# Patient Record
Sex: MALE | Race: WHITE | Employment: UNEMPLOYED | URBAN - METROPOLITAN AREA
[De-identification: names, ages, dates, MRNs, and addresses within clinical notes are randomized per-mention and may not be internally consistent; named-entity substitution may affect disease eponyms.]

---

## 2017-03-03 DIAGNOSIS — F41.9 ANXIETY AND DEPRESSION: ICD-10-CM

## 2017-03-03 DIAGNOSIS — F32.A ANXIETY AND DEPRESSION: ICD-10-CM

## 2017-03-06 RX ORDER — PAROXETINE HYDROCHLORIDE 20 MG/1
TABLET, FILM COATED ORAL
Qty: 60 TAB | Refills: 3 | Status: SHIPPED | OUTPATIENT
Start: 2017-03-06 | End: 2017-08-11 | Stop reason: SDUPTHER

## 2017-03-06 RX ORDER — CLONAZEPAM 0.5 MG/1
0.5 TABLET ORAL DAILY
Qty: 30 TAB | Refills: 0 | Status: SHIPPED | OUTPATIENT
Start: 2017-03-06 | End: 2017-12-21 | Stop reason: SDUPTHER

## 2017-04-03 ENCOUNTER — OFFICE VISIT (OUTPATIENT)
Dept: FAMILY MEDICINE CLINIC | Age: 32
End: 2017-04-03

## 2017-04-03 VITALS
BODY MASS INDEX: 30.85 KG/M2 | TEMPERATURE: 98.7 F | WEIGHT: 240.4 LBS | HEART RATE: 82 BPM | RESPIRATION RATE: 16 BRPM | HEIGHT: 74 IN | SYSTOLIC BLOOD PRESSURE: 180 MMHG | DIASTOLIC BLOOD PRESSURE: 106 MMHG

## 2017-04-03 DIAGNOSIS — I10 ESSENTIAL HYPERTENSION: Primary | ICD-10-CM

## 2017-04-03 DIAGNOSIS — L29.8 PRURITIC ERYTHEMATOUS RASH: ICD-10-CM

## 2017-04-03 RX ORDER — CLOTRIMAZOLE AND BETAMETHASONE DIPROPIONATE 10; .64 MG/G; MG/G
CREAM TOPICAL
Qty: 60 G | Refills: 0 | Status: SHIPPED | OUTPATIENT
Start: 2017-04-03 | End: 2017-12-21 | Stop reason: ALTCHOICE

## 2017-04-03 RX ORDER — LISINOPRIL AND HYDROCHLOROTHIAZIDE 12.5; 2 MG/1; MG/1
1 TABLET ORAL DAILY
Qty: 30 TAB | Refills: 0 | Status: SHIPPED | OUTPATIENT
Start: 2017-04-03 | End: 2017-04-30 | Stop reason: SDUPTHER

## 2017-04-03 RX ORDER — FAMOTIDINE 20 MG/1
20 TABLET, FILM COATED ORAL 2 TIMES DAILY
COMMUNITY
End: 2017-12-21 | Stop reason: ALTCHOICE

## 2017-04-03 NOTE — PROGRESS NOTES
Chief Complaint   Patient presents with    Medication Refill    Hypertension     Increase BPs     Patient sts has concerns with Blood Pressure. 1. Have you been to the ER, urgent care clinic since your last visit? Hospitalized since your last visit? No    2. Have you seen or consulted any other health care providers outside of the [address] [place] [address] since your last visit? Include any pap smears or colon screening.  No

## 2017-04-03 NOTE — PATIENT INSTRUCTIONS
Home Blood Pressure Test: About This Test  What is it? A home blood pressure test allows you to keep track of your blood pressure at home. Blood pressure is a measure of the force of blood against the walls of your arteries. Blood pressure readings include two numbers, such as 130/80 (say \"130 over 80\"). The first number is the systolic pressure. The second number is the diastolic pressure. Why is this test done? You may do this test at home to:  · Find out if you have high blood pressure. · Track your blood pressure if you have high blood pressure. · Track how well medicine is working to reduce high blood pressure. · Check how lifestyle changes, such as weight loss and exercise, are affecting blood pressure. How can you prepare for the test?  · Do not use caffeine, tobacco, or medicines known to raise blood pressure (such as nasal decongestant sprays) for at least 30 minutes before taking your blood pressure. · Do not exercise for at least 30 minutes before taking your blood pressure. What happens before the test?  Take your blood pressure while you feel comfortable and relaxed. Sit quietly with both feet on the floor for at least 5 minutes before the test.  What happens during the test?  · Sit with your arm slightly bent and resting on a table so that your upper arm is at the same level as your heart. · Roll up your sleeve or take off your shirt to expose your upper arm. · Wrap the blood pressure cuff around your upper arm so that the lower edge of the cuff is about 1 inch above the bend of your elbow. Proceed with the following steps depending on if you are using an automatic or manual pressure monitor. Automatic blood pressure monitors  · Press the on/off button on the automatic monitor and wait until the ready-to-measure \"heart\" symbol appears next to zero in the display window. · Press the start button. The cuff will inflate and deflate by itself.   · Your blood pressure numbers will appear on the screen. · Write your numbers in your log book, along with the date and time. Manual blood pressure monitors  · Place the earpieces of a stethoscope in your ears, and place the bell of the stethoscope over the artery, just below the cuff. · Close the valve on the rubber inflating bulb. · Squeeze the bulb rapidly with your opposite hand to inflate the cuff until the dial or column of mercury reads about 30 mm Hg higher than your usual systolic pressure. If you do not know your usual pressure, inflate the cuff to 210 mm Hg or until the pulse at your wrist disappears. · Open the pressure valve just slightly by twisting or pressing the valve on the bulb. · As you watch the pressure slowly fall, note the level on the dial at which you first start to hear a pulsing or tapping sound through the stethoscope. This is your systolic blood pressure. · Continue letting the air out slowly. The sounds will become muffled and will finally disappear. Note the pressure when the sounds completely disappear. This is your diastolic blood pressure. Let out all the remaining air. · Write your numbers in your log book, along with the date and time. What else should you know about the test?  Results for adults ages 25 and older (mm Hg):  · Normal (ideal): Systolic 372 or below. Diastolic 79 or below. · Prehypertension: Systolic 272 to 249. Diastolic 80 to 89. · Hypertension: Systolic 103 or above. Diastolic 90 or above. Follow-up care is a key part of your treatment and safety. Be sure to make and go to all appointments, and call your doctor if you are having problems. It's also a good idea to keep a list of the medicines you take. Where can you learn more? Go to http://alistair-kalpesh.info/. Enter C427 in the search box to learn more about \"Home Blood Pressure Test: About This Test.\"  Current as of: January 27, 2016  Content Version: 11.2  © 8863-5223 [employer].  Care instructions adapted under license by [employer] (which disclaims liability or warranty for this information). If you have questions about a medical condition or this instruction, always ask your healthcare professional. [address] any warranty or liability for your use of this information. DASH Diet: Care Instructions  Your Care Instructions  The DASH diet is an eating plan that can help lower your blood pressure. DASH stands for Dietary Approaches to Stop Hypertension. Hypertension is high blood pressure. The DASH diet focuses on eating foods that are high in calcium, potassium, and magnesium. These nutrients can lower blood pressure. The foods that are highest in these nutrients are fruits, vegetables, low-fat dairy products, nuts, seeds, and legumes. But taking calcium, potassium, and magnesium supplements instead of eating foods that are high in those nutrients does not have the same effect. The DASH diet also includes whole grains, fish, and poultry. The DASH diet is one of several lifestyle changes your doctor may recommend to lower your high blood pressure. Your doctor may also want you to decrease the amount of sodium in your diet. Lowering sodium while following the DASH diet can lower blood pressure even further than just the DASH diet alone. Follow-up care is a key part of your treatment and safety. Be sure to make and go to all appointments, and call your doctor if you are having problems. It's also a good idea to know your test results and keep a list of the medicines you take. How can you care for yourself at home? Following the DASH diet  · Eat 4 to 5 servings of fruit each day. A serving is 1 medium-sized piece of fruit, ½ cup chopped or canned fruit, 1/4 cup dried fruit, or 4 ounces (½ cup) of fruit juice. Choose fruit more often than fruit juice. · Eat 4 to 5 servings of vegetables each day.  A serving is 1 cup of lettuce or raw leafy vegetables, ½ cup of chopped or cooked vegetables, or 4 ounces (½ cup) of vegetable juice. Choose vegetables more often than vegetable juice. · Get 2 to 3 servings of low-fat and fat-free dairy each day. A serving is 8 ounces of milk, 1 cup of yogurt, or 1 ½ ounces of cheese. · Eat 6 to 8 servings of grains each day. A serving is 1 slice of bread, 1 ounce of dry cereal, or ½ cup of cooked rice, pasta, or cooked cereal. Try to choose whole-grain products as much as possible. · Limit lean meat, poultry, and fish to 2 servings each day. A serving is 3 ounces, about the size of a deck of cards. · Eat 4 to 5 servings of nuts, seeds, and legumes (cooked dried beans, lentils, and split peas) each week. A serving is 1/3 cup of nuts, 2 tablespoons of seeds, or ½ cup of cooked beans or peas. · Limit fats and oils to 2 to 3 servings each day. A serving is 1 teaspoon of vegetable oil or 2 tablespoons of salad dressing. · Limit sweets and added sugars to 5 servings or less a week. A serving is 1 tablespoon jelly or jam, ½ cup sorbet, or 1 cup of lemonade. · Eat less than 2,300 milligrams (mg) of sodium a day. If you limit your sodium to 1,500 mg a day, you can lower your blood pressure even more. Tips for success  · Start small. Do not try to make dramatic changes to your diet all at once. You might feel that you are missing out on your favorite foods and then be more likely to not follow the plan. Make small changes, and stick with them. Once those changes become habit, add a few more changes. · Try some of the following:  ¨ Make it a goal to eat a fruit or vegetable at every meal and at snacks. This will make it easy to get the recommended amount of fruits and vegetables each day. ¨ Try yogurt topped with fruit and nuts for a snack or healthy dessert. ¨ Add lettuce, tomato, cucumber, and onion to sandwiches. ¨ Combine a ready-made pizza crust with low-fat mozzarella cheese and lots of vegetable toppings.  Try using tomatoes, squash, spinach, broccoli, carrots, cauliflower, and onions. ¨ Have a variety of cut-up vegetables with a low-fat dip as an appetizer instead of chips and dip. ¨ Sprinkle sunflower seeds or chopped almonds over salads. Or try adding chopped walnuts or almonds to cooked vegetables. ¨ Try some vegetarian meals using beans and peas. Add garbanzo or kidney beans to salads. Make burritos and tacos with mashed pelaez beans or black beans. Where can you learn more? Go to http://alistairSWEEPiOkalpesh.info/. Enter C490 in the search box to learn more about \"DASH Diet: Care Instructions. \"  Current as of: March 23, 2016  Content Version: 11.2  © 9743-2945 [employer]. Care instructions adapted under license by [employer] (which disclaims liability or warranty for this information). If you have questions about a medical condition or this instruction, always ask your healthcare professional. [address] any warranty or liability for your use of this information.

## 2017-04-03 NOTE — PROGRESS NOTES
Chief Complaint   Patient presents with    Medication Refill    Hypertension     Increase BPs       HPI:  Patient is a 33year old  male presents today for follow up of Hypertension. He was previously on Lisinopril 5 mg daily for hypertension which he has not taken in about 2 years and   has been using herbal preparations to try to control BP as well as diet and exercise. Blood pressure is elevated at the office today initially at 160/100 taken by nurse with repeat by me at 180/106. He endorsed recent erythematous scaly rash on the posterior right calf that he noticed yesterday, otherwise he feels well and denies fever, chills, headache, dizziness, blurry vision, CP, or SOB. Past Medical History:   Diagnosis Date    Anxiety     Cholelithiasis 08/18/2013    Note on U/S    Chronic gastritis 10/17/2014    Dr. Rosmery Shipley (EGD Result) H. Pylori Neg    Depression     Dog bite of left hand including fingers with infection 11/16/2014    Adm 11/18/14 for IV antibiotics.  ETOH abuse     GERD (gastroesophageal reflux disease)     Hepatic steatosis 02/06/2014    Noted on U/S.     History of plasmapheresis 02/2014    Hypertriglyceridemia, Severe Pancreatitis    Hypertension     Hypertriglyceridemia     Hypomagnesemia 07/24/2016    1.6 mg/dL    Non compliance with medical treatment     Osteomyelitis of left hand ([address] 11/22/2014    MRI Finding @ LUE 2nd MCP joint.  Pancreatitis 08/17/2013    Highest on Record (2/6/14) 2723. Dr. Rosmery Shipley    PICC (peripherally inserted central catheter) in place 41/86/1758    R Basilic Vein (IV Antibiotics for LUE Hand Osteomyelitis).  Rectal bleeding     Substance abuse     Several UDS + Opiates.      Vitamin D deficiency 02/17/2014    11.6ng/mL ( ng/mL)     Allergies   Allergen Reactions    Amoxicillin Swelling    Contrast Dye [Iodine] Hives     Pt had iv contrast prior to this study without problems, developed hives on 07/04/15 5 minutes post injection    Penicillins Hives and Swelling    Wellbutrin [Bupropion] Other (comments)     Caused aggression     Current Outpatient Prescriptions   Medication Sig Dispense Refill    GRAPE SEED EXTRACT (GRAPE SEED PO) Take  by mouth.  famotidine (PEPCID) 20 mg tablet Take 20 mg by mouth two (2) times a day.  UBIDECARENONE/VITAMIN E MIXED (COQ10  PO) Take  by mouth.  VITAMIN B COMPLEX VIT C NO.4 (SUPER B COMPLEX + C PO) Take  by mouth.  lisinopril-hydroCHLOROthiazide (PRINZIDE, ZESTORETIC) 20-12.5 mg per tablet Take 1 Tab by mouth daily. 30 Tab 0    clotrimazole-betamethasone (LOTRISONE) topical cream Sig: Apply BID to affected area 60 g 0    clonazePAM (KLONOPIN) 0.5 mg tablet Take 1 Tab by mouth daily. Max Daily Amount: 0.5 mg. 30 Tab 0    PARoxetine (PAXIL) 20 mg tablet TAKE TWO TABLETS BY MOUTH ONCE DAILY 60 Tab 3    loratadine (CLARITIN) 10 mg tablet Take 10 mg by mouth daily. Indications: ALLERGIC RHINITIS      fluticasone (FLONASE) 50 mcg/actuation nasal spray 2 Sprays by Both Nostrils route daily. Indications: ALLERGIC RHINITIS      lisinopril (PRINIVIL, ZESTRIL) 5 mg tablet Take 1 Tab by mouth daily. Indications: HYPERTENSION 30 Tab 2    gemfibrozil (LOPID) 600 mg tablet TAKE ONE TABLET BY MOUTH TWICE DAILY 180 Tab 0         ROS:  Pertinent as in HPI        Physical Exam:  Visit Vitals    BP (!) 180/106    Pulse 82    Temp 98.7 °F (37.1 °C) (Oral)    Resp 16    Ht 6' 2\" (1.88 m)    Wt 240 lb 6.4 oz (109 kg)    BMI 30.87 kg/m2     General: a & o x 3, afebrile, well-nourished, interacting appropriately, in no acute distress  Respiratory: symmetrical chest expansion, lung sounds clear bilaterally  Cardiovascular: normal S1S2, regular rate and rhythm  Skin: Erythematous scaly rash noted on the posterior right calf      Assessment/Plan:    ICD-10-CM ICD-9-CM    1.  Essential hypertension I10 401.9 Uncontrolled  Lisinopril-HCTZ 20-12.5 mg daily started today and he was counseled on low salt diet. He was advised on home BP monitoring and keep diary to present at f/u in 1 week. lisinopril-hydroCHLOROthiazide (PRINZIDE, ZESTORETIC) 20-12.5 mg per tablet   2. Pruritic erythematous rash L29.8 698.8 Lotrisone cream to be applied BID to affected area started today. clotrimazole-betamethasone (LOTRISONE) topical cream           Orders Placed This Encounter    GRAPE SEED EXTRACT (GRAPE SEED PO)     Sig: Take  by mouth.  famotidine (PEPCID) 20 mg tablet     Sig: Take 20 mg by mouth two (2) times a day.  UBIDECARENONE/VITAMIN E MIXED (COQ10  PO)     Sig: Take  by mouth.  VITAMIN B COMPLEX VIT C NO.4 (SUPER B COMPLEX + C PO)     Sig: Take  by mouth.  lisinopril-hydroCHLOROthiazide (PRINZIDE, ZESTORETIC) 20-12.5 mg per tablet     Sig: Take 1 Tab by mouth daily. Dispense:  30 Tab     Refill:  0    clotrimazole-betamethasone (LOTRISONE) topical cream     Sig: Sig: Apply BID to affected area     Dispense:  60 g     Refill:  0         Additional Notes: Discussed today's diagnosis, treatment plans. Discussed medication indications and side effects. After Visit Summary: Discussed provided printed patient instructions. Answered questions accordingly.   Follow-up Disposition: In 1 week for Hypertension          Lamont Thrasher DO, MPH  Internal Medicine

## 2017-04-03 NOTE — MR AVS SNAPSHOT
Visit Information Date & Time Provider Department Dept. Phone Encounter #  
 4/3/2017 12:30 PM Martine Ta, [address]  [id number] Follow-up Instructions Return in about 1 week (around 4/10/2017). Upcoming Health Maintenance Date Due INFLUENZA AGE 9 TO ADULT 8/1/2016 DTaP/Tdap/Td series (2 - Td) 11/17/2024 Allergies as of 4/3/2017  Review Complete On: 4/3/2017 By: Deng Guzman LPN Severity Noted Reaction Type Reactions Amoxicillin  12/28/2014    Swelling Contrast Dye [Iodine]  07/04/2015   Topical Hives Pt had iv contrast prior to this study without problems, developed hives on 07/04/15 5 minutes post injection Penicillins  05/27/2015    Hives, Swelling Wellbutrin [Bupropion]  10/03/2012    Other (comments) Caused aggression Current Immunizations  Never Reviewed Name Date Tdap 11/17/2014 12:17 PM  
  
 Not reviewed this visit You Were Diagnosed With   
  
 Codes Comments Essential hypertension    -  Primary ICD-10-CM: I10 
ICD-9-CM: 401.9 Vitals BP Pulse Temp Resp Height(growth percentile) Weight(growth percentile) (!) 160/100 (BP 1 Location: Left arm, BP Patient Position: Sitting) 82 98.7 °F (37.1 °C) (Oral) 16 6' 2\" (1.88 m) 240 lb 6.4 oz (109 kg) BMI Smoking Status 30.87 kg/m2 Former Smoker Vitals History BMI and BSA Data Body Mass Index Body Surface Area  
 30.87 kg/m 2 2.39 m 2 Preferred Pharmacy Pharmacy Name Phone [facility] [address] [phone] Your Updated Medication List  
  
   
This list is accurate as of: 4/3/17  1:51 PM.  Always use your most recent med list.  
  
  
  
  
 clonazePAM 0.5 mg tablet Commonly known as:  [place] Take 1 Tab by mouth daily. Max Daily Amount: 0.5 mg.  
  
 COQ10  PO Take  by mouth. fluticasone 50 mcg/actuation nasal spray Commonly known as:  Carmarilyn Lenugusick 2 Sprays by Both Nostrils route daily. Indications: ALLERGIC RHINITIS  
  
 gemfibrozil 600 mg tablet Commonly known as:  LOPID TAKE ONE TABLET BY MOUTH TWICE DAILY  
  
 GRAPE SEED PO Take  by mouth.  
  
 lisinopril 5 mg tablet Commonly known as:  Duldanie Vallejoshalini Take 1 Tab by mouth daily. Indications: HYPERTENSION  
  
 lisinopril-hydroCHLOROthiazide 20-12.5 mg per tablet Commonly known as:  Vallerie Irwin Take 1 Tab by mouth daily. loratadine 10 mg tablet Commonly known as:  Efrain Meo Take 10 mg by mouth daily. Indications: ALLERGIC RHINITIS PARoxetine 20 mg tablet Commonly known as:  PAXIL TAKE TWO TABLETS BY MOUTH ONCE DAILY PEPCID 20 mg tablet Generic drug:  famotidine Take 20 mg by mouth two (2) times a day. SUPER B COMPLEX + C PO Take  by mouth. Prescriptions Sent to Pharmacy Refills  
 lisinopril-hydroCHLOROthiazide (PRINZIDE, ZESTORETIC) 20-12.5 mg per tablet 0 Sig: Take 1 Tab by mouth daily. Class: Normal  
 Pharmacy: [address] [facility]  #: [phone] Route: Oral  
  
Follow-up Instructions Return in about 1 week (around 4/10/2017). Patient Instructions Home Blood Pressure Test: About This Test 
What is it? A home blood pressure test allows you to keep track of your blood pressure at home. Blood pressure is a measure of the force of blood against the walls of your arteries. Blood pressure readings include two numbers, such as 130/80 (say \"130 over 80\"). The first number is the systolic pressure. The second number is the diastolic pressure. Why is this test done? You may do this test at home to: · Find out if you have high blood pressure. · Track your blood pressure if you have high blood pressure. · Track how well medicine is working to reduce high blood pressure. · Check how lifestyle changes, such as weight loss and exercise, are affecting blood pressure. How can you prepare for the test? 
· Do not use caffeine, tobacco, or medicines known to raise blood pressure (such as nasal decongestant sprays) for at least 30 minutes before taking your blood pressure. · Do not exercise for at least 30 minutes before taking your blood pressure. What happens before the test? 
Take your blood pressure while you feel comfortable and relaxed. Sit quietly with both feet on the floor for at least 5 minutes before the test. 
What happens during the test? 
· Sit with your arm slightly bent and resting on a table so that your upper arm is at the same level as your heart. · Roll up your sleeve or take off your shirt to expose your upper arm. · Wrap the blood pressure cuff around your upper arm so that the lower edge of the cuff is about 1 inch above the bend of your elbow. Proceed with the following steps depending on if you are using an automatic or manual pressure monitor. Automatic blood pressure monitors · Press the on/off button on the automatic monitor and wait until the ready-to-measure \"heart\" symbol appears next to zero in the display window. · Press the start button. The cuff will inflate and deflate by itself. · Your blood pressure numbers will appear on the screen. · Write your numbers in your log book, along with the date and time. Manual blood pressure monitors · Place the earpieces of a stethoscope in your ears, and place the bell of the stethoscope over the artery, just below the cuff. · Close the valve on the rubber inflating bulb. · Squeeze the bulb rapidly with your opposite hand to inflate the cuff until the dial or column of mercury reads about 30 mm Hg higher than your usual systolic pressure. If you do not know your usual pressure, inflate the cuff to 210 mm Hg or until the pulse at your wrist disappears. · Open the pressure valve just slightly by twisting or pressing the valve on the bulb. · As you watch the pressure slowly fall, note the level on the dial at which you first start to hear a pulsing or tapping sound through the stethoscope. This is your systolic blood pressure. · Continue letting the air out slowly. The sounds will become muffled and will finally disappear. Note the pressure when the sounds completely disappear. This is your diastolic blood pressure. Let out all the remaining air. · Write your numbers in your log book, along with the date and time. What else should you know about the test? 
Results for adults ages 25 and older (mm Hg): · Normal (ideal): Systolic 860 or below. Diastolic 79 or below. · Prehypertension: Systolic 157 to 511. Diastolic 80 to 89. · Hypertension: Systolic 906 or above. Diastolic 90 or above. Follow-up care is a key part of your treatment and safety. Be sure to make and go to all appointments, and call your doctor if you are having problems. It's also a good idea to keep a list of the medicines you take. Where can you learn more? Go to http://alistair-kalpesh.info/. Enter C427 in the search box to learn more about \"Home Blood Pressure Test: About This Test.\" Current as of: January 27, 2016 Content Version: 11.2 © 4765-4906 RoomReveal. Care instructions adapted under license by [employer]alo (which disclaims liability or warranty for this information). If you have questions about a medical condition or this instruction, always ask your healthcare professional. [place] [address] any warranty or liability for your use of this information. DASH Diet: Care Instructions Your Care Instructions The DASH diet is an eating plan that can help lower your blood pressure. DASH stands for Dietary Approaches to Stop Hypertension. Hypertension is high blood pressure. The DASH diet focuses on eating foods that are high in calcium, potassium, and magnesium. These nutrients can lower blood pressure. The foods that are highest in these nutrients are fruits, vegetables, low-fat dairy products, nuts, seeds, and legumes. But taking calcium, potassium, and magnesium supplements instead of eating foods that are high in those nutrients does not have the same effect. The DASH diet also includes whole grains, fish, and poultry. The DASH diet is one of several lifestyle changes your doctor may recommend to lower your high blood pressure. Your doctor may also want you to decrease the amount of sodium in your diet. Lowering sodium while following the DASH diet can lower blood pressure even further than just the DASH diet alone. Follow-up care is a key part of your treatment and safety. Be sure to make and go to all appointments, and call your doctor if you are having problems. It's also a good idea to know your test results and keep a list of the medicines you take. How can you care for yourself at home? Following the DASH diet · Eat 4 to 5 servings of fruit each day. A serving is 1 medium-sized piece of fruit, ½ cup chopped or canned fruit, 1/4 cup dried fruit, or 4 ounces (½ cup) of fruit juice. Choose fruit more often than fruit juice. · Eat 4 to 5 servings of vegetables each day. A serving is 1 cup of lettuce or raw leafy vegetables, ½ cup of chopped or cooked vegetables, or 4 ounces (½ cup) of vegetable juice. Choose vegetables more often than vegetable juice. · Get 2 to 3 servings of low-fat and fat-free dairy each day. A serving is 8 ounces of milk, 1 cup of yogurt, or 1 ½ ounces of cheese. · Eat 6 to 8 servings of grains each day. A serving is 1 slice of bread, 1 ounce of dry cereal, or ½ cup of cooked rice, pasta, or cooked cereal. Try to choose whole-grain products as much as possible. · Limit lean meat, poultry, and fish to 2 servings each day.  A serving is 3 ounces, about the size of a deck of cards. · Eat 4 to 5 servings of nuts, seeds, and legumes (cooked dried beans, lentils, and split peas) each week. A serving is 1/3 cup of nuts, 2 tablespoons of seeds, or ½ cup of cooked beans or peas. · Limit fats and oils to 2 to 3 servings each day. A serving is 1 teaspoon of vegetable oil or 2 tablespoons of salad dressing. · Limit sweets and added sugars to 5 servings or less a week. A serving is 1 tablespoon jelly or jam, ½ cup sorbet, or 1 cup of lemonade. · Eat less than 2,300 milligrams (mg) of sodium a day. If you limit your sodium to 1,500 mg a day, you can lower your blood pressure even more. Tips for success · Start small. Do not try to make dramatic changes to your diet all at once. You might feel that you are missing out on your favorite foods and then be more likely to not follow the plan. Make small changes, and stick with them. Once those changes become habit, add a few more changes. · Try some of the following: ¨ Make it a goal to eat a fruit or vegetable at every meal and at snacks. This will make it easy to get the recommended amount of fruits and vegetables each day. ¨ Try yogurt topped with fruit and nuts for a snack or healthy dessert. ¨ Add lettuce, tomato, cucumber, and onion to sandwiches. ¨ Combine a ready-made pizza crust with low-fat mozzarella cheese and lots of vegetable toppings. Try using tomatoes, squash, spinach, broccoli, carrots, cauliflower, and onions. ¨ Have a variety of cut-up vegetables with a low-fat dip as an appetizer instead of chips and dip. ¨ Sprinkle sunflower seeds or chopped almonds over salads. Or try adding chopped walnuts or almonds to cooked vegetables. ¨ Try some vegetarian meals using beans and peas. Add garbanzo or kidney beans to salads. Make burritos and tacos with mashed pelaez beans or black beans. Where can you learn more? Go to http://sainz-kalpesh.info/. Enter H974 in the search box to learn more about \"DASH Diet: Care Instructions. \" Current as of: March 23, 2016 Content Version: 11.2 © 8960-2654 ViS. Care instructions adapted under license by "[employer] (which disclaims liability or warranty for this information). If you have questions about a medical condition or this instruction, always ask your healthcare professional. [address] any warranty or liability for your use of this information. Introducing [facility] & [facility]! Dear Beatrice Mart: Thank you for requesting a [email or web address] account. Our records indicate that you already have an active [email or web address] account. You can access your account anytime at [email or web address] Did you know that you can access your hospital and ER discharge instructions at any time in [email or web address]? You can also review all of your test results from your hospital stay or ER visit. Additional Information If you have questions, please visit the Frequently Asked Questions section of the [email or web address] website at [email or web address] Remember, [email or web address] is NOT to be used for urgent needs. For medical emergencies, dial 911. Now available from your iPhone and Android! Please provide this summary of care documentation to your next provider. Your primary care clinician is listed as Liz Gonzalez. If you have any questions after today's visit, please call [phone].

## 2017-04-10 ENCOUNTER — OFFICE VISIT (OUTPATIENT)
Dept: FAMILY MEDICINE CLINIC | Age: 32
End: 2017-04-10

## 2017-04-10 DIAGNOSIS — I10 ESSENTIAL HYPERTENSION: Primary | ICD-10-CM

## 2017-04-30 DIAGNOSIS — I10 ESSENTIAL HYPERTENSION: ICD-10-CM

## 2017-05-01 RX ORDER — LISINOPRIL AND HYDROCHLOROTHIAZIDE 12.5; 2 MG/1; MG/1
TABLET ORAL
Qty: 30 TAB | Refills: 0 | Status: SHIPPED | OUTPATIENT
Start: 2017-05-01 | End: 2017-06-04 | Stop reason: SDUPTHER

## 2017-06-05 ENCOUNTER — TELEPHONE (OUTPATIENT)
Dept: FAMILY MEDICINE CLINIC | Age: 32
End: 2017-06-05

## 2017-06-16 NOTE — TELEPHONE ENCOUNTER
Juan Alberto on home phone for pt to return call to office to schedule a fu apt with [facility].  Noted in Jr

## 2017-07-03 DIAGNOSIS — I10 ESSENTIAL HYPERTENSION: ICD-10-CM

## 2017-07-05 RX ORDER — LISINOPRIL AND HYDROCHLOROTHIAZIDE 12.5; 2 MG/1; MG/1
TABLET ORAL
Qty: 30 TAB | Refills: 0 | Status: SHIPPED | OUTPATIENT
Start: 2017-07-05 | End: 2017-08-07 | Stop reason: SDUPTHER

## 2017-08-07 DIAGNOSIS — I10 ESSENTIAL HYPERTENSION: ICD-10-CM

## 2017-08-08 NOTE — TELEPHONE ENCOUNTER
Patient called to check on the status of his refill. Patient states he has been out of the mediation for a couple of days. He is requesting it to be filled as soon as possible and a call when it has been done.  [phone]

## 2017-08-09 ENCOUNTER — TELEPHONE (OUTPATIENT)
Dept: FAMILY MEDICINE CLINIC | Age: 32
End: 2017-08-09

## 2017-08-09 RX ORDER — LISINOPRIL AND HYDROCHLOROTHIAZIDE 12.5; 2 MG/1; MG/1
TABLET ORAL
Qty: 30 TAB | Refills: 0 | Status: SHIPPED | OUTPATIENT
Start: 2017-08-09 | End: 2017-09-06 | Stop reason: SDUPTHER

## 2017-08-09 NOTE — TELEPHONE ENCOUNTER
Ronel Ashford 40 minutes ago (2:55 PM)                 Pt called wanting to know can someone else write his rx instead of Dr. Chang Burgos

## 2017-08-11 DIAGNOSIS — F32.A ANXIETY AND DEPRESSION: ICD-10-CM

## 2017-08-11 DIAGNOSIS — F41.9 ANXIETY AND DEPRESSION: ICD-10-CM

## 2017-08-11 RX ORDER — PAROXETINE HYDROCHLORIDE 20 MG/1
TABLET, FILM COATED ORAL
Qty: 30 TAB | Refills: 0 | Status: SHIPPED | OUTPATIENT
Start: 2017-08-11 | End: 2017-09-06 | Stop reason: SDUPTHER

## 2017-08-11 NOTE — TELEPHONE ENCOUNTER
Has not been seen in office April 2017 uncontrolled hypertension seen by [facility] and told to come back in one week.   Needs appointment ----RX refilled

## 2017-09-06 DIAGNOSIS — I10 ESSENTIAL HYPERTENSION: ICD-10-CM

## 2017-09-06 DIAGNOSIS — F32.A ANXIETY AND DEPRESSION: ICD-10-CM

## 2017-09-06 DIAGNOSIS — F41.9 ANXIETY AND DEPRESSION: ICD-10-CM

## 2017-09-07 RX ORDER — LISINOPRIL AND HYDROCHLOROTHIAZIDE 12.5; 2 MG/1; MG/1
TABLET ORAL
Qty: 30 TAB | Refills: 0 | Status: SHIPPED | OUTPATIENT
Start: 2017-09-07 | End: 2017-10-10 | Stop reason: SDUPTHER

## 2017-09-07 RX ORDER — PAROXETINE HYDROCHLORIDE 20 MG/1
TABLET, FILM COATED ORAL
Qty: 30 TAB | Refills: 0 | Status: SHIPPED | OUTPATIENT
Start: 2017-09-07 | End: 2017-10-10 | Stop reason: SDUPTHER

## 2017-10-10 DIAGNOSIS — I10 ESSENTIAL HYPERTENSION: ICD-10-CM

## 2017-10-10 DIAGNOSIS — F41.9 ANXIETY AND DEPRESSION: ICD-10-CM

## 2017-10-10 DIAGNOSIS — F32.A ANXIETY AND DEPRESSION: ICD-10-CM

## 2017-10-10 RX ORDER — PAROXETINE HYDROCHLORIDE 20 MG/1
TABLET, FILM COATED ORAL
Qty: 30 TAB | Refills: 0 | Status: SHIPPED | OUTPATIENT
Start: 2017-10-10 | End: 2017-11-20 | Stop reason: SDUPTHER

## 2017-10-10 RX ORDER — LISINOPRIL AND HYDROCHLOROTHIAZIDE 12.5; 2 MG/1; MG/1
TABLET ORAL
Qty: 30 TAB | Refills: 0 | Status: SHIPPED | OUTPATIENT
Start: 2017-10-10 | End: 2017-11-20 | Stop reason: SDUPTHER

## 2017-10-10 NOTE — TELEPHONE ENCOUNTER
Requested Prescriptions     Pending Prescriptions Disp Refills    lisinopril-hydroCHLOROthiazide (PRINZIDE, ZESTORETIC) 20-12.5 mg per tablet 30 Tab 0     Sig: TAKE ONE TABLET BY MOUTH ONCE DAILY    PARoxetine (PAXIL) 20 mg tablet 30 Tab 0     Sig: TAKE TWO TABLETS BY MOUTH ONCE DAILY

## 2017-11-20 DIAGNOSIS — F41.9 ANXIETY AND DEPRESSION: ICD-10-CM

## 2017-11-20 DIAGNOSIS — F32.A ANXIETY AND DEPRESSION: ICD-10-CM

## 2017-11-20 DIAGNOSIS — I10 ESSENTIAL HYPERTENSION: ICD-10-CM

## 2017-11-20 RX ORDER — LISINOPRIL AND HYDROCHLOROTHIAZIDE 12.5; 2 MG/1; MG/1
TABLET ORAL
Qty: 30 TAB | Refills: 0 | Status: SHIPPED | OUTPATIENT
Start: 2017-11-20 | End: 2017-12-21 | Stop reason: ALTCHOICE

## 2017-11-20 RX ORDER — PAROXETINE HYDROCHLORIDE 20 MG/1
TABLET, FILM COATED ORAL
Qty: 30 TAB | Refills: 0 | Status: SHIPPED | OUTPATIENT
Start: 2017-11-20 | End: 2017-12-21 | Stop reason: DRUGHIGH

## 2017-12-11 DIAGNOSIS — F41.9 ANXIETY AND DEPRESSION: ICD-10-CM

## 2017-12-11 DIAGNOSIS — F32.A ANXIETY AND DEPRESSION: ICD-10-CM

## 2017-12-11 RX ORDER — CLONAZEPAM 0.5 MG/1
0.5 TABLET ORAL DAILY
Qty: 30 TAB | Refills: 0 | OUTPATIENT
Start: 2017-12-11

## 2017-12-11 NOTE — TELEPHONE ENCOUNTER
Pt has scheduled fu apt with [facility] December 21st but said that his anxiety has been really bad lately and its messing with his sleep. He knows that he might have to be seen before any refill is granted.

## 2017-12-21 ENCOUNTER — OFFICE VISIT (OUTPATIENT)
Dept: FAMILY MEDICINE CLINIC | Age: 32
End: 2017-12-21

## 2017-12-21 VITALS
WEIGHT: 227.6 LBS | TEMPERATURE: 99.1 F | DIASTOLIC BLOOD PRESSURE: 90 MMHG | OXYGEN SATURATION: 99 % | SYSTOLIC BLOOD PRESSURE: 148 MMHG | HEIGHT: 74 IN | BODY MASS INDEX: 29.21 KG/M2 | RESPIRATION RATE: 17 BRPM | HEART RATE: 88 BPM

## 2017-12-21 DIAGNOSIS — F32.A ANXIETY AND DEPRESSION: ICD-10-CM

## 2017-12-21 DIAGNOSIS — F41.1 GAD (GENERALIZED ANXIETY DISORDER): ICD-10-CM

## 2017-12-21 DIAGNOSIS — F41.0 PANIC ATTACKS: ICD-10-CM

## 2017-12-21 DIAGNOSIS — I10 ESSENTIAL HYPERTENSION: Primary | ICD-10-CM

## 2017-12-21 DIAGNOSIS — Z91.199 PATIENT NONADHERENCE: ICD-10-CM

## 2017-12-21 DIAGNOSIS — F41.9 ANXIETY AND DEPRESSION: ICD-10-CM

## 2017-12-21 DIAGNOSIS — Z59.9 FINANCIAL DIFFICULTIES: ICD-10-CM

## 2017-12-21 RX ORDER — LISINOPRIL 20 MG/1
20 TABLET ORAL DAILY
Qty: 30 TAB | Refills: 3 | Status: SHIPPED | OUTPATIENT
Start: 2017-12-21 | End: 2018-06-08 | Stop reason: SDUPTHER

## 2017-12-21 RX ORDER — PAROXETINE HYDROCHLORIDE 20 MG/1
40 TABLET, FILM COATED ORAL DAILY
Qty: 60 TAB | Refills: 3 | Status: SHIPPED | OUTPATIENT
Start: 2017-12-21 | End: 2018-06-08 | Stop reason: SDUPTHER

## 2017-12-21 RX ORDER — HYDROCHLOROTHIAZIDE 25 MG/1
TABLET ORAL
Qty: 30 TAB | Refills: 4 | Status: SHIPPED | OUTPATIENT
Start: 2017-12-21 | End: 2018-05-21 | Stop reason: SDUPTHER

## 2017-12-21 RX ORDER — CLONAZEPAM 0.5 MG/1
0.5 TABLET ORAL
Qty: 60 TAB | Refills: 0 | Status: SHIPPED | OUTPATIENT
Start: 2017-12-21 | End: 2018-06-08 | Stop reason: SDUPTHER

## 2017-12-21 SDOH — ECONOMIC STABILITY - INCOME SECURITY: PROBLEM RELATED TO HOUSING AND ECONOMIC CIRCUMSTANCES, UNSPECIFIED: Z59.9

## 2017-12-21 NOTE — MR AVS SNAPSHOT
Visit Information Date & Time Provider Department Dept. Phone Encounter #  
 12/21/2017 12:20 PM Jaimee Newsome NP [address] [phone] [id number] Upcoming Health Maintenance Date Due DTaP/Tdap/Td series (2 - Td) 11/17/2024 Allergies as of 12/21/2017  Review Complete On: 12/21/2017 By: Jaimee Newsome NP Severity Noted Reaction Type Reactions Amoxicillin  12/28/2014    Swelling Contrast Dye [Iodine]  07/04/2015   Topical Hives Pt had iv contrast prior to this study without problems, developed hives on 07/04/15 5 minutes post injection Iodinated Contrast- Oral And Iv Dye  12/02/2015    Hives Pt got hives when he received IV contrast in the past  
 Penicillins  05/27/2015    Hives, Swelling Wellbutrin [Bupropion]  10/03/2012    Other (comments) Caused aggression Current Immunizations  Never Reviewed Name Date Tdap 11/17/2014 12:17 PM  
  
 Not reviewed this visit You Were Diagnosed With   
  
 Codes Comments Essential hypertension    -  Primary ICD-10-CM: I10 
ICD-9-CM: 401.9 MICHELLE (generalized anxiety disorder)     ICD-10-CM: F41.1 ICD-9-CM: 300.02 Panic attacks     ICD-10-CM: F41.0 ICD-9-CM: 300.01 Patient nonadherence     ICD-10-CM: Z91.19 ICD-9-CM: V15.81 Vitals BP Pulse Temp Resp Height(growth percentile) Weight(growth percentile) 148/90 (BP 1 Location: Left arm, BP Patient Position: Sitting) 88 99.1 °F (37.3 °C) 17 6' 2\" (1.88 m) 227 lb 9.6 oz (103.2 kg) SpO2 BMI Smoking Status 99% 29.22 kg/m2 Former Smoker Vitals History BMI and BSA Data Body Mass Index Body Surface Area  
 29.22 kg/m 2 2.32 m 2 Preferred Pharmacy Pharmacy Name Phone [facility] - [address] [phone] Your Updated Medication List  
  
   
This list is accurate as of: 12/21/17  1:04 PM.  Always use your most recent med list.  
 clonazePAM 0.5 mg tablet Commonly known as:  Radha Wiley Take 1 Tab by mouth daily. Max Daily Amount: 0.5 mg.  
  
 COQ10  PO Take  by mouth. fluticasone 50 mcg/actuation nasal spray Commonly known as:  Shelvia Birks 2 Sprays by Both Nostrils route daily. Indications: ALLERGIC RHINITIS  
  
 gemfibrozil 600 mg tablet Commonly known as:  LOPID TAKE ONE TABLET BY MOUTH TWICE DAILY  
  
 lisinopril-hydroCHLOROthiazide 20-12.5 mg per tablet Commonly known as:  PRINZIDE, ZESTORETIC  
TAKE ONE TABLET BY MOUTH ONCE DAILY  
  
 loratadine 10 mg tablet Commonly known as:  Lynette Nay Take 10 mg by mouth daily. Indications: ALLERGIC RHINITIS Introducing [facility] & HEALTH SERVICES! Dear Saint Dolphin: Thank you for requesting a adhoclabs account. Our records indicate that you already have an active adhoclabs account. You can access your account anytime at [email or web address] Did you know that you can access your hospital and ER discharge instructions at any time in adhoclabs? You can also review all of your test results from your hospital stay or ER visit. Additional Information If you have questions, please visit the Frequently Asked Questions section of the adhoclabs website at [email or web address] Remember, adhoclabs is NOT to be used for urgent needs. For medical emergencies, dial 911. Now available from your iPhone and Android! Please provide this summary of care documentation to your next provider. Your primary care clinician is listed as Jaky Romero. If you have any questions after today's visit, please call [phone].

## 2017-12-21 NOTE — PROGRESS NOTES
Patient here today for follow up for hypertension and anxiety. 1. Have you been to the ER, urgent care clinic since your last visit? Hospitalized since your last visit? No    2. Have you seen or consulted any other health care providers outside of the [address] [place] [address] since your last visit? Include any pap smears or colon screening.  No

## 2017-12-21 NOTE — PROGRESS NOTES
Subjective:   Isaiah Diaz is a 27 y.o. male with hypertension and MICHELLE, depression  Lost job   Financial constraints  Not taking medications  PHQ-9 score 9  MICHELLE-7 score 20  ROS: denies suicidal ideations, denies illicit drugs or alcohol, (does admit was drinking 8 drinks a day one month ago but denies today), denies pain. Needs refills today  . Current Outpatient Prescriptions   Medication Sig Dispense Refill    lisinopril-hydroCHLOROthiazide (PRINZIDE, ZESTORETIC) 20-12.5 mg per tablet TAKE ONE TABLET BY MOUTH ONCE DAILY 30 Tab 0    loratadine (CLARITIN) 10 mg tablet Take 10 mg by mouth daily. Indications: ALLERGIC RHINITIS      UBIDECARENONE/VITAMIN E MIXED (COQ10  PO) Take  by mouth.  clonazePAM (KLONOPIN) 0.5 mg tablet Take 1 Tab by mouth daily. Max Daily Amount: 0.5 mg. 30 Tab 0    fluticasone (FLONASE) 50 mcg/actuation nasal spray 2 Sprays by Both Nostrils route daily. Indications: ALLERGIC RHINITIS      gemfibrozil (LOPID) 600 mg tablet TAKE ONE TABLET BY MOUTH TWICE DAILY 180 Tab 0      . Objective:   Awake and alert in no acute distress  Neck supple without lymphadenopathy, no carotid artery bruits auscultated bilaterally. Lungs clear throughout  S1 S2 RRR without ectopy or murmur auscultated. Extremities: no clubbing, cyanosis, peripheral edema  Integumentary: no rashes  Normal skin turgor  Visit Vitals    /90 (BP 1 Location: Left arm, BP Patient Position: Sitting)  Comment: has not taken medication lost job    Pulse 88    Temp 99.1 °F (37.3 °C)    Resp 17    Ht 6' 2\" (1.88 m)    Wt 227 lb 9.6 oz (103.2 kg)    SpO2 99%    BMI 29.22 kg/m2      Diagnoses and all orders for this visit:    1. Essential hypertension  -     hydroCHLOROthiazide (HYDRODIURIL) 25 mg tablet; 1/2 pill daily  -     lisinopril (PRINIVIL, ZESTRIL) 20 mg tablet; Take 1 Tab by mouth daily. 2. MICHELLE (generalized anxiety disorder)  -     PARoxetine (PAXIL) 20 mg tablet;  Take 2 Tabs by mouth daily.    3. Panic attacks  -     clonazePAM (KLONOPIN) 0.5 mg tablet; Take 1 Tab by mouth three (3) times daily as needed. Max Daily Amount: 1.5 mg.    4. Patient nonadherence    5. Anxiety and depression  -     PARoxetine (PAXIL) 20 mg tablet; Take 2 Tabs by mouth daily. 6. Financial difficulties      General comfort measures  Anticipatory guidance regarding emergency care if depression symptoms worsen and or patient experiences suicidal ideation and patient verbalizes understanding. Patient verbalizes understanding. I have discussed the diagnosis with the patient and the intended plan as seen in the above orders. The patient has received an after-visit summary and questions were answered concerning future plans. I have discussed medication side effects and warnings with the patient as well. Follow-up Disposition:  Return in about 8 weeks (around 2/15/2018) for MICHELLE and htn.

## 2018-01-05 ENCOUNTER — HOSPITAL ENCOUNTER (EMERGENCY)
Age: 33
Discharge: HOME | End: 2018-01-05
Attending: EMERGENCY MEDICINE
Payer: SELF-PAY

## 2018-01-05 ENCOUNTER — APPOINTMENT (OUTPATIENT)
Dept: GENERAL RADIOLOGY | Age: 33
End: 2018-01-05
Attending: PHYSICIAN ASSISTANT
Payer: SELF-PAY

## 2018-01-05 VITALS
SYSTOLIC BLOOD PRESSURE: 194 MMHG | HEART RATE: 106 BPM | RESPIRATION RATE: 18 BRPM | WEIGHT: 225 LBS | BODY MASS INDEX: 28.88 KG/M2 | TEMPERATURE: 99.8 F | HEIGHT: 74 IN | DIASTOLIC BLOOD PRESSURE: 115 MMHG | OXYGEN SATURATION: 100 %

## 2018-01-05 DIAGNOSIS — M79.671 RIGHT FOOT PAIN: Primary | ICD-10-CM

## 2018-01-05 LAB
ALBUMIN SERPL-MCNC: 4 G/DL (ref 3.4–5)
ALBUMIN/GLOB SERPL: 1.1 {RATIO} (ref 0.8–1.7)
ALP SERPL-CCNC: 47 U/L (ref 45–117)
ALT SERPL-CCNC: 19 U/L (ref 16–61)
ANION GAP SERPL CALC-SCNC: 8 MMOL/L (ref 3–18)
AST SERPL-CCNC: 24 U/L (ref 15–37)
BASOPHILS # BLD: 0 K/UL (ref 0–0.06)
BASOPHILS NFR BLD: 0 % (ref 0–2)
BILIRUB SERPL-MCNC: 0.7 MG/DL (ref 0.2–1)
BUN SERPL-MCNC: 14 MG/DL (ref 7–18)
BUN/CREAT SERPL: 15 (ref 12–20)
CALCIUM SERPL-MCNC: 8.8 MG/DL (ref 8.5–10.1)
CHLORIDE SERPL-SCNC: 103 MMOL/L (ref 100–108)
CO2 SERPL-SCNC: 26 MMOL/L (ref 21–32)
CREAT SERPL-MCNC: 0.96 MG/DL (ref 0.6–1.3)
D DIMER PPP FEU-MCNC: 0.28 UG/ML(FEU)
DIFFERENTIAL METHOD BLD: ABNORMAL
EOSINOPHIL # BLD: 0.3 K/UL (ref 0–0.4)
EOSINOPHIL NFR BLD: 3 % (ref 0–5)
ERYTHROCYTE [DISTWIDTH] IN BLOOD BY AUTOMATED COUNT: 12.8 % (ref 11.6–14.5)
GLOBULIN SER CALC-MCNC: 3.6 G/DL (ref 2–4)
GLUCOSE SERPL-MCNC: 88 MG/DL (ref 74–99)
HCT VFR BLD AUTO: 40 % (ref 36–48)
HGB BLD-MCNC: 13.9 G/DL (ref 13–16)
LIPASE SERPL-CCNC: 287 U/L (ref 73–393)
LYMPHOCYTES # BLD: 2 K/UL (ref 0.9–3.6)
LYMPHOCYTES NFR BLD: 21 % (ref 21–52)
MCH RBC QN AUTO: 31.7 PG (ref 24–34)
MCHC RBC AUTO-ENTMCNC: 34.8 G/DL (ref 31–37)
MCV RBC AUTO: 91.1 FL (ref 74–97)
MONOCYTES # BLD: 0.9 K/UL (ref 0.05–1.2)
MONOCYTES NFR BLD: 10 % (ref 3–10)
NEUTS SEG # BLD: 6.1 K/UL (ref 1.8–8)
NEUTS SEG NFR BLD: 66 % (ref 40–73)
PLATELET # BLD AUTO: 236 K/UL (ref 135–420)
PMV BLD AUTO: 9.8 FL (ref 9.2–11.8)
POTASSIUM SERPL-SCNC: 3.7 MMOL/L (ref 3.5–5.5)
PROT SERPL-MCNC: 7.6 G/DL (ref 6.4–8.2)
RBC # BLD AUTO: 4.39 M/UL (ref 4.7–5.5)
SODIUM SERPL-SCNC: 137 MMOL/L (ref 136–145)
WBC # BLD AUTO: 9.3 K/UL (ref 4.6–13.2)

## 2018-01-05 PROCEDURE — 99282 EMERGENCY DEPT VISIT SF MDM: CPT

## 2018-01-05 PROCEDURE — 74011250637 HC RX REV CODE- 250/637: Performed by: EMERGENCY MEDICINE

## 2018-01-05 PROCEDURE — 85025 COMPLETE CBC W/AUTO DIFF WBC: CPT

## 2018-01-05 PROCEDURE — 80053 COMPREHEN METABOLIC PANEL: CPT

## 2018-01-05 PROCEDURE — 83690 ASSAY OF LIPASE: CPT

## 2018-01-05 PROCEDURE — 73630 X-RAY EXAM OF FOOT: CPT

## 2018-01-05 PROCEDURE — 85379 FIBRIN DEGRADATION QUANT: CPT

## 2018-01-05 PROCEDURE — 96374 THER/PROPH/DIAG INJ IV PUSH: CPT

## 2018-01-05 PROCEDURE — 74011250636 HC RX REV CODE- 250/636: Performed by: EMERGENCY MEDICINE

## 2018-01-05 RX ORDER — CLINDAMYCIN HYDROCHLORIDE 300 MG/1
300 CAPSULE ORAL 4 TIMES DAILY
Qty: 20 CAP | Refills: 0 | Status: SHIPPED | OUTPATIENT
Start: 2018-01-05 | End: 2018-06-08

## 2018-01-05 RX ORDER — CLINDAMYCIN HYDROCHLORIDE 150 MG/1
300 CAPSULE ORAL
Status: COMPLETED | OUTPATIENT
Start: 2018-01-05 | End: 2018-01-05

## 2018-01-05 RX ORDER — ONDANSETRON 2 MG/ML
4 INJECTION INTRAMUSCULAR; INTRAVENOUS
Status: COMPLETED | OUTPATIENT
Start: 2018-01-05 | End: 2018-01-05

## 2018-01-05 RX ORDER — HYDROCODONE BITARTRATE AND ACETAMINOPHEN 5; 325 MG/1; MG/1
1 TABLET ORAL ONCE
Status: COMPLETED | OUTPATIENT
Start: 2018-01-05 | End: 2018-01-05

## 2018-01-05 RX ORDER — HYDROCODONE BITARTRATE AND ACETAMINOPHEN 5; 325 MG/1; MG/1
1 TABLET ORAL
Qty: 12 TAB | Refills: 0 | Status: SHIPPED | OUTPATIENT
Start: 2018-01-05 | End: 2018-06-08

## 2018-01-05 RX ORDER — TRAMADOL HYDROCHLORIDE 50 MG/1
50 TABLET ORAL
Status: COMPLETED | OUTPATIENT
Start: 2018-01-05 | End: 2018-01-05

## 2018-01-05 RX ADMIN — ONDANSETRON 4 MG: 2 SOLUTION INTRAMUSCULAR; INTRAVENOUS at 16:40

## 2018-01-05 RX ADMIN — HYDROCODONE BITARTRATE AND ACETAMINOPHEN 1 TABLET: 5; 325 TABLET ORAL at 17:00

## 2018-01-05 RX ADMIN — TRAMADOL HYDROCHLORIDE 50 MG: 50 TABLET ORAL at 15:06

## 2018-01-05 RX ADMIN — CLINDAMYCIN HYDROCHLORIDE 300 MG: 150 CAPSULE ORAL at 17:00

## 2018-01-05 NOTE — DISCHARGE INSTRUCTIONS
Foot Pain: Care Instructions  Your Care Instructions  Foot injuries that cause pain and swelling are fairly common. Almost all sports or home repair projects can cause a misstep that ends up as foot pain. Normal wear and tear, especially as you get older, also can cause foot pain. Most minor foot injuries will heal on their own, and home treatment is usually all you need to do. If you have a severe injury, you may need tests and treatment. Follow-up care is a key part of your treatment and safety. Be sure to make and go to all appointments, and call your doctor if you are having problems. It's also a good idea to know your test results and keep a list of the medicines you take. How can you care for yourself at home? · Take pain medicines exactly as directed. ¨ If the doctor gave you a prescription medicine for pain, take it as prescribed. ¨ If you are not taking a prescription pain medicine, ask your doctor if you can take an over-the-counter medicine. · Rest and protect your foot. Take a break from any activity that may cause pain. · Put ice or a cold pack on your foot for 10 to 20 minutes at a time. Put a thin cloth between the ice and your skin. · Prop up the sore foot on a pillow when you ice it or anytime you sit or lie down during the next 3 days. Try to keep it above the level of your heart. This will help reduce swelling. · Your doctor may recommend that you wrap your foot with an elastic bandage. Keep your foot wrapped for as long as your doctor advises. · If your doctor recommends crutches, use them as directed. · Wear roomy footwear. · As soon as pain and swelling end, begin gentle exercises of your foot. Your doctor can tell you which exercises will help. When should you call for help? Call 911 anytime you think you may need emergency care. For example, call if:  ? · Your foot turns pale, white, blue, or cold.    ?Call your doctor now or seek immediate medical care if:  ? · You cannot move or stand on your foot. ? · Your foot looks twisted or out of its normal position. ? · Your foot is not stable when you step down. ? · You have signs of infection, such as:  ¨ Increased pain, swelling, warmth, or redness. ¨ Red streaks leading from the sore area. ¨ Pus draining from a place on your foot. ¨ A fever. ? · Your foot is numb or tingly. ? Watch closely for changes in your health, and be sure to contact your doctor if:  ? · You do not get better as expected. ? · You have bruises from an injury that last longer than 2 weeks. Where can you learn more? Go to http://alistair-kalpesh.info/. Enter W075 in the search box to learn more about \"Foot Pain: Care Instructions. \"  Current as of: March 21, 2017  Content Version: 11.4  © 2849-7544 My eStore App. Care instructions adapted under license by [employer] (which disclaims liability or warranty for this information). If you have questions about a medical condition or this instruction, always ask your healthcare professional. [address] any warranty or liability for your use of this information.

## 2018-01-05 NOTE — ED NOTES
I have reviewed discharge instructions with the patient. The patient verbalized understanding. Pt left ED in NAD, ambulatory with crutches.

## 2018-01-05 NOTE — ED NOTES
I performed a brief evaluation, including history and physical, of the patient here in triage and I have determined that pt will need further treatment and evaluation from the main side ER physician. I have placed initial orders to help in expediting patients care. January 05, 2018 at 1:08 PM - Heydi Randolph PA-C        Visit Vitals    BP (!) 194/115    Pulse (!) 106    Temp 99.8 °F (37.7 °C)    Resp 18    Ht 6' 2\" (1.88 m)    Wt 102.1 kg (225 lb)    SpO2 100%    BMI 28.89 kg/m2        Concerned for infection in foot. Will plan on labs, poc lactic to r/o sepsis, and imaging.   Heydi Randolph PA-C

## 2018-01-05 NOTE — ED PROVIDER NOTES
EMERGENCY DEPARTMENT HISTORY AND PHYSICAL EXAM    1:59 PM      Date: 1/5/2018  Patient Name: Daniele Perez    History of Presenting Illness     No chief complaint on file. History Provided By: Patient    Chief Complaint: Foot Pain  Duration: 4 Days  Timing:  Constant, Progressive and Worsening  Location: Right foot starting at 5th digit progressing to calf  Quality:   Severity: Severe  Modifying Factors: Tylenol, Denies injury  Associated Symptoms: spasms, HA, nausea, diarrhea, fever, LUQ pain. Denies joint pain, cough, dysuria      Additional History (Context): Daniele Perez is a 27 y.o. male with hx of HTN, GERD, and Pancreatitis who presents with c/o constant severe and worsening right foot pain starting at 5th digit and progressing up to calf onset 4 days. Pt states associated sx of spasms, HA, nausea, diarrhea, fever, and LUQ pain. Denies join pain, cough, or dysuria. Pt states taking Tylenol to reduce fever but it did not relieve pain in LE. Pt is a non-smoker. Denies recent injury, travel, animal bites, or surgery. PCP: Sherie Willis NP    Current Facility-Administered Medications   Medication Dose Route Frequency Provider Last Rate Last Dose    traMADol (ULTRAM) tablet 50 mg  50 mg Oral NOW Gilbert Mcnamara MD        ondansetron [facility]) injection 4 mg  4 mg IntraVENous NOW Gilbert Mcnamara MD         Current Outpatient Prescriptions   Medication Sig Dispense Refill    hydroCHLOROthiazide (HYDRODIURIL) 25 mg tablet 1/2 pill daily 30 Tab 4    lisinopril (PRINIVIL, ZESTRIL) 20 mg tablet Take 1 Tab by mouth daily. 30 Tab 3    PARoxetine (PAXIL) 20 mg tablet Take 2 Tabs by mouth daily. 60 Tab 3    clonazePAM (KLONOPIN) 0.5 mg tablet Take 1 Tab by mouth three (3) times daily as needed. Max Daily Amount: 1.5 mg. 60 Tab 0    UBIDECARENONE/VITAMIN E MIXED (COQ10  PO) Take  by mouth.  loratadine (CLARITIN) 10 mg tablet Take 10 mg by mouth daily.  Indications: ALLERGIC RHINITIS      fluticasone (FLONASE) 50 mcg/actuation nasal spray 2 Sprays by Both Nostrils route daily. Indications: ALLERGIC RHINITIS      gemfibrozil (LOPID) 600 mg tablet TAKE ONE TABLET BY MOUTH TWICE DAILY 180 Tab 0       Past History     Past Medical History:  Past Medical History:   Diagnosis Date    Anxiety     Cholelithiasis 08/18/2013    Note on U/S    Chronic gastritis 10/17/2014    Dr. Ravindra Black (EGD Result) H. Pylori Neg    Depression     Dog bite of left hand including fingers with infection 11/16/2014    Adm 11/18/14 for IV antibiotics.  ETOH abuse     GERD (gastroesophageal reflux disease)     Hepatic steatosis 02/06/2014    Noted on U/S.     History of plasmapheresis 02/2014    Hypertriglyceridemia, Severe Pancreatitis    Hypertension     Hypertriglyceridemia     Hypomagnesemia 07/24/2016    1.6 mg/dL    Non compliance with medical treatment     Osteomyelitis of left hand ([facility] [address] 11/22/2014    MRI Finding @ LUE 2nd MCP joint.  Pancreatitis 08/17/2013    Highest on Record (2/6/14) 7263. Dr. Ravindra Black    PICC (peripherally inserted central catheter) in place 02/78/9205    R Basilic Vein (IV Antibiotics for LUE Hand Osteomyelitis).  Rectal bleeding     Substance abuse     Several UDS + Opiates.  Vitamin D deficiency 02/17/2014    11.6ng/mL ( ng/mL)       Past Surgical History:  Past Surgical History:   Procedure Laterality Date    HX CYST INCISION AND DRAINAGE Left 12/01/2014    LUE 2nd digit abscess I&D.      HX ENDOSCOPY  10/17/2014    Dr. Ravindra Black: EGD/Colonoscopy    HX HEENT      wisdom tooth extraction    HX KNEE ARTHROSCOPY Right        Family History:  Family History   Problem Relation Age of Onset    Elevated Lipids Mother     Hypertension Father     Thyroid Disease Mother     Alcohol abuse Maternal Grandmother     MS Paternal Grandmother        Social History:  Social History   Substance Use Topics    Smoking status: Former Smoker     Quit date: 9/17/2012    Smokeless tobacco: Never Used    Alcohol use 8.0 oz/week     8 Cans of beer, 8 Standard drinks or equivalent per week      Comment: quit 02/02/2016       Allergies: Allergies   Allergen Reactions    Amoxicillin Swelling    Contrast Dye [Iodine] Hives     Pt had iv contrast prior to this study without problems, developed hives on 07/04/15 5 minutes post injection    Iodinated Contrast- Oral And Iv Dye Hives     Pt got hives when he received IV contrast in the past    Penicillins Hives and Swelling    Wellbutrin [Bupropion] Other (comments)     Caused aggression         Review of Systems       Review of Systems   Constitutional: Positive for fever. Respiratory: Negative for cough. Gastrointestinal: Positive for abdominal pain (LUQ), diarrhea and nausea. Genitourinary: Negative for dysuria. Musculoskeletal:        Right LE pain from 5th digit up to calf with spasms   Neurological: Positive for headaches. All other systems reviewed and are negative. Physical Exam     Patient Vitals for the past 12 hrs:   Temp Pulse Resp BP SpO2   01/05/18 1303 99.8 °F (37.7 °C) (!) 106 18 (!) 194/115 100 %           Physical Exam   Constitutional: He is oriented to person, place, and time. He appears well-developed. HENT:   Head: Normocephalic and atraumatic. Eyes: Conjunctivae and EOM are normal.   Neck: Normal range of motion. Cardiovascular: Normal heart sounds. Exam reveals no gallop and no friction rub. No murmur heard. Pulmonary/Chest: Effort normal and breath sounds normal. No stridor. Abdominal: Soft. There is tenderness (LUQ). Musculoskeletal:        Right lower leg: He exhibits tenderness (mild right calf). He exhibits no edema. Right foot: There is tenderness (lateral aspect of lower ankle and at base of 5th digit w/o warmth or edema). There is normal range of motion, no swelling and no crepitus.    Good pulse in right foot  No streaking noted   Neurological: He is alert and oriented to person, place, and time. Skin: Skin is warm and dry. He is not diaphoretic. Psychiatric: He has a normal mood and affect. His behavior is normal.   Nursing note and vitals reviewed. Diagnostic Study Results     Labs -  Recent Results (from the past 12 hour(s))   CBC WITH AUTOMATED DIFF    Collection Time: 01/05/18  3:25 PM   Result Value Ref Range    WBC 9.3 4.6 - 13.2 K/uL    RBC 4.39 (L) 4.70 - 5.50 M/uL    HGB 13.9 13.0 - 16.0 g/dL    HCT 40.0 36.0 - 48.0 %    MCV 91.1 74.0 - 97.0 FL    MCH 31.7 24.0 - 34.0 PG    MCHC 34.8 31.0 - 37.0 g/dL    RDW 12.8 11.6 - 14.5 %    PLATELET 884 403 - 949 K/uL    MPV 9.8 9.2 - 11.8 FL    NEUTROPHILS 66 40 - 73 %    LYMPHOCYTES 21 21 - 52 %    MONOCYTES 10 3 - 10 %    EOSINOPHILS 3 0 - 5 %    BASOPHILS 0 0 - 2 %    ABS. NEUTROPHILS 6.1 1.8 - 8.0 K/UL    ABS. LYMPHOCYTES 2.0 0.9 - 3.6 K/UL    ABS. MONOCYTES 0.9 0.05 - 1.2 K/UL    ABS. EOSINOPHILS 0.3 0.0 - 0.4 K/UL    ABS. BASOPHILS 0.0 0.0 - 0.06 K/UL    DF AUTOMATED     METABOLIC PANEL, COMPREHENSIVE    Collection Time: 01/05/18  3:25 PM   Result Value Ref Range    Sodium 137 136 - 145 mmol/L    Potassium 3.7 3.5 - 5.5 mmol/L    Chloride 103 100 - 108 mmol/L    CO2 26 21 - 32 mmol/L    Anion gap 8 3.0 - 18 mmol/L    Glucose 88 74 - 99 mg/dL    BUN 14 7.0 - 18 MG/DL    Creatinine 0.96 0.6 - 1.3 MG/DL    BUN/Creatinine ratio 15 12 - 20      GFR est AA >60 >60 ml/min/1.73m2    GFR est non-AA >60 >60 ml/min/1.73m2    Calcium 8.8 8.5 - 10.1 MG/DL    Bilirubin, total 0.7 0.2 - 1.0 MG/DL    ALT (SGPT) 19 16 - 61 U/L    AST (SGOT) 24 15 - 37 U/L    Alk.  phosphatase 47 45 - 117 U/L    Protein, total 7.6 6.4 - 8.2 g/dL    Albumin 4.0 3.4 - 5.0 g/dL    Globulin 3.6 2.0 - 4.0 g/dL    A-G Ratio 1.1 0.8 - 1.7     D DIMER    Collection Time: 01/05/18  3:25 PM   Result Value Ref Range    D DIMER 0.28 <0.46 ug/ml(FEU)   LIPASE    Collection Time: 01/05/18  3:25 PM   Result Value Ref Range    Lipase 287 73 - 393 U/L       Radiologic Studies -   XR FOOT RT MIN 3 V   Final Result   IMPRESSION:     Only mild or questionable soft tissue prominence at the lateral aspect of right  mid foot. No other diagnostic finding on radiographs of right foot. Medical Decision Making     Provider Notes (Medical Decision Making): Will check dimer for VTE but not a lot of swelling or erythema. Pt states this feels like prior cellulitis and there is no crepitus suggesting necrotizing fasciitis or other severe infection. For pain control and likely empiric abx here then for outpt management. I am the first provider for this patient. I reviewed the vital signs, available nursing notes, past medical history, past surgical history, family history and social history. Vital Signs-Reviewed the patient's vital signs. Pulse Oximetry Analysis -  100% on room air (Interpretation) Normal    Cardiac Monitor:  Rate: 106 bpm  Rhythm:  Sinus Tachycardia on monitor      Records Reviewed: Nursing Notes and Old Medical Records (Time of Review: 2:10 PM)    ED Course: Progress Notes, Reevaluation, and Consults:  -Re-evaluted the patient and feeling better. No longer tachycardic, HR in 90's on final reassessment.   -Results including Labs and XR were discussed and reviewed with pt who understood the implications. Otherwise reassuring results. XR/Labs good, no pancreatitis, D-dimer was (-) no blood clot. -We discussed the diagnosis, treatment, and plan. Next steps in close outpt care include: discharge with Abx and pain medication.    -They verbally convey understanding and agreement of the signs, symptoms, diagnosis, treatment and prognosis and additionally agree to follow up as discussed. All questions were answered, and we reviewed pertinent return precautions as seen in the discharge paperwork. Pt understood follow up instructions, and would return to the ED if any worsening or concerns.      Linda Rubi MD  4:36 PM      Diagnosis     Clinical Impression:   1. Right foot pain        Disposition: Discharge    Follow-up Information     None           Patient's Medications   Start Taking    No medications on file   Continue Taking    CLONAZEPAM (KLONOPIN) 0.5 MG TABLET    Take 1 Tab by mouth three (3) times daily as needed. Max Daily Amount: 1.5 mg. FLUTICASONE (FLONASE) 50 MCG/ACTUATION NASAL SPRAY    2 Sprays by Both Nostrils route daily. Indications: ALLERGIC RHINITIS    GEMFIBROZIL (LOPID) 600 MG TABLET    TAKE ONE TABLET BY MOUTH TWICE DAILY    HYDROCHLOROTHIAZIDE (HYDRODIURIL) 25 MG TABLET    1/2 pill daily    LISINOPRIL (PRINIVIL, ZESTRIL) 20 MG TABLET    Take 1 Tab by mouth daily. LORATADINE (CLARITIN) 10 MG TABLET    Take 10 mg by mouth daily. Indications: ALLERGIC RHINITIS    PAROXETINE (PAXIL) 20 MG TABLET    Take 2 Tabs by mouth daily. UBIDECARENONE/VITAMIN E MIXED (COQ10  PO)    Take  by mouth. These Medications have changed    No medications on file   Stop Taking    No medications on file     _______________________________    Attestations:  [id number]  acting as a scribe for and in the presence of Kris Green MD      January 05, 2018 at 2:10 PM       Provider Attestation:      I personally performed the services described in the documentation, reviewed the documentation, as recorded by the scribe in my presence, and it accurately and completely records my words and actions.  January 05, 2018 at 2:10 PM - Kris Green MD    _______________________________

## 2018-05-21 DIAGNOSIS — I10 ESSENTIAL HYPERTENSION: ICD-10-CM

## 2018-05-21 DIAGNOSIS — F41.0 PANIC ATTACKS: ICD-10-CM

## 2018-05-21 NOTE — TELEPHONE ENCOUNTER
Requested Prescriptions     Pending Prescriptions Disp Refills    hydroCHLOROthiazide (HYDRODIURIL) 25 mg tablet 30 Tab 4     Si/2 pill daily    clonazePAM (KLONOPIN) 0.5 mg tablet 60 Tab 0     Sig: Take 1 Tab by mouth three (3) times daily as needed.  Max Daily Amount: 1.5 mg.

## 2018-05-23 RX ORDER — HYDROCHLOROTHIAZIDE 25 MG/1
TABLET ORAL
Qty: 30 TAB | Refills: 4 | Status: SHIPPED | OUTPATIENT
Start: 2018-05-23 | End: 2018-06-08 | Stop reason: SDUPTHER

## 2018-05-23 RX ORDER — CLONAZEPAM 0.5 MG/1
0.5 TABLET ORAL
Qty: 60 TAB | Refills: 0 | OUTPATIENT
Start: 2018-05-23

## 2018-05-28 ENCOUNTER — HOSPITAL ENCOUNTER (EMERGENCY)
Age: 33
Discharge: HOME | End: 2018-05-28
Attending: EMERGENCY MEDICINE
Payer: SELF-PAY

## 2018-05-28 VITALS
BODY MASS INDEX: 28.23 KG/M2 | OXYGEN SATURATION: 99 % | DIASTOLIC BLOOD PRESSURE: 108 MMHG | HEIGHT: 74 IN | TEMPERATURE: 100.4 F | WEIGHT: 220 LBS | HEART RATE: 90 BPM | SYSTOLIC BLOOD PRESSURE: 177 MMHG | RESPIRATION RATE: 16 BRPM

## 2018-05-28 DIAGNOSIS — A09 DIARRHEA OF INFECTIOUS ORIGIN: Primary | ICD-10-CM

## 2018-05-28 LAB
ALBUMIN SERPL-MCNC: 4.9 G/DL (ref 3.4–5)
ALBUMIN/GLOB SERPL: 1.4 {RATIO} (ref 0.8–1.7)
ALP SERPL-CCNC: 58 U/L (ref 45–117)
ALT SERPL-CCNC: 53 U/L (ref 16–61)
ANION GAP SERPL CALC-SCNC: 17 MMOL/L (ref 3–18)
APPEARANCE UR: CLEAR
AST SERPL-CCNC: 54 U/L (ref 15–37)
BACTERIA SPEC CULT: NORMAL
BACTERIA URNS QL MICRO: ABNORMAL /HPF
BASOPHILS # BLD: 0 K/UL (ref 0–0.1)
BASOPHILS NFR BLD: 0 % (ref 0–2)
BILIRUB SERPL-MCNC: 1 MG/DL (ref 0.2–1)
BILIRUB UR QL: ABNORMAL
BUN SERPL-MCNC: 8 MG/DL (ref 7–18)
BUN/CREAT SERPL: 8 (ref 12–20)
CALCIUM SERPL-MCNC: 10 MG/DL (ref 8.5–10.1)
CHLORIDE SERPL-SCNC: 102 MMOL/L (ref 100–108)
CO2 SERPL-SCNC: 20 MMOL/L (ref 21–32)
COLOR UR: ABNORMAL
CREAT SERPL-MCNC: 1.05 MG/DL (ref 0.6–1.3)
DIFFERENTIAL METHOD BLD: ABNORMAL
EOSINOPHIL # BLD: 0.3 K/UL (ref 0–0.4)
EOSINOPHIL NFR BLD: 2 % (ref 0–5)
EPITH CASTS URNS QL MICRO: NEGATIVE /LPF (ref 0–5)
ERYTHROCYTE [DISTWIDTH] IN BLOOD BY AUTOMATED COUNT: 12.3 % (ref 11.6–14.5)
GLOBULIN SER CALC-MCNC: 3.5 G/DL (ref 2–4)
GLUCOSE SERPL-MCNC: 85 MG/DL (ref 74–99)
GLUCOSE UR STRIP.AUTO-MCNC: NEGATIVE MG/DL
HCT VFR BLD AUTO: 44.3 % (ref 36–48)
HGB BLD-MCNC: 16.3 G/DL (ref 13–16)
HGB UR QL STRIP: NEGATIVE
KETONES UR QL STRIP.AUTO: ABNORMAL MG/DL
LEUKOCYTE ESTERASE UR QL STRIP.AUTO: ABNORMAL
LIPASE SERPL-CCNC: 312 U/L (ref 73–393)
LIPASE SERPL-CCNC: 314 U/L (ref 73–393)
LYMPHOCYTES # BLD: 2.2 K/UL (ref 0.9–3.6)
LYMPHOCYTES NFR BLD: 19 % (ref 21–52)
MCH RBC QN AUTO: 32.6 PG (ref 24–34)
MCHC RBC AUTO-ENTMCNC: 36.8 G/DL (ref 31–37)
MCV RBC AUTO: 88.6 FL (ref 74–97)
MONOCYTES # BLD: 1 K/UL (ref 0.05–1.2)
MONOCYTES NFR BLD: 8 % (ref 3–10)
MUCOUS THREADS URNS QL MICRO: ABNORMAL /LPF
NEUTS SEG # BLD: 8 K/UL (ref 1.8–8)
NEUTS SEG NFR BLD: 71 % (ref 40–73)
NITRITE UR QL STRIP.AUTO: NEGATIVE
PH UR STRIP: 5.5 [PH] (ref 5–8)
PLATELET # BLD AUTO: 277 K/UL (ref 135–420)
PMV BLD AUTO: 9.8 FL (ref 9.2–11.8)
POTASSIUM SERPL-SCNC: 3.9 MMOL/L (ref 3.5–5.5)
PROT SERPL-MCNC: 8.4 G/DL (ref 6.4–8.2)
PROT UR STRIP-MCNC: ABNORMAL MG/DL
RBC # BLD AUTO: 5 M/UL (ref 4.7–5.5)
RBC #/AREA URNS HPF: NEGATIVE /HPF (ref 0–5)
SERVICE CMNT-IMP: NORMAL
SODIUM SERPL-SCNC: 139 MMOL/L (ref 136–145)
SP GR UR REFRACTOMETRY: 1.02 (ref 1–1.03)
UROBILINOGEN UR QL STRIP.AUTO: 1 EU/DL (ref 0.2–1)
WBC # BLD AUTO: 11.4 K/UL (ref 4.6–13.2)
WBC URNS QL MICRO: ABNORMAL /HPF (ref 0–4)

## 2018-05-28 PROCEDURE — 87493 C DIFF AMPLIFIED PROBE: CPT | Performed by: EMERGENCY MEDICINE

## 2018-05-28 PROCEDURE — 96361 HYDRATE IV INFUSION ADD-ON: CPT

## 2018-05-28 PROCEDURE — 85025 COMPLETE CBC W/AUTO DIFF WBC: CPT

## 2018-05-28 PROCEDURE — 74011250636 HC RX REV CODE- 250/636: Performed by: EMERGENCY MEDICINE

## 2018-05-28 PROCEDURE — 74011250636 HC RX REV CODE- 250/636

## 2018-05-28 PROCEDURE — 83690 ASSAY OF LIPASE: CPT

## 2018-05-28 PROCEDURE — 81001 URINALYSIS AUTO W/SCOPE: CPT

## 2018-05-28 PROCEDURE — 96374 THER/PROPH/DIAG INJ IV PUSH: CPT

## 2018-05-28 PROCEDURE — 80053 COMPREHEN METABOLIC PANEL: CPT

## 2018-05-28 PROCEDURE — 83690 ASSAY OF LIPASE: CPT | Performed by: EMERGENCY MEDICINE

## 2018-05-28 PROCEDURE — 99283 EMERGENCY DEPT VISIT LOW MDM: CPT

## 2018-05-28 RX ORDER — FENTANYL CITRATE 50 UG/ML
50 INJECTION, SOLUTION INTRAMUSCULAR; INTRAVENOUS
Status: COMPLETED | OUTPATIENT
Start: 2018-05-28 | End: 2018-05-28

## 2018-05-28 RX ORDER — ONDANSETRON 2 MG/ML
INJECTION INTRAMUSCULAR; INTRAVENOUS
Status: COMPLETED
Start: 2018-05-28 | End: 2018-05-28

## 2018-05-28 RX ORDER — DICYCLOMINE HYDROCHLORIDE 20 MG/1
20 TABLET ORAL EVERY 6 HOURS
Qty: 20 TAB | Refills: 0 | Status: SHIPPED | OUTPATIENT
Start: 2018-05-28 | End: 2018-06-02

## 2018-05-28 RX ORDER — ONDANSETRON 4 MG/1
4 TABLET, ORALLY DISINTEGRATING ORAL
Status: DISCONTINUED | OUTPATIENT
Start: 2018-05-28 | End: 2018-05-28 | Stop reason: HOSPADM

## 2018-05-28 RX ORDER — ONDANSETRON 4 MG/1
4 TABLET, ORALLY DISINTEGRATING ORAL
Qty: 15 TAB | Refills: 0 | Status: SHIPPED | OUTPATIENT
Start: 2018-05-28 | End: 2018-06-08

## 2018-05-28 RX ADMIN — FENTANYL CITRATE 50 MCG: 50 INJECTION INTRAMUSCULAR; INTRAVENOUS at 13:15

## 2018-05-28 RX ADMIN — SODIUM CHLORIDE 1000 ML: 900 INJECTION, SOLUTION INTRAVENOUS at 13:04

## 2018-05-28 RX ADMIN — ONDANSETRON 4 MG: 2 INJECTION INTRAMUSCULAR; INTRAVENOUS at 13:15

## 2018-05-28 RX ADMIN — SODIUM CHLORIDE 1000 ML: 900 INJECTION, SOLUTION INTRAVENOUS at 13:05

## 2018-05-28 NOTE — ED PROVIDER NOTES
EMERGENCY DEPARTMENT HISTORY AND PHYSICAL EXAM    12:29 PM      Date: 5/28/2018  Patient Name: Jordon White    History of Presenting Illness     Chief Complaint   Patient presents with    Abdominal Pain         History Provided By: Patient    Additional History (Context): Jordon White is a 27 y.o. male with hx of HTN, GERD, pancreatitis, chronic gastritis, hepatic steatosis, cholelithiasis, plasmapheresis, substance abuse and ETOH abuse presenting to the ED with c/o constant, worsening left sided abdominal pain that began earlier today. Denies any CP, SOB, fever, chills or urinary sx. Associated sx include nausea, vomiting and diarrhea. Severity is 8/10. Admits to \"no more than 4 drinks per day\". No other sx or complaints given at this time. PCP: Melvin Triplett NP    Chief Complaint: Abdominal Pain   Duration: Earlier today   Timing:  Constant and Worsening  Location: Left sided   Quality: N/A  Severity: 8 out of 10  Modifying Factors: None   Associated Symptoms: nausea, vomiting, diarrhea       Current Outpatient Prescriptions   Medication Sig Dispense Refill    hydroCHLOROthiazide (HYDRODIURIL) 25 mg tablet 1/2 pill daily 30 Tab 4    HYDROcodone-acetaminophen (NORCO) 5-325 mg per tablet Take 1 Tab by mouth every four (4) hours as needed for Pain. Max Daily Amount: 6 Tabs. 12 Tab 0    clindamycin (CLEOCIN) 300 mg capsule Take 1 Cap by mouth four (4) times daily. 20 Cap 0    lisinopril (PRINIVIL, ZESTRIL) 20 mg tablet Take 1 Tab by mouth daily. 30 Tab 3    PARoxetine (PAXIL) 20 mg tablet Take 2 Tabs by mouth daily. 60 Tab 3    clonazePAM (KLONOPIN) 0.5 mg tablet Take 1 Tab by mouth three (3) times daily as needed. Max Daily Amount: 1.5 mg. 60 Tab 0    UBIDECARENONE/VITAMIN E MIXED (COQ10  PO) Take  by mouth.  loratadine (CLARITIN) 10 mg tablet Take 10 mg by mouth daily.  Indications: ALLERGIC RHINITIS      fluticasone (FLONASE) 50 mcg/actuation nasal spray 2 Sprays by Both Nostrils route daily. Indications: ALLERGIC RHINITIS      gemfibrozil (LOPID) 600 mg tablet TAKE ONE TABLET BY MOUTH TWICE DAILY 180 Tab 0       Past History     Past Medical History:  Past Medical History:   Diagnosis Date    Anxiety     Cholelithiasis 08/18/2013    Note on U/S    Chronic gastritis 10/17/2014    Dr. Chad Gayle (EGD Result) H. Pylori Neg    Depression     Dog bite of left hand including fingers with infection 11/16/2014    Adm 11/18/14 for IV antibiotics.  ETOH abuse     GERD (gastroesophageal reflux disease)     Hepatic steatosis 02/06/2014    Noted on U/S.     History of plasmapheresis 02/2014    Hypertriglyceridemia, Severe Pancreatitis    Hypertension     Hypertriglyceridemia     Hypomagnesemia 07/24/2016    1.6 mg/dL    Non compliance with medical treatment     Osteomyelitis of left hand ([facility] [address] 11/22/2014    MRI Finding @ LUE 2nd MCP joint.  Pancreatitis 08/17/2013    Highest on Record (2/6/14) 2723. Dr. Chad Gayle    PICC (peripherally inserted central catheter) in place 09/73/0874    R Basilic Vein (IV Antibiotics for LUE Hand Osteomyelitis).  Rectal bleeding     Substance abuse     Several UDS + Opiates.  Vitamin D deficiency 02/17/2014    11.6ng/mL ( ng/mL)       Past Surgical History:  Past Surgical History:   Procedure Laterality Date    HX CYST INCISION AND DRAINAGE Left 12/01/2014    LUE 2nd digit abscess I&D.      HX ENDOSCOPY  10/17/2014    Dr. Chad Gayle: EGD/Colonoscopy    HX HEENT      wisdom tooth extraction    HX KNEE ARTHROSCOPY Right        Family History:  Family History   Problem Relation Age of Onset    Elevated Lipids Mother     Thyroid Disease Mother     Hypertension Father     Alcohol abuse Maternal Grandmother     MS Paternal Grandmother        Social History:  Social History   Substance Use Topics    Smoking status: Former Smoker     Quit date: 9/17/2012    Smokeless tobacco: Never Used    Alcohol use 8.0 oz/week     8 Cans of beer, 8 Standard drinks or equivalent per week      Comment: quit 02/02/2016       Allergies: Allergies   Allergen Reactions    Amoxicillin Swelling    Contrast Dye [Iodine] Hives     Pt had iv contrast prior to this study without problems, developed hives on 07/04/15 5 minutes post injection    Iodinated Contrast- Oral And Iv Dye Hives     Pt got hives when he received IV contrast in the past    Penicillins Hives and Swelling    Wellbutrin [Bupropion] Other (comments)     Caused aggression         Review of Systems     Review of Systems   Constitutional: Negative for fever. Gastrointestinal: Positive for abdominal pain, diarrhea, nausea and vomiting. All other systems reviewed and are negative. Physical Exam     Visit Vitals    BP (!) 177/108 (BP 1 Location: Left arm, BP Patient Position: At rest)    Pulse 90    Temp 100.4 °F (38 °C)    Resp 16    Ht 6' 2\" (1.88 m)    Wt 99.8 kg (220 lb)    SpO2 99%    BMI 28.25 kg/m2       Physical Exam   Constitutional: He is oriented to person, place, and time. He appears well-developed. HENT:   Head: Normocephalic and atraumatic. Eyes: EOM are normal. Pupils are equal, round, and reactive to light. Neck: Normal range of motion. Neck supple. Cardiovascular: Normal rate, regular rhythm and normal heart sounds. Exam reveals no friction rub. No murmur heard. Pulmonary/Chest: Effort normal and breath sounds normal. No respiratory distress. He has no wheezes. Abdominal: Soft. He exhibits no distension. There is no tenderness. There is no rebound and no guarding. Musculoskeletal: Normal range of motion. Neurological: He is alert and oriented to person, place, and time. Skin: Skin is warm and dry. Psychiatric: He has a normal mood and affect. His behavior is normal. Thought content normal.         Diagnostic Study Results         Medical Decision Making     1. abd pain; with diarrrhea; feels like prior panceratitis. Lipase wnl'   c diff neg. Bili wnl    willd/c    Diagnosis     No diagnosis found. _______________________________    Attestations:  Scribe [address] [place] [address] acting as a scribe for and in the presence of Lydia Gallegos MD      May 28, 2018 at 12:29 PM       Provider Attestation:      I personally performed the services described in the documentation, reviewed the documentation, as recorded by the scribe in my presence, and it accurately and completely records my words and actions.  May 28, 2018 at 12:29 PM - Lydia Gallegos MD    _______________________________

## 2018-05-28 NOTE — ED NOTES
I have reviewed discharge instructions with the patient. The patient verbalized understanding. Discharge medications reviewed with patient and appropriate educational materials and side effects teaching were provided. Patient armband removed and shredded. Patient states that he feels better 2 RX provided. Patient ambulating at this time.

## 2018-05-28 NOTE — ED TRIAGE NOTES
The patient presents for evaluation of left side abdominal pain, nausea, vomiting, and diarrhea that began today.

## 2018-06-08 ENCOUNTER — OFFICE VISIT (OUTPATIENT)
Dept: FAMILY MEDICINE CLINIC | Age: 33
End: 2018-06-08

## 2018-06-08 VITALS
DIASTOLIC BLOOD PRESSURE: 111 MMHG | HEIGHT: 74 IN | HEART RATE: 73 BPM | TEMPERATURE: 98.7 F | RESPIRATION RATE: 18 BRPM | BODY MASS INDEX: 30.16 KG/M2 | WEIGHT: 235 LBS | SYSTOLIC BLOOD PRESSURE: 159 MMHG | OXYGEN SATURATION: 98 %

## 2018-06-08 DIAGNOSIS — F32.A ANXIETY AND DEPRESSION: ICD-10-CM

## 2018-06-08 DIAGNOSIS — F41.0 PANIC ATTACKS: ICD-10-CM

## 2018-06-08 DIAGNOSIS — K80.20 CALCULUS OF GALLBLADDER WITHOUT CHOLECYSTITIS WITHOUT OBSTRUCTION: ICD-10-CM

## 2018-06-08 DIAGNOSIS — F41.1 GAD (GENERALIZED ANXIETY DISORDER): Primary | ICD-10-CM

## 2018-06-08 DIAGNOSIS — I10 ESSENTIAL HYPERTENSION: ICD-10-CM

## 2018-06-08 DIAGNOSIS — F41.9 ANXIETY AND DEPRESSION: ICD-10-CM

## 2018-06-08 DIAGNOSIS — F10.10 ALCOHOL ABUSE: ICD-10-CM

## 2018-06-08 DIAGNOSIS — Z91.14 NONADHERENCE TO MEDICATION: ICD-10-CM

## 2018-06-08 RX ORDER — LISINOPRIL 20 MG/1
20 TABLET ORAL DAILY
Qty: 30 TAB | Refills: 4 | Status: SHIPPED | OUTPATIENT
Start: 2018-06-08 | End: 2019-07-09 | Stop reason: SDUPTHER

## 2018-06-08 RX ORDER — PAROXETINE HYDROCHLORIDE 20 MG/1
40 TABLET, FILM COATED ORAL DAILY
Qty: 60 TAB | Refills: 4 | Status: SHIPPED | OUTPATIENT
Start: 2018-06-08 | End: 2018-10-18 | Stop reason: SDUPTHER

## 2018-06-08 RX ORDER — CLONAZEPAM 0.5 MG/1
0.5 TABLET ORAL
Qty: 60 TAB | Refills: 0 | Status: SHIPPED | OUTPATIENT
Start: 2018-06-08 | End: 2018-08-15 | Stop reason: SDUPTHER

## 2018-06-08 RX ORDER — HYDROCHLOROTHIAZIDE 25 MG/1
TABLET ORAL
Qty: 30 TAB | Refills: 4 | Status: SHIPPED | OUTPATIENT
Start: 2018-06-08 | End: 2019-07-09 | Stop reason: SDUPTHER

## 2018-06-08 NOTE — PROGRESS NOTES
Subjective:   Efrain Iglesias is a 27 y.o. male here today to follow up with anxiety, depression and hypertension. Hypertension ROS: not taking medications regularly as instructed, no medication side effects noted, no TIA's, no chest pain on exertion, no dyspnea on exertion, no swelling of ankles, financial constraints and has not taken blood pressure medications for over 7 days. New concerns: went to the emergency room 5-28-18 [facility] ED for a upper abdominal pain. He thought it could have been pancreatitis but his labs were normal. Reviewed diagnostics and lab work. CT of abdomen and abdominal ultrasound revealed cholelithiasis. Patient does admit that he was titrating his paroxetine to make it last and was under dosing. He has been drinking alcohol to self medicate and he discusses this is why he thought he may have had pancreatitis. He admits to drinking one six pack daily up until a few days ago to help cope with anxiety. PHQ-9 score 7  MICHELLE-7 score 18  ROS: denies depression, +panic attacks at least three times per week, denies illicit drug use, denies suicidal ideations, denies abdominal pain today but does report that prior to ED visit he was experiencing upper right abdominal pain every other night for several weeks. Denies nausea or vomiting    Wt Readings from Last 3 Encounters:   06/08/18 235 lb (106.6 kg)   05/28/18 220 lb (99.8 kg)   01/05/18 225 lb (102.1 kg)     BP Readings from Last 3 Encounters:   06/08/18 (!) 159/111   05/28/18 (!) 177/108   01/05/18 (!) 194/115     PMH: reviewed medications and allergy lists and medical and family history. OBJECTIVE:  Awake and alert in no acute distress  Neck supple without lymphadenopathy, no carotid artery bruits auscultated bilaterally. Lungs clear throughout  S1 S2 RRR without ectopy or murmur auscultated. Abdomen: normoactive bowel sounds all quadrants, no tenderness to abdomen upper and lower quadrants.  No hepatosplenomegaly  Integumentary: no rashes  Normal skin turgor  Visit Vitals    BP (!) 159/111 (BP 1 Location: Left arm, BP Patient Position: Sitting)    Pulse 73    Temp 98.7 °F (37.1 °C) (Oral)    Resp 18    Ht 6' 2\" (1.88 m)    Wt 235 lb (106.6 kg)    SpO2 98%    BMI 30.17 kg/m2     Diagnoses and all orders for this visit:    1. MICHELLE (generalized anxiety disorder)  -     PARoxetine (PAXIL) 20 mg tablet; Take 2 Tabs by mouth daily. 2. Panic attacks  -     clonazePAM (KLONOPIN) 0.5 mg tablet; Take 1 Tab by mouth three (3) times daily as needed. Max Daily Amount: 1.5 mg.    3. Essential hypertension  -     hydroCHLOROthiazide (HYDRODIURIL) 25 mg tablet; 1/2 pill daily  -     lisinopril (PRINIVIL, ZESTRIL) 20 mg tablet; Take 1 Tab by mouth daily. 4. Anxiety and depression  -     PARoxetine (PAXIL) 20 mg tablet; Take 2 Tabs by mouth daily. 5. Calculus of gallbladder without cholecystitis without obstruction  -     [facility]    6. Alcohol abuse    7. Nonadherence to medication    Reviewed risks and benefits and common side effects of new medication  General comfort measures  Stop drinking alcohol  Take medications as directed  Health maintenance reviewed  Patient verbalizes understanding. I have discussed the diagnosis with the patient and the intended plan as seen in the above orders. The patient has received an after-visit summary and questions were answered concerning future plans. I have discussed medication side effects and warnings with the patient as well. Follow-up Disposition:  Return in about 4 months (around 10/8/2018), or if symptoms worsen or fail to improve, for htn.

## 2018-06-08 NOTE — PATIENT INSTRUCTIONS
Well Visit, Ages 25 to 48: Care Instructions  Your Care Instructions    Physical exams can help you stay healthy. Your doctor has checked your overall health and may have suggested ways to take good care of yourself. He or she also may have recommended tests. At home, you can help prevent illness with healthy eating, regular exercise, and other steps. Follow-up care is a key part of your treatment and safety. Be sure to make and go to all appointments, and call your doctor if you are having problems. It's also a good idea to know your test results and keep a list of the medicines you take. How can you care for yourself at home? · Reach and stay at a healthy weight. This will lower your risk for many problems, such as obesity, diabetes, heart disease, and high blood pressure. · Get at least 30 minutes of physical activity on most days of the week. Walking is a good choice. You also may want to do other activities, such as running, swimming, cycling, or playing tennis or team sports. Discuss any changes in your exercise program with your doctor. · Do not smoke or allow others to smoke around you. If you need help quitting, talk to your doctor about stop-smoking programs and medicines. These can increase your chances of quitting for good. · Talk to your doctor about whether you have any risk factors for sexually transmitted infections (STIs). Having one sex partner (who does not have STIs and does not have sex with anyone else) is a good way to avoid these infections. · Use birth control if you do not want to have children at this time. Talk with your doctor about the choices available and what might be best for you. · Protect your skin from too much sun. When you're outdoors from 10 a.m. to 4 p.m., stay in the shade or cover up with clothing and a hat with a wide brim. Wear sunglasses that block UV rays. Even when it's cloudy, put broad-spectrum sunscreen (SPF 30 or higher) on any exposed skin.   · See a dentist one or two times a year for checkups and to have your teeth cleaned. · Wear a seat belt in the car. · Drink alcohol in moderation, if at all. That means no more than 2 drinks a day for men and 1 drink a day for women. Follow your doctor's advice about when to have certain tests. These tests can spot problems early. For everyone  · Cholesterol. Have the fat (cholesterol) in your blood tested after age 21. Your doctor will tell you how often to have this done based on your age, family history, or other things that can increase your risk for heart disease. · Blood pressure. Have your blood pressure checked during a routine doctor visit. Your doctor will tell you how often to check your blood pressure based on your age, your blood pressure results, and other factors. · Vision. Talk with your doctor about how often to have a glaucoma test.  · Diabetes. Ask your doctor whether you should have tests for diabetes. · Colon cancer. Have a test for colon cancer at age 48. You may have one of several tests. If you are younger than 48, you may need a test earlier if you have any risk factors. Risk factors include whether you already had a precancerous polyp removed from your colon or whether your parent, brother, sister, or child has had colon cancer. For women  · Breast exam and mammogram. Talk to your doctor about when you should have a clinical breast exam and a mammogram. Medical experts differ on whether and how often women under 50 should have these tests. Your doctor can help you decide what is right for you. · Pap test and pelvic exam. Begin Pap tests at age 24. A Pap test is the best way to find cervical cancer. The test often is part of a pelvic exam. Ask how often to have this test.  · Tests for sexually transmitted infections (STIs). Ask whether you should have tests for STIs. You may be at risk if you have sex with more than one person, especially if your partners do not wear condoms.   For men  · Tests for sexually transmitted infections (STIs). Ask whether you should have tests for STIs. You may be at risk if you have sex with more than one person, especially if you do not wear a condom. · Testicular cancer exam. Ask your doctor whether you should check your testicles regularly. · Prostate exam. Talk to your doctor about whether you should have a blood test (called a PSA test) for prostate cancer. Experts differ on whether and when men should have this test. Some experts suggest it if you are older than 39 and are -American or have a father or brother who got prostate cancer when he was younger than 72. When should you call for help? Watch closely for changes in your health, and be sure to contact your doctor if you have any problems or symptoms that concern you. Where can you learn more? Go to http://alistair-kalpesh.info/. Enter P072 in the search box to learn more about \"Well Visit, Ages 25 to 48: Care Instructions. \"  Current as of: May 12, 2017  Content Version: 11.4  © 5809-9257 [employer]. Care instructions adapted under license by [employer] (which disclaims liability or warranty for this information). If you have questions about a medical condition or this instruction, always ask your healthcare professional. [place] [address] any warranty or liability for your use of this information. Low-Fat Diet for Gallbladder Disease: Care Instructions  Your Care Instructions    When you eat, the gallbladder releases bile, which helps you digest the fat in food. If you have an inflamed gallbladder, this may cause pain. A low-fat diet may give your gallbladder a rest so you can start to heal. Your doctor and dietitian can help you make an eating plan that does not irritate your digestive system. Always talk with your doctor or dietitian before you make changes in your diet.   Follow-up care is a key part of your treatment and safety. Be sure to make and go to all appointments, and call your doctor if you are having problems. It's also a good idea to know your test results and keep a list of the medicines you take. How can you care for yourself at home? · Eat many small meals and snacks each day instead of three large meals. · Choose lean meats. ¨ Eat no more than 5 to 6½ ounces of meat a day. ¨ Cut off all fat you can see. ¨ Eat chicken and turkey without the skin. ¨ Many types of fish, such as salmon, lake trout, tuna, and herring, provide healthy omega-3 fat. But, avoid fish canned in oil, such as sardines in olive oil. ¨ Bake, broil, or grill meats, poultry, or fish instead of frying them in butter or fat. · Drink or eat nonfat or low-fat milk, yogurt, cheese, or other milk products each day. ¨ Read the labels on cheeses, and choose those with less than 5 grams of fat an ounce. ¨ Try fat-free sour cream, cream cheese, or yogurt. ¨ Avoid cream soups and cream sauces on pasta. ¨ Eat low-fat ice cream, frozen yogurt, or sorbet. Avoid regular ice cream.  · Eat whole-grain cereals, breads, crackers, rice, or pasta. Avoid high-fat foods such as croissants, scones, biscuits, waffles, doughnuts, muffins, granola, and high-fat breads. · Flavor your foods with herbs and spices (such as basil, tarragon, or mint), fat-free sauces, or lemon juice instead of butter. You can also use butter substitutes, fat-free mayonnaise, or fat-free dressing. · Try applesauce, prune puree, or mashed bananas to replace some or all of the fat when you bake. · Limit fats and oils, such as butter, margarine, mayonnaise, and salad dressing, to no more than 1 tablespoon a meal.  · Avoid high-fat foods, such as:  ¨ Chocolate, whole milk, ice cream, and processed cheese. ¨ Fried or buttered foods. ¨ Sausage, salami, and wilks. ¨ Cinnamon rolls, cakes, pies, cookies, and other pastries.   ¨ Prepared snack foods, such as potato chips, nut and granola bars, and mixed nuts. ¨ Coconut and avocado. · Learn how to read food labels for serving sizes and ingredients. Fast-food and convenience-food meals often have lots of fat. Where can you learn more? Go to http://alistair-kalpesh.info/. Enter U026 in the search box to learn more about \"Low-Fat Diet for Gallbladder Disease: Care Instructions. \"  Current as of: May 12, 2017  Content Version: 11.4  © 4443-3364 [employer]. Care instructions adapted under license by [employer] (which disclaims liability or warranty for this information). If you have questions about a medical condition or this instruction, always ask your healthcare professional. [address] any warranty or liability for your use of this information.

## 2018-06-08 NOTE — PROGRESS NOTES
1. Have you been to the ER, urgent care clinic since your last visit? Hospitalized since your last visit? Yes seen in the ER about 2 weeks ago. Diarrhea/abdominal pain    2. Have you seen or consulted any other health care providers outside of the [address] [place] [address] since your last visit? Include any pap smears or colon screening. No

## 2018-06-08 NOTE — MR AVS SNAPSHOT
[address] [facility] 
 
 
 [address] [address] [address] [facility] [address] 
[phone] Patient: Abelino Louis MRN: [id number] LNO:[id number] Visit Information Date & Time Provider Department Dept. Phone Encounter #  
 6/8/2018  1:20 PM Maranda Westbrook NP [employer] [address] [id number] Upcoming Health Maintenance Date Due Influenza Age 5 to Adult 8/1/2018 DTaP/Tdap/Td series (2 - Td) 11/17/2024 Allergies as of 6/8/2018  Review Complete On: 6/8/2018 By: Maranda Westbrook NP Severity Noted Reaction Type Reactions Amoxicillin  12/28/2014    Swelling Contrast Dye [Iodine]  07/04/2015   Topical Hives Pt had iv contrast prior to this study without problems, developed hives on 07/04/15 5 minutes post injection Iodinated Contrast- Oral And Iv Dye  12/02/2015    Hives Pt got hives when he received IV contrast in the past  
 Penicillins  05/27/2015    Hives, Swelling Wellbutrin [Bupropion]  10/03/2012    Other (comments) Caused aggression Current Immunizations  Never Reviewed Name Date Tdap 11/17/2014 12:17 PM  
  
 Not reviewed this visit You Were Diagnosed With   
  
 Codes Comments IMCHELLE (generalized anxiety disorder)    -  Primary ICD-10-CM: F41.1 ICD-9-CM: 300.02 Panic attacks     ICD-10-CM: F41.0 ICD-9-CM: 300.01 Essential hypertension     ICD-10-CM: I10 
ICD-9-CM: 401.9 Anxiety and depression     ICD-10-CM: F41.9, F32.9 ICD-9-CM: 300.00, 311 Calculus of gallbladder without cholecystitis without obstruction     ICD-10-CM: K80.20 ICD-9-CM: 574.20 Alcohol abuse     ICD-10-CM: F10.10 ICD-9-CM: 305.00 Nonadherence to medication     ICD-10-CM: Z91.14 
ICD-9-CM: V15.81 Vitals BP Pulse Temp Resp Height(growth percentile) Weight(growth percentile)  (!) 159/111 (BP 1 Location: Left arm, BP Patient Position: Sitting) 73 98.7 °F (37.1 °C) (Oral) 18 6' 2\" (1.88 m) 235 lb (106.6 kg) SpO2 BMI Smoking Status 98% 30.17 kg/m2 Former Smoker BMI and BSA Data Body Mass Index Body Surface Area  
 30.17 kg/m 2 2.36 m 2 Preferred Pharmacy Pharmacy Name Phone [address] [place] [address]. [phone] Your Updated Medication List  
  
   
This list is accurate as of 18  2:06 PM.  Always use your most recent med list.  
  
  
  
  
 clonazePAM 0.5 mg tablet Commonly known as:  Amy Cord Take 1 Tab by mouth three (3) times daily as needed. Max Daily Amount: 1.5 mg.  
  
 gemfibrozil 600 mg tablet Commonly known as:  LOPID TAKE ONE TABLET BY MOUTH TWICE DAILY  
  
 hydroCHLOROthiazide 25 mg tablet Commonly known as:  HYDRODIURIL  
1/2 pill daily  
  
 lisinopril 20 mg tablet Commonly known as:  Debbie Edward Take 1 Tab by mouth daily. loratadine 10 mg tablet Commonly known as:  Aidee Daub Take 10 mg by mouth daily. Indications: ALLERGIC RHINITIS PARoxetine 20 mg tablet Commonly known as:  PAXIL Take 2 Tabs by mouth daily. PROMETHAZINE PO Take  by mouth. Prescriptions Printed Refills  
 clonazePAM (KLONOPIN) 0.5 mg tablet 0 Sig: Take 1 Tab by mouth three (3) times daily as needed. Max Daily Amount: 1.5 mg.  
 Class: Print Route: Oral  
  
Prescriptions Sent to Pharmacy Refills  
 hydroCHLOROthiazide (HYDRODIURIL) 25 mg tablet 4 Si/2 pill daily Class: Normal  
 Pharmacy: [facility] [address] [address]. Ph #: [phone]  
 lisinopril (PRINIVIL, ZESTRIL) 20 mg tablet 4 Sig: Take 1 Tab by mouth daily. Class: Normal  
 Pharmacy: [facility] [address] [address]. Ph #: [phone] Route: Oral  
 PARoxetine (PAXIL) 20 mg tablet 4 Sig: Take 2 Tabs by mouth daily.   
 Class: Normal  
 Pharmacy: [address]  #: [phone] Route: Oral  
  
We Performed the Following REFERRAL TO GENERAL SURGERY [REF27 Custom] Comments:  
 Cholelithiasis Referral Information Referral ID Referred By Referred To  
  
 [id number], [address]   
   [address] [facility] [place] [facility], [address] Phone: [phone] Fax: [phone] Visits Status Start Date End Date 1 New Request 6/8/18 6/8/19 If your referral has a status of pending review or denied, additional information will be sent to support the outcome of this decision. Patient Instructions Well Visit, Ages 25 to 48: Care Instructions Your Care Instructions Physical exams can help you stay healthy. Your doctor has checked your overall health and may have suggested ways to take good care of yourself. He or she also may have recommended tests. At home, you can help prevent illness with healthy eating, regular exercise, and other steps. Follow-up care is a key part of your treatment and safety. Be sure to make and go to all appointments, and call your doctor if you are having problems. It's also a good idea to know your test results and keep a list of the medicines you take. How can you care for yourself at home? · Reach and stay at a healthy weight. This will lower your risk for many problems, such as obesity, diabetes, heart disease, and high blood pressure. · Get at least 30 minutes of physical activity on most days of the week. Walking is a good choice. You also may want to do other activities, such as running, swimming, cycling, or playing tennis or team sports. Discuss any changes in your exercise program with your doctor. · Do not smoke or allow others to smoke around you. If you need help quitting, talk to your doctor about stop-smoking programs and medicines. These can increase your chances of quitting for good. · Talk to your doctor about whether you have any risk factors for sexually transmitted infections (STIs). Having one sex partner (who does not have STIs and does not have sex with anyone else) is a good way to avoid these infections. · Use birth control if you do not want to have children at this time. Talk with your doctor about the choices available and what might be best for you. · Protect your skin from too much sun. When you're outdoors from 10 a.m. to 4 p.m., stay in the shade or cover up with clothing and a hat with a wide brim. Wear sunglasses that block UV rays. Even when it's cloudy, put broad-spectrum sunscreen (SPF 30 or higher) on any exposed skin. · See a dentist one or two times a year for checkups and to have your teeth cleaned. · Wear a seat belt in the car. · Drink alcohol in moderation, if at all. That means no more than 2 drinks a day for men and 1 drink a day for women. Follow your doctor's advice about when to have certain tests. These tests can spot problems early. For everyone · Cholesterol. Have the fat (cholesterol) in your blood tested after age 21. Your doctor will tell you how often to have this done based on your age, family history, or other things that can increase your risk for heart disease. · Blood pressure. Have your blood pressure checked during a routine doctor visit. Your doctor will tell you how often to check your blood pressure based on your age, your blood pressure results, and other factors. · Vision. Talk with your doctor about how often to have a glaucoma test. 
· Diabetes. Ask your doctor whether you should have tests for diabetes. · Colon cancer. Have a test for colon cancer at age 48. You may have one of several tests. If you are younger than 48, you may need a test earlier if you have any risk factors.  Risk factors include whether you already had a precancerous polyp removed from your colon or whether your parent, brother, sister, or child has had colon cancer. For women · Breast exam and mammogram. Talk to your doctor about when you should have a clinical breast exam and a mammogram. Medical experts differ on whether and how often women under 50 should have these tests. Your doctor can help you decide what is right for you. · Pap test and pelvic exam. Begin Pap tests at age 24. A Pap test is the best way to find cervical cancer. The test often is part of a pelvic exam. Ask how often to have this test. 
· Tests for sexually transmitted infections (STIs). Ask whether you should have tests for STIs. You may be at risk if you have sex with more than one person, especially if your partners do not wear condoms. For men · Tests for sexually transmitted infections (STIs). Ask whether you should have tests for STIs. You may be at risk if you have sex with more than one person, especially if you do not wear a condom. · Testicular cancer exam. Ask your doctor whether you should check your testicles regularly. · Prostate exam. Talk to your doctor about whether you should have a blood test (called a PSA test) for prostate cancer. Experts differ on whether and when men should have this test. Some experts suggest it if you are older than 39 and are -American or have a father or brother who got prostate cancer when he was younger than 72. When should you call for help? Watch closely for changes in your health, and be sure to contact your doctor if you have any problems or symptoms that concern you. Where can you learn more? Go to http://alistair-kalpesh.info/. Enter P072 in the search box to learn more about \"Well Visit, Ages 25 to 48: Care Instructions. \" Current as of: May 12, 2017 Content Version: 11.4 © 7389-8775 [employer]wise, [employer].  Care instructions adapted under license by [address] (which disclaims liability or warranty for this information). If you have questions about a medical condition or this instruction, always ask your healthcare professional. [address] any warranty or liability for your use of this information. Low-Fat Diet for Gallbladder Disease: Care Instructions Your Care Instructions When you eat, the gallbladder releases bile, which helps you digest the fat in food. If you have an inflamed gallbladder, this may cause pain. A low-fat diet may give your gallbladder a rest so you can start to heal. Your doctor and dietitian can help you make an eating plan that does not irritate your digestive system. Always talk with your doctor or dietitian before you make changes in your diet. Follow-up care is a key part of your treatment and safety. Be sure to make and go to all appointments, and call your doctor if you are having problems. It's also a good idea to know your test results and keep a list of the medicines you take. How can you care for yourself at home? · Eat many small meals and snacks each day instead of three large meals. · Choose lean meats. ¨ Eat no more than 5 to 6½ ounces of meat a day. ¨ Cut off all fat you can see. ¨ Eat chicken and turkey without the skin. ¨ Many types of fish, such as salmon, lake trout, tuna, and herring, provide healthy omega-3 fat. But, avoid fish canned in oil, such as sardines in olive oil. ¨ Bake, broil, or grill meats, poultry, or fish instead of frying them in butter or fat. · Drink or eat nonfat or low-fat milk, yogurt, cheese, or other milk products each day. ¨ Read the labels on cheeses, and choose those with less than 5 grams of fat an ounce. ¨ Try fat-free sour cream, cream cheese, or yogurt. ¨ Avoid cream soups and cream sauces on pasta. ¨ Eat low-fat ice cream, frozen yogurt, or sorbet.  Avoid regular ice cream. 
 · Eat whole-grain cereals, breads, crackers, rice, or pasta. Avoid high-fat foods such as croissants, scones, biscuits, waffles, doughnuts, muffins, granola, and high-fat breads. · Flavor your foods with herbs and spices (such as basil, tarragon, or mint), fat-free sauces, or lemon juice instead of butter. You can also use butter substitutes, fat-free mayonnaise, or fat-free dressing. · Try applesauce, prune puree, or mashed bananas to replace some or all of the fat when you bake. · Limit fats and oils, such as butter, margarine, mayonnaise, and salad dressing, to no more than 1 tablespoon a meal. 
· Avoid high-fat foods, such as: 
¨ Chocolate, whole milk, ice cream, and processed cheese. ¨ Fried or buttered foods. ¨ Sausage, salami, and wilks. ¨ Cinnamon rolls, cakes, pies, cookies, and other pastries. ¨ Prepared snack foods, such as potato chips, nut and granola bars, and mixed nuts. ¨ Coconut and avocado. · Learn how to read food labels for serving sizes and ingredients. Fast-food and convenience-food meals often have lots of fat. Where can you learn more? Go to http://alistair-kalpesh.info/. Enter H362 in the search box to learn more about \"Low-Fat Diet for Gallbladder Disease: Care Instructions. \" Current as of: May 12, 2017 Content Version: 11.4 © 8935-9529 [employer]. Care instructions adapted under license by [employer] (which disclaims liability or warranty for this information). If you have questions about a medical condition or this instruction, always ask your healthcare professional. [place] [address] any warranty or liability for your use of this information. Introducing [facility]! Dear Zackary Zhou: Thank you for requesting a RidePost account. Our records indicate that you already have an active RidePost account. You can access your account anytime at [email or web address] Did you know that you can access your hospital and ER discharge instructions at any time in PasswordBank? You can also review all of your test results from your hospital stay or ER visit. Additional Information If you have questions, please visit the Frequently Asked Questions section of the PasswordBank website at [email or web address] Remember, PasswordBank is NOT to be used for urgent needs. For medical emergencies, dial 911. Now available from your iPhone and Android! Please provide this summary of care documentation to your next provider. Your primary care clinician is listed as Timur Romero. If you have any questions after today's visit, please call [phone].

## 2018-10-12 PROCEDURE — 99284 EMERGENCY DEPT VISIT MOD MDM: CPT

## 2018-10-12 PROCEDURE — 96375 TX/PRO/DX INJ NEW DRUG ADDON: CPT

## 2018-10-13 ENCOUNTER — APPOINTMENT (OUTPATIENT)
Dept: ULTRASOUND IMAGING | Age: 33
End: 2018-10-13
Attending: PHYSICIAN ASSISTANT
Payer: SELF-PAY

## 2018-10-13 ENCOUNTER — HOSPITAL ENCOUNTER (EMERGENCY)
Age: 33
Discharge: HOME | End: 2018-10-13
Attending: EMERGENCY MEDICINE
Payer: SELF-PAY

## 2018-10-13 ENCOUNTER — APPOINTMENT (OUTPATIENT)
Dept: CT IMAGING | Age: 33
End: 2018-10-13
Attending: EMERGENCY MEDICINE
Payer: SELF-PAY

## 2018-10-13 VITALS
DIASTOLIC BLOOD PRESSURE: 96 MMHG | OXYGEN SATURATION: 100 % | WEIGHT: 207 LBS | HEIGHT: 74 IN | HEART RATE: 87 BPM | TEMPERATURE: 98.4 F | SYSTOLIC BLOOD PRESSURE: 145 MMHG | RESPIRATION RATE: 16 BRPM | BODY MASS INDEX: 26.56 KG/M2

## 2018-10-13 VITALS
TEMPERATURE: 98.1 F | DIASTOLIC BLOOD PRESSURE: 78 MMHG | WEIGHT: 207.8 LBS | BODY MASS INDEX: 26.68 KG/M2 | HEART RATE: 77 BPM | RESPIRATION RATE: 12 BRPM | OXYGEN SATURATION: 99 % | SYSTOLIC BLOOD PRESSURE: 133 MMHG

## 2018-10-13 DIAGNOSIS — R19.7 DIARRHEA, UNSPECIFIED TYPE: ICD-10-CM

## 2018-10-13 DIAGNOSIS — R11.2 NON-INTRACTABLE VOMITING WITH NAUSEA, UNSPECIFIED VOMITING TYPE: ICD-10-CM

## 2018-10-13 DIAGNOSIS — R10.13 ABDOMINAL PAIN, EPIGASTRIC: Primary | ICD-10-CM

## 2018-10-13 DIAGNOSIS — K62.5 BRIGHT RED BLOOD PER RECTUM: ICD-10-CM

## 2018-10-13 DIAGNOSIS — R10.32 ABDOMINAL PAIN, LLQ (LEFT LOWER QUADRANT): Primary | ICD-10-CM

## 2018-10-13 LAB
ABO + RH BLD: NORMAL
ALBUMIN SERPL-MCNC: 3.9 G/DL (ref 3.4–5)
ALBUMIN SERPL-MCNC: 4.6 G/DL (ref 3.4–5)
ALBUMIN/GLOB SERPL: 1.3 {RATIO} (ref 0.8–1.7)
ALBUMIN/GLOB SERPL: 1.3 {RATIO} (ref 0.8–1.7)
ALP SERPL-CCNC: 47 U/L (ref 45–117)
ALP SERPL-CCNC: 55 U/L (ref 45–117)
ALT SERPL-CCNC: 24 U/L (ref 16–61)
ALT SERPL-CCNC: 28 U/L (ref 16–61)
ANION GAP SERPL CALC-SCNC: 15 MMOL/L (ref 3–18)
ANION GAP SERPL CALC-SCNC: 7 MMOL/L (ref 3–18)
APPEARANCE UR: CLEAR
APPEARANCE UR: CLEAR
AST SERPL-CCNC: 25 U/L (ref 15–37)
AST SERPL-CCNC: 29 U/L (ref 15–37)
BACTERIA URNS QL MICRO: NEGATIVE /HPF
BASOPHILS # BLD: 0 K/UL (ref 0–0.1)
BASOPHILS # BLD: 0.1 K/UL (ref 0–0.1)
BASOPHILS NFR BLD: 1 % (ref 0–2)
BASOPHILS NFR BLD: 1 % (ref 0–2)
BILIRUB SERPL-MCNC: 0.4 MG/DL (ref 0.2–1)
BILIRUB SERPL-MCNC: 0.6 MG/DL (ref 0.2–1)
BILIRUB UR QL: NEGATIVE
BILIRUB UR QL: NEGATIVE
BLOOD GROUP ANTIBODIES SERPL: NORMAL
BUN SERPL-MCNC: 4 MG/DL (ref 7–18)
BUN SERPL-MCNC: 4 MG/DL (ref 7–18)
BUN/CREAT SERPL: 4 (ref 12–20)
BUN/CREAT SERPL: 4 (ref 12–20)
CALCIUM SERPL-MCNC: 7.9 MG/DL (ref 8.5–10.1)
CALCIUM SERPL-MCNC: 8.4 MG/DL (ref 8.5–10.1)
CHLORIDE SERPL-SCNC: 107 MMOL/L (ref 100–108)
CHLORIDE SERPL-SCNC: 108 MMOL/L (ref 100–108)
CO2 SERPL-SCNC: 17 MMOL/L (ref 21–32)
CO2 SERPL-SCNC: 23 MMOL/L (ref 21–32)
COLOR UR: YELLOW
COLOR UR: YELLOW
CREAT SERPL-MCNC: 0.9 MG/DL (ref 0.6–1.3)
CREAT SERPL-MCNC: 0.94 MG/DL (ref 0.6–1.3)
DIFFERENTIAL METHOD BLD: ABNORMAL
DIFFERENTIAL METHOD BLD: ABNORMAL
EOSINOPHIL # BLD: 0.2 K/UL (ref 0–0.4)
EOSINOPHIL # BLD: 0.2 K/UL (ref 0–0.4)
EOSINOPHIL NFR BLD: 3 % (ref 0–5)
EOSINOPHIL NFR BLD: 3 % (ref 0–5)
EPITH CASTS URNS QL MICRO: NORMAL /LPF (ref 0–5)
ERYTHROCYTE [DISTWIDTH] IN BLOOD BY AUTOMATED COUNT: 13.1 % (ref 11.6–14.5)
ERYTHROCYTE [DISTWIDTH] IN BLOOD BY AUTOMATED COUNT: 13.2 % (ref 11.6–14.5)
ETHANOL SERPL-MCNC: <3 MG/DL (ref 0–3)
GLOBULIN SER CALC-MCNC: 3 G/DL (ref 2–4)
GLOBULIN SER CALC-MCNC: 3.6 G/DL (ref 2–4)
GLUCOSE SERPL-MCNC: 119 MG/DL (ref 74–99)
GLUCOSE SERPL-MCNC: 93 MG/DL (ref 74–99)
GLUCOSE UR STRIP.AUTO-MCNC: NEGATIVE MG/DL
GLUCOSE UR STRIP.AUTO-MCNC: NEGATIVE MG/DL
HCT VFR BLD AUTO: 37.8 % (ref 36–48)
HCT VFR BLD AUTO: 43.6 % (ref 36–48)
HGB BLD-MCNC: 13.8 G/DL (ref 13–16)
HGB BLD-MCNC: 16.3 G/DL (ref 13–16)
HGB UR QL STRIP: NEGATIVE
HGB UR QL STRIP: NEGATIVE
KETONES UR QL STRIP.AUTO: NEGATIVE MG/DL
KETONES UR QL STRIP.AUTO: NEGATIVE MG/DL
LACTATE BLD-SCNC: 1.2 MMOL/L (ref 0.4–2)
LEUKOCYTE ESTERASE UR QL STRIP.AUTO: ABNORMAL
LEUKOCYTE ESTERASE UR QL STRIP.AUTO: NEGATIVE
LIPASE SERPL-CCNC: 287 U/L (ref 73–393)
LIPASE SERPL-CCNC: 356 U/L (ref 73–393)
LYMPHOCYTES # BLD: 1.9 K/UL (ref 0.9–3.6)
LYMPHOCYTES # BLD: 3.3 K/UL (ref 0.9–3.6)
LYMPHOCYTES NFR BLD: 35 % (ref 21–52)
LYMPHOCYTES NFR BLD: 42 % (ref 21–52)
MCH RBC QN AUTO: 30.6 PG (ref 24–34)
MCH RBC QN AUTO: 31.4 PG (ref 24–34)
MCHC RBC AUTO-ENTMCNC: 36.5 G/DL (ref 31–37)
MCHC RBC AUTO-ENTMCNC: 37.4 G/DL (ref 31–37)
MCV RBC AUTO: 83.8 FL (ref 74–97)
MCV RBC AUTO: 84 FL (ref 74–97)
MONOCYTES # BLD: 0.5 K/UL (ref 0.05–1.2)
MONOCYTES # BLD: 0.5 K/UL (ref 0.05–1.2)
MONOCYTES NFR BLD: 10 % (ref 3–10)
MONOCYTES NFR BLD: 6 % (ref 3–10)
NEUTS SEG # BLD: 2.7 K/UL (ref 1.8–8)
NEUTS SEG # BLD: 3.7 K/UL (ref 1.8–8)
NEUTS SEG NFR BLD: 48 % (ref 40–73)
NEUTS SEG NFR BLD: 51 % (ref 40–73)
NITRITE UR QL STRIP.AUTO: NEGATIVE
NITRITE UR QL STRIP.AUTO: NEGATIVE
PH UR STRIP: 6 [PH] (ref 5–8)
PH UR STRIP: 6 [PH] (ref 5–8)
PLATELET # BLD AUTO: 288 K/UL (ref 135–420)
PLATELET # BLD AUTO: 341 K/UL (ref 135–420)
PLATELET COMMENTS,PCOM: ABNORMAL
PMV BLD AUTO: 9.5 FL (ref 9.2–11.8)
PMV BLD AUTO: 9.6 FL (ref 9.2–11.8)
POTASSIUM SERPL-SCNC: 3.4 MMOL/L (ref 3.5–5.5)
POTASSIUM SERPL-SCNC: 3.5 MMOL/L (ref 3.5–5.5)
PROT SERPL-MCNC: 6.9 G/DL (ref 6.4–8.2)
PROT SERPL-MCNC: 8.2 G/DL (ref 6.4–8.2)
PROT UR STRIP-MCNC: NEGATIVE MG/DL
PROT UR STRIP-MCNC: NEGATIVE MG/DL
RBC # BLD AUTO: 4.51 M/UL (ref 4.7–5.5)
RBC # BLD AUTO: 5.19 M/UL (ref 4.7–5.5)
RBC #/AREA URNS HPF: NEGATIVE /HPF (ref 0–5)
RBC MORPH BLD: ABNORMAL
SODIUM SERPL-SCNC: 138 MMOL/L (ref 136–145)
SODIUM SERPL-SCNC: 139 MMOL/L (ref 136–145)
SP GR UR REFRACTOMETRY: <1.005 (ref 1–1.03)
SP GR UR REFRACTOMETRY: <1.005 (ref 1–1.03)
SPECIMEN EXP DATE BLD: NORMAL
UROBILINOGEN UR QL STRIP.AUTO: 0.2 EU/DL (ref 0.2–1)
UROBILINOGEN UR QL STRIP.AUTO: 0.2 EU/DL (ref 0.2–1)
WBC # BLD AUTO: 5.2 K/UL (ref 4.6–13.2)
WBC # BLD AUTO: 7.8 K/UL (ref 4.6–13.2)
WBC URNS QL MICRO: NORMAL /HPF (ref 0–4)

## 2018-10-13 PROCEDURE — 83605 ASSAY OF LACTIC ACID: CPT

## 2018-10-13 PROCEDURE — 96361 HYDRATE IV INFUSION ADD-ON: CPT

## 2018-10-13 PROCEDURE — 99284 EMERGENCY DEPT VISIT MOD MDM: CPT

## 2018-10-13 PROCEDURE — 96376 TX/PRO/DX INJ SAME DRUG ADON: CPT

## 2018-10-13 PROCEDURE — 85025 COMPLETE CBC W/AUTO DIFF WBC: CPT | Performed by: EMERGENCY MEDICINE

## 2018-10-13 PROCEDURE — 81001 URINALYSIS AUTO W/SCOPE: CPT | Performed by: PHYSICIAN ASSISTANT

## 2018-10-13 PROCEDURE — 74011250637 HC RX REV CODE- 250/637: Performed by: PHYSICIAN ASSISTANT

## 2018-10-13 PROCEDURE — 74011250636 HC RX REV CODE- 250/636: Performed by: PHYSICIAN ASSISTANT

## 2018-10-13 PROCEDURE — 80307 DRUG TEST PRSMV CHEM ANLYZR: CPT | Performed by: PHYSICIAN ASSISTANT

## 2018-10-13 PROCEDURE — 86900 BLOOD TYPING SEROLOGIC ABO: CPT | Performed by: EMERGENCY MEDICINE

## 2018-10-13 PROCEDURE — 74011250637 HC RX REV CODE- 250/637: Performed by: EMERGENCY MEDICINE

## 2018-10-13 PROCEDURE — 74011000250 HC RX REV CODE- 250: Performed by: PHYSICIAN ASSISTANT

## 2018-10-13 PROCEDURE — 83690 ASSAY OF LIPASE: CPT | Performed by: EMERGENCY MEDICINE

## 2018-10-13 PROCEDURE — 83690 ASSAY OF LIPASE: CPT | Performed by: PHYSICIAN ASSISTANT

## 2018-10-13 PROCEDURE — 96375 TX/PRO/DX INJ NEW DRUG ADDON: CPT

## 2018-10-13 PROCEDURE — C9113 INJ PANTOPRAZOLE SODIUM, VIA: HCPCS | Performed by: EMERGENCY MEDICINE

## 2018-10-13 PROCEDURE — 81003 URINALYSIS AUTO W/O SCOPE: CPT | Performed by: EMERGENCY MEDICINE

## 2018-10-13 PROCEDURE — 76705 ECHO EXAM OF ABDOMEN: CPT

## 2018-10-13 PROCEDURE — 74011250636 HC RX REV CODE- 250/636: Performed by: EMERGENCY MEDICINE

## 2018-10-13 PROCEDURE — 96374 THER/PROPH/DIAG INJ IV PUSH: CPT

## 2018-10-13 PROCEDURE — 80053 COMPREHEN METABOLIC PANEL: CPT | Performed by: PHYSICIAN ASSISTANT

## 2018-10-13 PROCEDURE — 74176 CT ABD & PELVIS W/O CONTRAST: CPT

## 2018-10-13 PROCEDURE — 80053 COMPREHEN METABOLIC PANEL: CPT | Performed by: EMERGENCY MEDICINE

## 2018-10-13 PROCEDURE — 93005 ELECTROCARDIOGRAM TRACING: CPT

## 2018-10-13 PROCEDURE — 74011250636 HC RX REV CODE- 250/636

## 2018-10-13 RX ORDER — ONDANSETRON 4 MG/1
4 TABLET, ORALLY DISINTEGRATING ORAL
Qty: 10 TAB | Refills: 0 | Status: SHIPPED | OUTPATIENT
Start: 2018-10-13 | End: 2018-10-14

## 2018-10-13 RX ORDER — ONDANSETRON 4 MG/1
4 TABLET, ORALLY DISINTEGRATING ORAL
Status: COMPLETED | OUTPATIENT
Start: 2018-10-13 | End: 2018-10-13

## 2018-10-13 RX ORDER — MORPHINE SULFATE 2 MG/ML
4 INJECTION, SOLUTION INTRAMUSCULAR; INTRAVENOUS ONCE
Status: DISCONTINUED | OUTPATIENT
Start: 2018-10-13 | End: 2018-10-13 | Stop reason: HOSPADM

## 2018-10-13 RX ORDER — MORPHINE SULFATE 4 MG/ML
4 INJECTION INTRAVENOUS
Status: COMPLETED | OUTPATIENT
Start: 2018-10-13 | End: 2018-10-13

## 2018-10-13 RX ORDER — PANTOPRAZOLE SODIUM 40 MG/10ML
40 INJECTION, POWDER, LYOPHILIZED, FOR SOLUTION INTRAVENOUS
Status: COMPLETED | OUTPATIENT
Start: 2018-10-13 | End: 2018-10-13

## 2018-10-13 RX ORDER — ONDANSETRON HYDROCHLORIDE 4 MG/2ML
4 INJECTION, SOLUTION INTRAMUSCULAR; INTRAVENOUS
Status: COMPLETED | OUTPATIENT
Start: 2018-10-13 | End: 2018-10-13

## 2018-10-13 RX ORDER — FAMOTIDINE 10 MG/ML
20 INJECTION INTRAVENOUS
Status: COMPLETED | OUTPATIENT
Start: 2018-10-13 | End: 2018-10-13

## 2018-10-13 RX ORDER — PANTOPRAZOLE SODIUM 40 MG/10ML
INJECTION, POWDER, LYOPHILIZED, FOR SOLUTION INTRAVENOUS
Status: DISCONTINUED
Start: 2018-10-13 | End: 2018-10-13 | Stop reason: HOSPADM

## 2018-10-13 RX ORDER — TRAMADOL HYDROCHLORIDE 50 MG/1
50 TABLET ORAL
Qty: 8 TAB | Refills: 0 | Status: SHIPPED | OUTPATIENT
Start: 2018-10-13 | End: 2018-10-14

## 2018-10-13 RX ORDER — ONDANSETRON HYDROCHLORIDE 4 MG/2ML
INJECTION, SOLUTION INTRAMUSCULAR; INTRAVENOUS
Status: DISCONTINUED
Start: 2018-10-13 | End: 2018-10-13 | Stop reason: HOSPADM

## 2018-10-13 RX ORDER — PROMETHAZINE HYDROCHLORIDE 25 MG/1
25 SUPPOSITORY RECTAL
Qty: 12 SUPPOSITORY | Refills: 0 | Status: SHIPPED | OUTPATIENT
Start: 2018-10-13 | End: 2018-10-15

## 2018-10-13 RX ORDER — MORPHINE SULFATE 2 MG/ML
2 INJECTION, SOLUTION INTRAMUSCULAR; INTRAVENOUS
Status: COMPLETED | OUTPATIENT
Start: 2018-10-13 | End: 2018-10-13

## 2018-10-13 RX ORDER — MORPHINE SULFATE 4 MG/ML
INJECTION INTRAVENOUS
Status: COMPLETED
Start: 2018-10-13 | End: 2018-10-13

## 2018-10-13 RX ORDER — MAG HYDROX/ALUMINUM HYD/SIMETH 200-200-20
30 SUSPENSION, ORAL (FINAL DOSE FORM) ORAL
Status: COMPLETED | OUTPATIENT
Start: 2018-10-13 | End: 2018-10-13

## 2018-10-13 RX ORDER — SODIUM CHLORIDE 9 MG/ML
1000 INJECTION, SOLUTION INTRAVENOUS ONCE
Status: COMPLETED | OUTPATIENT
Start: 2018-10-13 | End: 2018-10-13

## 2018-10-13 RX ORDER — OMEPRAZOLE 40 MG/1
40 CAPSULE, DELAYED RELEASE ORAL DAILY
Qty: 14 CAP | Refills: 0 | Status: SHIPPED | OUTPATIENT
Start: 2018-10-13 | End: 2018-10-14

## 2018-10-13 RX ORDER — LIDOCAINE HYDROCHLORIDE 20 MG/ML
15 SOLUTION OROPHARYNGEAL
Status: COMPLETED | OUTPATIENT
Start: 2018-10-13 | End: 2018-10-13

## 2018-10-13 RX ADMIN — SODIUM CHLORIDE 1000 ML: 900 INJECTION, SOLUTION INTRAVENOUS at 01:35

## 2018-10-13 RX ADMIN — ALUMINUM HYDROXIDE, MAGNESIUM HYDROXIDE, AND SIMETHICONE 30 ML: 200; 200; 20 SUSPENSION ORAL at 20:07

## 2018-10-13 RX ADMIN — ONDANSETRON 4 MG: 4 TABLET, ORALLY DISINTEGRATING ORAL at 15:53

## 2018-10-13 RX ADMIN — FAMOTIDINE 20 MG: 10 INJECTION INTRAVENOUS at 15:53

## 2018-10-13 RX ADMIN — ONDANSETRON HYDROCHLORIDE 4 MG: 4 INJECTION, SOLUTION INTRAMUSCULAR; INTRAVENOUS at 01:35

## 2018-10-13 RX ADMIN — SODIUM CHLORIDE 1000 ML: 900 INJECTION, SOLUTION INTRAVENOUS at 15:53

## 2018-10-13 RX ADMIN — MORPHINE SULFATE 2 MG: 2 INJECTION, SOLUTION INTRAMUSCULAR; INTRAVENOUS at 19:17

## 2018-10-13 RX ADMIN — ALUMINUM HYDROXIDE AND MAGNESIUM HYDROXIDE 30 ML: 200; 200 SUSPENSION ORAL at 04:42

## 2018-10-13 RX ADMIN — MORPHINE SULFATE 4 MG: 4 INJECTION INTRAVENOUS at 02:52

## 2018-10-13 RX ADMIN — MORPHINE SULFATE 4 MG: 4 INJECTION INTRAVENOUS at 17:12

## 2018-10-13 RX ADMIN — PANTOPRAZOLE SODIUM 40 MG: 40 INJECTION, POWDER, FOR SOLUTION INTRAVENOUS at 01:35

## 2018-10-13 RX ADMIN — LIDOCAINE HYDROCHLORIDE 15 ML: 20 SOLUTION ORAL; TOPICAL at 20:07

## 2018-10-13 RX ADMIN — ONDANSETRON HYDROCHLORIDE 4 MG: 4 INJECTION, SOLUTION INTRAMUSCULAR; INTRAVENOUS at 19:18

## 2018-10-13 NOTE — Clinical Note
Complete all medications as prescribed. Follow-up with GI this week. Return to the ED immediately for any new or worsening symptoms.

## 2018-10-13 NOTE — ED PROVIDER NOTES
EMERGENCY DEPARTMENT HISTORY AND PHYSICAL EXAM 
 
Date: 10/13/2018 Patient Name: Pati Balbuena History of Presenting Illness Chief Complaint Patient presents with  Vomiting  Diarrhea History Provided By: patient Chief Complaint: abd pain Duration:2 days Timing: acute Location: epigastric, migrating throughout the entire abd Quality:burning type pain Severity:severe Modifying Factors: none Associated Symptoms:N/V, diarrhea Additional History (Context): Pati Balbuena is a 26 y.o. male with PMH htn, depression, anxiety, pancreatitis, gastritis, GERD, gallstones, and ETOH abuse who presents with complaints of epigastric pain that began last night, now migrating throughout the abdomen, as well as nausea/vomiting, and diarrhea. Pt was seen in the ED early this am for the same complaints. He was given zofran and pepcid with some relief in sx. Pt states he has not yet picked up the prescriptions he received upon discharge but states his pain is worsening. Denies fever and chills. No other complaints at this time. PCP: Michelle Frye NP Current Outpatient Prescriptions Medication Sig Dispense Refill  ondansetron (ZOFRAN ODT) 4 mg disintegrating tablet Take 1 Tab by mouth every eight (8) hours as needed for Nausea. 10 Tab 0  
 omeprazole (PRILOSEC) 40 mg capsule Take 1 Cap by mouth daily for 14 days. 14 Cap 0  clonazePAM (KLONOPIN) 0.5 mg tablet Take 1 Tab by mouth three (3) times daily as needed. Max Daily Amount: 1.5 mg. 30 Tab 0  promethazine HCl (PROMETHAZINE PO) Take  by mouth.  hydroCHLOROthiazide (HYDRODIURIL) 25 mg tablet 1/2 pill daily 30 Tab 4  
 lisinopril (PRINIVIL, ZESTRIL) 20 mg tablet Take 1 Tab by mouth daily. 30 Tab 4  PARoxetine (PAXIL) 20 mg tablet Take 2 Tabs by mouth daily. 60 Tab 4  
 loratadine (CLARITIN) 10 mg tablet Take 10 mg by mouth daily. Indications: ALLERGIC RHINITIS Past History Past Medical History: Past Medical History:  
Diagnosis Date  Anxiety  Cholelithiasis 08/18/2013 Note on U/S  Chronic gastritis 10/17/2014 Dr. Dawson Epps (EGD Result) H. Pylori Neg  
 Depression  Dog bite of left hand including fingers with infection 11/16/2014 Adm 11/18/14 for IV antibiotics.  ETOH abuse  GERD (gastroesophageal reflux disease)  Hepatic steatosis 02/06/2014 Noted on U/S.   
 History of plasmapheresis 02/2014 Hypertriglyceridemia, Severe Pancreatitis  Hypertension  Hypertriglyceridemia  Hypomagnesemia 07/24/2016  
 1.6 mg/dL  Non compliance with medical treatment  Osteomyelitis of left hand ([facility] [address] 11/22/2014 MRI Finding @ LUE 2nd MCP joint.  Pancreatitis 08/17/2013 Highest on Record (2/6/14) 8612. Dr. Dawson Epps  PICC (peripherally inserted central catheter) in place 12/05/2014 R Basilic Vein (IV Antibiotics for LUE Hand Osteomyelitis).  Rectal bleeding  Substance abuse ([facility] [address] Several UDS + Opiates.  Vitamin D deficiency 02/17/2014  
 11.6ng/mL ( ng/mL) Past Surgical History: 
Past Surgical History:  
Procedure Laterality Date  HX CYST INCISION AND DRAINAGE Left 12/01/2014 LUE 2nd digit abscess I&D.  HX ENDOSCOPY  10/17/2014 Dr. Dawson Epps: EGD/Colonoscopy  HX HEENT    
 wisdom tooth extraction  HX KNEE ARTHROSCOPY Right Family History: 
Family History Problem Relation Age of Onset  Elevated Lipids Mother  Thyroid Disease Mother  Hypertension Father  Alcohol abuse Maternal Grandmother  MS Paternal Grandmother Social History: 
Social History Substance Use Topics  Smoking status: Former Smoker Quit date: 9/17/2012  Smokeless tobacco: Never Used  Alcohol use 8.0 oz/week 8 Cans of beer, 8 Standard drinks or equivalent per week Comment: quit 02/02/2016 Allergies: Allergies Allergen Reactions  Amoxicillin Swelling  Contrast Dye [Iodine] Hives Pt had iv contrast prior to this study without problems, developed hives on 07/04/15 5 minutes post injection  Iodinated Contrast- Oral And Iv Dye Hives Pt got hives when he received IV contrast in the past  
 Penicillins Hives and Swelling  Wellbutrin [Bupropion] Other (comments) Caused aggression Review of Systems Review of Systems Constitutional: Negative. Negative for chills and fever. HENT: Negative. Negative for congestion, ear pain and rhinorrhea. Eyes: Negative. Negative for pain and redness. Respiratory: Negative. Negative for cough, shortness of breath, wheezing and stridor. Cardiovascular: Negative. Negative for chest pain and leg swelling. Gastrointestinal: Positive for abdominal pain, diarrhea, nausea and vomiting. Negative for constipation. Genitourinary: Negative. Negative for dysuria and frequency. Musculoskeletal: Negative. Negative for back pain and neck pain. Skin: Negative. Negative for rash and wound. Neurological: Negative. Negative for dizziness, seizures, syncope and headaches. All other systems reviewed and are negative. All Other Systems Negative Physical Exam  
 
Vitals:  
 10/13/18 1511 10/13/18 1640 BP: (!) 145/96 Pulse: 87 Resp: 16 Temp: 98.4 °F (36.9 °C) SpO2: 100% 100% Weight: 93.9 kg (207 lb) Height: 6' 2\" (1.88 m) Physical Exam  
Constitutional: He is oriented to person, place, and time. He appears well-developed and well-nourished. He appears distressed. HENT:  
Head: Normocephalic and atraumatic. Eyes: Conjunctivae are normal. Right eye exhibits no discharge. Left eye exhibits no discharge. No scleral icterus. Neck: Normal range of motion. Neck supple. Cardiovascular: Normal rate, regular rhythm and normal heart sounds. Exam reveals no gallop and no friction rub. No murmur heard. Pulmonary/Chest: Effort normal and breath sounds normal. No stridor.  No respiratory distress. He has no wheezes. He has no rales. Abdominal: Soft. Bowel sounds are normal. He exhibits no distension. There is tenderness. There is guarding. Epigastric and RUQ TTP noted with guarding, mild discomfort noted with deep palpation of the bilateral lower abd. Musculoskeletal: Normal range of motion. Neurological: He is alert and oriented to person, place, and time. Coordination normal.  
Gait is steady. Able to ambulate without difficulty. Skin: Skin is warm and dry. No rash noted. He is not diaphoretic. No erythema. Psychiatric: He has a normal mood and affect. His behavior is normal. Thought content normal.  
Nursing note and vitals reviewed. Diagnostic Study Results Labs - Recent Results (from the past 12 hour(s)) CBC WITH AUTOMATED DIFF Collection Time: 10/13/18  4:25 PM  
Result Value Ref Range WBC 5.2 4.6 - 13.2 K/uL  
 RBC 4.51 (L) 4.70 - 5.50 M/uL  
 HGB 13.8 13.0 - 16.0 g/dL HCT 37.8 36.0 - 48.0 % MCV 83.8 74.0 - 97.0 FL  
 MCH 30.6 24.0 - 34.0 PG  
 MCHC 36.5 31.0 - 37.0 g/dL  
 RDW 13.1 11.6 - 14.5 % PLATELET 207 045 - 586 K/uL MPV 9.5 9.2 - 11.8 FL  
 NEUTROPHILS 51 40 - 73 % LYMPHOCYTES 35 21 - 52 % MONOCYTES 10 3 - 10 % EOSINOPHILS 3 0 - 5 % BASOPHILS 1 0 - 2 %  
 ABS. NEUTROPHILS 2.7 1.8 - 8.0 K/UL  
 ABS. LYMPHOCYTES 1.9 0.9 - 3.6 K/UL  
 ABS. MONOCYTES 0.5 0.05 - 1.2 K/UL  
 ABS. EOSINOPHILS 0.2 0.0 - 0.4 K/UL  
 ABS. BASOPHILS 0.0 0.0 - 0.1 K/UL  
 DF AUTOMATED METABOLIC PANEL, COMPREHENSIVE Collection Time: 10/13/18  4:25 PM  
Result Value Ref Range Sodium 138 136 - 145 mmol/L Potassium 3.5 3.5 - 5.5 mmol/L Chloride 108 100 - 108 mmol/L  
 CO2 23 21 - 32 mmol/L Anion gap 7 3.0 - 18 mmol/L Glucose 119 (H) 74 - 99 mg/dL BUN 4 (L) 7.0 - 18 MG/DL Creatinine 0.90 0.6 - 1.3 MG/DL  
 BUN/Creatinine ratio 4 (L) 12 - 20 GFR est AA >60 >60 ml/min/1.73m2 GFR est non-AA >60 >60 ml/min/1.73m2 Calcium 7.9 (L) 8.5 - 10.1 MG/DL Bilirubin, total 0.6 0.2 - 1.0 MG/DL  
 ALT (SGPT) 24 16 - 61 U/L  
 AST (SGOT) 25 15 - 37 U/L Alk. phosphatase 47 45 - 117 U/L Protein, total 6.9 6.4 - 8.2 g/dL Albumin 3.9 3.4 - 5.0 g/dL Globulin 3.0 2.0 - 4.0 g/dL A-G Ratio 1.3 0.8 - 1.7 LIPASE Collection Time: 10/13/18  4:25 PM  
Result Value Ref Range Lipase 356 73 - 393 U/L  
ETHYL ALCOHOL Collection Time: 10/13/18  4:25 PM  
Result Value Ref Range ALCOHOL(ETHYL),SERUM <3 0 - 3 MG/DL POC LACTIC ACID Collection Time: 10/13/18  4:28 PM  
Result Value Ref Range Lactic Acid (POC) 1.2 0.4 - 2.0 mmol/L  
URINALYSIS W/ RFLX MICROSCOPIC Collection Time: 10/13/18  4:30 PM  
Result Value Ref Range Color YELLOW Appearance CLEAR Specific gravity <1.005 (L) 1.005 - 1.030  
 pH (UA) 6.0 5.0 - 8.0 Protein NEGATIVE  NEG mg/dL Glucose NEGATIVE  NEG mg/dL Ketone NEGATIVE  NEG mg/dL Bilirubin NEGATIVE  NEG Blood NEGATIVE  NEG Urobilinogen 0.2 0.2 - 1.0 EU/dL Nitrites NEGATIVE  NEG Leukocyte Esterase TRACE (A) NEG URINE MICROSCOPIC ONLY Collection Time: 10/13/18  4:30 PM  
Result Value Ref Range WBC 0 to 2 0 - 4 /hpf  
 RBC NEGATIVE  0 - 5 /hpf Epithelial cells FEW 0 - 5 /lpf Bacteria NEGATIVE  NEG /hpf Radiologic Studies -  
US ABD LTD Final Result CXR Results  (Last 48 hours) None EKG: normal sinus rhythm, rate 70, no STEMI, WA interval 134, QRS duration 90, normal ECG, reviewed by myself and Dr. Lee Rodgers, ED attending. Medical Decision Making I am the first provider for this patient. I reviewed the vital signs, available nursing notes, past medical history, past surgical history, family history and social history. Vital Signs-Reviewed the patient's vital signs. Records Reviewed: Luann Mac PA-C 6:50 PM  
 
Procedures: 
Procedures Provider Notes (Medical Decision Making): Impression:  Epigastric pain, N/V, diarrhea IV inserted morphine, zofran, pepcid, and GI cocktail given. Pt has not vomited while in the ED. Gallbladder US and abd CT scan from earlier this morning both demonstrate cholelithiasis without evidence of cholecystitis. Pt recommended to have close GI follow-up. MED RECONCILIATION: 
No current facility-administered medications for this encounter. Current Outpatient Prescriptions Medication Sig  
 ondansetron (ZOFRAN ODT) 4 mg disintegrating tablet Take 1 Tab by mouth every eight (8) hours as needed for Nausea.  omeprazole (PRILOSEC) 40 mg capsule Take 1 Cap by mouth daily for 14 days.  clonazePAM (KLONOPIN) 0.5 mg tablet Take 1 Tab by mouth three (3) times daily as needed. Max Daily Amount: 1.5 mg.  promethazine HCl (PROMETHAZINE PO) Take  by mouth.  hydroCHLOROthiazide (HYDRODIURIL) 25 mg tablet 1/2 pill daily  lisinopril (PRINIVIL, ZESTRIL) 20 mg tablet Take 1 Tab by mouth daily.  PARoxetine (PAXIL) 20 mg tablet Take 2 Tabs by mouth daily.  loratadine (CLARITIN) 10 mg tablet Take 10 mg by mouth daily. Indications: ALLERGIC RHINITIS Disposition: D/c 
 
DISCHARGE NOTE:  
 
Pt has been reexamined. Patient has no new complaints, changes, or physical findings. Care plan outlined and precautions discussed. Results of the labs and imaging were reviewed with the patient. All medications were reviewed with the patient; will d/c home with phenergan suppository and very short course of ultram. All of pt's questions and concerns were addressed. Patient was instructed and agrees to follow up with GI, as well as to return to the ED upon further deterioration. Patient is ready to go home. Luann Mac PA-C 8:22 PM  
 
Follow-up Information Follow up With Details Comments Contact Info Darren Jiménez MD Schedule an appointment as soon as possible for a visit in 3 days  [address] [place] [address] [address] [facility] [id number]  [facility] EMERGENCY DEPT  As needed, If symptoms worsen [address] [place] [address] [address] Diagnosis Clinical Impression: 1. Abdominal pain, epigastric 2. Non-intractable vomiting with nausea, unspecified vomiting type 3. Diarrhea, unspecified type

## 2018-10-13 NOTE — DISCHARGE INSTRUCTIONS
Abdominal Pain: Care Instructions  Your Care Instructions    Abdominal pain has many possible causes. Some aren't serious and get better on their own in a few days. Others need more testing and treatment. If your pain continues or gets worse, you need to be rechecked and may need more tests to find out what is wrong. You may need surgery to correct the problem. Don't ignore new symptoms, such as fever, nausea and vomiting, urination problems, pain that gets worse, and dizziness. These may be signs of a more serious problem. Your doctor may have recommended a follow-up visit in the next 8 to 12 hours. If you are not getting better, you may need more tests or treatment. The doctor has checked you carefully, but problems can develop later. If you notice any problems or new symptoms, get medical treatment right away. Follow-up care is a key part of your treatment and safety. Be sure to make and go to all appointments, and call your doctor if you are having problems. It's also a good idea to know your test results and keep a list of the medicines you take. How can you care for yourself at home? · Rest until you feel better. · To prevent dehydration, drink plenty of fluids, enough so that your urine is light yellow or clear like water. Choose water and other caffeine-free clear liquids until you feel better. If you have kidney, heart, or liver disease and have to limit fluids, talk with your doctor before you increase the amount of fluids you drink. · If your stomach is upset, eat mild foods, such as rice, dry toast or crackers, bananas, and applesauce. Try eating several small meals instead of two or three large ones. · Wait until 48 hours after all symptoms have gone away before you have spicy foods, alcohol, and drinks that contain caffeine. · Do not eat foods that are high in fat. · Avoid anti-inflammatory medicines such as aspirin, ibuprofen (Advil, Motrin), and naproxen (Aleve).  These can cause stomach upset. Talk to your doctor if you take daily aspirin for another health problem. When should you call for help? Call 911 anytime you think you may need emergency care. For example, call if:    · You passed out (lost consciousness).     · You pass maroon or very bloody stools.     · You vomit blood or what looks like coffee grounds.     · You have new, severe belly pain.    Call your doctor now or seek immediate medical care if:    · Your pain gets worse, especially if it becomes focused in one area of your belly.     · You have a new or higher fever.     · Your stools are black and look like tar, or they have streaks of blood.     · You have unexpected vaginal bleeding.     · You have symptoms of a urinary tract infection. These may include:  ¨ Pain when you urinate. ¨ Urinating more often than usual.  ¨ Blood in your urine.     · You are dizzy or lightheaded, or you feel like you may faint.    Watch closely for changes in your health, and be sure to contact your doctor if:    · You are not getting better after 1 day (24 hours). Where can you learn more? Go to http://alistairHIGH MOBILITYkalpesh.info/. Enter R637 in the search box to learn more about \"Abdominal Pain: Care Instructions. \"  Current as of: November 20, 2017  Content Version: 11.8  © 5483-0732 [employer]. Care instructions adapted under license by [employer] (which disclaims liability or warranty for this information). If you have questions about a medical condition or this instruction, always ask your healthcare professional. [place] [address] any warranty or liability for your use of this information. [employer] Activation    Thank you for requesting access to [employer]. Please follow the instructions below to securely access and download your online medical record.  [employer] allows you to send messages to your doctor, view your test results, renew your prescriptions, schedule appointments, and more.    How Do I Sign Up? 1. In your internet browser, go to [email or web address]  2. Click on the First Time User? Click Here link in the Sign In box. You will be redirect to the New Member Sign Up page. 3. Enter your [employer] Access Code exactly as it appears below. You will not need to use this code after youve completed the sign-up process. If you do not sign up before the expiration date, you must request a new code. [employer] Access Code: Activation code not generated  Current [employer] Status: Active (This is the date your [employer] access code will )    4. Enter the last four digits of your Social Security Number (xxxx) and Date of Birth (mm/dd/yyyy) as indicated and click Submit. You will be taken to the next sign-up page. 5. Create a O[employer]ven ID. This will be your [employer] login ID and cannot be changed, so think of one that is secure and easy to remember. 6. Create a [employer] password. You can change your password at any time. 7. Enter your Password Reset Question and Answer. This can be used at a later time if you forget your password. 8. Enter your e-mail address. You will receive e-mail notification when new information is available in [address] 9. Click Sign Up. You can now view and download portions of your medical record. 10. Click the Download Summary menu link to download a portable copy of your medical information. Additional Information    If you have questions, please visit the Frequently Asked Questions section of the [employer] website at [email or web address] Remember, [employer] is NOT to be used for urgent needs. For medical emergencies, dial 911.

## 2018-10-13 NOTE — ED NOTES
Pt stating that his abdominal pain is shifting from the left to right side and back, and states that he has back cramps. Dr. Perera Net informed.

## 2018-10-13 NOTE — DISCHARGE INSTRUCTIONS
IF YOU HAVE NEW OR WORSENING SYMPTOMS, SEVERE PAIN, CONTINUED BLEEDING, FEVER, DIZZINESS OR PASSING OUT, OR OTHER WORRYING SIGNS THEN RETURN TO THE ER RIGHT AWAY. Abdominal Pain: Care Instructions  Your Care Instructions    Abdominal pain has many possible causes. Some aren't serious and get better on their own in a few days. Others need more testing and treatment. If your pain continues or gets worse, you need to be rechecked and may need more tests to find out what is wrong. You may need surgery to correct the problem. Don't ignore new symptoms, such as fever, nausea and vomiting, urination problems, pain that gets worse, and dizziness. These may be signs of a more serious problem. Your doctor may have recommended a follow-up visit in the next 8 to 12 hours. If you are not getting better, you may need more tests or treatment. The doctor has checked you carefully, but problems can develop later. If you notice any problems or new symptoms, get medical treatment right away. Follow-up care is a key part of your treatment and safety. Be sure to make and go to all appointments, and call your doctor if you are having problems. It's also a good idea to know your test results and keep a list of the medicines you take. How can you care for yourself at home? · Rest until you feel better. · To prevent dehydration, drink plenty of fluids, enough so that your urine is light yellow or clear like water. Choose water and other caffeine-free clear liquids until you feel better. If you have kidney, heart, or liver disease and have to limit fluids, talk with your doctor before you increase the amount of fluids you drink. · If your stomach is upset, eat mild foods, such as rice, dry toast or crackers, bananas, and applesauce. Try eating several small meals instead of two or three large ones.   · Wait until 48 hours after all symptoms have gone away before you have spicy foods, alcohol, and drinks that contain caffeine. · Do not eat foods that are high in fat. · Avoid anti-inflammatory medicines such as aspirin, ibuprofen (Advil, Motrin), and naproxen (Aleve). These can cause stomach upset. Talk to your doctor if you take daily aspirin for another health problem. When should you call for help? Call 911 anytime you think you may need emergency care. For example, call if:    · You passed out (lost consciousness).     · You pass maroon or very bloody stools.     · You vomit blood or what looks like coffee grounds.     · You have new, severe belly pain.    Call your doctor now or seek immediate medical care if:    · Your pain gets worse, especially if it becomes focused in one area of your belly.     · You have a new or higher fever.     · Your stools are black and look like tar, or they have streaks of blood.     · You have unexpected vaginal bleeding.     · You have symptoms of a urinary tract infection. These may include:  ¨ Pain when you urinate. ¨ Urinating more often than usual.  ¨ Blood in your urine.     · You are dizzy or lightheaded, or you feel like you may faint.    Watch closely for changes in your health, and be sure to contact your doctor if:    · You are not getting better after 1 day (24 hours). Where can you learn more? Go to http://alistair-kalpesh.info/. Enter B942 in the search box to learn more about \"Abdominal Pain: Care Instructions. \"  Current as of: November 20, 2017  Content Version: 11.8  © 9962-4396 [employer]. Care instructions adapted under license by [employer] (which disclaims liability or warranty for this information). If you have questions about a medical condition or this instruction, always ask your healthcare professional. [place] [address] any warranty or liability for your use of this information. Diarrhea: Care Instructions  Your Care Instructions    Diarrhea is loose, watery stools (bowel movements).  The exact cause is often hard to find. Sometimes diarrhea is your body's way of getting rid of what caused an upset stomach. Viruses, food poisoning, and many medicines can cause diarrhea. Some people get diarrhea in response to emotional stress, anxiety, or certain foods. Almost everyone has diarrhea now and then. It usually isn't serious, and your stools will return to normal soon. The important thing to do is replace the fluids you have lost, so you can prevent dehydration. The doctor has checked you carefully, but problems can develop later. If you notice any problems or new symptoms, get medical treatment right away. Follow-up care is a key part of your treatment and safety. Be sure to make and go to all appointments, and call your doctor if you are having problems. It's also a good idea to know your test results and keep a list of the medicines you take. How can you care for yourself at home? · Watch for signs of dehydration, which means your body has lost too much water. Dehydration is a serious condition and should be treated right away. Signs of dehydration are:  ¨ Increasing thirst and dry eyes and mouth. ¨ Feeling faint or lightheaded. ¨ Darker urine, and a smaller amount of urine than normal.  · To prevent dehydration, drink plenty of fluids, enough so that your urine is light yellow or clear like water. Choose water and other caffeine-free clear liquids until you feel better. If you have kidney, heart, or liver disease and have to limit fluids, talk with your doctor before you increase the amount of fluids you drink. · Begin eating small amounts of mild foods the next day, if you feel like it. ¨ Try yogurt that has live cultures of Lactobacillus. (Check the label.)  ¨ Avoid spicy foods, fruits, alcohol, and caffeine until 48 hours after all symptoms are gone. ¨ Avoid chewing gum that contains sorbitol.   ¨ Avoid dairy products (except for yogurt with Lactobacillus) while you have diarrhea and for 3 days after symptoms are gone. · The doctor may recommend that you take over-the-counter medicine, such as loperamide (Imodium), if you still have diarrhea after 6 hours. Read and follow all instructions on the label. Do not use this medicine if you have bloody diarrhea, a high fever, or other signs of serious illness. Call your doctor if you think you are having a problem with your medicine. When should you call for help? Call 911 anytime you think you may need emergency care. For example, call if:    · You passed out (lost consciousness).     · Your stools are maroon or very bloody.    Call your doctor now or seek immediate medical care if:    · You are dizzy or lightheaded, or you feel like you may faint.     · Your stools are black and look like tar, or they have streaks of blood.     · You have new or worse belly pain.     · You have symptoms of dehydration, such as:  ¨ Dry eyes and a dry mouth. ¨ Passing only a little dark urine. ¨ Feeling thirstier than usual.     · You have a new or higher fever.    Watch closely for changes in your health, and be sure to contact your doctor if:    · Your diarrhea is getting worse.     · You see pus in the diarrhea.     · You are not getting better after 2 days (48 hours). Where can you learn more? Go to http://alistair-kalpesh.info/. Enter E303 in the search box to learn more about \"Diarrhea: Care Instructions. \"  Current as of: November 20, 2017  Content Version: 11.8  © 0295-8111 [employer]. Care instructions adapted under license by [employer] (which disclaims liability or warranty for this information). If you have questions about a medical condition or this instruction, always ask your healthcare professional. [place] [address] any warranty or liability for your use of this information.          Nausea and Vomiting: Care Instructions  Your Care Instructions    When you are nauseated, you may feel weak and sweaty and notice a lot of saliva in your mouth. Nausea often leads to vomiting. Most of the time you do not need to worry about nausea and vomiting, but they can be signs of other illnesses. Two common causes of nausea and vomiting are stomach flu and food poisoning. Nausea and vomiting from viral stomach flu will usually start to improve within 24 hours. Nausea and vomiting from food poisoning may last from 12 to 48 hours. The doctor has checked you carefully, but problems can develop later. If you notice any problems or new symptoms, get medical treatment right away. Follow-up care is a key part of your treatment and safety. Be sure to make and go to all appointments, and call your doctor if you are having problems. It's also a good idea to know your test results and keep a list of the medicines you take. How can you care for yourself at home? · To prevent dehydration, drink plenty of fluids, enough so that your urine is light yellow or clear like water. Choose water and other caffeine-free clear liquids until you feel better. If you have kidney, heart, or liver disease and have to limit fluids, talk with your doctor before you increase the amount of fluids you drink. · Rest in bed until you feel better. · When you are able to eat, try clear soups, mild foods, and liquids until all symptoms are gone for 12 to 48 hours. Other good choices include dry toast, crackers, cooked cereal, and gelatin dessert, such as Jell-O. When should you call for help? Call 911 anytime you think you may need emergency care. For example, call if:    · You passed out (lost consciousness).    Call your doctor now or seek immediate medical care if:    · You have symptoms of dehydration, such as:  ¨ Dry eyes and a dry mouth. ¨ Passing only a little dark urine. ¨ Feeling thirstier than usual.     · You have new or worsening belly pain.     · You have a new or higher fever.     · You vomit blood or what looks like coffee grounds.  Watch closely for changes in your health, and be sure to contact your doctor if:    · You have ongoing nausea and vomiting.     · Your vomiting is getting worse.     · Your vomiting lasts longer than 2 days.     · You are not getting better as expected. Where can you learn more? Go to http://alistair-kalpesh.info/. Enter 25 [id number] in the search box to learn more about \"Nausea and Vomiting: Care Instructions. \"  Current as of: November 20, 2017  Content Version: 11.8  © 4663-1836 [employer]. Care instructions adapted under license by [employer] (which disclaims liability or warranty for this information). If you have questions about a medical condition or this instruction, always ask your healthcare professional. [address] any warranty or liability for your use of this information.

## 2018-10-13 NOTE — ED NOTES
Reviewed discharge instructions with patient. Informed pt to follow up with his primary care provider within 2 days. Reviewed medication instructions with pt. Pt verbalized understanding.  Pt ambulated out of ER

## 2018-10-13 NOTE — ED TRIAGE NOTES
Patient states he cannot think straight,  Not seeing well, pt states he did a six pack today,  Now having left side abdominal pain. Pt vomiting in waiting room. States he has had diarrhea today and the last time he had blood in his stool.

## 2018-10-13 NOTE — ED PROVIDER NOTES
EMERGENCY DEPARTMENT HISTORY AND PHYSICAL EXAM 
 
2:29 AM 
 
 
Date: 10/13/2018 Patient Name: Galina Durham History of Presenting Illness Chief Complaint Patient presents with  Abdominal Pain  Diarrhea History Provided By: Patient Chief Complaint: Abdominal pain Duration:  1 Day Timing:  Constant Location: Left sided abdomen Severity: 9 out of 10 Associated Symptoms: Vomiting and diarrhea. Patient denies fever, sick contacts, recent travels, use of antibiotics, and any other associated symptoms or complaints Additional History (Context): Galina Durham is a 26 y.o. male with a past medical history of HTN, hypertriglyceridemia, hypomagnesemia, GERD, pancreatitis, chronic gastritis, vitamin D deficiency, cholelithiasis, hepatic steatosis, ETOH abuse, and substance abuse who presents with c/o left sided abdominal pain onset 1 day ago. Patient reports that he had several episodes of non-bloody vomiting. Also he had 1 loose stool with bright red blood in the toilet and several subsequent watery stools that looked yellow but with no blood. He has a history of alcohol abuse and pancreatitis. He stats that he drank a 6 pack of beer tonight because he ran out of Klonopin, but he does not drink daily. He had an endoscopy and coloscopy done in the past that showed stomach ulcer. Patient denies fever, sick contacts, recent travels, use of antibiotics, and any other associated symptoms or complaints. PCP: Darrel Petersen NP Current Facility-Administered Medications Medication Dose Route Frequency Provider Last Rate Last Dose  ondansetron HCl (PF) (ZOFRAN) 4 mg/2 mL syringe  pantoprazole (PROTONIX) 40 mg injection Current Outpatient Prescriptions Medication Sig Dispense Refill  clonazePAM (KLONOPIN) 0.5 mg tablet Take 1 Tab by mouth three (3) times daily as needed.  Max Daily Amount: 1.5 mg. 30 Tab 0  
  promethazine HCl (PROMETHAZINE PO) Take  by mouth.  hydroCHLOROthiazide (HYDRODIURIL) 25 mg tablet 1/2 pill daily 30 Tab 4  
 lisinopril (PRINIVIL, ZESTRIL) 20 mg tablet Take 1 Tab by mouth daily. 30 Tab 4  PARoxetine (PAXIL) 20 mg tablet Take 2 Tabs by mouth daily. 60 Tab 4  
 loratadine (CLARITIN) 10 mg tablet Take 10 mg by mouth daily. Indications: ALLERGIC RHINITIS Past History Past Medical History: 
Past Medical History:  
Diagnosis Date  Anxiety  Cholelithiasis 08/18/2013 Note on U/S  Chronic gastritis 10/17/2014 Dr. Delmis Fernández (EGD Result) H. Pylori Neg  
 Depression  Dog bite of left hand including fingers with infection 11/16/2014 Adm 11/18/14 for IV antibiotics.  ETOH abuse  GERD (gastroesophageal reflux disease)  Hepatic steatosis 02/06/2014 Noted on U/S.   
 History of plasmapheresis 02/2014 Hypertriglyceridemia, Severe Pancreatitis  Hypertension  Hypertriglyceridemia  Hypomagnesemia 07/24/2016  
 1.6 mg/dL  Non compliance with medical treatment  Osteomyelitis of left hand ([address] 11/22/2014 MRI Finding @ LUE 2nd MCP joint.  Pancreatitis 08/17/2013 Highest on Record (2/6/14) 9833. Dr. Delmis Fernández  PICC (peripherally inserted central catheter) in place 12/05/2014 R Basilic Vein (IV Antibiotics for LUE Hand Osteomyelitis).  Rectal bleeding  Substance abuse ([address] Several UDS + Opiates.  Vitamin D deficiency 02/17/2014  
 11.6ng/mL ( ng/mL) Past Surgical History: 
Past Surgical History:  
Procedure Laterality Date  HX CYST INCISION AND DRAINAGE Left 12/01/2014 LUE 2nd digit abscess I&D.  HX ENDOSCOPY  10/17/2014 Dr. Delmis Fernández: EGD/Colonoscopy  HX HEENT    
 wisdom tooth extraction  HX KNEE ARTHROSCOPY Right Family History: 
Family History Problem Relation Age of Onset  Elevated Lipids Mother  Thyroid Disease Mother  Hypertension Father  Alcohol abuse Maternal Grandmother  MS Paternal Grandmother Social History: 
Social History Substance Use Topics  Smoking status: Former Smoker Quit date: 9/17/2012  Smokeless tobacco: Never Used  Alcohol use 8.0 oz/week 8 Cans of beer, 8 Standard drinks or equivalent per week Comment: quit 02/02/2016 Allergies: Allergies Allergen Reactions  Amoxicillin Swelling  Contrast Dye [Iodine] Hives Pt had iv contrast prior to this study without problems, developed hives on 07/04/15 5 minutes post injection  Iodinated Contrast- Oral And Iv Dye Hives Pt got hives when he received IV contrast in the past  
 Penicillins Hives and Swelling  Wellbutrin [Bupropion] Other (comments) Caused aggression Review of Systems Review of Systems Constitutional: Negative for activity change, fatigue and fever. HENT: Negative for congestion and rhinorrhea. Eyes: Negative for visual disturbance. Respiratory: Negative for shortness of breath. Cardiovascular: Negative for chest pain and palpitations. Gastrointestinal: Positive for abdominal pain, blood in stool, diarrhea and vomiting. Negative for nausea. Genitourinary: Negative for dysuria and hematuria. Musculoskeletal: Negative for back pain. Skin: Negative for rash. Neurological: Negative for dizziness, weakness and light-headedness. All other systems reviewed and are negative. Physical Exam  
 
Visit Vitals  /89 (BP 1 Location: Left arm)  Pulse 88  Temp 98.1 °F (36.7 °C)  Resp 18  Wt 94.3 kg (207 lb 12.8 oz)  SpO2 100%  BMI 26.68 kg/m2 Physical Exam  
Constitutional: He is oriented to person, place, and time. He appears well-developed and well-nourished. No distress. HENT:  
Head: Normocephalic and atraumatic.   
Right Ear: External ear normal.  
Left Ear: External ear normal.  
Nose: Nose normal.  
 Mouth/Throat: Oropharynx is clear and moist.  
Eyes: Conjunctivae and EOM are normal. Pupils are equal, round, and reactive to light. No scleral icterus. Neck: Normal range of motion. Neck supple. No JVD present. No tracheal deviation present. No thyromegaly present. Cardiovascular: Normal rate, regular rhythm, normal heart sounds and intact distal pulses. Exam reveals no gallop and no friction rub. No murmur heard. Pulmonary/Chest: Effort normal and breath sounds normal. He exhibits no tenderness. Abdominal: Soft. Bowel sounds are normal. He exhibits no distension. There is tenderness (LLQ). There is no rebound and no guarding. Genitourinary: Rectum normal. Rectal exam shows guaiac negative stool. Genitourinary Comments: Brown stool, guaiac negative Musculoskeletal: Normal range of motion. He exhibits no edema or tenderness. Lymphadenopathy:  
  He has no cervical adenopathy. Neurological: He is alert and oriented to person, place, and time. No cranial nerve deficit. Coordination normal.  
No sensory loss, Gait normal, Motor 5/5 Skin: Skin is warm and dry. Psychiatric: He has a normal mood and affect. His behavior is normal. Judgment and thought content normal.  
Nursing note and vitals reviewed. Diagnostic Study Results Labs - Recent Results (from the past 12 hour(s)) URINALYSIS W/ RFLX MICROSCOPIC Collection Time: 10/13/18 12:29 AM  
Result Value Ref Range Color YELLOW Appearance CLEAR Specific gravity <1.005 (L) 1.005 - 1.030  
 pH (UA) 6.0 5.0 - 8.0 Protein NEGATIVE  NEG mg/dL Glucose NEGATIVE  NEG mg/dL Ketone NEGATIVE  NEG mg/dL Bilirubin NEGATIVE  NEG Blood NEGATIVE  NEG Urobilinogen 0.2 0.2 - 1.0 EU/dL Nitrites NEGATIVE  NEG Leukocyte Esterase NEGATIVE  NEG    
CBC WITH AUTOMATED DIFF Collection Time: 10/13/18  1:13 AM  
Result Value Ref Range WBC 7.8 4.6 - 13.2 K/uL  
 RBC 5.19 4.70 - 5.50 M/uL HGB 16.3 (H) 13.0 - 16.0 g/dL HCT 43.6 36.0 - 48.0 % MCV 84.0 74.0 - 97.0 FL  
 MCH 31.4 24.0 - 34.0 PG  
 MCHC 37.4 (H) 31.0 - 37.0 g/dL  
 RDW 13.2 11.6 - 14.5 % PLATELET 466 414 - 982 K/uL MPV 9.6 9.2 - 11.8 FL  
 NEUTROPHILS 48 40 - 73 % LYMPHOCYTES 42 21 - 52 % MONOCYTES 6 3 - 10 % EOSINOPHILS 3 0 - 5 % BASOPHILS 1 0 - 2 %  
 ABS. NEUTROPHILS 3.7 1.8 - 8.0 K/UL  
 ABS. LYMPHOCYTES 3.3 0.9 - 3.6 K/UL  
 ABS. MONOCYTES 0.5 0.05 - 1.2 K/UL  
 ABS. EOSINOPHILS 0.2 0.0 - 0.4 K/UL  
 ABS. BASOPHILS 0.1 0.0 - 0.1 K/UL  
 DF AUTOMATED PLATELET COMMENTS ADEQUATE PLATELETS    
 RBC COMMENTS NORMOCYTIC, NORMOCHROMIC METABOLIC PANEL, COMPREHENSIVE Collection Time: 10/13/18  1:13 AM  
Result Value Ref Range Sodium 139 136 - 145 mmol/L Potassium 3.4 (L) 3.5 - 5.5 mmol/L Chloride 107 100 - 108 mmol/L  
 CO2 17 (L) 21 - 32 mmol/L Anion gap 15 3.0 - 18 mmol/L Glucose 93 74 - 99 mg/dL BUN 4 (L) 7.0 - 18 MG/DL Creatinine 0.94 0.6 - 1.3 MG/DL  
 BUN/Creatinine ratio 4 (L) 12 - 20 GFR est AA >60 >60 ml/min/1.73m2 GFR est non-AA >60 >60 ml/min/1.73m2 Calcium 8.4 (L) 8.5 - 10.1 MG/DL Bilirubin, total 0.4 0.2 - 1.0 MG/DL  
 ALT (SGPT) 28 16 - 61 U/L  
 AST (SGOT) 29 15 - 37 U/L Alk. phosphatase 55 45 - 117 U/L Protein, total 8.2 6.4 - 8.2 g/dL Albumin 4.6 3.4 - 5.0 g/dL Globulin 3.6 2.0 - 4.0 g/dL A-G Ratio 1.3 0.8 - 1.7 LIPASE Collection Time: 10/13/18  1:13 AM  
Result Value Ref Range Lipase 287 73 - 393 U/L Radiologic Studies -  
CT ABD PELV WO CONT Final Result Ct Abd Pelv Wo Cont Result Date: 10/13/2018 CT ABDOMEN AND PELVIS WITHOUT ENHANCEMENT INDICATION: Left-sided abdominal pain. Vomiting. Diarrhea today. Blood in stool. TECHNIQUE: Axial images obtained of the abdomen and pelvis without intravenous contrast. Coronal and sagittal reformatted images were obtained.   All CT scans are performed using dose optimization techniques as appropriate to the performed exam including the following: Automated exposure control, adjustment of mA and/or kV according to patient size, and use of iterative reconstructive technique. COMPARISON: 5/13/2016. ABDOMEN FINDINGS: Lung Base: Unremarkable. Liver: Unremarkable. Biliary: Cholelithiasis. No ductal dilation. Spleen: Chronic 4.5 cm hypodense splenic cyst is increased in size from 1.8 cm in 2016. Pancreas: Unremarkable. Adrenal Glands: Unremarkable. Kidneys: Unremarkable. Peritoneum/ Retroperitoneum: Unremarkable. Lymph Nodes: Unremarkable. Vessels: Unremarkable for age. PELVIS FINDINGS: Bowel: Normal appendix. Nondilated bowel. Bladder/ Pelvic Organs: Unremarkable. Bones/Soft tissues: Unremarkable for age. IMPRESSION: 1. No acute abnormalities. 2. Stable cholelithiasis. 3. Growth of chronic left splenic cyst. 
 
 
Medical Decision Making I am the first provider for this patient. I reviewed the vital signs, available nursing notes, past medical history, past surgical history, family history and social history. Vital Signs-Reviewed the patient's vital signs. Pulse Oximetry Analysis -  100% on room air (normal) Records Reviewed: Nursing Notes (Time of Review: 2:29 AM) ED Course: Progress Notes, Reevaluation, and Consults: 
 
4:25 AM 
Patient presenting with abd pain, vomiting, and diarrhea. One reported episode of bright red blood in stool, which then resolved. H/H normal.  CT shows no acute pathology. Felt much better after getting Maalox. Suspect sx due to exacerbation of gastritis from EtOH intake and/or viral GI infection. Will rx Zofran and antacid. Advise PCP follow-up. Discussed return precautions Diagnosis Clinical Impression: 1. Abdominal pain, LLQ (left lower quadrant) 2. Non-intractable vomiting with nausea, unspecified vomiting type 3. Diarrhea, unspecified type 4. Bright red blood per rectum Disposition: Discharge Follow-up Information None Patient's Medications Start Taking No medications on file Continue Taking CLONAZEPAM (KLONOPIN) 0.5 MG TABLET    Take 1 Tab by mouth three (3) times daily as needed. Max Daily Amount: 1.5 mg. HYDROCHLOROTHIAZIDE (HYDRODIURIL) 25 MG TABLET    1/2 pill daily LISINOPRIL (PRINIVIL, ZESTRIL) 20 MG TABLET    Take 1 Tab by mouth daily. LORATADINE (CLARITIN) 10 MG TABLET    Take 10 mg by mouth daily. Indications: ALLERGIC RHINITIS  
 PAROXETINE (PAXIL) 20 MG TABLET    Take 2 Tabs by mouth daily. PROMETHAZINE HCL (PROMETHAZINE PO)    Take  by mouth. These Medications have changed No medications on file Stop Taking GEMFIBROZIL (LOPID) 600 MG TABLET    TAKE ONE TABLET BY MOUTH TWICE DAILY  
 
_______________________________ Attestations: 
Scribe Attestation Frank Weathers acting as a scribe for and in the presence of Obie Cabot, MD     
October 13, 2018 at 2:29 AM 
    
Provider Attestation:     
I personally performed the services described in the documentation, reviewed the documentation, as recorded by the scribe in my presence, and it accurately and completely records my words and actions. October 13, 2018 at 2:29 AM - Obie Cabot, MD   
_______________________________

## 2018-10-14 ENCOUNTER — APPOINTMENT (OUTPATIENT)
Dept: ULTRASOUND IMAGING | Age: 33
End: 2018-10-14
Attending: EMERGENCY MEDICINE
Payer: SELF-PAY

## 2018-10-14 ENCOUNTER — HOSPITAL ENCOUNTER (EMERGENCY)
Age: 33
Discharge: HOME | End: 2018-10-14
Attending: EMERGENCY MEDICINE | Admitting: EMERGENCY MEDICINE
Payer: SELF-PAY

## 2018-10-14 VITALS
HEART RATE: 82 BPM | SYSTOLIC BLOOD PRESSURE: 150 MMHG | RESPIRATION RATE: 18 BRPM | TEMPERATURE: 98.9 F | OXYGEN SATURATION: 100 % | DIASTOLIC BLOOD PRESSURE: 99 MMHG

## 2018-10-14 DIAGNOSIS — K85.90 ACUTE PANCREATITIS, UNSPECIFIED COMPLICATION STATUS, UNSPECIFIED PANCREATITIS TYPE: Primary | ICD-10-CM

## 2018-10-14 LAB
ALBUMIN SERPL-MCNC: 4.2 G/DL (ref 3.4–5)
ALBUMIN/GLOB SERPL: 1.3 {RATIO} (ref 0.8–1.7)
ALP SERPL-CCNC: 48 U/L (ref 45–117)
ALT SERPL-CCNC: 21 U/L (ref 16–61)
ANION GAP SERPL CALC-SCNC: 7 MMOL/L (ref 3–18)
APPEARANCE UR: CLEAR
AST SERPL-CCNC: 15 U/L (ref 15–37)
ATRIAL RATE: 70 BPM
BASOPHILS # BLD: 0 K/UL (ref 0–0.1)
BASOPHILS NFR BLD: 0 % (ref 0–2)
BILIRUB SERPL-MCNC: 0.4 MG/DL (ref 0.2–1)
BILIRUB UR QL: NEGATIVE
BUN SERPL-MCNC: 7 MG/DL (ref 7–18)
BUN/CREAT SERPL: 8 (ref 12–20)
CALCIUM SERPL-MCNC: 8.5 MG/DL (ref 8.5–10.1)
CALCULATED P AXIS, ECG09: 50 DEGREES
CALCULATED R AXIS, ECG10: 7 DEGREES
CALCULATED T AXIS, ECG11: 24 DEGREES
CHLORIDE SERPL-SCNC: 107 MMOL/L (ref 100–108)
CO2 SERPL-SCNC: 25 MMOL/L (ref 21–32)
COLOR UR: YELLOW
CREAT SERPL-MCNC: 0.92 MG/DL (ref 0.6–1.3)
DIAGNOSIS, 93000: NORMAL
DIFFERENTIAL METHOD BLD: NORMAL
EOSINOPHIL # BLD: 0.2 K/UL (ref 0–0.4)
EOSINOPHIL NFR BLD: 3 % (ref 0–5)
ERYTHROCYTE [DISTWIDTH] IN BLOOD BY AUTOMATED COUNT: 13 % (ref 11.6–14.5)
GLOBULIN SER CALC-MCNC: 3.3 G/DL (ref 2–4)
GLUCOSE SERPL-MCNC: 97 MG/DL (ref 74–99)
GLUCOSE UR STRIP.AUTO-MCNC: NEGATIVE MG/DL
HCT VFR BLD AUTO: 40.9 % (ref 36–48)
HGB BLD-MCNC: 14.7 G/DL (ref 13–16)
HGB UR QL STRIP: NEGATIVE
KETONES UR QL STRIP.AUTO: NEGATIVE MG/DL
LEUKOCYTE ESTERASE UR QL STRIP.AUTO: NEGATIVE
LIPASE SERPL-CCNC: 456 U/L (ref 73–393)
LYMPHOCYTES # BLD: 1.7 K/UL (ref 0.9–3.6)
LYMPHOCYTES NFR BLD: 23 % (ref 21–52)
MCH RBC QN AUTO: 31.1 PG (ref 24–34)
MCHC RBC AUTO-ENTMCNC: 35.9 G/DL (ref 31–37)
MCV RBC AUTO: 86.5 FL (ref 74–97)
MONOCYTES # BLD: 0.6 K/UL (ref 0.05–1.2)
MONOCYTES NFR BLD: 8 % (ref 3–10)
NEUTS SEG # BLD: 4.8 K/UL (ref 1.8–8)
NEUTS SEG NFR BLD: 66 % (ref 40–73)
NITRITE UR QL STRIP.AUTO: NEGATIVE
P-R INTERVAL, ECG05: 134 MS
PH UR STRIP: 5.5 [PH] (ref 5–8)
PLATELET # BLD AUTO: 289 K/UL (ref 135–420)
PMV BLD AUTO: 9.8 FL (ref 9.2–11.8)
POTASSIUM SERPL-SCNC: 4.1 MMOL/L (ref 3.5–5.5)
PROT SERPL-MCNC: 7.5 G/DL (ref 6.4–8.2)
PROT UR STRIP-MCNC: NEGATIVE MG/DL
Q-T INTERVAL, ECG07: 426 MS
QRS DURATION, ECG06: 90 MS
QTC CALCULATION (BEZET), ECG08: 460 MS
RBC # BLD AUTO: 4.73 M/UL (ref 4.7–5.5)
SODIUM SERPL-SCNC: 139 MMOL/L (ref 136–145)
SP GR UR REFRACTOMETRY: 1.01 (ref 1–1.03)
UROBILINOGEN UR QL STRIP.AUTO: 0.2 EU/DL (ref 0.2–1)
VENTRICULAR RATE, ECG03: 70 BPM
WBC # BLD AUTO: 7.3 K/UL (ref 4.6–13.2)

## 2018-10-14 PROCEDURE — 96375 TX/PRO/DX INJ NEW DRUG ADDON: CPT

## 2018-10-14 PROCEDURE — 80053 COMPREHEN METABOLIC PANEL: CPT | Performed by: EMERGENCY MEDICINE

## 2018-10-14 PROCEDURE — 76705 ECHO EXAM OF ABDOMEN: CPT

## 2018-10-14 PROCEDURE — 74011250636 HC RX REV CODE- 250/636: Performed by: EMERGENCY MEDICINE

## 2018-10-14 PROCEDURE — 74011250637 HC RX REV CODE- 250/637: Performed by: EMERGENCY MEDICINE

## 2018-10-14 PROCEDURE — 83690 ASSAY OF LIPASE: CPT | Performed by: EMERGENCY MEDICINE

## 2018-10-14 PROCEDURE — 85025 COMPLETE CBC W/AUTO DIFF WBC: CPT | Performed by: EMERGENCY MEDICINE

## 2018-10-14 PROCEDURE — 96376 TX/PRO/DX INJ SAME DRUG ADON: CPT

## 2018-10-14 PROCEDURE — 99284 EMERGENCY DEPT VISIT MOD MDM: CPT

## 2018-10-14 PROCEDURE — 96374 THER/PROPH/DIAG INJ IV PUSH: CPT

## 2018-10-14 PROCEDURE — 74011000250 HC RX REV CODE- 250: Performed by: EMERGENCY MEDICINE

## 2018-10-14 PROCEDURE — 96361 HYDRATE IV INFUSION ADD-ON: CPT

## 2018-10-14 PROCEDURE — 81003 URINALYSIS AUTO W/O SCOPE: CPT | Performed by: EMERGENCY MEDICINE

## 2018-10-14 RX ORDER — ONDANSETRON HYDROCHLORIDE 4 MG/2ML
4 INJECTION, SOLUTION INTRAMUSCULAR; INTRAVENOUS
Status: COMPLETED | OUTPATIENT
Start: 2018-10-14 | End: 2018-10-14

## 2018-10-14 RX ORDER — LIDOCAINE HYDROCHLORIDE 20 MG/ML
15 SOLUTION OROPHARYNGEAL
Status: DISCONTINUED | OUTPATIENT
Start: 2018-10-14 | End: 2018-10-15 | Stop reason: HOSPADM

## 2018-10-14 RX ORDER — OMEPRAZOLE 40 MG/1
40 CAPSULE, DELAYED RELEASE ORAL DAILY
Qty: 14 CAP | Refills: 0 | Status: SHIPPED | OUTPATIENT
Start: 2018-10-14 | End: 2018-10-28

## 2018-10-14 RX ORDER — MORPHINE SULFATE 4 MG/ML
4 INJECTION INTRAVENOUS
Status: COMPLETED | OUTPATIENT
Start: 2018-10-14 | End: 2018-10-14

## 2018-10-14 RX ORDER — FAMOTIDINE 10 MG/ML
20 INJECTION INTRAVENOUS
Status: COMPLETED | OUTPATIENT
Start: 2018-10-14 | End: 2018-10-14

## 2018-10-14 RX ORDER — HYDROXYZINE 50 MG/1
50 TABLET, FILM COATED ORAL
Qty: 20 TAB | Refills: 0 | Status: SHIPPED | OUTPATIENT
Start: 2018-10-14 | End: 2018-10-18 | Stop reason: ALTCHOICE

## 2018-10-14 RX ORDER — HYDROCODONE BITARTRATE AND ACETAMINOPHEN 5; 325 MG/1; MG/1
1 TABLET ORAL
Qty: 12 TAB | Refills: 0 | Status: SHIPPED | OUTPATIENT
Start: 2018-10-14 | End: 2018-10-18 | Stop reason: ALTCHOICE

## 2018-10-14 RX ORDER — HYDROCODONE BITARTRATE AND ACETAMINOPHEN 5; 325 MG/1; MG/1
1 TABLET ORAL ONCE
Status: COMPLETED | OUTPATIENT
Start: 2018-10-14 | End: 2018-10-14

## 2018-10-14 RX ORDER — HYDROXYZINE 25 MG/1
50 TABLET, FILM COATED ORAL
Status: COMPLETED | OUTPATIENT
Start: 2018-10-14 | End: 2018-10-14

## 2018-10-14 RX ORDER — ONDANSETRON 4 MG/1
4 TABLET, ORALLY DISINTEGRATING ORAL
Qty: 20 TAB | Refills: 0 | Status: SHIPPED | OUTPATIENT
Start: 2018-10-14 | End: 2018-10-18 | Stop reason: ALTCHOICE

## 2018-10-14 RX ADMIN — ONDANSETRON HYDROCHLORIDE 4 MG: 4 INJECTION, SOLUTION INTRAMUSCULAR; INTRAVENOUS at 16:59

## 2018-10-14 RX ADMIN — SODIUM CHLORIDE 1000 ML: 900 INJECTION, SOLUTION INTRAVENOUS at 18:55

## 2018-10-14 RX ADMIN — MORPHINE SULFATE 4 MG: 4 INJECTION INTRAVENOUS at 16:59

## 2018-10-14 RX ADMIN — LIDOCAINE HYDROCHLORIDE 15 ML: 20 SOLUTION ORAL; TOPICAL at 16:59

## 2018-10-14 RX ADMIN — HYDROXYZINE HYDROCHLORIDE 50 MG: 25 TABLET, FILM COATED ORAL at 20:03

## 2018-10-14 RX ADMIN — HYDROCODONE BITARTRATE AND ACETAMINOPHEN 1 TABLET: 5; 325 TABLET ORAL at 18:54

## 2018-10-14 RX ADMIN — SODIUM CHLORIDE 1000 ML: 900 INJECTION, SOLUTION INTRAVENOUS at 18:54

## 2018-10-14 RX ADMIN — HYDROCODONE BITARTRATE AND ACETAMINOPHEN 1 TABLET: 5; 325 TABLET ORAL at 20:03

## 2018-10-14 RX ADMIN — FAMOTIDINE 20 MG: 10 INJECTION INTRAVENOUS at 16:58

## 2018-10-14 RX ADMIN — MORPHINE SULFATE 4 MG: 4 INJECTION INTRAVENOUS at 18:54

## 2018-10-14 RX ADMIN — ONDANSETRON HYDROCHLORIDE 4 MG: 4 INJECTION, SOLUTION INTRAMUSCULAR; INTRAVENOUS at 18:56

## 2018-10-14 RX ADMIN — ALUMINUM HYDROXIDE AND MAGNESIUM HYDROXIDE 15 ML: 200; 200 SUSPENSION ORAL at 16:58

## 2018-10-14 RX ADMIN — SODIUM CHLORIDE 1000 ML: 900 INJECTION, SOLUTION INTRAVENOUS at 16:59

## 2018-10-14 NOTE — ED PROVIDER NOTES
EMERGENCY DEPARTMENT HISTORY AND PHYSICAL EXAM 
 
4:03 PM 
 
Date: 10/14/2018 Patient Name: Heidi Kearney History of Presenting Illness Chief Complaint Patient presents with  Abdominal Pain Chief Complaint: Abdominal Pain History Provided By: Patient Additional History (Context): Heidi Kearney is a 26 y.o. male with a PMHx of pancreatitis, GERD, chronic gastritis, substance abuse, HTN, and other noted PMHx who presents with constant, moderate RUQ pain onset x 3 days ago with associated N/V/D, one episode of blood in his stool, and back spasms. Abdominal pain is exacerbated after eating. Pt states he has not been around anyone who is sick. He says the pain does not feel similar to pancreatitis, gall stones, or stomach ulcers. No further concerns or complaints at this time. PCP: Ching Castro, NP Current Facility-Administered Medications Medication Dose Route Frequency Provider Last Rate Last Dose  lidocaine (XYLOCAINE) 2 % viscous solution 15 mL  15 mL Mouth/Throat Q3H PRN Lana Kang MD   15 mL at 10/14/18 7620 Current Outpatient Prescriptions Medication Sig Dispense Refill  aluminum-magnesium hydroxide (MAALOX) 200-200 mg/5 mL suspension Take 15 mL by mouth every six (6) hours as needed for Indigestion. 300 mL 0  
 HYDROcodone-acetaminophen (NORCO) 5-325 mg per tablet Take 1 Tab by mouth every eight (8) hours as needed for Pain. Max Daily Amount: 3 Tabs. 12 Tab 0  
 hydrOXYzine HCl (ATARAX) 50 mg tablet Take 1 Tab by mouth every eight (8) hours as needed for Itching. 20 Tab 0  
 ondansetron (ZOFRAN ODT) 4 mg disintegrating tablet Take 1 Tab by mouth every eight (8) hours as needed for Nausea. 20 Tab 0  
 omeprazole (PRILOSEC) 40 mg capsule Take 1 Cap by mouth daily for 14 days. 14 Cap 0  
 promethazine (PHENERGAN) 25 mg suppository Insert 1 Suppository into rectum every six (6) hours as needed for Nausea for up to 7 days.  12 Suppository 0  
  clonazePAM (KLONOPIN) 0.5 mg tablet Take 1 Tab by mouth three (3) times daily as needed. Max Daily Amount: 1.5 mg. 30 Tab 0  
 hydroCHLOROthiazide (HYDRODIURIL) 25 mg tablet 1/2 pill daily 30 Tab 4  
 lisinopril (PRINIVIL, ZESTRIL) 20 mg tablet Take 1 Tab by mouth daily. 30 Tab 4  PARoxetine (PAXIL) 20 mg tablet Take 2 Tabs by mouth daily. 60 Tab 4  
 loratadine (CLARITIN) 10 mg tablet Take 10 mg by mouth daily. Indications: ALLERGIC RHINITIS Duration: 3 Days Timing:  Constant Location: RUQ Quality: Aching Severity: Moderate Modifying Factors: eating exacerbates the pain Associated Symptoms: N/V/D, blood in stool, and back spasms Past History Past Medical History: 
Past Medical History:  
Diagnosis Date  Anxiety  Cholelithiasis 08/18/2013 Note on U/S  Chronic gastritis 10/17/2014 Dr. Delicia Montalvo (EGD Result) H. Pylori Neg  
 Depression  Dog bite of left hand including fingers with infection 11/16/2014 Adm 11/18/14 for IV antibiotics.  ETOH abuse  GERD (gastroesophageal reflux disease)  Hepatic steatosis 02/06/2014 Noted on U/S.   
 History of plasmapheresis 02/2014 Hypertriglyceridemia, Severe Pancreatitis  Hypertension  Hypertriglyceridemia  Hypomagnesemia 07/24/2016  
 1.6 mg/dL  Non compliance with medical treatment  Osteomyelitis of left hand ([address] 11/22/2014 MRI Finding @ [facility] 2nd MCP joint.  Pancreatitis 08/17/2013 Highest on Record (2/6/14) 0483. Dr. Delicia Montalvo  PICC (peripherally inserted central catheter) in place 12/05/2014 R Basilic Vein (IV Antibiotics for LUE Hand Osteomyelitis).  Rectal bleeding  Substance abuse ([address] Several UDS + Opiates.  Vitamin D deficiency 02/17/2014  
 11.6ng/mL ( ng/mL) Past Surgical History: 
Past Surgical History:  
Procedure Laterality Date  HX CYST INCISION AND DRAINAGE Left 12/01/2014 LUE 2nd digit abscess I&D.  HX ENDOSCOPY  10/17/2014 Dr. Garner Commander: EGD/Colonoscopy  HX HEENT    
 wisdom tooth extraction  HX KNEE ARTHROSCOPY Right Family History: 
Family History Problem Relation Age of Onset  Elevated Lipids Mother  Thyroid Disease Mother  Hypertension Father  Alcohol abuse Maternal Grandmother  MS Paternal Grandmother Social History: 
Social History Substance Use Topics  Smoking status: Former Smoker Quit date: 9/17/2012  Smokeless tobacco: Never Used  Alcohol use 8.0 oz/week 8 Cans of beer, 8 Standard drinks or equivalent per week Comment: quit 02/02/2016 Allergies: Allergies Allergen Reactions  Amoxicillin Swelling  Contrast Dye [Iodine] Hives Pt had iv contrast prior to this study without problems, developed hives on 07/04/15 5 minutes post injection  Iodinated Contrast- Oral And Iv Dye Hives Pt got hives when he received IV contrast in the past  
 Penicillins Hives and Swelling  Wellbutrin [Bupropion] Other (comments) Caused aggression Review of Systems Review of Systems Constitutional: Negative for fever. Gastrointestinal: Positive for abdominal pain, blood in stool (resolved), diarrhea, nausea and vomiting. Musculoskeletal: Positive for back pain (\"spasms\"). All other systems reviewed and are negative. Physical Exam  
 
Patient Vitals for the past 12 hrs: 
 Temp Pulse Resp BP SpO2  
10/14/18 2005 - - - (!) 150/99 100 % 10/14/18 2000 - - - - 100 % 10/14/18 1602 - - - - 100 % 10/14/18 1601 98.9 °F (37.2 °C) 82 18 - -  
10/14/18 1552 - - - - 100 % 10/14/18 1549 - - - 173/88 - Physical Exam  
Constitutional: He is oriented to person, place, and time. He appears well-developed. HENT:  
Head: Normocephalic and atraumatic. Eyes: Conjunctivae and EOM are normal.  
Neck: Normal range of motion. Cardiovascular: Normal heart sounds.   Exam reveals no gallop and no friction rub. No murmur heard. Pulmonary/Chest: Effort normal and breath sounds normal. No stridor. Abdominal: Soft. There is tenderness in the right upper quadrant and epigastric area. Musculoskeletal: Normal range of motion. He exhibits no tenderness. Neurological: He is alert and oriented to person, place, and time. Skin: Skin is warm and dry. He is not diaphoretic. Psychiatric: He has a normal mood and affect. His behavior is normal.  
Nursing note and vitals reviewed. Diagnostic Study Results Labs - Recent Results (from the past 12 hour(s)) CBC WITH AUTOMATED DIFF Collection Time: 10/14/18  3:53 PM  
Result Value Ref Range WBC 7.3 4.6 - 13.2 K/uL  
 RBC 4.73 4.70 - 5.50 M/uL  
 HGB 14.7 13.0 - 16.0 g/dL HCT 40.9 36.0 - 48.0 % MCV 86.5 74.0 - 97.0 FL  
 MCH 31.1 24.0 - 34.0 PG  
 MCHC 35.9 31.0 - 37.0 g/dL  
 RDW 13.0 11.6 - 14.5 % PLATELET 545 467 - 237 K/uL MPV 9.8 9.2 - 11.8 FL  
 NEUTROPHILS 66 40 - 73 % LYMPHOCYTES 23 21 - 52 % MONOCYTES 8 3 - 10 % EOSINOPHILS 3 0 - 5 % BASOPHILS 0 0 - 2 %  
 ABS. NEUTROPHILS 4.8 1.8 - 8.0 K/UL  
 ABS. LYMPHOCYTES 1.7 0.9 - 3.6 K/UL  
 ABS. MONOCYTES 0.6 0.05 - 1.2 K/UL  
 ABS. EOSINOPHILS 0.2 0.0 - 0.4 K/UL  
 ABS. BASOPHILS 0.0 0.0 - 0.1 K/UL  
 DF AUTOMATED METABOLIC PANEL, COMPREHENSIVE Collection Time: 10/14/18  3:53 PM  
Result Value Ref Range Sodium 139 136 - 145 mmol/L Potassium 4.1 3.5 - 5.5 mmol/L Chloride 107 100 - 108 mmol/L  
 CO2 25 21 - 32 mmol/L Anion gap 7 3.0 - 18 mmol/L Glucose 97 74 - 99 mg/dL BUN 7 7.0 - 18 MG/DL Creatinine 0.92 0.6 - 1.3 MG/DL  
 BUN/Creatinine ratio 8 (L) 12 - 20 GFR est AA >60 >60 ml/min/1.73m2 GFR est non-AA >60 >60 ml/min/1.73m2 Calcium 8.5 8.5 - 10.1 MG/DL Bilirubin, total 0.4 0.2 - 1.0 MG/DL  
 ALT (SGPT) 21 16 - 61 U/L  
 AST (SGOT) 15 15 - 37 U/L Alk. phosphatase 48 45 - 117 U/L Protein, total 7.5 6.4 - 8.2 g/dL Albumin 4.2 3.4 - 5.0 g/dL Globulin 3.3 2.0 - 4.0 g/dL A-G Ratio 1.3 0.8 - 1.7 LIPASE Collection Time: 10/14/18  3:53 PM  
Result Value Ref Range Lipase 456 (H) 73 - 393 U/L  
URINALYSIS W/ RFLX MICROSCOPIC Collection Time: 10/14/18  5:03 PM  
Result Value Ref Range Color YELLOW Appearance CLEAR Specific gravity 1.006 1.005 - 1.030    
 pH (UA) 5.5 5.0 - 8.0 Protein NEGATIVE  NEG mg/dL Glucose NEGATIVE  NEG mg/dL Ketone NEGATIVE  NEG mg/dL Bilirubin NEGATIVE  NEG Blood NEGATIVE  NEG Urobilinogen 0.2 0.2 - 1.0 EU/dL Nitrites NEGATIVE  NEG Leukocyte Esterase NEGATIVE  NEG Radiologic Studies -  
US ABD LTD Final Result Medical Decision Making ED Course: Progress Notes, Reevaluation, and Consults: 
 
 
Provider Notes (Medical Decision Making):  
Patient presents to the emergency department with acute pancreatitis. The pt has a history of pancreatitis secondary to alcohol dependence and admits to drinking prior to the onset of symptoms. In the emergency department patient received  iv fluids, morphine and Zofran intravenously. The pt's laboratory data was significant for a moderately elevated lipase. There is no leukocytosis. The pt is not tachycardic, tachypneic or febrile. On serial exam  the pt drastically improves, and at final re eval is able to tolerate PO. Plan for close PMD and GI follow up. He had all questions answered and knew return precautions. Vital Signs-Reviewed the patient's vital signs. Pulse Oximetry Analysis -  100% on room air, normal. 
 
Records Reviewed: Nursing Notes (Time of Review: 4:03 PM) 
-I am the first provider for this patient. 
-I reviewed the vital signs, available nursing notes, past medical history, past surgical history, family history and social history. Diagnosis Clinical Impression: 1. Acute pancreatitis, unspecified complication status, unspecified pancreatitis type Disposition:  
 
Follow-up Information Follow up With Details Comments Contact Info Neita Gitelman, NP Schedule an appointment as soon as possible for a visit in 2 days For follow up [address] [id number] [phone] Price Murillo MD  For GI follow up [address] [place] [address] [address] [facility] 
[phone] [facility] EMERGENCY DEPT  If symptoms worsen [address] [place], [place] [address] [address] Patient's Medications Start Taking ALUMINUM-MAGNESIUM HYDROXIDE (MAALOX) 200-200 MG/5 ML SUSPENSION    Take 15 mL by mouth every six (6) hours as needed for Indigestion. HYDROCODONE-ACETAMINOPHEN (NORCO) 5-325 MG PER TABLET    Take 1 Tab by mouth every eight (8) hours as needed for Pain. Max Daily Amount: 3 Tabs. HYDROXYZINE HCL (ATARAX) 50 MG TABLET    Take 1 Tab by mouth every eight (8) hours as needed for Itching. Continue Taking CLONAZEPAM (KLONOPIN) 0.5 MG TABLET    Take 1 Tab by mouth three (3) times daily as needed. Max Daily Amount: 1.5 mg. HYDROCHLOROTHIAZIDE (HYDRODIURIL) 25 MG TABLET    1/2 pill daily LISINOPRIL (PRINIVIL, ZESTRIL) 20 MG TABLET    Take 1 Tab by mouth daily. LORATADINE (CLARITIN) 10 MG TABLET    Take 10 mg by mouth daily. Indications: ALLERGIC RHINITIS  
 PAROXETINE (PAXIL) 20 MG TABLET    Take 2 Tabs by mouth daily. PROMETHAZINE (PHENERGAN) 25 MG SUPPOSITORY    Insert 1 Suppository into rectum every six (6) hours as needed for Nausea for up to 7 days. These Medications have changed Modified Medication Previous Medication OMEPRAZOLE (PRILOSEC) 40 MG CAPSULE omeprazole (PRILOSEC) 40 mg capsule Take 1 Cap by mouth daily for 14 days. Take 1 Cap by mouth daily for 14 days. ONDANSETRON (ZOFRAN ODT) 4 MG DISINTEGRATING TABLET ondansetron (ZOFRAN ODT) 4 mg disintegrating tablet Take 1 Tab by mouth every eight (8) hours as needed for Nausea. Take 1 Tab by mouth every eight (8) hours as needed for Nausea. Stop Taking TRAMADOL (ULTRAM) 50 MG TABLET    Take 1 Tab by mouth every six (6) hours as needed for Pain. Max Daily Amount: 200 mg.  
 
_______________________________ Attestations: 
Scribe Attestation Jennifer Wilson acting as a scribe for and in the presence of Jamarcus Bautista MD     
October 14, 2018 at 4:03 PM 
    
Provider Attestation:     
I personally performed the services described in the documentation, reviewed the documentation, as recorded by the scribe in my presence, and it accurately and completely records my words and actions. October 14, 2018 at 4:03 PM - Jamarcus Bautista MD   
_______________________________

## 2018-10-14 NOTE — ED TRIAGE NOTES
Pt. Arrived c/o of abdominal pain, N/V, and back spasms. Pt. Says he cannot keep food down. Pt. Hx of alcohol and presents with tremors, diaphoresis, and slight anxiety.

## 2018-10-15 ENCOUNTER — TELEPHONE (OUTPATIENT)
Dept: FAMILY MEDICINE CLINIC | Age: 33
End: 2018-10-15

## 2018-10-15 ENCOUNTER — APPOINTMENT (OUTPATIENT)
Dept: GENERAL RADIOLOGY | Age: 33
End: 2018-10-15
Attending: PHYSICIAN ASSISTANT
Payer: SELF-PAY

## 2018-10-15 ENCOUNTER — HOSPITAL ENCOUNTER (EMERGENCY)
Age: 33
Discharge: HOME | End: 2018-10-15
Attending: EMERGENCY MEDICINE | Admitting: EMERGENCY MEDICINE
Payer: SELF-PAY

## 2018-10-15 VITALS
BODY MASS INDEX: 26.31 KG/M2 | TEMPERATURE: 98 F | OXYGEN SATURATION: 100 % | WEIGHT: 205 LBS | HEART RATE: 76 BPM | RESPIRATION RATE: 16 BRPM | DIASTOLIC BLOOD PRESSURE: 100 MMHG | HEIGHT: 74 IN | SYSTOLIC BLOOD PRESSURE: 150 MMHG

## 2018-10-15 DIAGNOSIS — R07.9 CHEST PAIN, UNSPECIFIED TYPE: ICD-10-CM

## 2018-10-15 DIAGNOSIS — G89.29 OTHER CHRONIC PAIN: ICD-10-CM

## 2018-10-15 DIAGNOSIS — R11.2 NAUSEA AND VOMITING, INTRACTABILITY OF VOMITING NOT SPECIFIED, UNSPECIFIED VOMITING TYPE: Primary | ICD-10-CM

## 2018-10-15 LAB
ALBUMIN SERPL-MCNC: 3.8 G/DL (ref 3.4–5)
ALBUMIN/GLOB SERPL: 1.3 {RATIO} (ref 0.8–1.7)
ALP SERPL-CCNC: 41 U/L (ref 45–117)
ALT SERPL-CCNC: 22 U/L (ref 16–61)
ANION GAP SERPL CALC-SCNC: 8 MMOL/L (ref 3–18)
AST SERPL-CCNC: 21 U/L (ref 15–37)
BASOPHILS # BLD: 0 K/UL (ref 0–0.1)
BASOPHILS NFR BLD: 0 % (ref 0–2)
BILIRUB SERPL-MCNC: 0.2 MG/DL (ref 0.2–1)
BUN SERPL-MCNC: 4 MG/DL (ref 7–18)
BUN/CREAT SERPL: 5 (ref 12–20)
CALCIUM SERPL-MCNC: 7.7 MG/DL (ref 8.5–10.1)
CHLORIDE SERPL-SCNC: 108 MMOL/L (ref 100–108)
CK MB CFR SERPL CALC: NORMAL % (ref 0–4)
CK MB SERPL-MCNC: <1 NG/ML (ref 5–25)
CK SERPL-CCNC: 69 U/L (ref 39–308)
CO2 SERPL-SCNC: 25 MMOL/L (ref 21–32)
CREAT SERPL-MCNC: 0.81 MG/DL (ref 0.6–1.3)
DIFFERENTIAL METHOD BLD: ABNORMAL
EOSINOPHIL # BLD: 0.2 K/UL (ref 0–0.4)
EOSINOPHIL NFR BLD: 4 % (ref 0–5)
ERYTHROCYTE [DISTWIDTH] IN BLOOD BY AUTOMATED COUNT: 13.2 % (ref 11.6–14.5)
GLOBULIN SER CALC-MCNC: 2.9 G/DL (ref 2–4)
GLUCOSE SERPL-MCNC: 106 MG/DL (ref 74–99)
HCT VFR BLD AUTO: 36.7 % (ref 36–48)
HGB BLD-MCNC: 13 G/DL (ref 13–16)
LIPASE SERPL-CCNC: 292 U/L (ref 73–393)
LYMPHOCYTES # BLD: 2 K/UL (ref 0.9–3.6)
LYMPHOCYTES NFR BLD: 28 % (ref 21–52)
MCH RBC QN AUTO: 30.6 PG (ref 24–34)
MCHC RBC AUTO-ENTMCNC: 35.4 G/DL (ref 31–37)
MCV RBC AUTO: 86.4 FL (ref 74–97)
MONOCYTES # BLD: 0.5 K/UL (ref 0.05–1.2)
MONOCYTES NFR BLD: 7 % (ref 3–10)
NEUTS SEG # BLD: 4.2 K/UL (ref 1.8–8)
NEUTS SEG NFR BLD: 61 % (ref 40–73)
PLATELET # BLD AUTO: 239 K/UL (ref 135–420)
PMV BLD AUTO: 9.3 FL (ref 9.2–11.8)
POTASSIUM SERPL-SCNC: 3.5 MMOL/L (ref 3.5–5.5)
PROT SERPL-MCNC: 6.7 G/DL (ref 6.4–8.2)
RBC # BLD AUTO: 4.25 M/UL (ref 4.7–5.5)
SODIUM SERPL-SCNC: 141 MMOL/L (ref 136–145)
TROPONIN I SERPL-MCNC: <0.02 NG/ML (ref 0–0.04)
WBC # BLD AUTO: 6.9 K/UL (ref 4.6–13.2)

## 2018-10-15 PROCEDURE — 83690 ASSAY OF LIPASE: CPT | Performed by: PHYSICIAN ASSISTANT

## 2018-10-15 PROCEDURE — 74011250636 HC RX REV CODE- 250/636

## 2018-10-15 PROCEDURE — 99283 EMERGENCY DEPT VISIT LOW MDM: CPT

## 2018-10-15 PROCEDURE — 85025 COMPLETE CBC W/AUTO DIFF WBC: CPT | Performed by: PHYSICIAN ASSISTANT

## 2018-10-15 PROCEDURE — 80053 COMPREHEN METABOLIC PANEL: CPT | Performed by: PHYSICIAN ASSISTANT

## 2018-10-15 PROCEDURE — 74011250637 HC RX REV CODE- 250/637: Performed by: EMERGENCY MEDICINE

## 2018-10-15 PROCEDURE — 96375 TX/PRO/DX INJ NEW DRUG ADDON: CPT

## 2018-10-15 PROCEDURE — 74011250636 HC RX REV CODE- 250/636: Performed by: EMERGENCY MEDICINE

## 2018-10-15 PROCEDURE — 71046 X-RAY EXAM CHEST 2 VIEWS: CPT

## 2018-10-15 PROCEDURE — 96374 THER/PROPH/DIAG INJ IV PUSH: CPT

## 2018-10-15 PROCEDURE — 82550 ASSAY OF CK (CPK): CPT | Performed by: PHYSICIAN ASSISTANT

## 2018-10-15 RX ORDER — HYDROXYZINE 25 MG/1
25 TABLET, FILM COATED ORAL
Status: COMPLETED | OUTPATIENT
Start: 2018-10-15 | End: 2018-10-15

## 2018-10-15 RX ORDER — PROMETHAZINE HYDROCHLORIDE 25 MG/1
25 SUPPOSITORY RECTAL
Qty: 6 SUPPOSITORY | Refills: 0 | Status: SHIPPED | OUTPATIENT
Start: 2018-10-15 | End: 2018-10-18 | Stop reason: SDUPTHER

## 2018-10-15 RX ORDER — ONDANSETRON HYDROCHLORIDE 4 MG/2ML
4 INJECTION, SOLUTION INTRAMUSCULAR; INTRAVENOUS
Status: COMPLETED | OUTPATIENT
Start: 2018-10-15 | End: 2018-10-15

## 2018-10-15 RX ORDER — ONDANSETRON 2 MG/ML
INJECTION INTRAMUSCULAR; INTRAVENOUS
Status: COMPLETED
Start: 2018-10-15 | End: 2018-10-15

## 2018-10-15 RX ORDER — KETOROLAC TROMETHAMINE 30 MG/ML
30 INJECTION, SOLUTION INTRAMUSCULAR; INTRAVENOUS
Status: COMPLETED | OUTPATIENT
Start: 2018-10-15 | End: 2018-10-15

## 2018-10-15 RX ADMIN — ONDANSETRON HYDROCHLORIDE 4 MG: 4 INJECTION, SOLUTION INTRAMUSCULAR; INTRAVENOUS at 21:10

## 2018-10-15 RX ADMIN — HYDROXYZINE HYDROCHLORIDE 25 MG: 25 TABLET, FILM COATED ORAL at 20:35

## 2018-10-15 RX ADMIN — ONDANSETRON HYDROCHLORIDE 4 MG: 2 INJECTION, SOLUTION INTRAMUSCULAR; INTRAVENOUS at 21:10

## 2018-10-15 RX ADMIN — KETOROLAC TROMETHAMINE 30 MG: 30 INJECTION, SOLUTION INTRAMUSCULAR at 21:44

## 2018-10-15 NOTE — TELEPHONE ENCOUNTER
Spoke with the patient today. States he has been experiencing SOB since yesterday. States the issue is worsening. Was seen at the ER yesterday for acute pancreatitis. States sob and abdominal pain have worsened. Advised patient that he will need to go to the emergency room. He verbalized understanding.

## 2018-10-15 NOTE — DISCHARGE INSTRUCTIONS
Pancreatitis: Care Instructions  Your Care Instructions    The pancreas is an organ behind the stomach. It makes hormones and enzymes to help your body digest food. But if these enzymes attack the pancreas, it can get inflamed. This is called pancreatitis. Most cases are caused by gallstones or by heavy alcohol use. If you take care of yourself at home, it will help you get better. It will also help you avoid more problems with your pancreas. Follow-up care is a key part of your treatment and safety. Be sure to make and go to all appointments, and call your doctor if you are having problems. It's also a good idea to know your test results and keep a list of the medicines you take. How can you care for yourself at home? · Drink clear liquids and eat bland foods until you feel better. [place] foods include rice, dry toast, and crackers. They also include bananas and applesauce. · Eat a low-fat diet until your doctor says your pancreas is healed. · Do not drink alcohol. Tell your doctor if you need help to quit. Counseling, support groups, and sometimes medicines can help you stay sober. · Be safe with medicines. Read and follow all instructions on the label. ¨ If the doctor gave you a prescription medicine for pain, take it as prescribed. ¨ If you are not taking a prescription pain medicine, ask your doctor if you can take an over-the-counter medicine. · If your doctor prescribed antibiotics, take them as directed. Do not stop taking them just because you feel better. You need to take the full course of antibiotics. · Get extra rest until you feel better. To prevent future problems with your pancreas  · Do not drink alcohol. · Tell your doctors and pharmacist that you've had pancreatitis. They can help you avoid medicines that may cause this problem again. When should you call for help? Call 911 anytime you think you may need emergency care.  For example, call if:    · You vomit blood or what looks like coffee grounds.     · Your stools are maroon or very bloody.    Call your doctor now or seek immediate medical care if:    · You have new or worse belly pain.     · Your stools are black and look like tar, or they have streaks of blood.     · You are vomiting.    Watch closely for changes in your health, and be sure to contact your doctor if:    · You do not get better as expected. Where can you learn more? Go to http://alistair-kalpesh.info/. Enter N570 in the search box to learn more about \"Pancreatitis: Care Instructions. \"  Current as of: March 28, 2018  Content Version: 11.8  © 9539-5163 [employer]. Care instructions adapted under license by [employer] (which disclaims liability or warranty for this information). If you have questions about a medical condition or this instruction, always ask your healthcare professional. [address] any warranty or liability for your use of this information.

## 2018-10-15 NOTE — ED TRIAGE NOTES
Pt. States \"I was diagnosed yesterday  With Pancreatitis; I'm dizzy and I'm having tightness in my chest with pain in my left side\". Pt. C/o abdominal pain along with N/V.

## 2018-10-16 NOTE — ED PROVIDER NOTES
EMERGENCY DEPARTMENT HISTORY AND PHYSICAL EXAM 
 
8:10 PM 
 
 
Date: 10/15/2018 Patient Name: Jose Nassar History of Presenting Illness Chief Complaint Patient presents with  Chest Pain  
  tightness  Abdominal Pain  Nausea  Vomiting History Provided By: Patient Chief Complaint: Back tightness Duration:  1 day Timing:  constant Location: Upper back Quality: NA Severity: moderate Modifying Factors: Worse when inhaling Associated Symptoms: dizziness, vomiting, Abd pain Additional History (Context): 8:13 PM Jose Nassar is a 26 y.o. male with h/o HTN, hypertriglyceridemia, anxiety, hypomagnesemia, PICC, GERD, chronic gastritis, cholelithiasis, hepatic steatosis, opiate abuse, and noncompliance who presents to ED complaining of constant moderate upper back tightness onset for 1 day. Sx worse when inhaling. Associated Sx include dizziness, vomiting, and Abd pain. Reports coming to the ED 1 day ago for gallstones with the same Sx. Was called for follow up today and told them of his Sx, so he was told to come back to the ER. Has an appointment with his primary care tomorrow morning. Reports that his Abd pain is worsening. Could not afford Zofran, so reports not being able to keep his pain medications down. Denies any further complaints or symptoms at the moment. PCP: Ok Rodriguez NP Current Outpatient Prescriptions Medication Sig Dispense Refill  promethazine (PHENERGAN) 25 mg suppository Insert 1 Suppository into rectum every six (6) hours as needed for Nausea for up to 7 days. 6 Suppository 0  
 aluminum-magnesium hydroxide (MAALOX) 200-200 mg/5 mL suspension Take 15 mL by mouth every six (6) hours as needed for Indigestion. 300 mL 0  
 HYDROcodone-acetaminophen (NORCO) 5-325 mg per tablet Take 1 Tab by mouth every eight (8) hours as needed for Pain. Max Daily Amount: 3 Tabs.  12 Tab 0  
  hydrOXYzine HCl (ATARAX) 50 mg tablet Take 1 Tab by mouth every eight (8) hours as needed for Itching. 20 Tab 0  
 ondansetron (ZOFRAN ODT) 4 mg disintegrating tablet Take 1 Tab by mouth every eight (8) hours as needed for Nausea. 20 Tab 0  
 omeprazole (PRILOSEC) 40 mg capsule Take 1 Cap by mouth daily for 14 days. 14 Cap 0  clonazePAM (KLONOPIN) 0.5 mg tablet Take 1 Tab by mouth three (3) times daily as needed. Max Daily Amount: 1.5 mg. 30 Tab 0  
 hydroCHLOROthiazide (HYDRODIURIL) 25 mg tablet 1/2 pill daily 30 Tab 4  
 lisinopril (PRINIVIL, ZESTRIL) 20 mg tablet Take 1 Tab by mouth daily. 30 Tab 4  PARoxetine (PAXIL) 20 mg tablet Take 2 Tabs by mouth daily. 60 Tab 4  
 loratadine (CLARITIN) 10 mg tablet Take 10 mg by mouth daily. Indications: ALLERGIC RHINITIS Past History Past Medical History: 
Past Medical History:  
Diagnosis Date  Anxiety  Cholelithiasis 08/18/2013 Note on U/S  Chronic gastritis 10/17/2014 Dr. Marion Andersen (EGD Result) H. Pylori Neg  
 Depression  Dog bite of left hand including fingers with infection 11/16/2014 Adm 11/18/14 for IV antibiotics.  ETOH abuse  GERD (gastroesophageal reflux disease)  Hepatic steatosis 02/06/2014 Noted on U/S.   
 History of plasmapheresis 02/2014 Hypertriglyceridemia, Severe Pancreatitis  Hypertension  Hypertriglyceridemia  Hypomagnesemia 07/24/2016  
 1.6 mg/dL  Non compliance with medical treatment  Osteomyelitis of left hand ([address] 11/22/2014 MRI Finding @ LUE 2nd MCP joint.  Pancreatitis 08/17/2013 Highest on Record (2/6/14) 6213. Dr. Marion Andersen  PICC (peripherally inserted central catheter) in place 12/05/2014 R Basilic Vein (IV Antibiotics for LUE Hand Osteomyelitis).  Rectal bleeding  Substance abuse ([address] Several UDS + Opiates.  Vitamin D deficiency 02/17/2014  
 11.6ng/mL ( ng/mL) Past Surgical History: Past Surgical History:  
Procedure Laterality Date  HX CYST INCISION AND DRAINAGE Left 12/01/2014 LUE 2nd digit abscess I&D.  HX ENDOSCOPY  10/17/2014 Dr. Delmis Fernández: EGD/Colonoscopy  HX HEENT    
 wisdom tooth extraction  HX KNEE ARTHROSCOPY Right Family History: 
Family History Problem Relation Age of Onset  Elevated Lipids Mother  Thyroid Disease Mother  Hypertension Father  Alcohol abuse Maternal Grandmother  MS Paternal Grandmother Social History: 
Social History Substance Use Topics  Smoking status: Former Smoker Quit date: 9/17/2012  Smokeless tobacco: Never Used  Alcohol use 8.0 oz/week 8 Cans of beer, 8 Standard drinks or equivalent per week Comment: quit 02/02/2016 Allergies: Allergies Allergen Reactions  Amoxicillin Swelling  Contrast Dye [Iodine] Hives Pt had iv contrast prior to this study without problems, developed hives on 07/04/15 5 minutes post injection  Iodinated Contrast- Oral And Iv Dye Hives Pt got hives when he received IV contrast in the past  
 Penicillins Hives and Swelling  Wellbutrin [Bupropion] Other (comments) Caused aggression Review of Systems Review of Systems Constitutional: Negative. Negative for chills, diaphoresis and fever. HENT: Negative. Negative for congestion, rhinorrhea and sore throat. Eyes: Negative. Negative for pain, discharge and redness. Respiratory: Negative. Negative for cough, chest tightness, shortness of breath and wheezing. Positive for \"back tightness\" Cardiovascular: Negative. Negative for chest pain. Gastrointestinal: Positive for vomiting. Negative for abdominal pain, constipation, diarrhea and nausea. Genitourinary: Negative. Negative for dysuria, flank pain, frequency, hematuria and urgency. Musculoskeletal: Positive for back pain. Negative for neck pain. Skin: Negative. Negative for rash. Neurological: Positive for dizziness. Negative for syncope, weakness, numbness and headaches. Psychiatric/Behavioral: Negative. All other systems reviewed and are negative. Physical Exam  
 
Visit Vitals  BP (!) 149/103  Pulse 74  Temp 98 °F (36.7 °C)  Resp 18  Ht 6' 2\" (1.88 m)  Wt 93 kg (205 lb)  SpO2 100%  BMI 26.32 kg/m2 Physical Exam  
Constitutional: He appears well-developed and well-nourished. Non-toxic appearance. He has a sickly appearance. He appears ill. No distress. HENT:  
Head: Normocephalic and atraumatic. Mouth/Throat: Oropharynx is clear and moist. No oropharyngeal exudate. Eyes: Conjunctivae and EOM are normal. Pupils are equal, round, and reactive to light. No scleral icterus. Neck: Normal range of motion. Neck supple. No hepatojugular reflux and no JVD present. No tracheal deviation present. No thyromegaly present. Cardiovascular: Normal rate, regular rhythm, S1 normal, S2 normal, normal heart sounds, intact distal pulses and normal pulses. Exam reveals no gallop, no S3 and no S4. No murmur heard. Pulses: 
     Radial pulses are 2+ on the right side, and 2+ on the left side. Dorsalis pedis pulses are 2+ on the right side, and 2+ on the left side. Pulmonary/Chest: Effort normal and breath sounds normal. No respiratory distress. He has no decreased breath sounds. He has no wheezes. He has no rhonchi. He has no rales. Abdominal: Soft. Normal appearance and bowel sounds are normal. He exhibits no distension and no mass. There is no hepatosplenomegaly. There is no tenderness. There is no rigidity, no rebound, no guarding, no CVA tenderness, no tenderness at McBurney's point and negative Corral's sign. Musculoskeletal: Normal range of motion. Strength 4/5 throughout Lymphadenopathy:  
     Head (right side): No submental, no submandibular, no preauricular and no occipital adenopathy present. Head (left side): No submental, no submandibular, no preauricular and no occipital adenopathy present. He has no cervical adenopathy. Right: No supraclavicular adenopathy present. Left: No supraclavicular adenopathy present. Neurological: He has normal strength and normal reflexes. He is not disoriented. No cranial nerve deficit or sensory deficit. Coordination and gait normal. GCS eye subscore is 4. GCS verbal subscore is 5. GCS motor subscore is 6. Grossly intact Skin: Skin is warm, dry and intact. No rash noted. He is not diaphoretic. Psychiatric: He has a normal mood and affect. His speech is normal and behavior is normal. Judgment and thought content normal. Cognition and memory are normal.  
Nursing note and vitals reviewed. Diagnostic Study Results Labs - Recent Results (from the past 12 hour(s)) CBC WITH AUTOMATED DIFF Collection Time: 10/15/18  8:40 PM  
Result Value Ref Range WBC 6.9 4.6 - 13.2 K/uL  
 RBC 4.25 (L) 4.70 - 5.50 M/uL  
 HGB 13.0 13.0 - 16.0 g/dL HCT 36.7 36.0 - 48.0 % MCV 86.4 74.0 - 97.0 FL  
 MCH 30.6 24.0 - 34.0 PG  
 MCHC 35.4 31.0 - 37.0 g/dL  
 RDW 13.2 11.6 - 14.5 % PLATELET 897 104 - 394 K/uL MPV 9.3 9.2 - 11.8 FL  
 NEUTROPHILS 61 40 - 73 % LYMPHOCYTES 28 21 - 52 % MONOCYTES 7 3 - 10 % EOSINOPHILS 4 0 - 5 % BASOPHILS 0 0 - 2 %  
 ABS. NEUTROPHILS 4.2 1.8 - 8.0 K/UL  
 ABS. LYMPHOCYTES 2.0 0.9 - 3.6 K/UL  
 ABS. MONOCYTES 0.5 0.05 - 1.2 K/UL  
 ABS. EOSINOPHILS 0.2 0.0 - 0.4 K/UL  
 ABS. BASOPHILS 0.0 0.0 - 0.1 K/UL  
 DF AUTOMATED METABOLIC PANEL, COMPREHENSIVE Collection Time: 10/15/18  8:40 PM  
Result Value Ref Range Sodium 141 136 - 145 mmol/L Potassium 3.5 3.5 - 5.5 mmol/L Chloride 108 100 - 108 mmol/L  
 CO2 25 21 - 32 mmol/L Anion gap 8 3.0 - 18 mmol/L Glucose 106 (H) 74 - 99 mg/dL BUN 4 (L) 7.0 - 18 MG/DL  Creatinine 0.81 0.6 - 1.3 MG/DL  
 BUN/Creatinine ratio 5 (L) 12 - 20 GFR est AA >60 >60 ml/min/1.73m2 GFR est non-AA >60 >60 ml/min/1.73m2 Calcium 7.7 (L) 8.5 - 10.1 MG/DL Bilirubin, total 0.2 0.2 - 1.0 MG/DL  
 ALT (SGPT) 22 16 - 61 U/L  
 AST (SGOT) 21 15 - 37 U/L Alk. phosphatase 41 (L) 45 - 117 U/L Protein, total 6.7 6.4 - 8.2 g/dL Albumin 3.8 3.4 - 5.0 g/dL Globulin 2.9 2.0 - 4.0 g/dL A-G Ratio 1.3 0.8 - 1.7 LIPASE Collection Time: 10/15/18  8:40 PM  
Result Value Ref Range Lipase 292 73 - 393 U/L  
CARDIAC PANEL,(CK, CKMB & TROPONIN) Collection Time: 10/15/18  8:40 PM  
Result Value Ref Range CK 69 39 - 308 U/L  
 CK - MB <1.0 <3.6 ng/ml CK-MB Index  0.0 - 4.0 % CALCULATION NOT PERFORMED WHEN RESULT IS BELOW LINEAR LIMIT Troponin-I, Qt. <0.02 0.0 - 0.045 NG/ML Radiologic Studies -  
XR CHEST PA LAT    (Results Pending) No results found. Medical Decision Making Provider Notes (Medical Decision Making): MDM Number of Diagnoses or Management Options Chest pain, unspecified type:  
Nausea and vomiting, intractability of vomiting not specified, unspecified vomiting type: Other chronic pain: I am the first provider for this patient. I reviewed the vital signs, available nursing notes, past medical history, past surgical history, family history and social history. Vital Signs-Reviewed the patient's vital signs. Records Reviewed: Nursing Notes and Old Medical Records (Time of Review: 8:10 PM) ED Course: Progress Notes, Reevaluation, and Consults: 
Labs essentially normal. Lipase nl. Cardiac enzymes nl.  
8:10 PM 10/15/2018 Diagnosis I have reassessed the patient. Patient is feeling better. Patient will be prescribed Phenargen. Patient was discharged in stable condition. Patient is to return to emergency department if any new or worsening condition. Clinical Impression: 1.  Nausea and vomiting, intractability of vomiting not specified, unspecified vomiting type 2. Chest pain, unspecified type 3. Other chronic pain Disposition: DC home Follow-up Information Follow up With Details Comments Contact Info [facility] Go to As needed, If symptoms worsen [address] [place] [address] [address] Tyson Roque NP Schedule an appointment as soon as possible for a visit in 2 days For follow up [address] [place] [id number] [phone] 
  
  
  
 
_______________________________ Attestations: 
Scribe Attestation Loni Song acting as a scribe for and in the presence of [employer], DO October 15, 2018 at 8:10 PM 
    
Provider Attestation:     
I personally performed the services described in the documentation, reviewed the documentation, as recorded by the scribe in my presence, and it accurately and completely records my words and actions. October 15, 2018 at 8:10 PM - [employer], DO   
_______________________________

## 2018-10-16 NOTE — DISCHARGE INSTRUCTIONS
Chronic Pain: Care Instructions  Your Care Instructions    Chronic pain is pain that lasts a long time (months or even years) and may or may not have a clear cause. It is different from acute pain, which usually does have a clear cause--like an injury or illness--and gets better over time. Chronic pain:  · Lasts over time but may vary from day to day. · Does not go away despite efforts to end it. · May disrupt your sleep and lead to fatigue. · May cause depression or anxiety. · May make your muscles tense, causing more pain. · Can disrupt your work, hobbies, home life, and relationships with friends and family. Chronic pain is a very real condition. It is not just in your head. Treatment can help and usually includes several methods used together, such as medicines, physical therapy, exercise, and other treatments. Learning how to relax and changing negative thought patterns can also help you cope. Chronic pain is complex. Taking an active role in your treatment will help you better manage your pain. Tell your doctor if you have trouble dealing with your pain. You may have to try several things before you find what works best for you. Follow-up care is a key part of your treatment and safety. Be sure to make and go to all appointments, and call your doctor if you are having problems. It's also a good idea to know your test results and keep a list of the medicines you take. How can you care for yourself at home? · Pace yourself. Break up large jobs into smaller tasks. Save harder tasks for days when you have less pain, or go back and forth between hard tasks and easier ones. Take rest breaks. · Relax, and reduce stress. Relaxation techniques such as deep breathing or meditation can help. · Keep moving. Gentle, daily exercise can help reduce pain over the long run. Try low- or no-impact exercises such as walking, swimming, and stationary biking. Do stretches to stay flexible.   · Try heat, cold packs, and massage. · Get enough sleep. Chronic pain can make you tired and drain your energy. Talk with your doctor if you have trouble sleeping because of pain. · Think positive. Your thoughts can affect your pain level. Do things that you enjoy to distract yourself when you have pain instead of focusing on the pain. See a movie, read a book, listen to music, or spend time with a friend. · If you think you are depressed, talk to your doctor about treatment. · Keep a daily pain diary. Record how your moods, thoughts, sleep patterns, activities, and medicine affect your pain. You may find that your pain is worse during or after certain activities or when you are feeling a certain emotion. Having a record of your pain can help you and your doctor find the best ways to treat your pain. · Take pain medicines exactly as directed. ¨ If the doctor gave you a prescription medicine for pain, take it as prescribed. ¨ If you are not taking a prescription pain medicine, ask your doctor if you can take an over-the-counter medicine. Reducing constipation caused by pain medicine  · Include fruits, vegetables, beans, and whole grains in your diet each day. These foods are high in fiber. · Drink plenty of fluids, enough so that your urine is light yellow or clear like water. If you have kidney, heart, or liver disease and have to limit fluids, talk with your doctor before you increase the amount of fluids you drink. · If your doctor recommends it, get more exercise. Walking is a good choice. Bit by bit, increase the amount you walk every day. Try for at least 30 minutes on most days of the week. · Schedule time each day for a bowel movement. A daily routine may help. Take your time and do not strain when having a bowel movement. When should you call for help?   Call your doctor now or seek immediate medical care if:    · Your pain gets worse or is out of control.     · You feel down or blue, or you do not enjoy things like you once did. You may be depressed, which is common in people with chronic pain. Depression can be treated.     · You have vomiting or cramps for more than 2 hours.    Watch closely for changes in your health, and be sure to contact your doctor if:    · You cannot sleep because of pain.     · You are very worried or anxious about your pain.     · You have trouble taking your pain medicine.     · You have any concerns about your pain medicine.     · You have trouble with bowel movements, such as:  ¨ No bowel movement in 3 days. ¨ Blood in the anal area, in your stool, or on the toilet paper. ¨ Diarrhea for more than 24 hours. Where can you learn more? Go to http://alistair-kalpesh.info/. Enter N004 in the search box to learn more about \"Chronic Pain: Care Instructions. \"  Current as of: June 4, 2018  Content Version: 11.8  © 9059-1152 [employer]. Care instructions adapted under license by [employer] (which disclaims liability or warranty for this information). If you have questions about a medical condition or this instruction, always ask your healthcare professional. [place] [address] any warranty or liability for your use of this information. Chest Pain: Care Instructions  Your Care Instructions    There are many things that can cause chest pain. Some are not serious and will get better on their own in a few days. But some kinds of chest pain need more testing and treatment. Your doctor may have recommended a follow-up visit in the next 8 to 12 hours. If you are not getting better, you may need more tests or treatment. Even though your doctor has released you, you still need to watch for any problems. The doctor carefully checked you, but sometimes problems can develop later. If you have new symptoms or if your symptoms do not get better, get medical care right away.   If you have worse or different chest pain or pressure that lasts more than 5 minutes or you passed out (lost consciousness), call 911 or seek other emergency help right away. A medical visit is only one step in your treatment. Even if you feel better, you still need to do what your doctor recommends, such as going to all suggested follow-up appointments and taking medicines exactly as directed. This will help you recover and help prevent future problems. How can you care for yourself at home? · Rest until you feel better. · Take your medicine exactly as prescribed. Call your doctor if you think you are having a problem with your medicine. · Do not drive after taking a prescription pain medicine. When should you call for help? Call 911 if:    · You passed out (lost consciousness).     · You have severe difficulty breathing.     · You have symptoms of a heart attack. These may include:  ¨ Chest pain or pressure, or a strange feeling in your chest.  ¨ Sweating. ¨ Shortness of breath. ¨ Nausea or vomiting. ¨ Pain, pressure, or a strange feeling in your back, neck, jaw, or upper belly or in one or both shoulders or arms. ¨ Lightheadedness or sudden weakness. ¨ A fast or irregular heartbeat. After you call 911, the  may tell you to chew 1 adult-strength or 2 to 4 low-dose aspirin. Wait for an ambulance. Do not try to drive yourself.    Call your doctor today if:    · You have any trouble breathing.     · Your chest pain gets worse.     · You are dizzy or lightheaded, or you feel like you may faint.     · You are not getting better as expected.     · You are having new or different chest pain. Where can you learn more? Go to http://alistair-kalpesh.info/. Enter A120 in the search box to learn more about \"Chest Pain: Care Instructions. \"  Current as of: November 20, 2017  Content Version: 11.8  © 5843-6192 [employer]. Care instructions adapted under license by [employer] (which disclaims liability or warranty for this information).  If you have questions about a medical condition or this instruction, always ask your healthcare professional. [address] any warranty or liability for your use of this information. Nausea and Vomiting: Care Instructions  Your Care Instructions    When you are nauseated, you may feel weak and sweaty and notice a lot of saliva in your mouth. Nausea often leads to vomiting. Most of the time you do not need to worry about nausea and vomiting, but they can be signs of other illnesses. Two common causes of nausea and vomiting are stomach flu and food poisoning. Nausea and vomiting from viral stomach flu will usually start to improve within 24 hours. Nausea and vomiting from food poisoning may last from 12 to 48 hours. The doctor has checked you carefully, but problems can develop later. If you notice any problems or new symptoms, get medical treatment right away. Follow-up care is a key part of your treatment and safety. Be sure to make and go to all appointments, and call your doctor if you are having problems. It's also a good idea to know your test results and keep a list of the medicines you take. How can you care for yourself at home? · To prevent dehydration, drink plenty of fluids, enough so that your urine is light yellow or clear like water. Choose water and other caffeine-free clear liquids until you feel better. If you have kidney, heart, or liver disease and have to limit fluids, talk with your doctor before you increase the amount of fluids you drink. · Rest in bed until you feel better. · When you are able to eat, try clear soups, mild foods, and liquids until all symptoms are gone for 12 to 48 hours. Other good choices include dry toast, crackers, cooked cereal, and gelatin dessert, such as Jell-O. When should you call for help? Call 911 anytime you think you may need emergency care.  For example, call if:    · You passed out (lost consciousness).    Call your doctor now or seek immediate medical care if:    · You have symptoms of dehydration, such as:  ¨ Dry eyes and a dry mouth. ¨ Passing only a little dark urine. ¨ Feeling thirstier than usual.     · You have new or worsening belly pain.     · You have a new or higher fever.     · You vomit blood or what looks like coffee grounds.    Watch closely for changes in your health, and be sure to contact your doctor if:    · You have ongoing nausea and vomiting.     · Your vomiting is getting worse.     · Your vomiting lasts longer than 2 days.     · You are not getting better as expected. Where can you learn more? Go to http://alistair-kalpesh.info/. Enter 25 924606 in the search box to learn more about \"Nausea and Vomiting: Care Instructions. \"  Current as of: November 20, 2017  Content Version: 11.8  © 3869-5456 [employer]. Care instructions adapted under license by [employer] (which disclaims liability or warranty for this information). If you have questions about a medical condition or this instruction, always ask your healthcare professional. [place] [address] any warranty or liability for your use of this information.

## 2018-10-18 ENCOUNTER — OFFICE VISIT (OUTPATIENT)
Dept: FAMILY MEDICINE CLINIC | Age: 33
End: 2018-10-18

## 2018-10-18 VITALS
SYSTOLIC BLOOD PRESSURE: 126 MMHG | WEIGHT: 218.6 LBS | OXYGEN SATURATION: 99 % | HEART RATE: 83 BPM | HEIGHT: 74 IN | DIASTOLIC BLOOD PRESSURE: 84 MMHG | TEMPERATURE: 98.7 F | BODY MASS INDEX: 28.06 KG/M2 | RESPIRATION RATE: 20 BRPM

## 2018-10-18 DIAGNOSIS — F10.10 ALCOHOL ABUSE: ICD-10-CM

## 2018-10-18 DIAGNOSIS — F41.9 ANXIETY AND DEPRESSION: ICD-10-CM

## 2018-10-18 DIAGNOSIS — F32.A ANXIETY AND DEPRESSION: ICD-10-CM

## 2018-10-18 DIAGNOSIS — R10.13 EPIGASTRIC PAIN: ICD-10-CM

## 2018-10-18 DIAGNOSIS — R11.2 NAUSEA AND VOMITING, INTRACTABILITY OF VOMITING NOT SPECIFIED, UNSPECIFIED VOMITING TYPE: Primary | ICD-10-CM

## 2018-10-18 DIAGNOSIS — F41.1 GAD (GENERALIZED ANXIETY DISORDER): ICD-10-CM

## 2018-10-18 RX ORDER — SUCRALFATE 1 G/10ML
2 SUSPENSION ORAL 4 TIMES DAILY
Qty: 120 ML | Refills: 1 | Status: SHIPPED | OUTPATIENT
Start: 2018-10-18 | End: 2019-07-22

## 2018-10-18 RX ORDER — CLONAZEPAM 0.5 MG/1
0.5 TABLET ORAL
Qty: 30 TAB | Refills: 0 | Status: CANCELLED | OUTPATIENT
Start: 2018-10-18

## 2018-10-18 RX ORDER — PAROXETINE HYDROCHLORIDE 20 MG/1
40 TABLET, FILM COATED ORAL DAILY
Qty: 60 TAB | Refills: 4 | Status: SHIPPED | OUTPATIENT
Start: 2018-10-18 | End: 2019-07-09 | Stop reason: SDUPTHER

## 2018-10-18 RX ORDER — PROMETHAZINE HYDROCHLORIDE 25 MG/1
25 SUPPOSITORY RECTAL
Qty: 6 SUPPOSITORY | Refills: 0 | Status: SHIPPED | OUTPATIENT
Start: 2018-10-18 | End: 2018-10-25

## 2018-10-18 NOTE — PROGRESS NOTES
Subjective:   Diya Shahid is a 26 y.o. male with ETOH abuse, substance abuse history, cholelithiasis, hepatic steatosis, hypertension, hypertriglyceridemia, pancreatitis and chronic gastritis and presents to the office today for follow up emergency room visits: 10-13-18 two visits abdominal pain and diarrhea and reported vomiting up blood. He drank beer reported because he ran out of clonazepan. Diagnostics reviewed. He went back to ED later same date for N/V diarrhea and abdominal pain. US of gallbladder and abdominal CT demonstrate cholelithiasis without evidence of cholecystitis. Patient given morphine, zofran, pepcid and GI cocktail, discharged and advised  GI follow up recommended    10-14-18 sent back to ED for c/o abdominal pain with N/V and back spasms. Reports he cannot keep food down. Presented with tremors, diaphoresis and slight anxiety. Abdominal pain exacerbated after eating moderate RUQ He reports that his abdominal pain does not feel like pancreatitis, gall stones, or stomach ulcers. Diagnostics moderate elevated lipase, no leukocytosi, given IV fluids, morphine and zofran. Patient was able to tolerate po and to follow up with PCP and GI   , 10-15-18 with this visit patient reports constant upper back tightness for one day. Symptoms worse with inhaling. Associated dizziness, vomiting and abdominal pain. Reports he could not afford Zofran and reports cannot keep pain medications down. Patient given IVF phenergan and reported feeling better. He is here today complaining of right upper quadrant pain with some radiation to right posterior shoulder region. Continued nausea and reported vomiting yesterday with blood streaks. Reports that he has not been taking any of his medications and has not been drinking alcohol. He does report pain and is requesting pain medication. He is also requesting ativan.   He says that he was given Vicodin in the ED and he did take these medications but he is still in pain and points to his stomach. Review of Systems   Constitutional: Negative. Respiratory: Negative. Cardiovascular: Negative. Gastrointestinal: Positive for nausea. Negative for blood in stool, constipation, diarrhea, heartburn and vomiting. No vomiting today  Able to eat and drink small amount today    Neurological: Negative. Endo/Heme/Allergies: Negative. Psychiatric/Behavioral: Negative for depression, hallucinations, memory loss, substance abuse and suicidal ideas. The patient is nervous/anxious. The patient does not have insomnia. Diagnostic Review:  Final result    10/16/2018  7:46 AM    Rajat, Rad Results In         This is the newest version No older versions exist   Narrative     Chest PA and lateral     INDICATION: Shortness of breath     COMPARISON: 5/16     FINDINGS:   Two views of the chest were obtained. The lungs are clear. Cardiac silhouette is   normal. Pulmonary vasculature is unremarkable. Osseous structures are intact. Impression     IMPRESSION:   No acute cardiopulmonary disease. No significant interval change. ULTRASOUND ABDOMEN LIMITED     HISTORY: Worsening right upper quadrant pain. TECHNIQUE: Selected images from limited abdominal ultrasound provided for   interpretation.       COMPARISON: 10/13/2018      FINDINGS:       Pancreas: Visualized portions are grossly normal in appearance. The tail is not   well visualized due to bowel gas. Liver: Measures 16.4 cm in mid clavicular sagittal plane. Homogeneous   echotexture without mass in the visualized portions.  There is no biliary ductal   dilatation.  The main portal vein is patent with proper directional flow. IVC: Visualized portion is patent. Aorta: Normal caliber. Gallbladder: Adequately distended.  Stable appearance of gallstones within the   neck. No associated gallbladder wall thickening or pericholecystic fluid.    Negative sonographic Corral's sign noted.  The common bile duct measures 4mm.       Right kidney:  Measures 11 x 4.6 x 5.2 cm. No hydronephrosis.  Normal   parenchymal echogenicity. No mass, cyst or calculus is demonstrated. No free fluid is identified. Impression   10-14-18  Impression:     Stable appearance of cholelithiasis with nonmobile gallstones in the gallbladder   neck. No secondary findings of cholecystitis. No new findings.             Narrative     CT ABDOMEN AND PELVIS WITHOUT ENHANCEMENT     INDICATION: Left-sided abdominal pain. Vomiting. Diarrhea today. Blood in stool. TECHNIQUE: Axial images obtained of the abdomen and pelvis without intravenous   contrast. Coronal and sagittal reformatted images were obtained.  All CT scans   are performed using dose optimization techniques as appropriate to the performed   exam including the following: Automated exposure control, adjustment of mA   and/or kV according to patient size, and use of iterative reconstructive   technique. COMPARISON: 5/13/2016.       ABDOMEN FINDINGS:      Lung Base: Unremarkable. Liver: Unremarkable. Biliary: Cholelithiasis. No ductal dilation. Spleen: Chronic 4.5 cm hypodense splenic cyst is increased in size from 1.8 cm   in 2016. Pancreas: Unremarkable. Adrenal Glands: Unremarkable. Kidneys: Unremarkable. Peritoneum/ Retroperitoneum: Unremarkable. Lymph Nodes: Unremarkable. Vessels: Unremarkable for age. PELVIS FINDINGS:      Bowel: Normal appendix. Nondilated bowel. Bladder/ Pelvic Organs: Unremarkable. Bones/Soft tissues: Unremarkable for age. Impression     IMPRESSION:     1.  No acute abnormalities. 2. Stable cholelithiasis.    3. Growth of chronic left splenic cyst.      Lab Results   Component Value Date/Time    CK 69 10/15/2018 08:40 PM     Lab Results   Component Value Date/Time    CK 69 10/15/2018 08:40 PM    CK - MB <1.0 10/15/2018 08:40 PM    CK-MB Index  10/15/2018 08:40 PM     CALCULATION NOT PERFORMED WHEN RESULT IS BELOW LINEAR LIMIT    Troponin-I, Qt. <0.02 10/15/2018 08:40 PM     Lab Results   Component Value Date/Time    Lipase 292 10/15/2018 08:40 PM     Lab Results   Component Value Date/Time    WBC 6.9 10/15/2018 08:40 PM    HGB 13.0 10/15/2018 08:40 PM    HCT 36.7 10/15/2018 08:40 PM    PLATELET 336 29/33/6795 08:40 PM    MCV 86.4 10/15/2018 08:40 PM     Lab Results   Component Value Date/Time    Sodium 141 10/15/2018 08:40 PM    Potassium 3.5 10/15/2018 08:40 PM    Chloride 108 10/15/2018 08:40 PM    CO2 25 10/15/2018 08:40 PM    Anion gap 8 10/15/2018 08:40 PM    Glucose 106 (H) 10/15/2018 08:40 PM    BUN 4 (L) 10/15/2018 08:40 PM    Creatinine 0.81 10/15/2018 08:40 PM    BUN/Creatinine ratio 5 (L) 10/15/2018 08:40 PM    GFR est AA >60 10/15/2018 08:40 PM    GFR est non-AA >60 10/15/2018 08:40 PM    Calcium 7.7 (L) 10/15/2018 08:40 PM    Bilirubin, total 0.2 10/15/2018 08:40 PM    AST (SGOT) 21 10/15/2018 08:40 PM    Alk. phosphatase 41 (L) 10/15/2018 08:40 PM    Protein, total 6.7 10/15/2018 08:40 PM    Albumin 3.8 10/15/2018 08:40 PM    Globulin 2.9 10/15/2018 08:40 PM    A-G Ratio 1.3 10/15/2018 08:40 PM    ALT (SGPT) 22 10/15/2018 08:40 PM               Current Outpatient Medications   Medication Sig Dispense Refill    aluminum-magnesium hydroxide (MAALOX) 200-200 mg/5 mL suspension Take 15 mL by mouth every six (6) hours as needed for Indigestion. 300 mL 0    hydrOXYzine HCl (ATARAX) 50 mg tablet Take 1 Tab by mouth every eight (8) hours as needed for Itching. 20 Tab 0    omeprazole (PRILOSEC) 40 mg capsule Take 1 Cap by mouth daily for 14 days. 14 Cap 0    clonazePAM (KLONOPIN) 0.5 mg tablet Take 1 Tab by mouth three (3) times daily as needed. Max Daily Amount: 1.5 mg. 30 Tab 0    hydroCHLOROthiazide (HYDRODIURIL) 25 mg tablet 1/2 pill daily 30 Tab 4    lisinopril (PRINIVIL, ZESTRIL) 20 mg tablet Take 1 Tab by mouth daily.  30 Tab 4    PARoxetine (PAXIL) 20 mg tablet Take 2 Tabs by mouth daily. 60 Tab 4    loratadine (CLARITIN) 10 mg tablet Take 10 mg by mouth daily. Indications: ALLERGIC RHINITIS      promethazine (PHENERGAN) 25 mg suppository Insert 1 Suppository into rectum every six (6) hours as needed for Nausea for up to 7 days. 6 Suppository 0    ondansetron (ZOFRAN ODT) 4 mg disintegrating tablet Take 1 Tab by mouth every eight (8) hours as needed for Nausea. 20 Tab 0      Wt Readings from Last 3 Encounters:   10/18/18 218 lb 9.6 oz (99.2 kg)   10/15/18 205 lb (93 kg)   10/13/18 207 lb (93.9 kg)     BP Readings from Last 3 Encounters:   10/18/18 126/84   10/15/18 (!) 150/100   10/14/18 (!) 150/99     PMH: reviewed medications and allergy lists and medical and family history. Objective:   Awake and alert in no acute distress  Lungs clear throughout  S1 S2 RRR without ectopy or murmur auscultated. Abdomen: normoactive bowel sounds all quadrants, mild tenderness to abdomen upper quadrants, moderate TTP epigastric region no TTP over  Lower abdominal quadrants. No hepatosplenomegaly  Integumentary: no rashes  Normal skin turgor  Visit Vitals  /84 (BP 1 Location: Left arm, BP Patient Position: Sitting)   Pulse 83   Temp 98.7 °F (37.1 °C) (Oral)   Resp 20   Ht 6' 2\" (1.88 m)   Wt 218 lb 9.6 oz (99.2 kg)   SpO2 99%   BMI 28.07 kg/m²      Diagnoses and all orders for this visit:    Nausea and vomiting, intractability of vomiting not specified, unspecified vomiting type  -     promethazine (PHENERGAN) 25 mg suppository; Insert 1 Suppository into rectum every six (6) hours as needed for Nausea for up to 7 days. , Normal, Disp-6 Suppository, R-0  -     sucralfate (CARAFATE) 100 mg/mL suspension; Take 20 mL by mouth four (4) times daily. , Normal, Disp-120 mL, R-1    MICHELLE (generalized anxiety disorder)  -     PARoxetine (PAXIL) 20 mg tablet; Take 2 Tabs by mouth daily. , Normal, Disp-60 Tab, R-4    Anxiety and depression  -     PARoxetine (PAXIL) 20 mg tablet; Take 2 Tabs by mouth daily. , Normal, Disp-60 Tab, R-4    Alcohol abuse    Epigastric pain    Other orders  -     Cancel: clonazePAM (KLONOPIN) 0.5 mg tablet; Take 1 Tab by mouth three (3) times daily as needed. Max Daily Amount: 1.5 mg., Normal, Disp-30 Tab, R-0      Anticipatory guidance regarding emergency care if symptoms worsen and patient verbalizes understanding. Praised for no alcohol intake and encouraged him to continue  Continue to take paroxetine  Patient plans to transfer care to [facility] as he does not have any health insurance or consistent income. I have discussed the diagnosis with the patient and the intended plan as seen in the above orders. The patient has received an after-visit summary and questions were answered concerning future plans. I have discussed medication side effects and warnings with the patient as well. Follow-up Disposition:  Return for patient transferring to [facility].

## 2018-10-18 NOTE — PROGRESS NOTES
Chief Complaint   Patient presents with    Other     ED follow up (four seperate visits 10/13-10/15)  for acute pancreatitis      1. Have you been to the ER, urgent care clinic since your last visit? Hospitalized since your last visit? [facility] ED     2. Have you seen or consulted any other health care providers outside of the [address] [place] [address] since your last visit? Include any pap smears or colon screening.   No

## 2018-10-20 ENCOUNTER — TELEPHONE (OUTPATIENT)
Dept: FAMILY MEDICINE CLINIC | Age: 33
End: 2018-10-20

## 2018-10-20 NOTE — TELEPHONE ENCOUNTER
Pt called stating his phenergan was never sent to the pharmacy. I called the pharmacy, they have the script but not the medicine until Monday. That is when the patient can pick this up. Pt states he will call the office on Monday because he wants to talk about what will be done until he gets in with someone.

## 2018-12-12 ENCOUNTER — HOSPITAL ENCOUNTER (EMERGENCY)
Age: 33
Discharge: HOME | End: 2018-12-12
Attending: EMERGENCY MEDICINE
Payer: SELF-PAY

## 2018-12-12 ENCOUNTER — APPOINTMENT (OUTPATIENT)
Dept: GENERAL RADIOLOGY | Age: 33
End: 2018-12-12
Attending: EMERGENCY MEDICINE
Payer: SELF-PAY

## 2018-12-12 ENCOUNTER — APPOINTMENT (OUTPATIENT)
Dept: ULTRASOUND IMAGING | Age: 33
End: 2018-12-12
Attending: EMERGENCY MEDICINE
Payer: SELF-PAY

## 2018-12-12 VITALS
HEIGHT: 74 IN | HEART RATE: 85 BPM | SYSTOLIC BLOOD PRESSURE: 147 MMHG | DIASTOLIC BLOOD PRESSURE: 94 MMHG | OXYGEN SATURATION: 99 % | BODY MASS INDEX: 26.73 KG/M2 | RESPIRATION RATE: 15 BRPM | TEMPERATURE: 99 F | WEIGHT: 208.31 LBS

## 2018-12-12 DIAGNOSIS — R10.9 ACUTE ABDOMINAL PAIN: ICD-10-CM

## 2018-12-12 DIAGNOSIS — R50.9 ACUTE FEBRILE ILLNESS: Primary | ICD-10-CM

## 2018-12-12 LAB
ALBUMIN SERPL-MCNC: 4.2 G/DL (ref 3.4–5)
ALBUMIN/GLOB SERPL: 1.7 {RATIO} (ref 0.8–1.7)
ALP SERPL-CCNC: 43 U/L (ref 45–117)
ALT SERPL-CCNC: 16 U/L (ref 16–61)
ANION GAP SERPL CALC-SCNC: 5 MMOL/L (ref 3–18)
APPEARANCE UR: CLEAR
AST SERPL-CCNC: 11 U/L (ref 15–37)
BASOPHILS # BLD: 0 K/UL (ref 0–0.1)
BASOPHILS NFR BLD: 0 % (ref 0–2)
BILIRUB SERPL-MCNC: 0.3 MG/DL (ref 0.2–1)
BILIRUB UR QL: NEGATIVE
BUN SERPL-MCNC: 4 MG/DL (ref 7–18)
BUN/CREAT SERPL: 5 (ref 12–20)
CALCIUM SERPL-MCNC: 7.9 MG/DL (ref 8.5–10.1)
CHLORIDE SERPL-SCNC: 112 MMOL/L (ref 100–108)
CO2 SERPL-SCNC: 24 MMOL/L (ref 21–32)
COLOR UR: YELLOW
CREAT SERPL-MCNC: 0.79 MG/DL (ref 0.6–1.3)
DIFFERENTIAL METHOD BLD: ABNORMAL
EOSINOPHIL # BLD: 0.3 K/UL (ref 0–0.4)
EOSINOPHIL NFR BLD: 3 % (ref 0–5)
ERYTHROCYTE [DISTWIDTH] IN BLOOD BY AUTOMATED COUNT: 12.8 % (ref 11.6–14.5)
GLOBULIN SER CALC-MCNC: 2.5 G/DL (ref 2–4)
GLUCOSE SERPL-MCNC: 82 MG/DL (ref 74–99)
GLUCOSE UR STRIP.AUTO-MCNC: NEGATIVE MG/DL
HCT VFR BLD AUTO: 38.5 % (ref 36–48)
HGB BLD-MCNC: 13.6 G/DL (ref 13–16)
HGB UR QL STRIP: NEGATIVE
KETONES UR QL STRIP.AUTO: NEGATIVE MG/DL
LACTATE BLD-SCNC: 1.07 MMOL/L (ref 0.4–2)
LEUKOCYTE ESTERASE UR QL STRIP.AUTO: NEGATIVE
LIPASE SERPL-CCNC: 274 U/L (ref 73–393)
LYMPHOCYTES # BLD: 1.6 K/UL (ref 0.9–3.6)
LYMPHOCYTES NFR BLD: 21 % (ref 21–52)
MAGNESIUM SERPL-MCNC: 2.1 MG/DL (ref 1.6–2.6)
MCH RBC QN AUTO: 30.5 PG (ref 24–34)
MCHC RBC AUTO-ENTMCNC: 35.3 G/DL (ref 31–37)
MCV RBC AUTO: 86.3 FL (ref 74–97)
MONOCYTES # BLD: 0.5 K/UL (ref 0.05–1.2)
MONOCYTES NFR BLD: 7 % (ref 3–10)
NEUTS SEG # BLD: 5.1 K/UL (ref 1.8–8)
NEUTS SEG NFR BLD: 69 % (ref 40–73)
NITRITE UR QL STRIP.AUTO: NEGATIVE
PH UR STRIP: 5 [PH] (ref 5–8)
PLATELET # BLD AUTO: 238 K/UL (ref 135–420)
PMV BLD AUTO: 9.6 FL (ref 9.2–11.8)
POTASSIUM SERPL-SCNC: 4.2 MMOL/L (ref 3.5–5.5)
PROT SERPL-MCNC: 6.7 G/DL (ref 6.4–8.2)
PROT UR STRIP-MCNC: NEGATIVE MG/DL
RBC # BLD AUTO: 4.46 M/UL (ref 4.7–5.5)
SODIUM SERPL-SCNC: 141 MMOL/L (ref 136–145)
SP GR UR REFRACTOMETRY: <1.005 (ref 1–1.03)
UROBILINOGEN UR QL STRIP.AUTO: 0.2 EU/DL (ref 0.2–1)
WBC # BLD AUTO: 7.4 K/UL (ref 4.6–13.2)

## 2018-12-12 PROCEDURE — 80053 COMPREHEN METABOLIC PANEL: CPT

## 2018-12-12 PROCEDURE — 83605 ASSAY OF LACTIC ACID: CPT

## 2018-12-12 PROCEDURE — 85025 COMPLETE CBC W/AUTO DIFF WBC: CPT

## 2018-12-12 PROCEDURE — 83690 ASSAY OF LIPASE: CPT

## 2018-12-12 PROCEDURE — 81003 URINALYSIS AUTO W/O SCOPE: CPT

## 2018-12-12 PROCEDURE — 74011250636 HC RX REV CODE- 250/636: Performed by: EMERGENCY MEDICINE

## 2018-12-12 PROCEDURE — 93005 ELECTROCARDIOGRAM TRACING: CPT

## 2018-12-12 PROCEDURE — 99285 EMERGENCY DEPT VISIT HI MDM: CPT

## 2018-12-12 PROCEDURE — 96375 TX/PRO/DX INJ NEW DRUG ADDON: CPT

## 2018-12-12 PROCEDURE — 76705 ECHO EXAM OF ABDOMEN: CPT

## 2018-12-12 PROCEDURE — 96374 THER/PROPH/DIAG INJ IV PUSH: CPT

## 2018-12-12 PROCEDURE — 71045 X-RAY EXAM CHEST 1 VIEW: CPT

## 2018-12-12 PROCEDURE — 83735 ASSAY OF MAGNESIUM: CPT

## 2018-12-12 RX ORDER — FAMOTIDINE 10 MG/ML
20 INJECTION INTRAVENOUS
Status: COMPLETED | OUTPATIENT
Start: 2018-12-12 | End: 2018-12-12

## 2018-12-12 RX ORDER — PROMETHAZINE HYDROCHLORIDE 25 MG/1
25 TABLET ORAL
Qty: 12 TAB | Refills: 0 | Status: SHIPPED | OUTPATIENT
Start: 2018-12-12 | End: 2019-07-09 | Stop reason: ALTCHOICE

## 2018-12-12 RX ORDER — ONDANSETRON 2 MG/ML
4 INJECTION INTRAMUSCULAR; INTRAVENOUS
Status: COMPLETED | OUTPATIENT
Start: 2018-12-12 | End: 2018-12-12

## 2018-12-12 RX ORDER — KETOROLAC TROMETHAMINE 30 MG/ML
30 INJECTION, SOLUTION INTRAMUSCULAR; INTRAVENOUS
Status: COMPLETED | OUTPATIENT
Start: 2018-12-12 | End: 2018-12-12

## 2018-12-12 RX ORDER — SODIUM CHLORIDE 9 MG/ML
150 INJECTION, SOLUTION INTRAVENOUS CONTINUOUS
Status: DISCONTINUED | OUTPATIENT
Start: 2018-12-12 | End: 2018-12-12 | Stop reason: HOSPADM

## 2018-12-12 RX ADMIN — SODIUM CHLORIDE 150 ML/HR: 900 INJECTION, SOLUTION INTRAVENOUS at 15:10

## 2018-12-12 RX ADMIN — KETOROLAC TROMETHAMINE 30 MG: 30 INJECTION, SOLUTION INTRAMUSCULAR at 15:22

## 2018-12-12 RX ADMIN — FAMOTIDINE 20 MG: 10 INJECTION, SOLUTION INTRAVENOUS at 15:23

## 2018-12-12 RX ADMIN — ONDANSETRON 4 MG: 2 INJECTION INTRAMUSCULAR; INTRAVENOUS at 15:21

## 2018-12-12 NOTE — ED NOTES
I performed a brief evaluation, including history and physical, of the patient here in triage and I have determined that pt will need further treatment and evaluation from the main side ER physician. I have placed initial orders to help in expediting patients care.      December 12, 2018 at 1:22 PM - CARLITA Willis        Visit Vitals  BP (!) 145/101 (BP 1 Location: Left arm, BP Patient Position: Sitting)   Pulse 84   Temp 99 °F (37.2 °C)   Resp 18   Ht 6' 2\" (1.88 m)   Wt 94.5 kg (208 lb 5 oz)   SpO2 100%   BMI 26.75 kg/m²

## 2018-12-12 NOTE — ED NOTES
Reviewed discharge instructions with the patient including new medications and suggested follow-up. Questions encouraged and answered.   Patient verbalized an understanding

## 2018-12-12 NOTE — DISCHARGE INSTRUCTIONS
May use Tylenol and/or Ibuprofen as needed for pain or fever  Being over-the-counter Prilosec daily  May use Phenergan as needed for nausea or vomiting  Increase clear fluid oral hydration and advance diet as tolerated  Return immediately for increasing pain, fever, green bile or bloody vomit, bloody diarrhea or any other concerns           Abdominal Pain: Care Instructions  Your Care Instructions    Abdominal pain has many possible causes. Some aren't serious and get better on their own in a few days. Others need more testing and treatment. If your pain continues or gets worse, you need to be rechecked and may need more tests to find out what is wrong. You may need surgery to correct the problem. Don't ignore new symptoms, such as fever, nausea and vomiting, urination problems, pain that gets worse, and dizziness. These may be signs of a more serious problem. Your doctor may have recommended a follow-up visit in the next 8 to 12 hours. If you are not getting better, you may need more tests or treatment. The doctor has checked you carefully, but problems can develop later. If you notice any problems or new symptoms, get medical treatment right away. Follow-up care is a key part of your treatment and safety. Be sure to make and go to all appointments, and call your doctor if you are having problems. It's also a good idea to know your test results and keep a list of the medicines you take. How can you care for yourself at home? · Rest until you feel better. · To prevent dehydration, drink plenty of fluids, enough so that your urine is light yellow or clear like water. Choose water and other caffeine-free clear liquids until you feel better. If you have kidney, heart, or liver disease and have to limit fluids, talk with your doctor before you increase the amount of fluids you drink. · If your stomach is upset, eat mild foods, such as rice, dry toast or crackers, bananas, and applesauce.  Try eating several small meals instead of two or three large ones. · Wait until 48 hours after all symptoms have gone away before you have spicy foods, alcohol, and drinks that contain caffeine. · Do not eat foods that are high in fat. · Avoid anti-inflammatory medicines such as aspirin, ibuprofen (Advil, Motrin), and naproxen (Aleve). These can cause stomach upset. Talk to your doctor if you take daily aspirin for another health problem. When should you call for help? Call 911 anytime you think you may need emergency care. For example, call if:    · You passed out (lost consciousness).     · You pass maroon or very bloody stools.     · You vomit blood or what looks like coffee grounds.     · You have new, severe belly pain.    Call your doctor now or seek immediate medical care if:    · Your pain gets worse, especially if it becomes focused in one area of your belly.     · You have a new or higher fever.     · Your stools are black and look like tar, or they have streaks of blood.     · You have unexpected vaginal bleeding.     · You have symptoms of a urinary tract infection. These may include:  ? Pain when you urinate. ? Urinating more often than usual.  ? Blood in your urine.     · You are dizzy or lightheaded, or you feel like you may faint.    Watch closely for changes in your health, and be sure to contact your doctor if:    · You are not getting better after 1 day (24 hours). Where can you learn more? Go to http://alistair-kalpesh.info/. Enter N672 in the search box to learn more about \"Abdominal Pain: Care Instructions. \"  Current as of: November 20, 2017  Content Version: 11.8  © 6139-5235 [employer]. Care instructions adapted under license by [employer] (which disclaims liability or warranty for this information).  If you have questions about a medical condition or this instruction, always ask your healthcare professional. Moiz Schuler disclaims any warranty or liability for your use of this information.

## 2018-12-12 NOTE — ED PROVIDER NOTES
EMERGENCY DEPARTMENT HISTORY AND PHYSICAL EXAM    3:00 PM      Date: 12/12/2018  Patient Name: Shannon Gomes    History of Presenting Illness     Chief Complaint   Patient presents with    Fever    Abdominal Pain    Flank Pain     right         History Provided By: Patient    Chief Complaint: Abd pain  Duration: 4 Days  Timing:  Intermittent and Worsening  Location: Right lower abd, \"spread to left side\"  Quality: \"I've had gallstones and pancreatitis. I know the pain. \"  Severity: Moderate  Modifying Factors: No modifying or aggravating factors were reported. Associated Symptoms: fever, diarrhea, malodorous urine, cough      Additional History (Context): 3:04 PM Shannon Gomes is a 26 y.o. male with h/o HTN, cholelithiasis, pancreatitis, ETOH abuse, GERD, and other noted PMHx who presents to ED complaining of intermittent, worsening, moderate, right lower abd pain onset 4 days, with associated fever, diarrhea, malodorous urine, and \"random\" cough. The patient reports that the abd pain started in his RLQ, and then spread to his RUQ and left lower chest. He states \"it started in my gallbladder area and ribcage, and then spread to my left side. \" He notes \"I've had gallstones and pancreatitis. I know the pain. \" The patient claims that he has had a fever since Saturday, for which he took Tylenol. He notes that his stool was \"very dark,\" and is unsure if it contained blood. The patient states that he has h/o \"ulcers,\" and that he \"used to drink like crazy, but not anymore. \" He claims that he does not take antiinflammatory medications. The patient denies nasal congestion, ill contacts, smoking tobacco use, recreational drug use, changes in diet, recent travel, h/o kidney stones, h/o UTI, and h/o abd surgery. He reports that he is trying to have his gallbladder removed through [facility],\" but he needs \"picture ID. \" No other concerns or symptoms at this time.       PCP: Adelita Mcallister, NP        Current Facility-Administered Medications   Medication Dose Route Frequency Provider Last Rate Last Dose    0.9% sodium chloride infusion  150 mL/hr IntraVENous CONTINUOUS Aydee Damon PA   Stopped at 12/12/18 1540     Current Outpatient Medications   Medication Sig Dispense Refill    promethazine (PHENERGAN) 25 mg tablet Take 1 Tab by mouth every six (6) hours as needed for Nausea. Caution: This medication may make you drowsy. Avoid driving while under the influence of this medication. 12 Tab 0    PARoxetine (PAXIL) 20 mg tablet Take 2 Tabs by mouth daily. 60 Tab 4    sucralfate (CARAFATE) 100 mg/mL suspension Take 20 mL by mouth four (4) times daily. 120 mL 1    aluminum-magnesium hydroxide (MAALOX) 200-200 mg/5 mL suspension Take 15 mL by mouth every six (6) hours as needed for Indigestion. 300 mL 0    hydroCHLOROthiazide (HYDRODIURIL) 25 mg tablet 1/2 pill daily 30 Tab 4    lisinopril (PRINIVIL, ZESTRIL) 20 mg tablet Take 1 Tab by mouth daily. 30 Tab 4       Past History     Past Medical History:  Past Medical History:   Diagnosis Date    Anxiety     Cholelithiasis 08/18/2013    Note on U/S    Chronic gastritis 10/17/2014    Dr. Lisa Melton (EGD Result) H. Pylori Neg    Depression     Dog bite of left hand including fingers with infection 11/16/2014    Adm 11/18/14 for IV antibiotics.  ETOH abuse     GERD (gastroesophageal reflux disease)     Hepatic steatosis 02/06/2014    Noted on U/S.     History of plasmapheresis 02/2014    Hypertriglyceridemia, Severe Pancreatitis    Hypertension     Hypertriglyceridemia     Hypomagnesemia 07/24/2016    1.6 mg/dL    Non compliance with medical treatment     Osteomyelitis of left hand ([facility] [address] 11/22/2014    MRI Finding @ LUE 2nd MCP joint.  Pancreatitis 08/17/2013    Highest on Record (2/6/14) 3499.  Dr. Lsia Melton    PICC (peripherally inserted central catheter) in place 72/49/5472    R Basilic Vein (IV Antibiotics for LUE Hand Osteomyelitis).  Rectal bleeding     Substance abuse (HCC)     Several UDS + Opiates.  Vitamin D deficiency 2014    11.6ng/mL ( ng/mL)       Past Surgical History:  Past Surgical History:   Procedure Laterality Date    HX CYST INCISION AND DRAINAGE Left 2014    LUE 2nd digit abscess I&D.  HX ENDOSCOPY  10/17/2014    Dr. Jyothi Hutchison: EGD/Colonoscopy    HX HEENT      wisdom tooth extraction    HX KNEE ARTHROSCOPY Right        Family History:  Family History   Problem Relation Age of Onset    Elevated Lipids Mother     Thyroid Disease Mother     Hypertension Father     Alcohol abuse Maternal Grandmother     MS Paternal Grandmother        Social History:  Social History     Tobacco Use    Smoking status: Former Smoker     Last attempt to quit: 2012     Years since quittin.2    Smokeless tobacco: Never Used   Substance Use Topics    Alcohol use: Yes     Alcohol/week: 8.0 oz     Types: 8 Cans of beer, 8 Standard drinks or equivalent per week     Comment: quit 2016    Drug use: No       Allergies: Allergies   Allergen Reactions    Amoxicillin Swelling    Contrast Dye [Iodine] Hives     Pt had iv contrast prior to this study without problems, developed hives on 07/04/15 5 minutes post injection    Iodinated Contrast- Oral And Iv Dye Hives     Pt got hives when he received IV contrast in the past    Penicillins Hives and Swelling    Wellbutrin [Bupropion] Other (comments)     Caused aggression         Review of Systems       Review of Systems   Constitutional: Positive for fever. Negative for activity change, appetite change, chills, diaphoresis, fatigue and unexpected weight change. HENT: Negative for congestion, dental problem, drooling, ear discharge, ear pain, facial swelling, hearing loss, mouth sores, nosebleeds, postnasal drip, rhinorrhea, sinus pressure, sneezing, sore throat, tinnitus and trouble swallowing.     Eyes: Negative for photophobia, pain, discharge, redness, itching and visual disturbance. Respiratory: Positive for cough. Negative for apnea, choking, chest tightness, shortness of breath, wheezing and stridor. Cardiovascular: Negative for chest pain, palpitations and leg swelling. Gastrointestinal: Positive for abdominal pain and diarrhea. Negative for abdominal distention, anal bleeding, blood in stool, constipation, nausea, rectal pain and vomiting. Endocrine: Negative for cold intolerance, heat intolerance, polydipsia, polyphagia and polyuria. Genitourinary: Negative for decreased urine volume, difficulty urinating, dysuria, enuresis, flank pain, frequency, genital sores, hematuria and urgency. Positive for malodorous urine. Musculoskeletal: Negative for arthralgias, back pain, gait problem, joint swelling, myalgias, neck pain and neck stiffness. Skin: Negative for color change, pallor, rash and wound. Allergic/Immunologic: Negative for environmental allergies, food allergies and immunocompromised state. Neurological: Negative for dizziness, tremors, seizures, syncope, facial asymmetry, speech difficulty, weakness, light-headedness, numbness and headaches. Hematological: Negative for adenopathy. Does not bruise/bleed easily. Psychiatric/Behavioral: Negative for agitation, behavioral problems, confusion, decreased concentration, dysphoric mood, hallucinations, self-injury, sleep disturbance and suicidal ideas. The patient is not nervous/anxious and is not hyperactive. Physical Exam     Visit Vitals  BP (!) 137/92   Pulse 75   Temp 99 °F (37.2 °C)   Resp 21   Ht 6' 2\" (1.88 m)   Wt 94.5 kg (208 lb 5 oz)   SpO2 98%   BMI 26.75 kg/m²         Physical Exam   Constitutional: He is oriented to person, place, and time. He appears well-developed and well-nourished. Alert and appropriate in no apparent mild discomfort but no acute respiratory distress   HENT:   Head: Normocephalic and atraumatic.    Right Ear: External ear normal.   Left Ear: External ear normal.   Mouth/Throat: Oropharynx is clear and moist. No oropharyngeal exudate. Eyes: Conjunctivae and EOM are normal. Pupils are equal, round, and reactive to light. Right eye exhibits no discharge. Left eye exhibits no discharge. No scleral icterus. Neck: Normal range of motion. Neck supple. No tracheal deviation present. No thyromegaly present. Cardiovascular: Normal rate, regular rhythm, normal heart sounds and intact distal pulses. Exam reveals no gallop and no friction rub. No murmur heard. Pulmonary/Chest: Effort normal and breath sounds normal. No respiratory distress. He has no wheezes. He has no rales. Abdominal: Soft. Bowel sounds are normal. He exhibits no distension and no mass. There is tenderness. There is no rebound and no guarding. Abdomen is flat and soft with mild epigastric tenderness without guarding; +NABS without masses or hernias; no evidence of focal peritoneal irritation or HSM   Musculoskeletal: Normal range of motion. He exhibits no edema or tenderness. Lymphadenopathy:     He has no cervical adenopathy. Neurological: He is alert and oriented to person, place, and time. No cranial nerve deficit. Coordination normal.   Skin: Skin is warm. No rash noted. No erythema. Psychiatric: He has a normal mood and affect. His behavior is normal. Judgment and thought content normal.   Nursing note and vitals reviewed.         Diagnostic Study Results     Labs -  Recent Results (from the past 12 hour(s))   CBC WITH AUTOMATED DIFF    Collection Time: 12/12/18  3:10 PM   Result Value Ref Range    WBC 7.4 4.6 - 13.2 K/uL    RBC 4.46 (L) 4.70 - 5.50 M/uL    HGB 13.6 13.0 - 16.0 g/dL    HCT 38.5 36.0 - 48.0 %    MCV 86.3 74.0 - 97.0 FL    MCH 30.5 24.0 - 34.0 PG    MCHC 35.3 31.0 - 37.0 g/dL    RDW 12.8 11.6 - 14.5 %    PLATELET 096 847 - 455 K/uL    MPV 9.6 9.2 - 11.8 FL    NEUTROPHILS 69 40 - 73 %    LYMPHOCYTES 21 21 - 52 %    MONOCYTES 7 3 - 10 % EOSINOPHILS 3 0 - 5 %    BASOPHILS 0 0 - 2 %    ABS. NEUTROPHILS 5.1 1.8 - 8.0 K/UL    ABS. LYMPHOCYTES 1.6 0.9 - 3.6 K/UL    ABS. MONOCYTES 0.5 0.05 - 1.2 K/UL    ABS. EOSINOPHILS 0.3 0.0 - 0.4 K/UL    ABS. BASOPHILS 0.0 0.0 - 0.1 K/UL    DF AUTOMATED     METABOLIC PANEL, COMPREHENSIVE    Collection Time: 12/12/18  3:10 PM   Result Value Ref Range    Sodium 141 136 - 145 mmol/L    Potassium 4.2 3.5 - 5.5 mmol/L    Chloride 112 (H) 100 - 108 mmol/L    CO2 24 21 - 32 mmol/L    Anion gap 5 3.0 - 18 mmol/L    Glucose 82 74 - 99 mg/dL    BUN 4 (L) 7.0 - 18 MG/DL    Creatinine 0.79 0.6 - 1.3 MG/DL    BUN/Creatinine ratio 5 (L) 12 - 20      GFR est AA >60 >60 ml/min/1.73m2    GFR est non-AA >60 >60 ml/min/1.73m2    Calcium 7.9 (L) 8.5 - 10.1 MG/DL    Bilirubin, total 0.3 0.2 - 1.0 MG/DL    ALT (SGPT) 16 16 - 61 U/L    AST (SGOT) 11 (L) 15 - 37 U/L    Alk.  phosphatase 43 (L) 45 - 117 U/L    Protein, total 6.7 6.4 - 8.2 g/dL    Albumin 4.2 3.4 - 5.0 g/dL    Globulin 2.5 2.0 - 4.0 g/dL    A-G Ratio 1.7 0.8 - 1.7     LIPASE    Collection Time: 12/12/18  3:10 PM   Result Value Ref Range    Lipase 274 73 - 393 U/L   MAGNESIUM    Collection Time: 12/12/18  3:10 PM   Result Value Ref Range    Magnesium 2.1 1.6 - 2.6 mg/dL   POC LACTIC ACID    Collection Time: 12/12/18  3:19 PM   Result Value Ref Range    Lactic Acid (POC) 1.07 0.40 - 2.00 mmol/L   URINALYSIS W/ RFLX MICROSCOPIC    Collection Time: 12/12/18  3:26 PM   Result Value Ref Range    Color YELLOW      Appearance CLEAR      Specific gravity <1.005 (L) 1.005 - 1.030    pH (UA) 5.0 5.0 - 8.0      Protein NEGATIVE  NEG mg/dL    Glucose NEGATIVE  NEG mg/dL    Ketone NEGATIVE  NEG mg/dL    Bilirubin NEGATIVE  NEG      Blood NEGATIVE  NEG      Urobilinogen 0.2 0.2 - 1.0 EU/dL    Nitrites NEGATIVE  NEG      Leukocyte Esterase NEGATIVE  NEG     EKG, 12 LEAD, INITIAL    Collection Time: 12/12/18  3:31 PM   Result Value Ref Range    Ventricular Rate 81 BPM    Atrial Rate 81 BPM P-R Interval 158 ms    QRS Duration 92 ms    Q-T Interval 398 ms    QTC Calculation (Bezet) 462 ms    Calculated P Axis 49 degrees    Calculated R Axis 8 degrees    Calculated T Axis 25 degrees    Diagnosis       Normal sinus rhythm  Normal ECG  When compared with ECG of 13-OCT-2018 20:11,  No significant change was found         Radiologic Studies -   XR CHEST PORT   Final Result   IMPRESSION:      No active or acute cardiopulmonary process is evident. US ABD LTD   Final Result   IMPRESSION:      Increased echogenicity of the liver and borderline increased size most likely   represents fatty infiltration. Solitary gallstone noted in the neck of the gallbladder similar to prior studies            Medical Decision Making   I am the first provider for this patient. I reviewed the vital signs, available nursing notes, past medical history, past surgical history, family history and social history. Vital Signs-Reviewed the patient's vital signs. Pulse Oximetry Analysis -  100% on room air (Interpretation) WNL    Cardiac Monitor:  Rate: 84  Rhythm:  Normal Sinus Rhythm     Records Reviewed: Nursing Notes (Time of Review: 3:00 PM)    ED Course: Progress Notes, Reevaluation, and Consults:  Patient had marked improvement with IV fluids, anti-inflammatories, antiacids, and antiemetics. Ultrasound and labs had no signs of acute cholecystitis. At time of discharge he had no focal abdominal tenderness, was resting comfortably, and tolerating oral intake. He agreed to follow-up for repeat blood pressure checks for hypertension when no longer acutely ill. Patient understood that an early or occult intraabdominal infection or obstruction could not be entirely excluded but agreed with trial of continued outpatient symptomatic treatment and close follow-up. He will return immediately for any worsening. Precautions given.     Provider Notes (Medical Decision Making): Patient with nausea/vomiting/diarrhea and upper abdominal pain and lower chest discomfort. Abdominal examination is reassuring without signs of focal peritoneal irritation or appendicitis, GI hemorrhage, obstruction, or diverticulitis but will still evaluate for pyelonephritis, pancreatitis, pneumonia, pericarditis, arrhythmia, cholecystitis, anemia, electrolyte abnormality, hepatitis, or renal dysfunction while treating symptomatically. Patient agrees with this plan. Diagnosis     Clinical Impression:   1. Acute febrile illness    2. Acute abdominal pain        Disposition: Discharge    Follow-up Information     Follow up With Specialties Details Why [facility]    [facility] EMERGENCY DEPT Emergency Medicine  As needed, If symptoms worsen Kenan Carlin NP Nurse Practitioner In 2 days As needed, If symptoms or fever are not improving [address]  [address]  [phone]                Medication List      START taking these medications    promethazine 25 mg tablet  Commonly known as:  PHENERGAN  Take 1 Tab by mouth every six (6) hours as needed for Nausea. Caution: This medication may make you drowsy. Avoid driving while under the influence of this medication. ASK your doctor about these medications    aluminum-magnesium hydroxide 200-200 mg/5 mL suspension  Commonly known as:  MAALOX  Take 15 mL by mouth every six (6) hours as needed for Indigestion. hydroCHLOROthiazide 25 mg tablet  Commonly known as:  HYDRODIURIL  1/2 pill daily     lisinopril 20 mg tablet  Commonly known as:  PRINIVIL, ZESTRIL  Take 1 Tab by mouth daily. PARoxetine 20 mg tablet  Commonly known as:  PAXIL  Take 2 Tabs by mouth daily. sucralfate 100 mg/mL suspension  Commonly known as:  CARAFATE  Take 20 mL by mouth four (4) times daily.            Where to Get Your Medications      Information about where to get these medications is not yet available    Ask your nurse or doctor about these medications  · promethazine 25 mg tablet       _______________________________       Scribmiriam Lucia acting as a scribe for and in the presence of Gareth Bartlett MD      December 12, 2018 at 3:00 PM       Provider Attestation:      I personally performed the services described in the documentation, reviewed the documentation, as recorded by the scribe in my presence, and it accurately and completely records my words and actions.  December 12, 2018 at 3:00 PM - Gareth Barteltt MD        _______________________________

## 2018-12-12 NOTE — ED TRIAGE NOTES
Pt. States \"I have a history of gallstones; I've had a fever since Saturday of 102; now my right lower back hurt\".

## 2018-12-13 LAB
ATRIAL RATE: 81 BPM
CALCULATED P AXIS, ECG09: 49 DEGREES
CALCULATED R AXIS, ECG10: 8 DEGREES
CALCULATED T AXIS, ECG11: 25 DEGREES
DIAGNOSIS, 93000: NORMAL
P-R INTERVAL, ECG05: 158 MS
Q-T INTERVAL, ECG07: 398 MS
QRS DURATION, ECG06: 92 MS
QTC CALCULATION (BEZET), ECG08: 462 MS
VENTRICULAR RATE, ECG03: 81 BPM

## 2019-06-22 ENCOUNTER — HOSPITAL ENCOUNTER (EMERGENCY)
Age: 34
Discharge: HOME | End: 2019-06-22
Attending: EMERGENCY MEDICINE | Admitting: EMERGENCY MEDICINE
Payer: MEDICAID

## 2019-06-22 VITALS
HEART RATE: 87 BPM | DIASTOLIC BLOOD PRESSURE: 103 MMHG | TEMPERATURE: 97.8 F | SYSTOLIC BLOOD PRESSURE: 141 MMHG | OXYGEN SATURATION: 100 % | RESPIRATION RATE: 18 BRPM

## 2019-06-22 DIAGNOSIS — R10.13 ABDOMINAL PAIN, EPIGASTRIC: Primary | ICD-10-CM

## 2019-06-22 LAB
ALBUMIN SERPL-MCNC: 4.5 G/DL (ref 3.4–5)
ALBUMIN/GLOB SERPL: 1.5 {RATIO} (ref 0.8–1.7)
ALP SERPL-CCNC: 48 U/L (ref 45–117)
ALT SERPL-CCNC: 24 U/L (ref 16–61)
ANION GAP SERPL CALC-SCNC: 7 MMOL/L (ref 3–18)
APPEARANCE UR: CLEAR
AST SERPL-CCNC: 34 U/L (ref 15–37)
BASOPHILS # BLD: 0.1 K/UL (ref 0–0.06)
BASOPHILS NFR BLD: 1 % (ref 0–3)
BILIRUB SERPL-MCNC: 0.9 MG/DL (ref 0.2–1)
BILIRUB UR QL: NEGATIVE
BUN SERPL-MCNC: 9 MG/DL (ref 7–18)
BUN/CREAT SERPL: 10 (ref 12–20)
CALCIUM SERPL-MCNC: 9.2 MG/DL (ref 8.5–10.1)
CHLORIDE SERPL-SCNC: 104 MMOL/L (ref 100–108)
CO2 SERPL-SCNC: 29 MMOL/L (ref 21–32)
COLOR UR: YELLOW
CREAT SERPL-MCNC: 0.93 MG/DL (ref 0.6–1.3)
DIFFERENTIAL METHOD BLD: ABNORMAL
EOSINOPHIL # BLD: 0.1 K/UL (ref 0–0.4)
EOSINOPHIL NFR BLD: 2 % (ref 0–5)
ERYTHROCYTE [DISTWIDTH] IN BLOOD BY AUTOMATED COUNT: 12.5 % (ref 11.6–14.5)
GLOBULIN SER CALC-MCNC: 3 G/DL (ref 2–4)
GLUCOSE SERPL-MCNC: 92 MG/DL (ref 74–99)
GLUCOSE UR STRIP.AUTO-MCNC: NEGATIVE MG/DL
HCT VFR BLD AUTO: 42.8 % (ref 36–48)
HGB BLD-MCNC: 15.4 G/DL (ref 13–16)
HGB UR QL STRIP: NEGATIVE
KETONES UR QL STRIP.AUTO: NEGATIVE MG/DL
LEUKOCYTE ESTERASE UR QL STRIP.AUTO: NEGATIVE
LIPASE SERPL-CCNC: 218 U/L (ref 73–393)
LYMPHOCYTES # BLD: 3.4 K/UL (ref 0.8–3.5)
LYMPHOCYTES NFR BLD: 46 % (ref 20–51)
MCH RBC QN AUTO: 30.3 PG (ref 24–34)
MCHC RBC AUTO-ENTMCNC: 36 G/DL (ref 31–37)
MCV RBC AUTO: 84.1 FL (ref 74–97)
MONOCYTES # BLD: 0.5 K/UL (ref 0–1)
MONOCYTES NFR BLD: 7 % (ref 2–9)
NEUTS SEG # BLD: 3.3 K/UL (ref 1.8–8)
NEUTS SEG NFR BLD: 44 % (ref 42–75)
NITRITE UR QL STRIP.AUTO: NEGATIVE
PH UR STRIP: 7 [PH] (ref 5–8)
PLATELET # BLD AUTO: 235 K/UL (ref 135–420)
PLATELET COMMENTS,PCOM: ABNORMAL
PMV BLD AUTO: 9.3 FL (ref 9.2–11.8)
POTASSIUM SERPL-SCNC: 4 MMOL/L (ref 3.5–5.5)
PROT SERPL-MCNC: 7.5 G/DL (ref 6.4–8.2)
PROT UR STRIP-MCNC: NEGATIVE MG/DL
RBC # BLD AUTO: 5.09 M/UL (ref 4.7–5.5)
RBC MORPH BLD: ABNORMAL
SODIUM SERPL-SCNC: 140 MMOL/L (ref 136–145)
SP GR UR REFRACTOMETRY: 1.01 (ref 1–1.03)
UROBILINOGEN UR QL STRIP.AUTO: 0.2 EU/DL (ref 0.2–1)
WBC # BLD AUTO: 7.4 K/UL (ref 4.6–13.2)

## 2019-06-22 PROCEDURE — 80053 COMPREHEN METABOLIC PANEL: CPT

## 2019-06-22 PROCEDURE — 83690 ASSAY OF LIPASE: CPT

## 2019-06-22 PROCEDURE — 99283 EMERGENCY DEPT VISIT LOW MDM: CPT

## 2019-06-22 PROCEDURE — 85025 COMPLETE CBC W/AUTO DIFF WBC: CPT

## 2019-06-22 PROCEDURE — 81003 URINALYSIS AUTO W/O SCOPE: CPT

## 2019-06-22 NOTE — DISCHARGE INSTRUCTIONS

## 2019-06-22 NOTE — ED TRIAGE NOTES
\"I have gall stones and I think I might have passed one today. My stomach started hurting today. I also have pancreatitis. I'm not sure if it's the gall stones or the pancreatitis. \"

## 2019-06-22 NOTE — ED PROVIDER NOTES
EMERGENCY DEPARTMENT HISTORY AND PHYSICAL EXAM    5:29 PM      Date: 6/22/2019  Patient Name: Karlos Carrero    History of Presenting Illness     Chief Complaint   Patient presents with    Abdominal Pain         History Provided By:       Additional History (Context): Karlos Carrero is a 26 y.o. male with PMH of pancreatitis presents with epigastric and right upper quadrant abdominal pain x1 day. The patient has had some nausea and one episode of vomiting. He has had no fevers and denies diarrhea. PCP: Isabel Stevenson NP          Current Outpatient Medications   Medication Sig Dispense Refill    promethazine (PHENERGAN) 25 mg tablet Take 1 Tab by mouth every six (6) hours as needed for Nausea. Caution: This medication may make you drowsy. Avoid driving while under the influence of this medication. 12 Tab 0    PARoxetine (PAXIL) 20 mg tablet Take 2 Tabs by mouth daily. 60 Tab 4    sucralfate (CARAFATE) 100 mg/mL suspension Take 20 mL by mouth four (4) times daily. 120 mL 1    aluminum-magnesium hydroxide (MAALOX) 200-200 mg/5 mL suspension Take 15 mL by mouth every six (6) hours as needed for Indigestion. 300 mL 0    hydroCHLOROthiazide (HYDRODIURIL) 25 mg tablet 1/2 pill daily 30 Tab 4    lisinopril (PRINIVIL, ZESTRIL) 20 mg tablet Take 1 Tab by mouth daily. 30 Tab 4       Past History     Past Medical History:  Past Medical History:   Diagnosis Date    Anxiety     Cholelithiasis 08/18/2013    Note on U/S    Chronic gastritis 10/17/2014    Dr. Ayesha Bullard (EGD Result) H. Pylori Neg    Depression     Dog bite of left hand including fingers with infection 11/16/2014    Adm 11/18/14 for IV antibiotics.      ETOH abuse     GERD (gastroesophageal reflux disease)     Hepatic steatosis 02/06/2014    Noted on U/S.     History of plasmapheresis 02/2014    Hypertriglyceridemia, Severe Pancreatitis    Hypertension     Hypertriglyceridemia     Hypomagnesemia 07/24/2016    1.6 mg/dL    Non compliance with medical treatment     Osteomyelitis of left hand ([facility] [address] 2014    MRI Finding @ LUE 2nd MCP joint.  Pancreatitis 2013    Highest on Record [id number]. Dr. Alexandrea Ochoa    PICC (peripherally inserted central catheter) in place     R Basilic Vein (IV Antibiotics for LUE Hand Osteomyelitis).  Rectal bleeding     Substance abuse (HCC)     Several UDS + Opiates.  Vitamin D deficiency 2014    11.6ng/mL ( ng/mL)       Past Surgical History:  Past Surgical History:   Procedure Laterality Date    HX CYST INCISION AND DRAINAGE Left 2014    LUE 2nd digit abscess I&D.  HX ENDOSCOPY  10/17/2014    Dr. Alexandrea Ochoa: EGD/Colonoscopy    HX HEENT      wisdom tooth extraction    HX KNEE ARTHROSCOPY Right        Family History:  Family History   Problem Relation Age of Onset    Elevated Lipids Mother     Thyroid Disease Mother     Hypertension Father     Alcohol abuse Maternal Grandmother     MS Paternal Grandmother        Social History:  Social History     Tobacco Use    Smoking status: Former Smoker     Last attempt to quit: 2012     Years since quittin.7    Smokeless tobacco: Never Used   Substance Use Topics    Alcohol use: Yes     Alcohol/week: 8.0 oz     Types: 8 Cans of beer, 8 Standard drinks or equivalent per week     Comment: quit 2016    Drug use: No       Allergies: Allergies   Allergen Reactions    Amoxicillin Swelling    Contrast Dye [Iodine] Hives     Pt had iv contrast prior to this study without problems, developed hives on 07/04/15 5 minutes post injection    Iodinated Contrast- Oral And Iv Dye Hives     Pt got hives when he received IV contrast in the past    Penicillins Hives and Swelling    Wellbutrin [Bupropion] Other (comments)     Caused aggression         Review of Systems       Review of Systems   Constitutional: Negative for chills and fever. Respiratory: Negative for shortness of breath. Cardiovascular: Negative for chest pain. Gastrointestinal: Positive for nausea and vomiting. All other systems reviewed and are negative. Physical Exam     Visit Vitals  BP (!) 141/103 (BP 1 Location: Left arm, BP Patient Position: Sitting)   Pulse 87   Temp 97.8 °F (36.6 °C)   Resp 18   SpO2 100%       Physical Exam   Constitutional: He is oriented to person, place, and time. He appears well-developed and well-nourished. No distress. HENT:   Head: Normocephalic and atraumatic. Eyes: Conjunctivae and EOM are normal. Right eye exhibits no discharge. Left eye exhibits no discharge. No scleral icterus. Neck: Normal range of motion. Neck supple. No tracheal deviation present. Cardiovascular: Normal rate, regular rhythm and normal heart sounds. No murmur heard. Pulmonary/Chest: Effort normal and breath sounds normal. No respiratory distress. He has no wheezes. He has no rales. Abdominal: Soft. He exhibits no distension. There is tenderness (Left upper quadrant). There is no rebound and no guarding. Musculoskeletal: Normal range of motion. He exhibits no edema or deformity. Neurological: He is alert and oriented to person, place, and time. No cranial nerve deficit. Skin: Skin is warm and dry. He is not diaphoretic. Psychiatric: He has a normal mood and affect.  His behavior is normal. Judgment and thought content normal.         Diagnostic Study Results     Labs -  Recent Results (from the past 12 hour(s))   URINALYSIS W/ RFLX MICROSCOPIC    Collection Time: 06/22/19  2:35 PM   Result Value Ref Range    Color YELLOW      Appearance CLEAR      Specific gravity 1.006 1.005 - 1.030      pH (UA) 7.0 5.0 - 8.0      Protein NEGATIVE  NEG mg/dL    Glucose NEGATIVE  NEG mg/dL    Ketone NEGATIVE  NEG mg/dL    Bilirubin NEGATIVE  NEG      Blood NEGATIVE  NEG      Urobilinogen 0.2 0.2 - 1.0 EU/dL    Nitrites NEGATIVE  NEG      Leukocyte Esterase NEGATIVE  NEG     CBC WITH AUTOMATED DIFF    Collection Time: 06/22/19  2:55 PM   Result Value Ref Range    WBC 7.4 4.6 - 13.2 K/uL    RBC 5.09 4.70 - 5.50 M/uL    HGB 15.4 13.0 - 16.0 g/dL    HCT 42.8 36.0 - 48.0 %    MCV 84.1 74.0 - 97.0 FL    MCH 30.3 24.0 - 34.0 PG    MCHC 36.0 31.0 - 37.0 g/dL    RDW 12.5 11.6 - 14.5 %    PLATELET 049 904 - 445 K/uL    MPV 9.3 9.2 - 11.8 FL    NEUTROPHILS 44 42 - 75 %    LYMPHOCYTES 46 20 - 51 %    MONOCYTES 7 2 - 9 %    EOSINOPHILS 2 0 - 5 %    BASOPHILS 1 0 - 3 %    ABS. NEUTROPHILS 3.3 1.8 - 8.0 K/UL    ABS. LYMPHOCYTES 3.4 0.8 - 3.5 K/UL    ABS. MONOCYTES 0.5 0 - 1.0 K/UL    ABS. EOSINOPHILS 0.1 0.0 - 0.4 K/UL    ABS. BASOPHILS 0.1 (H) 0.0 - 0.06 K/UL    DF MANUAL      PLATELET COMMENTS ADEQUATE PLATELETS      RBC COMMENTS NORMOCYTIC, NORMOCHROMIC     METABOLIC PANEL, COMPREHENSIVE    Collection Time: 06/22/19  2:55 PM   Result Value Ref Range    Sodium 140 136 - 145 mmol/L    Potassium 4.0 3.5 - 5.5 mmol/L    Chloride 104 100 - 108 mmol/L    CO2 29 21 - 32 mmol/L    Anion gap 7 3.0 - 18 mmol/L    Glucose 92 74 - 99 mg/dL    BUN 9 7.0 - 18 MG/DL    Creatinine 0.93 0.6 - 1.3 MG/DL    BUN/Creatinine ratio 10 (L) 12 - 20      GFR est AA >60 >60 ml/min/1.73m2    GFR est non-AA >60 >60 ml/min/1.73m2    Calcium 9.2 8.5 - 10.1 MG/DL    Bilirubin, total 0.9 0.2 - 1.0 MG/DL    ALT (SGPT) 24 16 - 61 U/L    AST (SGOT) 34 15 - 37 U/L    Alk. phosphatase 48 45 - 117 U/L    Protein, total 7.5 6.4 - 8.2 g/dL    Albumin 4.5 3.4 - 5.0 g/dL    Globulin 3.0 2.0 - 4.0 g/dL    A-G Ratio 1.5 0.8 - 1.7     LIPASE    Collection Time: 06/22/19  2:55 PM   Result Value Ref Range    Lipase 218 73 - 393 U/L       Radiologic Studies -   [facility]    (Results Pending)         Medical Decision Making   I am the first provider for this patient. I reviewed the vital signs, available nursing notes, past medical history, past surgical history, family history and social history. Vital Signs-Reviewed the patient's vital signs.       Provider Notes (Medical Decision Making): Patient presents to the emergency department with abdominal pain. After history, physical exam, and diagnostic evaluation, the etiology for their pain is unclear. In the emergency department they received IVF's, antiemetic and pain meds. Laboratory data was nondiagnostic and white blood cell count was unremarkable. On serial exam their pain improved. At this point patient is at low risk for significant abdominal pathology based on serial exams and our ED evaluation. Patient is advised to follow-up with their primary care physician for a recheck and repeat abdominal exam. They were advised to return to the emergency department if significant pain, fevers, not tolerating oral food or fluid, or new complaints. Patient did not want to stay for CT or ultrasound and requested discharge. Diagnosis     Clinical Impression:   1. Abdominal pain, epigastric        Disposition: home    Follow-up Information     Follow up With Specialties Details Why Contact Info    Pedro Renteria NP Nurse Practitioner Schedule an appointment as soon as possible for a visit  [employer]  [address] [id number] [facility] [address]      [facility] EMERGENCY DEPT Emergency Medicine  If symptoms worsen [address] [place] [id number]  [phone]           Discharge Medication List as of 6/22/2019  5:15 PM      CONTINUE these medications which have NOT CHANGED    Details   promethazine (PHENERGAN) 25 mg tablet Take 1 Tab by mouth every six (6) hours as needed for Nausea. Caution: This medication may make you drowsy. Avoid driving while under the influence of this medication. , Print, Disp-12 Tab, R-0      PARoxetine (PAXIL) 20 mg tablet Take 2 Tabs by mouth daily. , Normal, Disp-60 Tab, R-4      sucralfate (CARAFATE) 100 mg/mL suspension Take 20 mL by mouth four (4) times daily. , Normal, Disp-120 mL, R-1      aluminum-magnesium hydroxide (MAALOX) 200-200 mg/5 mL suspension Take 15 mL by mouth every six (6) hours as needed for Indigestion. , Print, Disp-300 mL, R-0      hydroCHLOROthiazide (HYDRODIURIL) 25 mg tablet 1/2 pill daily, Normal, Disp-30 Tab, R-4      lisinopril (PRINIVIL, ZESTRIL) 20 mg tablet Take 1 Tab by mouth daily. , Normal, Disp-30 Tab, R-4           _______________________________  Yan MD Salud  _______________________________

## 2019-06-24 NOTE — PROGRESS NOTES
Called and left message with patient requesting a call back. Patient needs to make follow up appointment after ED visit.

## 2019-07-09 ENCOUNTER — OFFICE VISIT (OUTPATIENT)
Dept: FAMILY MEDICINE CLINIC | Age: 34
End: 2019-07-09

## 2019-07-09 VITALS
SYSTOLIC BLOOD PRESSURE: 140 MMHG | OXYGEN SATURATION: 99 % | TEMPERATURE: 99.1 F | RESPIRATION RATE: 17 BRPM | HEIGHT: 74 IN | WEIGHT: 216 LBS | DIASTOLIC BLOOD PRESSURE: 82 MMHG | HEART RATE: 88 BPM | BODY MASS INDEX: 27.72 KG/M2

## 2019-07-09 DIAGNOSIS — F32.A ANXIETY AND DEPRESSION: ICD-10-CM

## 2019-07-09 DIAGNOSIS — Z87.19 HISTORY OF PANCREATITIS: ICD-10-CM

## 2019-07-09 DIAGNOSIS — G89.29 CHRONIC RIGHT-SIDED LOW BACK PAIN WITH RIGHT-SIDED SCIATICA: ICD-10-CM

## 2019-07-09 DIAGNOSIS — F41.9 ANXIETY AND DEPRESSION: ICD-10-CM

## 2019-07-09 DIAGNOSIS — M54.41 CHRONIC RIGHT-SIDED LOW BACK PAIN WITH RIGHT-SIDED SCIATICA: ICD-10-CM

## 2019-07-09 DIAGNOSIS — F10.11 HISTORY OF ALCOHOL ABUSE: ICD-10-CM

## 2019-07-09 DIAGNOSIS — I10 ESSENTIAL HYPERTENSION: Primary | ICD-10-CM

## 2019-07-09 DIAGNOSIS — F41.1 GAD (GENERALIZED ANXIETY DISORDER): ICD-10-CM

## 2019-07-09 RX ORDER — PAROXETINE HYDROCHLORIDE 20 MG/1
40 TABLET, FILM COATED ORAL DAILY
Qty: 60 TAB | Refills: 4 | Status: SHIPPED | OUTPATIENT
Start: 2019-07-09 | End: 2019-07-22

## 2019-07-09 RX ORDER — GABAPENTIN 400 MG/1
400 CAPSULE ORAL
Qty: 60 CAP | Refills: 1
Start: 2019-07-09 | End: 2019-07-22

## 2019-07-09 RX ORDER — HYDROCHLOROTHIAZIDE 25 MG/1
TABLET ORAL
Qty: 30 TAB | Refills: 4 | Status: SHIPPED | OUTPATIENT
Start: 2019-07-09 | End: 2019-07-09

## 2019-07-09 RX ORDER — LISINOPRIL 20 MG/1
20 TABLET ORAL DAILY
Qty: 30 TAB | Refills: 4 | Status: SHIPPED | OUTPATIENT
Start: 2019-07-09 | End: 2019-12-17 | Stop reason: SDUPTHER

## 2019-07-09 NOTE — PROGRESS NOTES
Chief Complaint   Patient presents with   [facility] ED Follow-up     1. Have you been to the ER, urgent care clinic since your last visit? Hospitalized since your last visit? [facility] 6/22/19 for abdominal pain     2. Have you seen or consulted any other health care providers outside of the [address] [place] [address] since your last visit? Include any pap smears or colon screening.  No

## 2019-07-09 NOTE — PROGRESS NOTES
Subjective:   Jasson Henderson is a 26 y.o. male emergency room follow up for pancreatitis, elevated blood pressure with diagnosis of hypertension. History of alcohol abuse current alcohol intake none stopped in November 2018   He also relocated and has been living in [place] and was being treated in a clinic. He reports that he has been able to quit alcohol because his anxiety is controlled with paroxetine and gabapentin 400 mg po bid prn. He reports that he has not had to take in several months. He also takes for right sciatic pain which is complaining of for the past several days  He came back to [place] because his grandmother is ill. MICHELLE and is currently taking paroxetine 40 mg daily  Seen in the Emergency Department ED [facility] on 6-22-19 for epigastric pain right upper quadrant pain for one day. Associated nausea with one episode of vomiting. Blood pressure in /103  Ultrasound of abdomen results from 12-12-19 Increased echogenicity of the liver and borderline increased size most likely  represents fatty infiltration. Solitary gallstone noted in the neck of the gallbladder similar to prior studies  PHQ-9 score 10    MICHELLE-7 score 2    Review of Systems   Constitutional: Negative. Respiratory: Negative. Cardiovascular: Negative. Gastrointestinal: Negative for abdominal pain, blood in stool, constipation, diarrhea, heartburn, melena, nausea and vomiting. Genitourinary: Negative. Neurological: Negative for tingling and weakness. Psychiatric/Behavioral: Negative for depression, hallucinations, memory loss, substance abuse and suicidal ideas. The patient is not nervous/anxious and does not have insomnia.         Lab Results   Component Value Date/Time    Lipase 218 06/22/2019 02:55 PM     Lab Results   Component Value Date/Time    Sodium 140 06/22/2019 02:55 PM    Potassium 4.0 06/22/2019 02:55 PM    Chloride 104 06/22/2019 02:55 PM    CO2 29 06/22/2019 02:55 PM    Anion gap 7 06/22/2019 02:55 PM    Glucose 92 06/22/2019 02:55 PM    BUN 9 06/22/2019 02:55 PM    Creatinine 0.93 06/22/2019 02:55 PM    BUN/Creatinine ratio 10 (L) 06/22/2019 02:55 PM    GFR est AA >60 06/22/2019 02:55 PM    GFR est non-AA >60 06/22/2019 02:55 PM    Calcium 9.2 06/22/2019 02:55 PM    Bilirubin, total 0.9 06/22/2019 02:55 PM    AST (SGOT) 34 06/22/2019 02:55 PM    Alk. phosphatase 48 06/22/2019 02:55 PM    Protein, total 7.5 06/22/2019 02:55 PM    Albumin 4.5 06/22/2019 02:55 PM    Globulin 3.0 06/22/2019 02:55 PM    A-G Ratio 1.5 06/22/2019 02:55 PM    ALT (SGPT) 24 06/22/2019 02:55 PM     Lab Results   Component Value Date/Time    WBC 7.4 06/22/2019 02:55 PM    HGB 15.4 06/22/2019 02:55 PM    HCT 42.8 06/22/2019 02:55 PM    PLATELET 981 46/60/0141 02:55 PM    MCV 84.1 06/22/2019 02:55 PM       Current Outpatient Medications   Medication Sig Dispense Refill    PARoxetine (PAXIL) 20 mg tablet Take 2 Tabs by mouth daily. 60 Tab 4    lisinopril (PRINIVIL, ZESTRIL) 20 mg tablet Take 1 Tab by mouth daily. 30 Tab 4    promethazine (PHENERGAN) 25 mg tablet Take 1 Tab by mouth every six (6) hours as needed for Nausea. Caution: This medication may make you drowsy. Avoid driving while under the influence of this medication. 12 Tab 0    sucralfate (CARAFATE) 100 mg/mL suspension Take 20 mL by mouth four (4) times daily. 120 mL 1    aluminum-magnesium hydroxide (MAALOX) 200-200 mg/5 mL suspension Take 15 mL by mouth every six (6) hours as needed for Indigestion.  300 mL 0    hydroCHLOROthiazide (HYDRODIURIL) 25 mg tablet 1/2 pill daily 30 Tab 4      Wt Readings from Last 3 Encounters:   07/09/19 216 lb (98 kg)   12/12/18 208 lb 5 oz (94.5 kg)   10/18/18 218 lb 9.6 oz (99.2 kg)     BP Readings from Last 3 Encounters:   07/09/19 140/82   06/22/19 (!) 141/103   12/12/18 (!) 147/94     OBJECTIVE:  Awake and alert in no acute distress  Neck supple without lymphadenopathy, no carotid artery bruits auscultated bilaterally. No thyromegaly  Lungs clear throughout  S1 S2 RRR without ectopy or murmur auscultated. Abdomen: normoactive bowel sounds all quadrants, no tenderness to abdomen upper and lower quadrants. No hepatosplenomegaly  Inspection: no erythema no edema no warmth to palpation  Lumbar paraspinals +slight TTP right, +TTP sciatic   Gait  Normal  Extremities: no clubbing, cyanosis, peripheral edema  Visit Vitals  /82 (BP 1 Location: Left arm, BP Patient Position: Sitting)   Pulse 88   Temp 99.1 °F (37.3 °C) (Oral)   Resp 17   Ht 6' 2\" (1.88 m)   Wt 216 lb (98 kg)   SpO2 99%   BMI 27.73 kg/m²     Diagnoses and all orders for this visit:    Essential hypertension Stable continue lisinopril   -     lisinopril (PRINIVIL, ZESTRIL) 20 mg tablet; Take 1 Tab by mouth daily. , Normal, Disp-30 Tab, R-4  -     Discontinue: hydroCHLOROthiazide (HYDRODIURIL) 25 mg tablet; 1/2 pill daily, Normal, Disp-30 Tab, R-4    MICHELLE (generalized anxiety disorder) Stable continue current treatment plan  -     PARoxetine (PAXIL) 20 mg tablet; Take 2 Tabs by mouth daily. , Normal, Disp-60 Tab, R-4  -     gabapentin (NEURONTIN) 400 mg capsule; Take 1 Cap by mouth two (2) times daily as needed (anxiety and also for sciatic right). Max Daily Amount: 800 mg., No Print, Disp-60 Cap, R-1    Anxiety and depression  -     PARoxetine (PAXIL) 20 mg tablet; Take 2 Tabs by mouth daily. , Normal, Disp-60 Tab, R-4    History of pancreatitis    History of alcohol abuse    Chronic right-sided low back pain with right-sided sciatica  General comfort measures     Currently taking gabapentin prn   Declines need for refill today  But will follow up prn   Patient agrees with plan and verbalizes understanding. I have discussed the diagnosis with the patient and the intended plan as seen in the above orders. The patient has received an after-visit summary and questions were answered concerning future plans.   I have discussed medication side effects and warnings with the patient as well. Follow-up and Dispositions    · Return in about 3 months (around 10/9/2019) for follow up htn, anxiety .

## 2019-07-09 NOTE — PATIENT INSTRUCTIONS
Sciatica: Exercises  Your Care Instructions  Here are some examples of typical rehabilitation exercises for your condition. Start each exercise slowly. Ease off the exercise if you start to have pain. Your doctor or physical therapist will tell you when you can start these exercises and which ones will work best for you. When you are not being active, find a comfortable position for rest. Some people are comfortable on the floor or a medium-firm bed with a small pillow under their head and another under their knees. Some people prefer to lie on their side with a pillow between their knees. Don't stay in one position for too long. Take short walks (10 to 20 minutes) every 2 to 3 hours. Avoid slopes, hills, and stairs until you feel better. Walk only distances you can manage without pain, especially leg pain. How to do the exercises  Back stretches    1. Get down on your hands and knees on the floor. 2. Relax your head and allow it to droop. Round your back up toward the ceiling until you feel a nice stretch in your upper, middle, and lower back. Hold this stretch for as long as it feels comfortable, or about 15 to 30 seconds. 3. Return to the starting position with a flat back while you are on your hands and knees. 4. Let your back sway by pressing your stomach toward the floor. Lift your buttocks toward the ceiling. 5. Hold this position for 15 to 30 seconds. 6. Repeat 2 to 4 times. Follow-up care is a key part of your treatment and safety. Be sure to make and go to all appointments, and call your doctor if you are having problems. It's also a good idea to know your test results and keep a list of the medicines you take. Where can you learn more? Go to http://alistair-kalpesh.info/. Enter U183 in the search box to learn more about \"Sciatica: Exercises. \"  Current as of: September 20, 2018  Content Version: 11.9  © 3796-3830 [employer].  Care instructions adapted under license by [address] (which disclaims liability or warranty for this information). If you have questions about a medical condition or this instruction, always ask your healthcare professional. [address] any warranty or liability for your use of this information. Sciatica: Care Instructions  Your Care Instructions    Sciatica (say \"zty-LP-va-kuh\") is an irritation of one of the sciatic nerves, which come from the spinal cord in the lower back. The sciatic nerves and their branches extend down through the buttock to the foot. Sciatica can develop when an injured disc in the back presses against a spinal nerve root. Its main symptom is pain, numbness, or weakness that is often worse in the leg or foot than in the back. Sciatica often will improve and go away with time. Early treatment usually includes medicines and exercises to relieve pain. Follow-up care is a key part of your treatment and safety. Be sure to make and go to all appointments, and call your doctor if you are having problems. It's also a good idea to know your test results and keep a list of the medicines you take. How can you care for yourself at home? · Take pain medicines exactly as directed. ? If the doctor gave you a prescription medicine for pain, take it as prescribed. ? If you are not taking a prescription pain medicine, ask your doctor if you can take an over-the-counter medicine. · Use heat or ice to relieve pain. ? To apply heat, put a warm water bottle, heating pad set on low, or warm cloth on your back. Do not go to sleep with a heating pad on your skin. ? To use ice, put ice or a cold pack on the area for 10 to 20 minutes at a time. Put a thin cloth between the ice and your skin. · Avoid sitting if possible, unless it feels better than standing. · Alternate lying down with short walks. Increase your walking distance as you are able to without making your symptoms worse.   · Do not do anything that makes your symptoms worse. When should you call for help? Call 911 anytime you think you may need emergency care. For example, call if:    · You are unable to move a leg at all.   [facility] your doctor now or seek immediate medical care if:    · You have new or worse symptoms in your legs or buttocks. Symptoms may include:  ? Numbness or tingling. ? Weakness. ? Pain.     · You lose bladder or bowel control.    Watch closely for changes in your health, and be sure to contact your doctor if:    · You are not getting better as expected. Where can you learn more? Go to http://alistair-kalpesh.info/. Enter 775-295-5821 in the search box to learn more about \"Sciatica: Care Instructions. \"  Current as of: September 20, 2018  Content Version: 11.9  © 5796-4169 [employer]. Care instructions adapted under license by [employer] (which disclaims liability or warranty for this information). If you have questions about a medical condition or this instruction, always ask your healthcare professional. [address] any warranty or liability for your use of this information.

## 2019-07-22 ENCOUNTER — APPOINTMENT (OUTPATIENT)
Dept: GENERAL RADIOLOGY | Age: 34
End: 2019-07-22
Attending: EMERGENCY MEDICINE
Payer: MEDICAID

## 2019-07-22 ENCOUNTER — HOSPITAL ENCOUNTER (EMERGENCY)
Age: 34
Discharge: HOME | End: 2019-07-22
Attending: EMERGENCY MEDICINE
Payer: MEDICAID

## 2019-07-22 VITALS
HEART RATE: 107 BPM | BODY MASS INDEX: 27.6 KG/M2 | OXYGEN SATURATION: 100 % | SYSTOLIC BLOOD PRESSURE: 153 MMHG | TEMPERATURE: 98.4 F | WEIGHT: 215 LBS | DIASTOLIC BLOOD PRESSURE: 94 MMHG | RESPIRATION RATE: 14 BRPM

## 2019-07-22 DIAGNOSIS — T67.2XXA HEAT CRAMPS, INITIAL ENCOUNTER: Primary | ICD-10-CM

## 2019-07-22 LAB
ALBUMIN SERPL-MCNC: 4.1 G/DL (ref 3.4–5)
ALBUMIN/GLOB SERPL: 1.3 {RATIO} (ref 0.8–1.7)
ALP SERPL-CCNC: 50 U/L (ref 45–117)
ALT SERPL-CCNC: 23 U/L (ref 16–61)
ANION GAP SERPL CALC-SCNC: 9 MMOL/L (ref 3–18)
APPEARANCE UR: CLEAR
AST SERPL-CCNC: 25 U/L (ref 10–38)
ATRIAL RATE: 102 BPM
BASOPHILS # BLD: 0 K/UL (ref 0–0.1)
BASOPHILS NFR BLD: 0 % (ref 0–2)
BILIRUB SERPL-MCNC: 0.5 MG/DL (ref 0.2–1)
BILIRUB UR QL: NEGATIVE
BUN SERPL-MCNC: 11 MG/DL (ref 7–18)
BUN/CREAT SERPL: 13 (ref 12–20)
CALCIUM SERPL-MCNC: 8.8 MG/DL (ref 8.5–10.1)
CALCULATED P AXIS, ECG09: 49 DEGREES
CALCULATED R AXIS, ECG10: 7 DEGREES
CALCULATED T AXIS, ECG11: 27 DEGREES
CHLORIDE SERPL-SCNC: 106 MMOL/L (ref 100–111)
CK MB CFR SERPL CALC: 1.2 % (ref 0–4)
CK MB SERPL-MCNC: 2.4 NG/ML (ref 5–25)
CK SERPL-CCNC: 200 U/L (ref 39–308)
CO2 SERPL-SCNC: 25 MMOL/L (ref 21–32)
COLOR UR: YELLOW
CREAT SERPL-MCNC: 0.85 MG/DL (ref 0.6–1.3)
DIAGNOSIS, 93000: NORMAL
DIFFERENTIAL METHOD BLD: ABNORMAL
EOSINOPHIL # BLD: 0.2 K/UL (ref 0–0.4)
EOSINOPHIL NFR BLD: 4 % (ref 0–5)
ERYTHROCYTE [DISTWIDTH] IN BLOOD BY AUTOMATED COUNT: 12.7 % (ref 11.6–14.5)
GLOBULIN SER CALC-MCNC: 3.2 G/DL (ref 2–4)
GLUCOSE SERPL-MCNC: 83 MG/DL (ref 74–99)
GLUCOSE UR STRIP.AUTO-MCNC: NEGATIVE MG/DL
HCT VFR BLD AUTO: 37.6 % (ref 36–48)
HGB BLD-MCNC: 13.5 G/DL (ref 13–16)
HGB UR QL STRIP: NEGATIVE
KETONES UR QL STRIP.AUTO: NEGATIVE MG/DL
LEUKOCYTE ESTERASE UR QL STRIP.AUTO: NEGATIVE
LYMPHOCYTES # BLD: 2.2 K/UL (ref 0.9–3.6)
LYMPHOCYTES NFR BLD: 42 % (ref 21–52)
MCH RBC QN AUTO: 30.8 PG (ref 24–34)
MCHC RBC AUTO-ENTMCNC: 35.9 G/DL (ref 31–37)
MCV RBC AUTO: 85.8 FL (ref 74–97)
MONOCYTES # BLD: 0.5 K/UL (ref 0.05–1.2)
MONOCYTES NFR BLD: 9 % (ref 3–10)
NEUTS SEG # BLD: 2.4 K/UL (ref 1.8–8)
NEUTS SEG NFR BLD: 45 % (ref 40–73)
NITRITE UR QL STRIP.AUTO: NEGATIVE
P-R INTERVAL, ECG05: 150 MS
PH UR STRIP: 5.5 [PH] (ref 5–8)
PLATELET # BLD AUTO: 191 K/UL (ref 135–420)
PMV BLD AUTO: 8.7 FL (ref 9.2–11.8)
POTASSIUM SERPL-SCNC: 3.9 MMOL/L (ref 3.5–5.5)
PROT SERPL-MCNC: 7.3 G/DL (ref 6.4–8.2)
PROT UR STRIP-MCNC: NEGATIVE MG/DL
Q-T INTERVAL, ECG07: 370 MS
QRS DURATION, ECG06: 92 MS
QTC CALCULATION (BEZET), ECG08: 482 MS
RBC # BLD AUTO: 4.38 M/UL (ref 4.7–5.5)
SODIUM SERPL-SCNC: 140 MMOL/L (ref 136–145)
SP GR UR REFRACTOMETRY: <1.005 (ref 1–1.03)
TROPONIN I SERPL-MCNC: <0.02 NG/ML (ref 0–0.04)
UROBILINOGEN UR QL STRIP.AUTO: 0.2 EU/DL (ref 0.2–1)
VENTRICULAR RATE, ECG03: 102 BPM
WBC # BLD AUTO: 5.3 K/UL (ref 4.6–13.2)

## 2019-07-22 PROCEDURE — 82550 ASSAY OF CK (CPK): CPT

## 2019-07-22 PROCEDURE — 85025 COMPLETE CBC W/AUTO DIFF WBC: CPT

## 2019-07-22 PROCEDURE — 81003 URINALYSIS AUTO W/O SCOPE: CPT

## 2019-07-22 PROCEDURE — 99285 EMERGENCY DEPT VISIT HI MDM: CPT

## 2019-07-22 PROCEDURE — 80053 COMPREHEN METABOLIC PANEL: CPT

## 2019-07-22 PROCEDURE — 74011250636 HC RX REV CODE- 250/636: Performed by: EMERGENCY MEDICINE

## 2019-07-22 PROCEDURE — 71045 X-RAY EXAM CHEST 1 VIEW: CPT

## 2019-07-22 PROCEDURE — 74011250637 HC RX REV CODE- 250/637: Performed by: EMERGENCY MEDICINE

## 2019-07-22 PROCEDURE — 93005 ELECTROCARDIOGRAM TRACING: CPT

## 2019-07-22 RX ORDER — CYCLOBENZAPRINE HCL 10 MG
10 TABLET ORAL
Status: COMPLETED | OUTPATIENT
Start: 2019-07-22 | End: 2019-07-22

## 2019-07-22 RX ADMIN — SODIUM CHLORIDE, SODIUM LACTATE, POTASSIUM CHLORIDE, AND CALCIUM CHLORIDE 1000 ML: 600; 310; 30; 20 INJECTION, SOLUTION INTRAVENOUS at 04:45

## 2019-07-22 RX ADMIN — CYCLOBENZAPRINE 10 MG: 10 TABLET, FILM COATED ORAL at 04:45

## 2019-07-22 NOTE — DISCHARGE INSTRUCTIONS
Patient Education        Heat Exhaustion: Care Instructions  Your Care Instructions  Heat exhaustion occurs when you are hot, sweat a lot, and do not drink enough to replace the lost fluids. Heat exhaustion is not the same as heatstroke, which is much more serious. Heatstroke can lead to problems with many different organs and can be life-threatening. After medical care for heat exhaustion, you will need to limit your activities and take good care of your body while it recovers. Follow-up care is a key part of your treatment and safety. Be sure to make and go to all appointments, and call your doctor if you are having problems. It's also a good idea to know your test results and keep a list of the medicines you take. How can you care for yourself at home? · Reduce your activities, and get plenty of rest. Your doctor will give you instructions on when you can resume your normal schedule. · Stay in a cool room for at least the next 24 hours. · Drink rehydration drinks, juices, and water to replace fluids. Drinks such as sports drinks that contain electrolytes work best, because they have salt and minerals. You need salt and minerals as well as water. You are drinking enough fluids when your urine is normal in color (light yellow or clear), and you are urinating every 2 to 4 hours. If you have kidney, heart, or liver disease and have to limit fluids or salt, talk with your doctor before you increase your fluid or salt intake. · Avoid drinks that have caffeine or alcohol. To prevent heat exhaustion  · Drink plenty of fluids, enough so that your urine is light yellow or clear like water. If you have kidney, heart, or liver disease and have to limit fluids, talk with your doctor before you increase the amount of fluids you drink. · Drink plenty of water before, during, and after you are active. This is very important when it is hot out and when you do intense exercise.   · During hot weather, wear light-colored clothing that fits loosely and a hat with a brim to reflect the sun. · Limit or avoid strenuous activity during hot or humid weather, especially during the hottest part of the day (10 a.m. to 4 p.m.). Heat exhaustion and heatstroke usually develop when you are working or exercising in hot weather. Humidity makes hot weather even more dangerous. · Cars can get very hot inside. Open the windows or turn on the air conditioning before you get in and close the doors. · Try to stay cool during hot weather. If your home is not air-conditioned, seek an air-conditioned place. That could be in [employer], a neighborhood café, or a friend's home. [place] yourself with a cool mist. Take a cool shower, bath, or sponge bath. · Be aware that some medicines, such as major tranquilizers, can raise the risk of heat exhaustion. Ask your doctor whether any medicine you take raises your chance of getting heat exhaustion. When should you call for help? Call 911 anytime you think you may need emergency care. For example, call if:    · You feel very hot and:  ? You have a seizure. ? You feel confused. ? Your skin is red, hot, and dry. ? You passed out (lost consciousness).    Call your doctor now or seek immediate medical care if:    · You cannot keep fluids down.     · After returning to your normal activities, you have symptoms of heat exhaustion, such as sweating a lot, fatigue, dizziness, or nausea.    Watch closely for changes in your health, and be sure to contact your doctor if:    · You do not get better as expected. Where can you learn more? Go to http://alistair-kalpesh.info/. Enter S222 in the search box to learn more about \"Heat Exhaustion: Care Instructions. \"  Current as of: September 23, 2018  Content Version: 12.1  © 2970-2430 [employer]orporated. Care instructions adapted under license by [employer] (which disclaims liability or warranty for this information).  If you have questions about a medical condition or this instruction, always ask your healthcare professional. [place] [address] any warranty or liability for your use of this information.

## 2019-07-22 NOTE — ED PROVIDER NOTES
EMERGENCY DEPARTMENT HISTORY AND PHYSICAL EXAM    7:15 AM      Date: 7/22/2019  Patient Name: Emily Joseph    History of Presenting Illness     Chief Complaint   Patient presents with    Chest Pain    Claudication         History Provided By: Patient    Additional History (Context): Emily Joseph is a 37 y.o. male with no significant past medical history who presents with complaint of waking up with diffuse body cramps, primarily in his legs. He states he did work a double shift in [employer] it was very hot, and did not have much to drink, with exception of beer. He denies any chest pain, shortness of breath, nausea, vomiting, fever, urinary changes or other associated symptoms. He states his legs feel sore, but the cramps are not as severe as they were when he started. PCP: Guillermina Soni NP    Current Outpatient Medications   Medication Sig Dispense Refill    lisinopril (PRINIVIL, ZESTRIL) 20 mg tablet Take 1 Tab by mouth daily. 30 Tab 4       Past History     Past Medical History:  Past Medical History:   Diagnosis Date    Anxiety     Cholelithiasis 08/18/2013    Note on U/S    Chronic gastritis 10/17/2014    Dr. Tobin Burgess (EGD Result) H. Pylori Neg    Depression     Dog bite of left hand including fingers with infection 11/16/2014    Adm 11/18/14 for IV antibiotics.  ETOH abuse     GERD (gastroesophageal reflux disease)     Hepatic steatosis 02/06/2014    Noted on U/S.     History of plasmapheresis 02/2014    Hypertriglyceridemia, Severe Pancreatitis    Hypertension     Hypertriglyceridemia     Hypomagnesemia 07/24/2016    1.6 mg/dL    Non compliance with medical treatment     Osteomyelitis of left hand ([address] 11/22/2014    MRI Finding @ [facility] 2nd MCP joint.  Pancreatitis 08/17/2013    Highest on Record (2/6/14) 6175. Dr. Tobin Burgess    PICC (peripherally inserted central catheter) in place 89/04/4189    R Basilic Vein (IV Antibiotics for LUE Hand Osteomyelitis).  Rectal bleeding     Substance abuse (HCC)     Several UDS + Opiates.  Vitamin D deficiency 2014    11.6ng/mL ( ng/mL)       Past Surgical History:  Past Surgical History:   Procedure Laterality Date    HX CYST INCISION AND DRAINAGE Left 2014    [facility] 2nd digit abscess I&D.  HX ENDOSCOPY  10/17/2014    Dr. Renteria Deep: EGD/Colonoscopy    HX HEENT      wisdom tooth extraction    HX KNEE ARTHROSCOPY Right        Family History:  Family History   Problem Relation Age of Onset    Elevated Lipids Mother     Thyroid Disease Mother     Hypertension Father     Alcohol abuse Maternal Grandmother     MS Paternal Grandmother        Social History:  Social History     Tobacco Use    Smoking status: Former Smoker     Last attempt to quit: 2012     Years since quittin.8    Smokeless tobacco: Never Used    Tobacco comment: 2018    Substance Use Topics    Alcohol use: Not Currently     Alcohol/week: 13.3 standard drinks     Types: 8 Cans of beer, 8 Standard drinks or equivalent per week    Drug use: No       Allergies: Allergies   Allergen Reactions    Amoxicillin Swelling    Contrast Dye [Iodine] Hives     Pt had iv contrast prior to this study without problems, developed hives on 07/04/15 5 minutes post injection    Iodinated Contrast- Oral And Iv Dye Hives     Pt got hives when he received IV contrast in the past    Penicillins Hives and Swelling    Wellbutrin [Bupropion] Other (comments)     Caused aggression         Review of Systems       Review of Systems   Constitutional: Negative for activity change and appetite change. HENT: Negative for congestion. Eyes: Negative for visual disturbance. Respiratory: Negative for cough and shortness of breath. Cardiovascular: Negative for chest pain. Gastrointestinal: Negative for abdominal pain, diarrhea, nausea and vomiting. Genitourinary: Negative for dysuria. Musculoskeletal: Positive for myalgias. Negative for arthralgias. Skin: Negative for rash. Neurological: Negative for weakness and numbness. Physical Exam     Visit Vitals  BP (!) 153/94 (BP 1 Location: Left arm)   Pulse (!) 107   Temp 98.4 °F (36.9 °C)   Resp 14   Wt 97.5 kg (215 lb)   SpO2 100%   BMI 27.60 kg/m²         Physical Exam   Constitutional: He is oriented to person, place, and time. He appears well-developed and well-nourished. HENT:   Head: Normocephalic and atraumatic. Mouth/Throat: Oropharynx is clear and moist.   Eyes: Conjunctivae are normal.   Neck: Normal range of motion. Neck supple. No JVD present. Cardiovascular: Regular rhythm, normal heart sounds and intact distal pulses. Tachycardia present. No murmur heard. Pulmonary/Chest: Effort normal and breath sounds normal.   Abdominal: Soft. Bowel sounds are normal. He exhibits no distension. There is no tenderness. Musculoskeletal: Normal range of motion. He exhibits no deformity. Lymphadenopathy:     He has no cervical adenopathy. Neurological: He is alert and oriented to person, place, and time. Coordination normal.   Skin: Skin is warm and dry. No rash noted. Psychiatric: He has a normal mood and affect. Nursing note and vitals reviewed.         Diagnostic Study Results     Labs -  Recent Results (from the past 12 hour(s))   EKG, 12 LEAD, INITIAL    Collection Time: 07/22/19  1:20 AM   Result Value Ref Range    Ventricular Rate 102 BPM    Atrial Rate 102 BPM    P-R Interval 150 ms    QRS Duration 92 ms    Q-T Interval 370 ms    QTC Calculation (Bezet) 482 ms    Calculated P Axis 49 degrees    Calculated R Axis 7 degrees    Calculated T Axis 27 degrees    Diagnosis       Sinus tachycardia with premature ventricular complexes or fusion complexes  Otherwise normal ECG  When compared with ECG of 12-DEC-2018 15:31,  fusion complexes are now present  premature ventricular complexes are now present     CBC WITH AUTOMATED DIFF    Collection Time: 07/22/19  1:35 AM   Result Value Ref Range    WBC 5.3 4.6 - 13.2 K/uL    RBC 4.38 (L) 4.70 - 5.50 M/uL    HGB 13.5 13.0 - 16.0 g/dL    HCT 37.6 36.0 - 48.0 %    MCV 85.8 74.0 - 97.0 FL    MCH 30.8 24.0 - 34.0 PG    MCHC 35.9 31.0 - 37.0 g/dL    RDW 12.7 11.6 - 14.5 %    PLATELET 324 892 - 724 K/uL    MPV 8.7 (L) 9.2 - 11.8 FL    NEUTROPHILS 45 40 - 73 %    LYMPHOCYTES 42 21 - 52 %    MONOCYTES 9 3 - 10 %    EOSINOPHILS 4 0 - 5 %    BASOPHILS 0 0 - 2 %    ABS. NEUTROPHILS 2.4 1.8 - 8.0 K/UL    ABS. LYMPHOCYTES 2.2 0.9 - 3.6 K/UL    ABS. MONOCYTES 0.5 0.05 - 1.2 K/UL    ABS. EOSINOPHILS 0.2 0.0 - 0.4 K/UL    ABS. BASOPHILS 0.0 0.0 - 0.1 K/UL    DF AUTOMATED     METABOLIC PANEL, COMPREHENSIVE    Collection Time: 07/22/19  1:35 AM   Result Value Ref Range    Sodium 140 136 - 145 mmol/L    Potassium 3.9 3.5 - 5.5 mmol/L    Chloride 106 100 - 111 mmol/L    CO2 25 21 - 32 mmol/L    Anion gap 9 3.0 - 18 mmol/L    Glucose 83 74 - 99 mg/dL    BUN 11 7.0 - 18 MG/DL    Creatinine 0.85 0.6 - 1.3 MG/DL    BUN/Creatinine ratio 13 12 - 20      GFR est AA >60 >60 ml/min/1.73m2    GFR est non-AA >60 >60 ml/min/1.73m2    Calcium 8.8 8.5 - 10.1 MG/DL    Bilirubin, total 0.5 0.2 - 1.0 MG/DL    ALT (SGPT) 23 16 - 61 U/L    AST (SGOT) 25 10 - 38 U/L    Alk.  phosphatase 50 45 - 117 U/L    Protein, total 7.3 6.4 - 8.2 g/dL    Albumin 4.1 3.4 - 5.0 g/dL    Globulin 3.2 2.0 - 4.0 g/dL    A-G Ratio 1.3 0.8 - 1.7     CARDIAC PANEL,(CK, CKMB & TROPONIN)    Collection Time: 07/22/19  1:35 AM   Result Value Ref Range     39 - 308 U/L    CK - MB 2.4 <3.6 ng/ml    CK-MB Index 1.2 0.0 - 4.0 %    Troponin-I, QT <0.02 0.0 - 0.045 NG/ML   URINALYSIS W/ RFLX MICROSCOPIC    Collection Time: 07/22/19  3:39 AM   Result Value Ref Range    Color YELLOW      Appearance CLEAR      Specific gravity <1.005 (L) 1.005 - 1.030    pH (UA) 5.5 5.0 - 8.0      Protein NEGATIVE  NEG mg/dL    Glucose NEGATIVE  NEG mg/dL    Ketone NEGATIVE  NEG mg/dL    Bilirubin NEGATIVE  NEG Blood NEGATIVE  NEG      Urobilinogen 0.2 0.2 - 1.0 EU/dL    Nitrites NEGATIVE  NEG      Leukocyte Esterase NEGATIVE  NEG         Radiologic Studies -   XR CHEST PORT   Final Result   IMPRESSION:      Negative study. Medical Decision Making   I am the first provider for this patient. I reviewed the vital signs, available nursing notes, past medical history, past surgical history, family history and social history. Vital Signs-Reviewed the patient's vital signs. EKG:  Normal sinus rhythm, rate 102, , QTc 482. No acute ST or T wave changes, no STEMI. Records Reviewed: Nursing Notes (Time of Review: 7:15 AM)      Provider Notes (Medical Decision Making):   Megan Jay is a 37 y.o. male with no significant past medical history who presents with complaint of waking up with diffuse body cramps, primarily in his legs. He states he did work a double shift in [employer] it was very hot, and did not have much to drink, with exception of beer. He is noted to be tachycardic, but is not hyperthermic and appears otherwise well. Differential Diagnosis: Suspect heat cramps, possible dehydration, low suspicion for rhabdomyolysis, infectious etiology or other acute etiology. Testing: CBC, CMP, CPK  Treatments: IV fluids, Flexeril and will reevaluate    Re-evaluations:  Patient does feel significantly improved, tachycardia has resolved. All studies were unremarkable. Recommend he continue oral rehydration at home. The patient will be discharged home. Findings were discussed at length and questions were answered. Information on all newly prescribed medications was given. the patient was instructed to follow-up with his primary physician, or return to the Emergency Department with any worsened symptoms or concerns. Return precautions were given. Diagnosis     Clinical Impression:   1.  Heat cramps, initial encounter        Disposition: Discharge    Follow-up Information Follow up With Specialties Details Why Contact Nathan Robison NP Nurse Practitioner Schedule an appointment as soon as possible for a visit  [facility]  [address]      [facility] EMERGENCY DEPT Emergency Medicine  If symptoms worsen [address] [place] [id number]  [phone]           Patient's Medications   Start Taking    No medications on file   Continue Taking    LISINOPRIL (PRINIVIL, ZESTRIL) 20 MG TABLET    Take 1 Tab by mouth daily. These Medications have changed    No medications on file   Stop Taking    GABAPENTIN (NEURONTIN) 400 MG CAPSULE    Take 1 Cap by mouth two (2) times daily as needed (anxiety and also for sciatic right). Max Daily Amount: 800 mg. PAROXETINE (PAXIL) 20 MG TABLET    Take 2 Tabs by mouth daily. SUCRALFATE (CARAFATE) 100 MG/ML SUSPENSION    Take 20 mL by mouth four (4) times daily. _______________________________    Attestations:  Hira Alvarenga MD acting as a scribe for and in the presence of Minnie Laughlin MD      July 22, 2019 at 7:19 AM       Provider Attestation:      I personally performed the services described in the documentation, reviewed the documentation, as recorded by the scribe in my presence, and it accurately and completely records my words and actions.  July 22, 2019 at 7:19 AM - Minnie Laughlin MD    _______________________________

## 2019-11-30 ENCOUNTER — HOSPITAL ENCOUNTER (EMERGENCY)
Age: 34
Discharge: HOME | End: 2019-12-01
Attending: EMERGENCY MEDICINE
Payer: SELF-PAY

## 2019-11-30 DIAGNOSIS — J06.9 ACUTE UPPER RESPIRATORY INFECTION: Primary | ICD-10-CM

## 2019-11-30 LAB
FLUAV AG NPH QL IA: NEGATIVE
FLUBV AG NOSE QL IA: NEGATIVE

## 2019-11-30 PROCEDURE — 99283 EMERGENCY DEPT VISIT LOW MDM: CPT

## 2019-11-30 PROCEDURE — 87804 INFLUENZA ASSAY W/OPTIC: CPT

## 2019-11-30 NOTE — LETTER
NOTIFICATION RETURN TO WORK / SCHOOL 
 
12/1/2019 12:55 AM 
 
[facility] [id number] To Whom It May Concern: 
 
Francisco Pack is currently under the care of [facility] EMERGENCY DEPT. He will return to work/school on: 11/4/19. If there are questions or concerns please have the patient contact our office.  
 
 
 
Sincerely, 
 
 
Hal Aquino PA-C

## 2019-12-01 VITALS
OXYGEN SATURATION: 99 % | HEART RATE: 86 BPM | TEMPERATURE: 97.7 F | WEIGHT: 220 LBS | DIASTOLIC BLOOD PRESSURE: 103 MMHG | SYSTOLIC BLOOD PRESSURE: 149 MMHG | RESPIRATION RATE: 18 BRPM | BODY MASS INDEX: 28.23 KG/M2 | HEIGHT: 74 IN

## 2019-12-01 RX ORDER — FLUTICASONE PROPIONATE 50 MCG
2 SPRAY, SUSPENSION (ML) NASAL DAILY
Qty: 1 BOTTLE | Refills: 0 | Status: SHIPPED | OUTPATIENT
Start: 2019-12-01

## 2019-12-01 RX ORDER — LEVOFLOXACIN 500 MG/1
500 TABLET, FILM COATED ORAL DAILY
Qty: 10 TAB | Refills: 0 | Status: SHIPPED | OUTPATIENT
Start: 2019-12-01 | End: 2019-12-11

## 2019-12-01 RX ORDER — VAPORIZER
1 EACH MISCELLANEOUS
Qty: 1 EACH | Refills: 0 | Status: SHIPPED | OUTPATIENT
Start: 2019-12-01 | End: 2021-09-30

## 2019-12-01 RX ORDER — PSEUDOEPHEDRINE HYDROCHLORIDE 60 MG/1
60 TABLET ORAL
Qty: 20 TAB | Refills: 1 | Status: SHIPPED | OUTPATIENT
Start: 2019-12-01 | End: 2019-12-06

## 2019-12-01 RX ORDER — GUAIFENESIN 600 MG/1
600 TABLET, EXTENDED RELEASE ORAL 2 TIMES DAILY
Qty: 20 TAB | Refills: 0 | Status: SHIPPED | OUTPATIENT
Start: 2019-12-01 | End: 2021-09-30

## 2019-12-01 RX ORDER — PAROXETINE HYDROCHLORIDE 20 MG/1
40 TABLET, FILM COATED ORAL DAILY
Refills: 4 | COMMUNITY
Start: 2019-11-22 | End: 2019-12-17 | Stop reason: SDUPTHER

## 2019-12-01 NOTE — ED TRIAGE NOTES
Patient reports fever, cough, sore throat and body aches which started six days ago Patient reports productive coiugh with greenish sputum.

## 2019-12-01 NOTE — DISCHARGE INSTRUCTIONS
Patient Education        Upper Respiratory Infection (Cold): Care Instructions  Your Care Instructions    An upper respiratory infection, or URI, is an infection of the nose, sinuses, or throat. URIs are spread by coughs, sneezes, and direct contact. The common cold is the most frequent kind of URI. The flu and sinus infections are other kinds of URIs. Almost all URIs are caused by viruses. Antibiotics won't cure them. But you can treat most infections with home care. This may include drinking lots of fluids and taking over-the-counter pain medicine. You will probably feel better in 4 to 10 days. The doctor has checked you carefully, but problems can develop later. If you notice any problems or new symptoms, get medical treatment right away. Follow-up care is a key part of your treatment and safety. Be sure to make and go to all appointments, and call your doctor if you are having problems. It's also a good idea to know your test results and keep a list of the medicines you take. How can you care for yourself at home? · To prevent dehydration, drink plenty of fluids, enough so that your urine is light yellow or clear like water. Choose water and other caffeine-free clear liquids until you feel better. If you have kidney, heart, or liver disease and have to limit fluids, talk with your doctor before you increase the amount of fluids you drink. · Take an over-the-counter pain medicine, such as acetaminophen (Tylenol), ibuprofen (Advil, Motrin), or naproxen (Aleve). Read and follow all instructions on the label. · Before you use cough and cold medicines, check the label. These medicines may not be safe for young children or for people with certain health problems. · Be careful when taking over-the-counter cold or flu medicines and Tylenol at the same time. Many of these medicines have acetaminophen, which is Tylenol. Read the labels to make sure that you are not taking more than the recommended dose.  Too much acetaminophen (Tylenol) can be harmful. · Get plenty of rest.  · Do not smoke or allow others to smoke around you. If you need help quitting, talk to your doctor about stop-smoking programs and medicines. These can increase your chances of quitting for good. When should you call for help? Call 911 anytime you think you may need emergency care. For example, call if:    · You have severe trouble breathing.    Call your doctor now or seek immediate medical care if:    · You seem to be getting much sicker.     · You have new or worse trouble breathing.     · You have a new or higher fever.     · You have a new rash.    Watch closely for changes in your health, and be sure to contact your doctor if:    · You have a new symptom, such as a sore throat, an earache, or sinus pain.     · You cough more deeply or more often, especially if you notice more mucus or a change in the color of your mucus.     · You do not get better as expected. Where can you learn more? Go to http://alistair-kalpesh.info/. Enter B147 in the search box to learn more about \"Upper Respiratory Infection (Cold): Care Instructions. \"  Current as of: June 9, 2019  Content Version: 12.2  © 3040-3271 [employer]. Care instructions adapted under license by [employer] (which disclaims liability or warranty for this information). If you have questions about a medical condition or this instruction, always ask your healthcare professional. [place] [address] any warranty or liability for your use of this information.

## 2019-12-01 NOTE — ED PROVIDER NOTES
EMERGENCY DEPARTMENT HISTORY AND PHYSICAL EXAM    Date: 11/30/2019  Patient Name: Lukasz Platt    History of Presenting Illness     Chief Complaint   Patient presents with    Generalized Body Aches    Sore Throat    Cough    Fever         History Provided By: Patient      Additional History (Context): Lukasz Platt is a 37 y.o. male with No significant past medical history who presents with sinus congestion sore throat with postnasal drip and a productive cough with green sputum now for 6 days. Subjective fever. Does not smoke. Requesting work note. Denies nausea vomiting. PCP: Federico Randall NP    Current Outpatient Medications   Medication Sig Dispense Refill    levoFLOXacin (LEVAQUIN) 500 mg tablet Take 1 Tab by mouth daily for 10 days. 10 Tab 0    fluticasone propionate (FLONASE) 50 mcg/actuation nasal spray 2 Sprays by Both Nostrils route daily. 1 Bottle 0    guaiFENesin ER (MUCINEX) 600 mg ER tablet Take 1 Tab by mouth two (2) times a day. 20 Tab 0    pseudoephedrine (SUDAFED) 60 mg tablet Take 1 Tab by mouth every six (6) hours as needed for Congestion for up to 5 days. 20 Tab 1    Camphor-Eucalyptus Oil-Menthol (VICKS VAPORUB) 4.8-1.2-2.6 % oint 1 Actuation(s) by Apply Externally route three (3) times daily as needed for Cough or Other (congestion). 50 g 0    camphor-eucalyptus-menthol (VICKS VAPOSTEAM) liqd 1 Actuation(s) by Does Not Apply route three (3) times daily as needed for Cough or Other (congestion). 1 Bottle 0    Vaporizers ([employer] WARM STEAM VAPORIZER) misc 1 Actuation(s) by Does Not Apply route three (3) times daily as needed for Cough or Other (congestion). 1 Each 0    PARoxetine (PAXIL) 20 mg tablet Take 40 mg by mouth daily. 4    lisinopril (PRINIVIL, ZESTRIL) 20 mg tablet Take 1 Tab by mouth daily.  30 Tab 4       Past History     Past Medical History:  Past Medical History:   Diagnosis Date    Anxiety     Cholelithiasis 08/18/2013    Note on U/S    Chronic gastritis 10/17/2014    Dr. Maggie Dunn (EGD Result) H. Pylori Neg    Depression     Dog bite of left hand including fingers with infection 2014    Adm 14 for IV antibiotics.  ETOH abuse     GERD (gastroesophageal reflux disease)     Hepatic steatosis 2014    Noted on U/S.     History of plasmapheresis 2014    Hypertriglyceridemia, Severe Pancreatitis    Hypertension     Hypertriglyceridemia     Hypomagnesemia 2016    1.6 mg/dL    Non compliance with medical treatment     Osteomyelitis of left hand ([address] 2014    MRI Finding @ LUE 2nd MCP joint.  Pancreatitis 2013    Highest on Record [phone]. Dr. Maggie Dunn    PICC (peripherally inserted central catheter) in place     R Basilic Vein (IV Antibiotics for LUE Hand Osteomyelitis).  Rectal bleeding     Substance abuse (HCC)     Several UDS + Opiates.  Vitamin D deficiency 2014    11.6ng/mL ( ng/mL)       Past Surgical History:  Past Surgical History:   Procedure Laterality Date    HX CYST INCISION AND DRAINAGE Left 2014    LUE 2nd digit abscess I&D.  HX ENDOSCOPY  10/17/2014    Dr. Maggie Dunn: EGD/Colonoscopy    HX HEENT      wisdom tooth extraction    HX KNEE ARTHROSCOPY Right        Family History:  Family History   Problem Relation Age of Onset    Elevated Lipids Mother     Thyroid Disease Mother     Hypertension Father     Alcohol abuse Maternal Grandmother     MS Paternal Grandmother        Social History:  Social History     Tobacco Use    Smoking status: Former Smoker     Last attempt to quit: 2012     Years since quittin.2    Smokeless tobacco: Never Used    Tobacco comment: 2018    Substance Use Topics    Alcohol use: Not Currently     Alcohol/week: 13.3 standard drinks     Types: 8 Cans of beer, 8 Standard drinks or equivalent per week    Drug use: No       Allergies:   Allergies   Allergen Reactions    Amoxicillin Swelling    Contrast Dye [Iodine] Hives     Pt had iv contrast prior to this study without problems, developed hives on 07/04/15 5 minutes post injection    Iodinated Contrast Media Hives     Pt got hives when he received IV contrast in the past    Penicillins Hives and Swelling    Wellbutrin [Bupropion] Other (comments)     Caused aggression         Review of Systems   Review of Systems   Constitutional: Positive for fever. HENT: Positive for congestion, postnasal drip, rhinorrhea and sore throat. Respiratory: Positive for cough. All Other Systems Negative  Physical Exam     Vitals:    11/30/19 2210 12/01/19 0006   BP: (!) 157/103 (!) 149/103   Pulse: (!) 103 86   Resp: 16 18   Temp: 99 °F (37.2 °C) 97.7 °F (36.5 °C)   SpO2: 98% 99%   Weight: 99.8 kg (220 lb)    Height: 6' 2\" (1.88 m)      Physical Exam  Vitals signs and nursing note reviewed. Constitutional:       General: He is not in acute distress. Appearance: He is well-developed. He is not ill-appearing, toxic-appearing or diaphoretic. HENT:      Head: Normocephalic and atraumatic. Nose: Congestion present. Mouth/Throat: Tonsils: No tonsillar exudate or tonsillar abscesses. Neck:      Musculoskeletal: Normal range of motion and neck supple. Thyroid: No thyromegaly. Vascular: No carotid bruit. Trachea: No tracheal deviation. Cardiovascular:      Rate and Rhythm: Normal rate and regular rhythm. Heart sounds: Normal heart sounds. No murmur. No friction rub. No gallop. Pulmonary:      Effort: Pulmonary effort is normal. No respiratory distress. Breath sounds: Normal breath sounds. No stridor. No wheezing or rales. Chest:      Chest wall: No tenderness. Abdominal:      General: There is no distension. Palpations: Abdomen is soft. There is no mass. Tenderness: There is no tenderness. There is no guarding or rebound. Musculoskeletal: Normal range of motion.    Skin:     General: Skin is warm and dry. Coloration: Skin is not pale. Neurological:      Mental Status: He is alert. Psychiatric:         Speech: Speech normal.         Behavior: Behavior normal.         Thought Content: Thought content normal.         Judgment: Judgment normal.          Diagnostic Study Results     Labs -     Recent Results (from the past 12 hour(s))   INFLUENZA A & B AG (RAPID TEST)    Collection Time: 11/30/19 10:18 PM   Result Value Ref Range    Influenza A Antigen NEGATIVE  NEG      Influenza B Antigen NEGATIVE  NEG         Radiologic Studies -   No orders to display     CT Results  (Last 48 hours)    None        CXR Results  (Last 48 hours)    None            Medical Decision Making   I am the first provider for this patient. I reviewed the vital signs, available nursing notes, past medical history, past surgical history, family history and social history. Vital Signs-Reviewed the patient's vital signs. Procedures:  Procedures    Provider Notes (Medical Decision Making): Treat URI and non-smoker with productive cough post nasal drip. Discussed risks of tendon rupture with Levaquin; has an allergy to penicillin that is not willing to risk. Have him follow-up with his PCP as an outpatient. MED RECONCILIATION:  No current facility-administered medications for this encounter. Current Outpatient Medications   Medication Sig    levoFLOXacin (LEVAQUIN) 500 mg tablet Take 1 Tab by mouth daily for 10 days.  fluticasone propionate (FLONASE) 50 mcg/actuation nasal spray 2 Sprays by Both Nostrils route daily.  guaiFENesin ER (MUCINEX) 600 mg ER tablet Take 1 Tab by mouth two (2) times a day.  pseudoephedrine (SUDAFED) 60 mg tablet Take 1 Tab by mouth every six (6) hours as needed for Congestion for up to 5 days.  Camphor-Eucalyptus Oil-Menthol (VICKS VAPORUB) 4.8-1.2-2.6 % oint 1 Actuation(s) by Apply Externally route three (3) times daily as needed for Cough or Other (congestion).     camphor-eucalyptus-menthol (VICKS VAPOSTEAM) liqd 1 Actuation(s) by Does Not Apply route three (3) times daily as needed for Cough or Other (congestion).  Vaporizers ([employer] WARM STEAM VAPORIZER) misc 1 Actuation(s) by Does Not Apply route three (3) times daily as needed for Cough or Other (congestion).  PARoxetine (PAXIL) 20 mg tablet Take 40 mg by mouth daily.  lisinopril (PRINIVIL, ZESTRIL) 20 mg tablet Take 1 Tab by mouth daily. Disposition:  home    DISCHARGE NOTE:   12:59 AM    Pt has been reexamined. Patient has no new complaints, changes, or physical findings. Care plan outlined and precautions discussed. Results of exam were reviewed with the patient. All medications were reviewed with the patient; will d/c home with see below. All of pt's questions and concerns were addressed. Patient was instructed and agrees to follow up with PCP, as well as to return to the ED upon further deterioration. Patient is ready to go home. Follow-up Information     Follow up With Specialties Details Why Contact Info    Therese Miranda NP Nurse Practitioner Schedule an appointment as soon as possible for a visit in 2 days As needed [address] [place] [address]  [phone]      [facility] DEPT Emergency Medicine  If symptoms worsen return immediately [address]  [phone]          Current Discharge Medication List      START taking these medications    Details   levoFLOXacin (LEVAQUIN) 500 mg tablet Take 1 Tab by mouth daily for 10 days. Qty: 10 Tab, Refills: 0      fluticasone propionate (FLONASE) 50 mcg/actuation nasal spray 2 Sprays by Both Nostrils route daily. Qty: 1 Bottle, Refills: 0      guaiFENesin ER (MUCINEX) 600 mg ER tablet Take 1 Tab by mouth two (2) times a day. Qty: 20 Tab, Refills: 0      pseudoephedrine (SUDAFED) 60 mg tablet Take 1 Tab by mouth every six (6) hours as needed for Congestion for up to 5 days.   Qty: 20 Tab, Refills: 1      Camphor-Eucalyptus Oil-Menthol (VICKS VAPORUB) 4.8-1.2-2.6 % oint 1 Actuation(s) by Apply Externally route three (3) times daily as needed for Cough or Other (congestion). Qty: 50 g, Refills: 0      camphor-eucalyptus-menthol (VICKS VAPOSTEAM) liqd 1 Actuation(s) by Does Not Apply route three (3) times daily as needed for Cough or Other (congestion). Qty: 1 Bottle, Refills: 0      Vaporizers ([employer] WARM STEAM VAPORIZER) misc 1 Actuation(s) by Does Not Apply route three (3) times daily as needed for Cough or Other (congestion). Qty: 1 Each, Refills: 0             Diagnosis     Clinical Impression:   1.  Acute upper respiratory infection

## 2019-12-07 ENCOUNTER — HOSPITAL ENCOUNTER (EMERGENCY)
Age: 34
Discharge: HOME | End: 2019-12-07
Attending: EMERGENCY MEDICINE
Payer: SELF-PAY

## 2019-12-07 ENCOUNTER — APPOINTMENT (OUTPATIENT)
Dept: CT IMAGING | Age: 34
End: 2019-12-07
Attending: EMERGENCY MEDICINE
Payer: SELF-PAY

## 2019-12-07 VITALS
SYSTOLIC BLOOD PRESSURE: 153 MMHG | HEIGHT: 74 IN | WEIGHT: 250 LBS | TEMPERATURE: 100 F | BODY MASS INDEX: 32.08 KG/M2 | RESPIRATION RATE: 18 BRPM | OXYGEN SATURATION: 100 % | HEART RATE: 88 BPM | DIASTOLIC BLOOD PRESSURE: 100 MMHG

## 2019-12-07 DIAGNOSIS — B34.9 VIRAL ILLNESS: Primary | ICD-10-CM

## 2019-12-07 DIAGNOSIS — R10.84 ABDOMINAL PAIN, GENERALIZED: ICD-10-CM

## 2019-12-07 LAB
ALBUMIN SERPL-MCNC: 4.2 G/DL (ref 3.4–5)
ALBUMIN/GLOB SERPL: 1.1 {RATIO} (ref 0.8–1.7)
ALP SERPL-CCNC: 58 U/L (ref 45–117)
ALT SERPL-CCNC: 21 U/L (ref 16–61)
ANION GAP SERPL CALC-SCNC: 7 MMOL/L (ref 3–18)
APPEARANCE UR: CLEAR
AST SERPL-CCNC: 28 U/L (ref 10–38)
BASOPHILS # BLD: 0.1 K/UL (ref 0–0.1)
BASOPHILS NFR BLD: 1 % (ref 0–2)
BILIRUB SERPL-MCNC: 0.4 MG/DL (ref 0.2–1)
BILIRUB UR QL: NEGATIVE
BUN SERPL-MCNC: 10 MG/DL (ref 7–18)
BUN/CREAT SERPL: 11 (ref 12–20)
CALCIUM SERPL-MCNC: 9 MG/DL (ref 8.5–10.1)
CHLORIDE SERPL-SCNC: 108 MMOL/L (ref 100–111)
CO2 SERPL-SCNC: 27 MMOL/L (ref 21–32)
COLOR UR: NORMAL
CREAT SERPL-MCNC: 0.92 MG/DL (ref 0.6–1.3)
DIFFERENTIAL METHOD BLD: ABNORMAL
EOSINOPHIL # BLD: 0.2 K/UL (ref 0–0.4)
EOSINOPHIL NFR BLD: 2 % (ref 0–5)
ERYTHROCYTE [DISTWIDTH] IN BLOOD BY AUTOMATED COUNT: 12.8 % (ref 11.6–14.5)
ETHANOL SERPL-MCNC: <3 MG/DL (ref 0–3)
GLOBULIN SER CALC-MCNC: 4 G/DL (ref 2–4)
GLUCOSE SERPL-MCNC: 84 MG/DL (ref 74–99)
GLUCOSE UR STRIP.AUTO-MCNC: NEGATIVE MG/DL
HCT VFR BLD AUTO: 42.2 % (ref 36–48)
HGB BLD-MCNC: 15.1 G/DL (ref 13–16)
HGB UR QL STRIP: NEGATIVE
KETONES UR QL STRIP.AUTO: NEGATIVE MG/DL
LEUKOCYTE ESTERASE UR QL STRIP.AUTO: NEGATIVE
LIPASE SERPL-CCNC: 575 U/L (ref 73–393)
LYMPHOCYTES # BLD: 2.8 K/UL (ref 0.9–3.6)
LYMPHOCYTES NFR BLD: 27 % (ref 21–52)
MAGNESIUM SERPL-MCNC: 2.2 MG/DL (ref 1.6–2.6)
MCH RBC QN AUTO: 32.3 PG (ref 24–34)
MCHC RBC AUTO-ENTMCNC: 35.8 G/DL (ref 31–37)
MCV RBC AUTO: 90.2 FL (ref 74–97)
MONOCYTES # BLD: 1.1 K/UL (ref 0.05–1.2)
MONOCYTES NFR BLD: 11 % (ref 3–10)
NEUTS SEG # BLD: 6.4 K/UL (ref 1.8–8)
NEUTS SEG NFR BLD: 59 % (ref 40–73)
NITRITE UR QL STRIP.AUTO: NEGATIVE
PH UR STRIP: 6 [PH] (ref 5–8)
PLATELET # BLD AUTO: 281 K/UL (ref 135–420)
PMV BLD AUTO: 9 FL (ref 9.2–11.8)
POTASSIUM SERPL-SCNC: 4.1 MMOL/L (ref 3.5–5.5)
PROT SERPL-MCNC: 8.2 G/DL (ref 6.4–8.2)
PROT UR STRIP-MCNC: NEGATIVE MG/DL
RBC # BLD AUTO: 4.68 M/UL (ref 4.7–5.5)
SODIUM SERPL-SCNC: 142 MMOL/L (ref 136–145)
SP GR UR REFRACTOMETRY: 1.01 (ref 1–1.03)
UROBILINOGEN UR QL STRIP.AUTO: 0.2 EU/DL (ref 0.2–1)
WBC # BLD AUTO: 10.6 K/UL (ref 4.6–13.2)

## 2019-12-07 PROCEDURE — 74176 CT ABD & PELVIS W/O CONTRAST: CPT

## 2019-12-07 PROCEDURE — 80307 DRUG TEST PRSMV CHEM ANLYZR: CPT

## 2019-12-07 PROCEDURE — 83735 ASSAY OF MAGNESIUM: CPT

## 2019-12-07 PROCEDURE — C9113 INJ PANTOPRAZOLE SODIUM, VIA: HCPCS | Performed by: EMERGENCY MEDICINE

## 2019-12-07 PROCEDURE — 74011250636 HC RX REV CODE- 250/636: Performed by: EMERGENCY MEDICINE

## 2019-12-07 PROCEDURE — 83690 ASSAY OF LIPASE: CPT

## 2019-12-07 PROCEDURE — 99284 EMERGENCY DEPT VISIT MOD MDM: CPT

## 2019-12-07 PROCEDURE — 81003 URINALYSIS AUTO W/O SCOPE: CPT

## 2019-12-07 PROCEDURE — 96375 TX/PRO/DX INJ NEW DRUG ADDON: CPT

## 2019-12-07 PROCEDURE — 80053 COMPREHEN METABOLIC PANEL: CPT

## 2019-12-07 PROCEDURE — 85025 COMPLETE CBC W/AUTO DIFF WBC: CPT

## 2019-12-07 PROCEDURE — 96374 THER/PROPH/DIAG INJ IV PUSH: CPT

## 2019-12-07 RX ORDER — IBUPROFEN 800 MG/1
800 TABLET ORAL
Qty: 20 TAB | Refills: 0 | Status: SHIPPED | OUTPATIENT
Start: 2019-12-07 | End: 2019-12-14

## 2019-12-07 RX ORDER — KETOROLAC TROMETHAMINE 15 MG/ML
15 INJECTION, SOLUTION INTRAMUSCULAR; INTRAVENOUS
Status: COMPLETED | OUTPATIENT
Start: 2019-12-07 | End: 2019-12-07

## 2019-12-07 RX ORDER — ONDANSETRON 2 MG/ML
4 INJECTION INTRAMUSCULAR; INTRAVENOUS
Status: COMPLETED | OUTPATIENT
Start: 2019-12-07 | End: 2019-12-07

## 2019-12-07 RX ORDER — PANTOPRAZOLE SODIUM 40 MG/10ML
40 INJECTION, POWDER, LYOPHILIZED, FOR SOLUTION INTRAVENOUS
Status: COMPLETED | OUTPATIENT
Start: 2019-12-07 | End: 2019-12-07

## 2019-12-07 RX ORDER — MORPHINE SULFATE 4 MG/ML
4 INJECTION, SOLUTION INTRAMUSCULAR; INTRAVENOUS
Status: COMPLETED | OUTPATIENT
Start: 2019-12-07 | End: 2019-12-07

## 2019-12-07 RX ORDER — SODIUM CHLORIDE 9 MG/ML
100 INJECTION, SOLUTION INTRAVENOUS CONTINUOUS
Status: DISCONTINUED | OUTPATIENT
Start: 2019-12-07 | End: 2019-12-08 | Stop reason: HOSPADM

## 2019-12-07 RX ADMIN — SODIUM CHLORIDE 1000 ML: 900 INJECTION, SOLUTION INTRAVENOUS at 17:34

## 2019-12-07 RX ADMIN — MORPHINE SULFATE 4 MG: 4 INJECTION, SOLUTION INTRAMUSCULAR; INTRAVENOUS at 19:16

## 2019-12-07 RX ADMIN — SODIUM CHLORIDE 100 ML/HR: 900 INJECTION, SOLUTION INTRAVENOUS at 19:35

## 2019-12-07 RX ADMIN — PANTOPRAZOLE SODIUM 40 MG: 40 INJECTION, POWDER, FOR SOLUTION INTRAVENOUS at 17:34

## 2019-12-07 RX ADMIN — ONDANSETRON 4 MG: 2 INJECTION INTRAMUSCULAR; INTRAVENOUS at 17:34

## 2019-12-07 RX ADMIN — KETOROLAC TROMETHAMINE 15 MG: 15 INJECTION, SOLUTION INTRAMUSCULAR; INTRAVENOUS at 19:16

## 2019-12-07 NOTE — LETTER
NOTIFICATION RETURN TO WORK / SCHOOL 
 
12/7/2019 9:39 PM 
 
Mr. Chadd Allan [facility] [phone] To Whom It May Concern: 
 
Chadd Allan is currently under the care of [facility] EM[facility]GENCY DEPT. He will return to work/school on: 12/09/2019 If there are questions or concerns please have the patient contact our office. Sincerely, Ana Gates RN

## 2019-12-07 NOTE — ED PROVIDER NOTES
EMERGENCY DEPARTMENT HISTORY AND PHYSICAL EXAM    Date: 12/7/2019  Patient Name: Rob Mart    History of Presenting Illness     Chief Complaint   Patient presents with    Flu Like Symptoms         History Provided By: Patient    Additional History (Context): Rob Mart is a 37 y.o. male with Pancreatitis, alcohol abuse, substance abuse, anxiety who presents with flulike symptoms now for 13 days. He said he caught the flu from his roommate who was diagnosed with the flu. He had upper abdominal pain nausea vomiting yesterday and then diarrheal stools now for 2 to 3 days. Denies fever. Does have a history of pancreatitis. Denies melena hematochezia or hematemesis. Denies dysuria or flank pain. PCP: Prashant Skelton NP    Current Facility-Administered Medications   Medication Dose Route Frequency Provider Last Rate Last Dose    0.9% sodium chloride infusion  100 mL/hr IntraVENous CONTINUOUS Aydee Damon PA        pantoprazole (PROTONIX) injection 40 mg  40 mg IntraVENous NOW Aydee Damon PA        sodium chloride 0.9 % bolus infusion 1,000 mL  1,000 mL IntraVENous ONCE Aydee Damon PA        ondansetron (ZOFRAN) injection 4 mg  4 mg IntraVENous NOW Aydee Damon PA         Current Outpatient Medications   Medication Sig Dispense Refill    PARoxetine (PAXIL) 20 mg tablet Take 40 mg by mouth daily. 4    levoFLOXacin (LEVAQUIN) 500 mg tablet Take 1 Tab by mouth daily for 10 days. 10 Tab 0    fluticasone propionate (FLONASE) 50 mcg/actuation nasal spray 2 Sprays by Both Nostrils route daily. 1 Bottle 0    guaiFENesin ER (MUCINEX) 600 mg ER tablet Take 1 Tab by mouth two (2) times a day. 20 Tab 0    Camphor-Eucalyptus Oil-Menthol (VICKS VAPORUB) 4.8-1.2-2.6 % oint 1 Actuation(s) by Apply Externally route three (3) times daily as needed for Cough or Other (congestion).  50 g 0    camphor-eucalyptus-menthol (VICKS VAPOSTEAM) liqd 1 Actuation(s) by Does Not Apply route three (3) times daily as needed for Cough or Other (congestion). 1 Bottle 0    Vaporizers ([employer] WARM STEAM VAPORIZER) misc 1 Actuation(s) by Does Not Apply route three (3) times daily as needed for Cough or Other (congestion). 1 Each 0    lisinopril (PRINIVIL, ZESTRIL) 20 mg tablet Take 1 Tab by mouth daily. 30 Tab 4       Past History     Past Medical History:  Past Medical History:   Diagnosis Date    Anxiety     Cholelithiasis 08/18/2013    Note on U/S    Chronic gastritis 10/17/2014    Dr. Julieta Pires (EGD Result) H. Pylori Neg    Depression     Dog bite of left hand including fingers with infection 11/16/2014    Adm 11/18/14 for IV antibiotics.  ETOH abuse     GERD (gastroesophageal reflux disease)     Hepatic steatosis 02/06/2014    Noted on U/S.     History of plasmapheresis 02/2014    Hypertriglyceridemia, Severe Pancreatitis    Hypertension     Hypertriglyceridemia     Hypomagnesemia 07/24/2016    1.6 mg/dL    Non compliance with medical treatment     Osteomyelitis of left hand ([address] 11/22/2014    MRI Finding @ LUE 2nd MCP joint.  Pancreatitis 08/17/2013    Highest on Record (2/6/14) 2723. Dr. Julieta Pires    PICC (peripherally inserted central catheter) in place 06/96/4548    R Basilic Vein (IV Antibiotics for LUE Hand Osteomyelitis).  Rectal bleeding     Substance abuse (HCC)     Several UDS + Opiates.  Vitamin D deficiency 02/17/2014    11.6ng/mL ( ng/mL)       Past Surgical History:  Past Surgical History:   Procedure Laterality Date    HX CYST INCISION AND DRAINAGE Left 12/01/2014    LUE 2nd digit abscess I&D.      HX ENDOSCOPY  10/17/2014    Dr. Julieta Pires: EGD/Colonoscopy    HX HEENT      wisdom tooth extraction    HX KNEE ARTHROSCOPY Right        Family History:  Family History   Problem Relation Age of Onset    Elevated Lipids Mother     Thyroid Disease Mother     Hypertension Father     Alcohol abuse Maternal Grandmother     MS Paternal Grandmother Social History:  Social History     Tobacco Use    Smoking status: Former Smoker     Last attempt to quit: 2012     Years since quittin.2    Smokeless tobacco: Never Used    Tobacco comment: 2018    Substance Use Topics    Alcohol use: Not Currently     Alcohol/week: 13.3 standard drinks     Types: 8 Cans of beer, 8 Standard drinks or equivalent per week    Drug use: No       Allergies: Allergies   Allergen Reactions    Amoxicillin Swelling    Contrast Dye [Iodine] Hives     Pt had iv contrast prior to this study without problems, developed hives on 07/04/15 5 minutes post injection    Iodinated Contrast Media Hives     Pt got hives when he received IV contrast in the past    Penicillins Hives and Swelling    Wellbutrin [Bupropion] Other (comments)     Caused aggression         Review of Systems   Review of Systems   Constitutional: Negative for fever. Respiratory: Positive for cough. Gastrointestinal: Positive for abdominal pain, diarrhea, nausea and vomiting. Negative for blood in stool. All Other Systems Negative  Physical Exam     Vitals:    19 1440   BP: (!) 187/118   Pulse: (!) 103   Resp: 14   Temp: 99.1 °F (37.3 °C)   SpO2: 100%   Weight: 113.4 kg (250 lb)   Height: 6' 2\" (1.88 m)     Physical Exam  Vitals signs and nursing note reviewed. Constitutional:       General: He is not in acute distress. Appearance: He is well-developed. He is not ill-appearing, toxic-appearing or diaphoretic. HENT:      Head: Normocephalic and atraumatic. Neck:      Musculoskeletal: Normal range of motion and neck supple. Thyroid: No thyromegaly. Vascular: No carotid bruit. Trachea: No tracheal deviation. Cardiovascular:      Rate and Rhythm: Regular rhythm. Tachycardia present. Heart sounds: Normal heart sounds. No murmur. No friction rub. No gallop. Pulmonary:      Effort: Pulmonary effort is normal. No respiratory distress.       Breath sounds: Normal breath sounds. No stridor. No wheezing or rales. Chest:      Chest wall: No tenderness. Abdominal:      General: There is no distension. Palpations: Abdomen is soft. There is no mass. Tenderness: There is tenderness. There is no guarding or rebound. Comments: Generalized tenderness, greatest in the upper abdomen. Musculoskeletal: Normal range of motion. Skin:     General: Skin is warm and dry. Coloration: Skin is not pale. Neurological:      Mental Status: He is alert. Psychiatric:         Speech: Speech normal.         Behavior: Behavior normal.         Thought Content: Thought content normal.         Judgment: Judgment normal.          Diagnostic Study Results     Labs -     Recent Results (from the past 12 hour(s))   METABOLIC PANEL, COMPREHENSIVE    Collection Time: 12/07/19  4:37 PM   Result Value Ref Range    Sodium 142 136 - 145 mmol/L    Potassium 4.1 3.5 - 5.5 mmol/L    Chloride 108 100 - 111 mmol/L    CO2 27 21 - 32 mmol/L    Anion gap 7 3.0 - 18 mmol/L    Glucose 84 74 - 99 mg/dL    BUN 10 7.0 - 18 MG/DL    Creatinine 0.92 0.6 - 1.3 MG/DL    BUN/Creatinine ratio 11 (L) 12 - 20      GFR est AA >60 >60 ml/min/1.73m2    GFR est non-AA >60 >60 ml/min/1.73m2    Calcium 9.0 8.5 - 10.1 MG/DL    Bilirubin, total 0.4 0.2 - 1.0 MG/DL    ALT (SGPT) 21 16 - 61 U/L    AST (SGOT) 28 10 - 38 U/L    Alk.  phosphatase 58 45 - 117 U/L    Protein, total 8.2 6.4 - 8.2 g/dL    Albumin 4.2 3.4 - 5.0 g/dL    Globulin 4.0 2.0 - 4.0 g/dL    A-G Ratio 1.1 0.8 - 1.7     LIPASE    Collection Time: 12/07/19  4:37 PM   Result Value Ref Range    Lipase 575 (H) 73 - 393 U/L   CBC WITH AUTOMATED DIFF    Collection Time: 12/07/19  4:37 PM   Result Value Ref Range    WBC 10.6 4.6 - 13.2 K/uL    RBC 4.68 (L) 4.70 - 5.50 M/uL    HGB 15.1 13.0 - 16.0 g/dL    HCT 42.2 36.0 - 48.0 %    MCV 90.2 74.0 - 97.0 FL    MCH 32.3 24.0 - 34.0 PG    MCHC 35.8 31.0 - 37.0 g/dL    RDW 12.8 11.6 - 14.5 %    PLATELET 959 135 - 420 K/uL    MPV 9.0 (L) 9.2 - 11.8 FL    NEUTROPHILS 59 40 - 73 %    LYMPHOCYTES 27 21 - 52 %    MONOCYTES 11 (H) 3 - 10 %    EOSINOPHILS 2 0 - 5 %    BASOPHILS 1 0 - 2 %    ABS. NEUTROPHILS 6.4 1.8 - 8.0 K/UL    ABS. LYMPHOCYTES 2.8 0.9 - 3.6 K/UL    ABS. MONOCYTES 1.1 0.05 - 1.2 K/UL    ABS. EOSINOPHILS 0.2 0.0 - 0.4 K/UL    ABS. BASOPHILS 0.1 0.0 - 0.1 K/UL    DF AUTOMATED     ETHYL ALCOHOL    Collection Time: 12/07/19  4:37 PM   Result Value Ref Range    ALCOHOL(ETHYL),SERUM <3 0 - 3 MG/DL   MAGNESIUM    Collection Time: 12/07/19  4:37 PM   Result Value Ref Range    Magnesium 2.2 1.6 - 2.6 mg/dL       Radiologic Studies -   CT ABD PELV WO CONT    (Results Pending)     CT Results  (Last 48 hours)    None        CXR Results  (Last 48 hours)    None            Medical Decision Making   I am the first provider for this patient. I reviewed the vital signs, available nursing notes, past medical history, past surgical history, family history and social history. Vital Signs-Reviewed the patient's vital signs. Procedures:  Procedures    Provider Notes (Medical Decision Making): Patient has a history of pancreatitis. His lipase here is elevated at 575. This could be due from dehydration or alcohol abuse. Patient also apparently has a history of drug abuse. We will now scan w/o contrast secondary to iodinated contrast dye allergy. 5:33 PM : Pt care transferred to [facility] ,ED provider. History of patient complaint(s), available diagnostic reports and current treatment plan has been discussed thoroughly. Bedside rounding on patient occured : no .   Intended disposition of patient : home  Pending diagnostics reports and/or labs (please list): CT scan, PO challenge      MED RECONCILIATION:  Current Facility-Administered Medications   Medication Dose Route Frequency    0.9% sodium chloride infusion  100 mL/hr IntraVENous CONTINUOUS    pantoprazole (PROTONIX) injection 40 mg  40 mg IntraVENous NOW  sodium chloride 0.9 % bolus infusion 1,000 mL  1,000 mL IntraVENous ONCE    ondansetron (ZOFRAN) injection 4 mg  4 mg IntraVENous NOW     Current Outpatient Medications   Medication Sig    PARoxetine (PAXIL) 20 mg tablet Take 40 mg by mouth daily.  levoFLOXacin (LEVAQUIN) 500 mg tablet Take 1 Tab by mouth daily for 10 days.  fluticasone propionate (FLONASE) 50 mcg/actuation nasal spray 2 Sprays by Both Nostrils route daily.  guaiFENesin ER (MUCINEX) 600 mg ER tablet Take 1 Tab by mouth two (2) times a day.  Camphor-Eucalyptus Oil-Menthol (VICKS VAPORUB) 4.8-1.2-2.6 % oint 1 Actuation(s) by Apply Externally route three (3) times daily as needed for Cough or Other (congestion).  camphor-eucalyptus-menthol (VICKS VAPOSTEAM) liqd 1 Actuation(s) by Does Not Apply route three (3) times daily as needed for Cough or Other (congestion).  Vaporizers ([employer] WARM STEAM VAPORIZER) misc 1 Actuation(s) by Does Not Apply route three (3) times daily as needed for Cough or Other (congestion).  lisinopril (PRINIVIL, ZESTRIL) 20 mg tablet Take 1 Tab by mouth daily. Disposition:  TBD      Follow-up Information    None         Current Discharge Medication List              Diagnosis     Clinical Impression:   1.  Other acute pancreatitis, unspecified complication status

## 2019-12-07 NOTE — ED TRIAGE NOTES
The patient presents for evaluation of fever, cough, nausea, vomiting, diarrhea, and abdominal pain x 11 days. States, \"my roommate was positive for the flu. My left ear had some discharge this morning. I think my ear drum might have busted. \"

## 2019-12-08 NOTE — ED NOTES
Progress note    Assumed care of patient at 1800 patient was pending abdominal CT. Abdominal CT does not show any acute finding. Patient's lipase was very minimally elevated I do not suspect pancreatitis at this time. Abdominal pain and viral illness viral illness is probably reflective of influenza but it has been approximately 10 days and Tamiflu is not indicated. Patient encouraged to eat a healthy diet drink plenty of fluids take Motrin as needed for discomfort follow-up with primary care. 9:28 PM  Austin Duron's  results have been reviewed with him. He has been counseled regarding his diagnosis, treatment, and plan. He verbally conveys understanding and agreement of the signs, symptoms, diagnosis, treatment and prognosis and additionally agrees to follow up as discussed. He also agrees with the care-plan and conveys that all of his questions have been answered. I have also provided discharge instructions for him that include: educational information regarding their diagnosis and treatment, and list of reasons why they would want to return to the ED prior to their follow-up appointment, should his condition change.         Braden Lopez ENP-C, FNP-C

## 2019-12-08 NOTE — ED NOTES
I have reviewed discharge instructions with the patient. The patient verbalized understanding.  Patient discharged home, ambulatory and in stable condition

## 2019-12-08 NOTE — ED NOTES
Bedside and verbal shift report given by Kayode Rothman RN (off going nurse) to Latasha Peters RN (oncoming nurse). Report included the following information SBAR and ED Summary.

## 2019-12-08 NOTE — DISCHARGE INSTRUCTIONS
Patient Education        Abdominal Pain: Care Instructions  Your Care Instructions    Abdominal pain has many possible causes. Some aren't serious and get better on their own in a few days. Others need more testing and treatment. If your pain continues or gets worse, you need to be rechecked and may need more tests to find out what is wrong. You may need surgery to correct the problem. Don't ignore new symptoms, such as fever, nausea and vomiting, urination problems, pain that gets worse, and dizziness. These may be signs of a more serious problem. Your doctor may have recommended a follow-up visit in the next 8 to 12 hours. If you are not getting better, you may need more tests or treatment. The doctor has checked you carefully, but problems can develop later. If you notice any problems or new symptoms, get medical treatment right away. Follow-up care is a key part of your treatment and safety. Be sure to make and go to all appointments, and call your doctor if you are having problems. It's also a good idea to know your test results and keep a list of the medicines you take. How can you care for yourself at home? · Rest until you feel better. · To prevent dehydration, drink plenty of fluids, enough so that your urine is light yellow or clear like water. Choose water and other caffeine-free clear liquids until you feel better. If you have kidney, heart, or liver disease and have to limit fluids, talk with your doctor before you increase the amount of fluids you drink. · If your stomach is upset, eat mild foods, such as rice, dry toast or crackers, bananas, and applesauce. Try eating several small meals instead of two or three large ones. · Wait until 48 hours after all symptoms have gone away before you have spicy foods, alcohol, and drinks that contain caffeine. · Do not eat foods that are high in fat. · Avoid anti-inflammatory medicines such as aspirin, ibuprofen (Advil, Motrin), and naproxen (Aleve). These can cause stomach upset. Talk to your doctor if you take daily aspirin for another health problem. When should you call for help? Call 911 anytime you think you may need emergency care. For example, call if:    · You passed out (lost consciousness).     · You pass maroon or very bloody stools.     · You vomit blood or what looks like coffee grounds.     · You have new, severe belly pain.    Call your doctor now or seek immediate medical care if:    · Your pain gets worse, especially if it becomes focused in one area of your belly.     · You have a new or higher fever.     · Your stools are black and look like tar, or they have streaks of blood.     · You have unexpected vaginal bleeding.     · You have symptoms of a urinary tract infection. These may include:  ? Pain when you urinate. ? Urinating more often than usual.  ? Blood in your urine.     · You are dizzy or lightheaded, or you feel like you may faint.    Watch closely for changes in your health, and be sure to contact your doctor if:    · You are not getting better after 1 day (24 hours). Where can you learn more? Go to http://alistairPower2SMEkalpesh.info/. Enter I666 in the search box to learn more about \"Abdominal Pain: Care Instructions. \"  Current as of: June 26, 2019  Content Version: 12.2  © 2425-2207 [employer]. Care instructions adapted under license by [email or web address] (which disclaims liability or warranty for this information). If you have questions about a medical condition or this instruction, always ask your healthcare professional. [place] [address] any warranty or liability for your use of this information. Patient Education        Viral Infections: Care Instructions  Your Care Instructions    You don't feel well, but it's not clear what's causing it. You may have a viral infection.  Viruses cause many illnesses, such as the common cold, influenza, fever, rashes, and the diarrhea, nausea, and vomiting that are often called \"stomach flu. \" You may wonder if antibiotic medicines could make you feel better. But antibiotics only treat infections caused by bacteria. They don't work on viruses. The good news is that viral infections usually aren't serious. Most will go away in a few days without medical treatment. In the meantime, there are a few things you can do to make yourself more comfortable. Follow-up care is a key part of your treatment and safety. Be sure to make and go to all appointments, and call your doctor if you are having problems. It's also a good idea to know your test results and keep a list of the medicines you take. How can you care for yourself at home? · Get plenty of rest if you feel tired. · Take an over-the-counter pain medicine if needed, such as acetaminophen (Tylenol), ibuprofen (Advil, Motrin), or naproxen (Aleve). Read and follow all instructions on the label. · Be careful when taking over-the-counter cold or flu medicines and Tylenol at the same time. Many of these medicines have acetaminophen, which is Tylenol. Read the labels to make sure that you are not taking more than the recommended dose. Too much acetaminophen (Tylenol) can be harmful. · Drink plenty of fluids, enough so that your urine is light yellow or clear like water. If you have kidney, heart, or liver disease and have to limit fluids, talk with your doctor before you increase the amount of fluids you drink. · Stay home from work, school, and other public places while you have a fever. When should you call for help? Call 911 anytime you think you may need emergency care.  For example, call if:    · You have severe trouble breathing.     · You passed out (lost consciousness).    Call your doctor now or seek immediate medical care if:    · You seem to be getting much sicker.     · You have a new or higher fever.     · You have blood in your stools.     · You have new belly pain, or your pain gets worse.     · You have a new rash.    Watch closely for changes in your health, and be sure to contact your doctor if:    · You start to get better and then get worse.     · You do not get better as expected. Where can you learn more? Go to http://alistair-kalpesh.info/. Enter D048 in the search box to learn more about \"Viral Infections: Care Instructions. \"  Current as of: June 9, 2019  Content Version: 12.2  © 8496-6457 [employer]. Care instructions adapted under license by [employer] (which disclaims liability or warranty for this information). If you have questions about a medical condition or this instruction, always ask your healthcare professional. [address] any warranty or liability for your use of this information.

## 2019-12-17 DIAGNOSIS — I10 ESSENTIAL HYPERTENSION: ICD-10-CM

## 2019-12-17 RX ORDER — LISINOPRIL 20 MG/1
20 TABLET ORAL DAILY
Qty: 30 TAB | Refills: 4 | Status: SHIPPED | OUTPATIENT
Start: 2019-12-17 | End: 2020-08-26 | Stop reason: SDUPTHER

## 2019-12-17 RX ORDER — PAROXETINE HYDROCHLORIDE 20 MG/1
40 TABLET, FILM COATED ORAL DAILY
Qty: 60 TAB | Refills: 4 | Status: SHIPPED | OUTPATIENT
Start: 2019-12-17 | End: 2020-08-26 | Stop reason: SDUPTHER

## 2019-12-17 NOTE — TELEPHONE ENCOUNTER
Requested Prescriptions     Pending Prescriptions Disp Refills    lisinopril (PRINIVIL, ZESTRIL) 20 mg tablet 30 Tab 4     Sig: Take 1 Tab by mouth daily.  PARoxetine (PAXIL) 20 mg tablet  4     Sig: Take 2 Tabs by mouth daily.

## 2020-02-09 ENCOUNTER — HOSPITAL ENCOUNTER (EMERGENCY)
Age: 35
Discharge: HOME | End: 2020-02-09
Attending: EMERGENCY MEDICINE
Payer: SELF-PAY

## 2020-02-09 ENCOUNTER — APPOINTMENT (OUTPATIENT)
Dept: CT IMAGING | Age: 35
End: 2020-02-09
Attending: PHYSICIAN ASSISTANT
Payer: SELF-PAY

## 2020-02-09 VITALS
HEART RATE: 93 BPM | DIASTOLIC BLOOD PRESSURE: 106 MMHG | HEIGHT: 74 IN | OXYGEN SATURATION: 100 % | TEMPERATURE: 99.5 F | WEIGHT: 210 LBS | RESPIRATION RATE: 18 BRPM | BODY MASS INDEX: 26.95 KG/M2 | SYSTOLIC BLOOD PRESSURE: 159 MMHG

## 2020-02-09 DIAGNOSIS — N30.01 ACUTE CYSTITIS WITH HEMATURIA: ICD-10-CM

## 2020-02-09 DIAGNOSIS — E87.6 HYPOKALEMIA: ICD-10-CM

## 2020-02-09 DIAGNOSIS — K52.9 GASTROENTERITIS, ACUTE: Primary | ICD-10-CM

## 2020-02-09 LAB
ALBUMIN SERPL-MCNC: 4.1 G/DL (ref 3.4–5)
ALBUMIN/GLOB SERPL: 1.2 {RATIO} (ref 0.8–1.7)
ALP SERPL-CCNC: 55 U/L (ref 45–117)
ALT SERPL-CCNC: 32 U/L (ref 16–61)
AMORPH CRY URNS QL MICRO: ABNORMAL
ANION GAP SERPL CALC-SCNC: 17 MMOL/L (ref 3–18)
APPEARANCE UR: CLEAR
AST SERPL-CCNC: 33 U/L (ref 10–38)
BACTERIA URNS QL MICRO: ABNORMAL /HPF
BASOPHILS # BLD: 0 K/UL (ref 0–0.1)
BASOPHILS NFR BLD: 0 % (ref 0–3)
BILIRUB SERPL-MCNC: 1.7 MG/DL (ref 0.2–1)
BILIRUB UR QL: NEGATIVE
BUN SERPL-MCNC: 10 MG/DL (ref 7–18)
BUN/CREAT SERPL: 7 (ref 12–20)
CALCIUM SERPL-MCNC: 9 MG/DL (ref 8.5–10.1)
CAOX CRY URNS QL MICRO: ABNORMAL
CHLORIDE SERPL-SCNC: 96 MMOL/L (ref 100–111)
CO2 SERPL-SCNC: 22 MMOL/L (ref 21–32)
COLOR UR: YELLOW
CREAT SERPL-MCNC: 1.4 MG/DL (ref 0.6–1.3)
DIFFERENTIAL METHOD BLD: ABNORMAL
EOSINOPHIL # BLD: 0 K/UL (ref 0–0.4)
EOSINOPHIL NFR BLD: 0 % (ref 0–5)
EPITH CASTS URNS QL MICRO: ABNORMAL /LPF (ref 0–5)
ERYTHROCYTE [DISTWIDTH] IN BLOOD BY AUTOMATED COUNT: 12.5 % (ref 11.6–14.5)
GLOBULIN SER CALC-MCNC: 3.5 G/DL (ref 2–4)
GLUCOSE SERPL-MCNC: 125 MG/DL (ref 74–99)
GLUCOSE UR STRIP.AUTO-MCNC: NEGATIVE MG/DL
GRAN CASTS URNS QL MICRO: ABNORMAL /LPF
HCT VFR BLD AUTO: 45.6 % (ref 36–48)
HGB BLD-MCNC: 16.3 G/DL (ref 13–16)
HGB UR QL STRIP: NEGATIVE
HYALINE CASTS URNS QL MICRO: ABNORMAL /LPF (ref 0–2)
KETONES UR QL STRIP.AUTO: 15 MG/DL
LEUKOCYTE ESTERASE UR QL STRIP.AUTO: NEGATIVE
LIPASE SERPL-CCNC: 187 U/L (ref 73–393)
LYMPHOCYTES # BLD: 2.1 K/UL (ref 0.8–3.5)
LYMPHOCYTES NFR BLD: 17 % (ref 20–51)
MCH RBC QN AUTO: 31.8 PG (ref 24–34)
MCHC RBC AUTO-ENTMCNC: 35.7 G/DL (ref 31–37)
MCV RBC AUTO: 88.9 FL (ref 74–97)
MONOCYTES # BLD: 0.7 K/UL (ref 0–1)
MONOCYTES NFR BLD: 6 % (ref 2–9)
MUCOUS THREADS URNS QL MICRO: ABNORMAL /LPF
NEUTS BAND NFR BLD MANUAL: 2 % (ref 0–5)
NEUTS SEG # BLD: 9.2 K/UL (ref 1.8–8)
NEUTS SEG NFR BLD: 75 % (ref 42–75)
NITRITE UR QL STRIP.AUTO: NEGATIVE
PH UR STRIP: 5 [PH] (ref 5–8)
PLATELET # BLD AUTO: 302 K/UL (ref 135–420)
PLATELET COMMENTS,PCOM: ABNORMAL
PMV BLD AUTO: 8.8 FL (ref 9.2–11.8)
POTASSIUM SERPL-SCNC: 3.3 MMOL/L (ref 3.5–5.5)
PROT SERPL-MCNC: 7.6 G/DL (ref 6.4–8.2)
PROT UR STRIP-MCNC: 30 MG/DL
RBC # BLD AUTO: 5.13 M/UL (ref 4.7–5.5)
RBC #/AREA URNS HPF: ABNORMAL /HPF (ref 0–5)
RBC MORPH BLD: ABNORMAL
SODIUM SERPL-SCNC: 135 MMOL/L (ref 136–145)
SP GR UR REFRACTOMETRY: 1.01 (ref 1–1.03)
UROBILINOGEN UR QL STRIP.AUTO: 0.2 EU/DL (ref 0.2–1)
WBC # BLD AUTO: 12.2 K/UL (ref 4.6–13.2)
WBC MORPH BLD: ABNORMAL
WBC URNS QL MICRO: ABNORMAL /HPF (ref 0–4)

## 2020-02-09 PROCEDURE — 74011250637 HC RX REV CODE- 250/637: Performed by: PHYSICIAN ASSISTANT

## 2020-02-09 PROCEDURE — 74011250636 HC RX REV CODE- 250/636: Performed by: PHYSICIAN ASSISTANT

## 2020-02-09 PROCEDURE — 99283 EMERGENCY DEPT VISIT LOW MDM: CPT

## 2020-02-09 PROCEDURE — 85025 COMPLETE CBC W/AUTO DIFF WBC: CPT

## 2020-02-09 PROCEDURE — 81001 URINALYSIS AUTO W/SCOPE: CPT

## 2020-02-09 PROCEDURE — 74176 CT ABD & PELVIS W/O CONTRAST: CPT

## 2020-02-09 PROCEDURE — 96374 THER/PROPH/DIAG INJ IV PUSH: CPT

## 2020-02-09 PROCEDURE — 83690 ASSAY OF LIPASE: CPT

## 2020-02-09 PROCEDURE — 87491 CHLMYD TRACH DNA AMP PROBE: CPT

## 2020-02-09 PROCEDURE — 80053 COMPREHEN METABOLIC PANEL: CPT

## 2020-02-09 PROCEDURE — 87086 URINE CULTURE/COLONY COUNT: CPT

## 2020-02-09 PROCEDURE — 96361 HYDRATE IV INFUSION ADD-ON: CPT

## 2020-02-09 PROCEDURE — 96375 TX/PRO/DX INJ NEW DRUG ADDON: CPT

## 2020-02-09 RX ORDER — NITROFURANTOIN 25; 75 MG/1; MG/1
100 CAPSULE ORAL 2 TIMES DAILY
Qty: 14 CAP | Refills: 0 | Status: SHIPPED | OUTPATIENT
Start: 2020-02-09 | End: 2020-02-16

## 2020-02-09 RX ORDER — POTASSIUM CHLORIDE 20 MEQ/1
40 TABLET, EXTENDED RELEASE ORAL
Status: COMPLETED | OUTPATIENT
Start: 2020-02-09 | End: 2020-02-09

## 2020-02-09 RX ORDER — ONDANSETRON 2 MG/ML
4 INJECTION INTRAMUSCULAR; INTRAVENOUS
Status: COMPLETED | OUTPATIENT
Start: 2020-02-09 | End: 2020-02-09

## 2020-02-09 RX ORDER — DICYCLOMINE HYDROCHLORIDE 10 MG/1
10 CAPSULE ORAL 4 TIMES DAILY
Qty: 20 CAP | Refills: 0 | Status: SHIPPED | OUTPATIENT
Start: 2020-02-09 | End: 2020-02-14

## 2020-02-09 RX ORDER — ONDANSETRON 4 MG/1
TABLET, ORALLY DISINTEGRATING ORAL
Qty: 10 TAB | Refills: 0 | Status: SHIPPED | OUTPATIENT
Start: 2020-02-09 | End: 2021-04-21 | Stop reason: SDUPTHER

## 2020-02-09 RX ORDER — MORPHINE SULFATE 4 MG/ML
4 INJECTION, SOLUTION INTRAMUSCULAR; INTRAVENOUS
Status: COMPLETED | OUTPATIENT
Start: 2020-02-09 | End: 2020-02-09

## 2020-02-09 RX ADMIN — SODIUM CHLORIDE 1000 ML: 900 INJECTION, SOLUTION INTRAVENOUS at 18:33

## 2020-02-09 RX ADMIN — SODIUM CHLORIDE 1000 ML: 900 INJECTION, SOLUTION INTRAVENOUS at 20:02

## 2020-02-09 RX ADMIN — ONDANSETRON 4 MG: 2 INJECTION INTRAMUSCULAR; INTRAVENOUS at 18:58

## 2020-02-09 RX ADMIN — POTASSIUM CHLORIDE 40 MEQ: 1500 TABLET, EXTENDED RELEASE ORAL at 20:02

## 2020-02-09 RX ADMIN — MORPHINE SULFATE 4 MG: 4 INJECTION, SOLUTION INTRAMUSCULAR; INTRAVENOUS at 18:58

## 2020-02-09 NOTE — ED TRIAGE NOTES
Pt c/o abd pain, vomiting and diarrhea that started yesterday. Has been told in the past that he has gall stones. Drank etoh 2 days ago. Stopped drinking heavily one year ago.  Has been out of Lisinopril for one month

## 2020-02-10 LAB
C TRACH RRNA SPEC QL NAA+PROBE: NEGATIVE
N GONORRHOEA RRNA SPEC QL NAA+PROBE: NEGATIVE
SPECIMEN SOURCE: NORMAL

## 2020-02-10 NOTE — ED NOTES
I have reviewed discharge instructions with the patient and family. The patient and fmily verbalized understanding. Patient armband removed and shredded  IV catheter discontinued intact. Site without signs and symptoms of complications. Dressing and pressure applied.

## 2020-02-10 NOTE — ED PROVIDER NOTES
EMERGENCY DEPARTMENT HISTORY AND PHYSICAL EXAM    7:02 PM      Date: 2/9/2020  Patient Name: Kentrell Durbin    History of Presenting Illness     Chief Complaint   Patient presents with    Abdominal Pain    Vomiting    Diarrhea       History Provided By: Patient    Chief Complaint:  Generalized abdominal pain, nausea/vomiting/diarrhea  Duration: 1 Days  Timing:  Acute  Location:   Quality: Aching  Severity: 9 out of 10  Modifying Factors: none  Associated Symptoms: denies any other associated signs or symptoms      Additional History (Context):Jude Jett is a 37 y.o. male with a history of cholelithiasis, chronic gastritis, depression, EtOH abuse, GERD, hepatic steatosis, hypertriglyceridemia pancreatitis, hypertension, medical noncompliance, and substance abuse who presents to the emergency department for evaluation of severe, generalized abdominal pain with associated nausea/vomiting/diarrhea for the past 24 hours. Patient admits to EtOH intake a few nights ago and thinks that this is related to that. Patient reports greater than 20 episodes of vomiting. He reports maybe one episode of blood streaked in his vomit. No urinary symptoms, chest pain, shortness of breath, URI symptoms. No other concerns at this time. No treatments prior to arrival.      PCP:  Kemper Gowers, NP      Current Facility-Administered Medications   Medication Dose Route Frequency Provider Last Rate Last Dose    sodium chloride 0.9 % bolus infusion 1,000 mL  1,000 mL IntraVENous ONCE Aretta Lanes, PA-C 1,000 mL/hr at 02/09/20 2002 1,000 mL at 02/09/20 2002     Current Outpatient Medications   Medication Sig Dispense Refill    nitrofurantoin, macrocrystal-monohydrate, (MACROBID) 100 mg capsule Take 1 Cap by mouth two (2) times a day for 7 days. 14 Cap 0    ondansetron (ZOFRAN ODT) 4 mg disintegrating tablet Take 1-2 tablets every 6-8 hours as needed for nausea and vomiting.  10 Tab 0    dicyclomine (BENTYL) 10 mg capsule Take 1 Cap by mouth four (4) times daily for 5 days. 20 Cap 0    lisinopril (PRINIVIL, ZESTRIL) 20 mg tablet Take 1 Tab by mouth daily. 30 Tab 4    PARoxetine (PAXIL) 20 mg tablet Take 2 Tabs by mouth daily. 60 Tab 4    fluticasone propionate (FLONASE) 50 mcg/actuation nasal spray 2 Sprays by Both Nostrils route daily. 1 Bottle 0    guaiFENesin ER (MUCINEX) 600 mg ER tablet Take 1 Tab by mouth two (2) times a day. 20 Tab 0    Camphor-Eucalyptus Oil-Menthol (VICKS VAPORUB) 4.8-1.2-2.6 % oint 1 Actuation(s) by Apply Externally route three (3) times daily as needed for Cough or Other (congestion). 50 g 0    camphor-eucalyptus-menthol (VICKS VAPOSTEAM) liqd 1 Actuation(s) by Does Not Apply route three (3) times daily as needed for Cough or Other (congestion). 1 Bottle 0    Vaporizers ([employer] WARM STEAM VAPORIZER) misc 1 Actuation(s) by Does Not Apply route three (3) times daily as needed for Cough or Other (congestion). 1 Each 0       Past History     Past Medical History:  Past Medical History:   Diagnosis Date    Anxiety     Cholelithiasis 08/18/2013    Note on U/S    Chronic gastritis 10/17/2014    Dr. Zandra Shane (EGD Result) H. Pylori Neg    Depression     Dog bite of left hand including fingers with infection 11/16/2014    Adm 11/18/14 for IV antibiotics.  ETOH abuse     GERD (gastroesophageal reflux disease)     Hepatic steatosis 02/06/2014    Noted on U/S.     History of plasmapheresis 02/2014    Hypertriglyceridemia, Severe Pancreatitis    Hypertension     Hypertriglyceridemia     Hypomagnesemia 07/24/2016    1.6 mg/dL    Non compliance with medical treatment     Osteomyelitis of left hand ([address] 11/22/2014    MRI Finding @ [facility] 2nd MCP joint.  Pancreatitis 08/17/2013    Highest on Record (2/6/14) 7379. Dr. Zandra Shane    PICC (peripherally inserted central catheter) in place 96/83/8713    R Basilic Vein (IV Antibiotics for LUE Hand Osteomyelitis).      Rectal bleeding  Substance abuse (HCC)     Several UDS + Opiates.  Vitamin D deficiency 2014    11.6ng/mL ( ng/mL)       Past Surgical History:  Past Surgical History:   Procedure Laterality Date    HX CYST INCISION AND DRAINAGE Left 2014    [facility] 2nd digit abscess I&D.  HX ENDOSCOPY  10/17/2014    Dr. Pillo Leong: EGD/Colonoscopy    HX HEENT      wisdom tooth extraction    HX KNEE ARTHROSCOPY Right        Family History:  Family History   Problem Relation Age of Onset    Elevated Lipids Mother     Thyroid Disease Mother     Hypertension Father     Alcohol abuse Maternal Grandmother     MS Paternal Grandmother        Social History:  Social History     Tobacco Use    Smoking status: Former Smoker     Last attempt to quit: 2012     Years since quittin.4    Smokeless tobacco: Never Used    Tobacco comment: 2018    Substance Use Topics    Alcohol use: Not Currently     Alcohol/week: 13.3 standard drinks     Types: 8 Cans of beer, 8 Standard drinks or equivalent per week    Drug use: No       Allergies: Allergies   Allergen Reactions    Amoxicillin Swelling    Contrast Dye [Iodine] Hives     Pt had iv contrast prior to this study without problems, developed hives on 07/04/15 5 minutes post injection    Iodinated Contrast Media Hives     Pt got hives when he received IV contrast in the past    Penicillins Hives and Swelling    Wellbutrin [Bupropion] Other (comments)     Caused aggression         Review of Systems       Review of Systems   Constitutional: Positive for fever. Negative for chills. HENT: Negative for congestion, rhinorrhea and sore throat. Respiratory: Negative for cough and shortness of breath. Cardiovascular: Negative for chest pain. Gastrointestinal: Positive for abdominal pain, diarrhea, nausea and vomiting. Negative for blood in stool and constipation. Genitourinary: Negative for dysuria, frequency and hematuria.    Musculoskeletal: Negative for back pain and myalgias. Skin: Negative for rash and wound. Neurological: Negative for dizziness and headaches. All other systems reviewed and are negative. Physical Exam     Visit Vitals  BP (!) 159/106 (BP 1 Location: Right arm, BP Patient Position: At rest)   Pulse 93   Temp 99.5 °F (37.5 °C)   Resp 18   Ht 6' 2\" (1.88 m)   Wt 95.3 kg (210 lb)   SpO2 100%   BMI 26.96 kg/m²       Physical Exam  Vitals signs and nursing note reviewed. Constitutional:       General: He is not in acute distress. Appearance: He is well-developed. He is not diaphoretic. HENT:      Head: Normocephalic and atraumatic. Eyes:      Conjunctiva/sclera: Conjunctivae normal.   Neck:      Musculoskeletal: Normal range of motion and neck supple. Cardiovascular:      Rate and Rhythm: Normal rate and regular rhythm. Heart sounds: Normal heart sounds. Pulmonary:      Effort: Pulmonary effort is normal. No respiratory distress. Breath sounds: Normal breath sounds. Chest:      Chest wall: No tenderness. Abdominal:      General: Bowel sounds are normal. There is no distension. Palpations: Abdomen is soft. There is no shifting dullness, fluid wave, hepatomegaly, splenomegaly, mass or pulsatile mass. Tenderness: There is generalized abdominal tenderness. There is no guarding or rebound. Negative signs include Corral's sign, Rovsing's sign, McBurney's sign, psoas sign and obturator sign. Hernia: No hernia is present. Musculoskeletal: Normal range of motion. General: No deformity. Skin:     General: Skin is warm and dry. Neurological:      Mental Status: He is alert and oriented to person, place, and time.          Diagnostic Study Results     Labs -  Recent Results (from the past 12 hour(s))   METABOLIC PANEL, COMPREHENSIVE    Collection Time: 02/09/20  6:36 PM   Result Value Ref Range    Sodium 135 (L) 136 - 145 mmol/L    Potassium 3.3 (L) 3.5 - 5.5 mmol/L    Chloride 96 (L) 100 - 111 mmol/L    CO2 22 21 - 32 mmol/L    Anion gap 17 3.0 - 18 mmol/L    Glucose 125 (H) 74 - 99 mg/dL    BUN 10 7.0 - 18 MG/DL    Creatinine 1.40 (H) 0.6 - 1.3 MG/DL    BUN/Creatinine ratio 7 (L) 12 - 20      GFR est AA >60 >60 ml/min/1.73m2    GFR est non-AA 57 (L) >60 ml/min/1.73m2    Calcium 9.0 8.5 - 10.1 MG/DL    Bilirubin, total 1.7 (H) 0.2 - 1.0 MG/DL    ALT (SGPT) 32 16 - 61 U/L    AST (SGOT) 33 10 - 38 U/L    Alk. phosphatase 55 45 - 117 U/L    Protein, total 7.6 6.4 - 8.2 g/dL    Albumin 4.1 3.4 - 5.0 g/dL    Globulin 3.5 2.0 - 4.0 g/dL    A-G Ratio 1.2 0.8 - 1.7     LIPASE    Collection Time: 02/09/20  6:36 PM   Result Value Ref Range    Lipase 187 73 - 393 U/L   CBC WITH AUTOMATED DIFF    Collection Time: 02/09/20  6:36 PM   Result Value Ref Range    WBC 12.2 4.6 - 13.2 K/uL    RBC 5.13 4.70 - 5.50 M/uL    HGB 16.3 (H) 13.0 - 16.0 g/dL    HCT 45.6 36.0 - 48.0 %    MCV 88.9 74.0 - 97.0 FL    MCH 31.8 24.0 - 34.0 PG    MCHC 35.7 31.0 - 37.0 g/dL    RDW 12.5 11.6 - 14.5 %    PLATELET 843 555 - 361 K/uL    MPV 8.8 (L) 9.2 - 11.8 FL    NEUTROPHILS 75 42 - 75 %    BAND NEUTROPHILS 2 0 - 5 %    LYMPHOCYTES 17 (L) 20 - 51 %    MONOCYTES 6 2 - 9 %    EOSINOPHILS 0 0 - 5 %    BASOPHILS 0 0 - 3 %    ABS. NEUTROPHILS 9.2 (H) 1.8 - 8.0 K/UL    ABS. LYMPHOCYTES 2.1 0.8 - 3.5 K/UL    ABS. MONOCYTES 0.7 0 - 1.0 K/UL    ABS. EOSINOPHILS 0.0 0.0 - 0.4 K/UL    ABS.  BASOPHILS 0.0 0.0 - 0.1 K/UL    PLATELET COMMENTS ADEQUATE PLATELETS      RBC COMMENTS NORMOCYTIC, NORMOCHROMIC      WBC COMMENTS REACTIVE LYMPHS      DF MANUAL     URINALYSIS W/ RFLX MICROSCOPIC    Collection Time: 02/09/20  7:37 PM   Result Value Ref Range    Color YELLOW      Appearance CLEAR      Specific gravity 1.007 1.005 - 1.030      pH (UA) 5.0 5.0 - 8.0      Protein 30 (A) NEG mg/dL    Glucose NEGATIVE  NEG mg/dL    Ketone 15 (A) NEG mg/dL    Bilirubin NEGATIVE  NEG      Blood NEGATIVE  NEG      Urobilinogen 0.2 0.2 - 1.0 EU/dL    Nitrites NEGATIVE  NEG Leukocyte Esterase NEGATIVE  NEG     URINE MICROSCOPIC ONLY    Collection Time: 02/09/20  7:37 PM   Result Value Ref Range    WBC 4 to 10 0 - 4 /hpf    RBC 0 to 3 0 - 5 /hpf    Epithelial cells 1+ 0 - 5 /lpf    Bacteria FEW (A) NEG /hpf    Mucus 1+ (A) NEG /lpf    Amorphous Crystals 2+ (A) NEG    CA Oxalate crystals 1+ (A) NEG    Hyaline cast 21 to 35 0 - 2 /lpf    Granular cast 21 to 35 NEG /lpf       Radiologic Studies -   Ct Abd Pelv Wo Cont    Result Date: 2/9/2020  CT ABDOMEN/PELVIS WITHOUT CONTRAST HISTORY: Severe epigastric pain. COMPARISON: 12/7/2019. TECHNIQUE: 5 mm helical scans were obtained of the abdomen and pelvis without oral or IV contrast. Coronal and sagittal reformation. All CT scans are performed using dose optimization techniques as appropriate to the performed exam including the following: Automated exposure control, adjustment of mA and/or kV according to patient size, and use of iterative reconstructive technique. FINDINGS: The visualized lung bases are clear. The right and left kidney are normal in contour. No perinephric fluid collections  No dilatation of the collecting system. No calcifications are seen within the right or left kidney. No calcifications seen along the expected course of the right or left ureter. The bladder is underdistended and not well evaluated. Evaluation of the solid and hollow visceral and the abdomen and pelvis is slightly limited on this noncontrast CT scan. No focal lesions are seen in the visualized portions of the liver, pancreas and spleen. 7 mm gallstone. Gallbladder nondilated. No adrenal nodules or masses. There is no free intraperitoneal air, free fluid, or fluid collections. Stomach is nondilated. Mild increased fluid in the mid duodenum and in the proximal small bowel loops. There is a narrow transition to the more distal small bowel loops which are nondilated. Colon is nondilated with fluid. Watery stool in the rectum.  No appreciable wall thickening. The appendix is not inflamed. No gross prostatic enlargement. No calcifications of the abdominal aorta. No aneurysmal dilatation. No gross abdominal or pelvic lymphadenopathy. No acute fractures seen. IMPRESSION: Fluid in the small bowel and colon. Mild distended proximal small bowel loops. No clear focal transition point seen. Given the presence of fluid in the colon. Scan findings are suggestive of a gastroenteritis picture. A partial small bowel obstruction not entirely excluded. Radiographic follow-up may be helpful to assess for stability/resolution. 7 mm gallstone. No CT findings suggestive of acute gallbladder inflammation. Medical Decision Making   I am the first provider for this patient. I reviewed the vital signs, available nursing notes, past medical history, past surgical history, family history and social history. Vital Signs-Reviewed the patient's vital signs. Pulse Oximetry Analysis -  100% on room air (Interpretation)    Records Reviewed: Nursing Notes and Old Medical Records (Time of Review: 7:02 PM)    ED Course: Progress Notes, Reevaluation, and Consults:    Provider Notes (Medical Decision Making):   differential diagnosis: Pancreatitis, gastroenteritis, influenza, cholecystitis, UTI    Plan: Patient presents ambulatory in mild distress with elevated blood pressure, mild tachycardia, and otherwise normal vitals. Exam reveals generalized abdominal tenderness to palpation with normal active bowel sounds. No peritoneal signs. No leukocytosis, but patient is hemoconcentrated. Chemistry consistent with dehydration and hypokalemia. Normal LFTs and lipase, but elevated T bili. UA with infection. CT findings consistent with gastroenteritis. Pt feeling better here with fluids, zofran, and morphine. Will DC home with zofran, macrobid, and bentyl. Will send urine and NG/CT cultures. At this time, patient is stable and appropriate for discharge home.   Patient demonstrates understanding of current diagnoses and is in agreement with the treatment plan. They are advised that while the likelihood of serious underlying condition is low at this point given the evaluation performed today, we cannot fully rule it out. They are advised to immediately return with any new symptoms or worsening of current condition. All questions have been answered. Patient is given educational material regarding their diagnoses, including danger symptoms and when to return to the ED. Diagnosis     Clinical Impression:   1. Gastroenteritis, acute    2. Hypokalemia    3. Acute cystitis with hematuria        Disposition:  OR Home    Follow-up Information     Follow up With Specialties Details Why Contact Info    Xochitl Ceja NP Nurse Practitioner Call in 2 days  [address] [facility]  [address] [facility]  [address] [facility] [address]      [address] [facility] EMERGENCY DEPT Emergency Medicine Go to As needed, If symptoms worsen [address] [facility]  [phone]           Patient's Medications   Start Taking    DICYCLOMINE (BENTYL) 10 MG CAPSULE    Take 1 Cap by mouth four (4) times daily for 5 days. NITROFURANTOIN, MACROCRYSTAL-MONOHYDRATE, (MACROBID) 100 MG CAPSULE    Take 1 Cap by mouth two (2) times a day for 7 days. ONDANSETRON (ZOFRAN ODT) 4 MG DISINTEGRATING TABLET    Take 1-2 tablets every 6-8 hours as needed for nausea and vomiting. Continue Taking    CAMPHOR-EUCALYPTUS OIL-MENTHOL (VICKS VAPORUB) 4.8-1.2-2.6 % OINT    1 Actuation(s) by Apply Externally route three (3) times daily as needed for Cough or Other (congestion). CAMPHOR-EUCALYPTUS-MENTHOL (VICKS VAPOSTEAM) LIQD    1 Actuation(s) by Does Not Apply route three (3) times daily as needed for Cough or Other (congestion). FLUTICASONE PROPIONATE (FLONASE) 50 MCG/ACTUATION NASAL SPRAY    2 Sprays by Both Nostrils route daily.     GUAIFENESIN ER (MUCINEX) 600 MG ER TABLET    Take 1 Tab by mouth two (2) times a day.    LISINOPRIL (PRINIVIL, ZESTRIL) 20 MG TABLET    Take 1 Tab by mouth daily. PAROXETINE (PAXIL) 20 MG TABLET    Take 2 Tabs by mouth daily. VAPORIZERS ([facility] WARM STEAM VAPORIZER) MISC    1 Actuation(s) by Does Not Apply route three (3) times daily as needed for Cough or Other (congestion). These Medications have changed    No medications on file   Stop Taking    No medications on file     _______________________________    This note was dictated utilizing voice recognition software which may lead to typographical errors. I apologize in advance if the situation occurs. If questions arise please do not hesitate to contact me or call our department.   Amelie Burgos PA-C

## 2020-02-10 NOTE — DISCHARGE INSTRUCTIONS
Patient Education        Gastroenteritis: Care Instructions  Your Care Instructions    Gastroenteritis is an illness that may cause nausea, vomiting, and diarrhea. It is sometimes called \"stomach flu. \" It can be caused by bacteria or a virus. You will probably begin to feel better in 1 to 2 days. In the meantime, get plenty of rest and make sure you do not become dehydrated. Dehydration occurs when your body loses too much fluid. Follow-up care is a key part of your treatment and safety. Be sure to make and go to all appointments, and call your doctor if you are having problems. It's also a good idea to know your test results and keep a list of the medicines you take. How can you care for yourself at home? · If your doctor prescribed antibiotics, take them as directed. Do not stop taking them just because you feel better. You need to take the full course of antibiotics. · Drink plenty of fluids to prevent dehydration, enough so that your urine is light yellow or clear like water. Choose water and other caffeine-free clear liquids until you feel better. If you have kidney, heart, or liver disease and have to limit fluids, talk with your doctor before you increase your fluid intake. · Drink fluids slowly, in frequent, small amounts, because drinking too much too fast can cause vomiting. · Begin eating mild foods, such as dry toast, yogurt, applesauce, bananas, and rice. Avoid spicy, hot, or high-fat foods, and do not drink alcohol or caffeine for a day or two. Do not drink milk or eat ice cream until you are feeling better. How to prevent gastroenteritis  · Keep hot foods hot and cold foods cold. · Do not eat meats, dressings, salads, or other foods that have been kept at room temperature for more than 2 hours. · Use a thermometer to check your refrigerator. It should be between 34°F and 40°F.  · Defrost meats in the refrigerator or microwave, not on the kitchen counter.   · Keep your hands and your kitchen clean. Wash your hands, cutting boards, and countertops with hot soapy water frequently. · Cook meat until it is well done. · Do not eat raw eggs or uncooked sauces made with raw eggs. · Do not take chances. If food looks or tastes spoiled, throw it out. When should you call for help? Call 911 anytime you think you may need emergency care. For example, call if:    · You vomit blood or what looks like coffee grounds.     · You passed out (lost consciousness).     · You pass maroon or very bloody stools.    Call your doctor now or seek immediate medical care if:    · You have severe belly pain.     · You have signs of needing more fluids. You have sunken eyes, a dry mouth, and pass only a little dark urine.     · You feel like you are going to faint.     · You have increased belly pain that does not go away in 1 to 2 days.     · You have new or increased nausea, or you are vomiting.     · You have a new or higher fever.     · Your stools are black and tarlike or have streaks of blood.    Watch closely for changes in your health, and be sure to contact your doctor if:    · You are dizzy or lightheaded.     · You urinate less than usual, or your urine is dark yellow or brown.     · You do not feel better with each day that goes by. Where can you learn more? Go to http://alistair-kalpesh.info/. Enter N142 in the search box to learn more about \"Gastroenteritis: Care Instructions. \"  Current as of: June 9, 2019  Content Version: 12.2  © 0189-3175 [employer]. Care instructions adapted under license by [employer] (which disclaims liability or warranty for this information). If you have questions about a medical condition or this instruction, always ask your healthcare professional. [place] [address] any warranty or liability for your use of this information.          Patient Education        Urinary Tract Infections in Men: Care Instructions  Your Care Instructions    A urinary tract infection, or UTI, is a general term for an infection anywhere between the kidneys and the tip of the penis. UTIs can also be a result of a prostate problem. Most cause pain or burning when you urinate. Most UTIs are caused by bacteria and can be cured with antibiotics. It is important to complete your treatment so that the infection does not get worse. Follow-up care is a key part of your treatment and safety. Be sure to make and go to all appointments, and call your doctor if you are having problems. It's also a good idea to know your test results and keep a list of the medicines you take. How can you care for yourself at home? · Take your antibiotics as prescribed. Do not stop taking them just because you feel better. You need to take the full course of antibiotics. · Take your medicines exactly as prescribed. Your doctor may have prescribed a medicine, such as phenazopyridine (Pyridium), to help relieve pain when you urinate. This turns your urine orange. You may stop taking it when your symptoms get better. But be sure to take all of your antibiotics, which treat the infection. · Drink extra water for the next day or two. This will help make the urine less concentrated and help wash out the bacteria causing the infection. (If you have kidney, heart, or liver disease and have to limit your fluids, talk with your doctor before you increase your fluid intake.)  · Avoid drinks that are carbonated or have caffeine. They can irritate the bladder. · Urinate often. Try to empty your bladder each time. · To relieve pain, take a hot bath or lay a heating pad (set on low) over your lower belly or genital area. Never go to sleep with a heating pad in place. To help prevent UTIs  · Drink plenty of fluids, enough so that your urine is light yellow or clear like water.  If you have kidney, heart, or liver disease and have to limit fluids, talk with your doctor before you increase the amount of fluids you drink. · Urinate when you have the urge. Do not hold your urine for a long time. Urinate before you go to sleep. · Keep your penis clean. Catheter care  If you have a drainage tube (catheter) in place, the following steps will help you care for it. · Always wash your hands before and after touching your catheter. · Check the area around the urethra for inflammation or signs of infection. Signs of infection include irritated, swollen, red, or tender skin, or pus around the catheter. · Clean the area around the catheter with soap and water two times a day. Dry with a clean towel afterward. · Do not apply powder or lotion to the skin around the catheter. To empty the urine collection bag  · Wash your hands with soap and water. · Without touching the drain spout, remove the spout from its sleeve at the bottom of the collection bag. Open the valve on the spout. · Let the urine flow out of the bag and into the toilet or a container. Do not let the tubing or drain spout touch anything. · After you empty the bag, clean the end of the drain spout with tissue and water. Close the valve and put the drain spout back into its sleeve at the bottom of the collection bag. · Wash your hands with soap and water. When should you call for help? Call your doctor now or seek immediate medical care if:    · Symptoms such as a fever, chills, nausea, or vomiting get worse or happen for the first time.     · You have new pain in your back just below your rib cage. This is called flank pain.     · There is new blood or pus in your urine.     · You are not able to take or keep down your antibiotics.    Watch closely for changes in your health, and be sure to contact your doctor if:    · You are not getting better after taking an antibiotic for 2 days.     · Your symptoms go away but then come back. Where can you learn more? Go to http://alistair-kalpesh.info/.   Enter E522 in the search box to learn more about \"Urinary Tract Infections in Men: Care Instructions. \"  Current as of: December 19, 2018  Content Version: 12.2  © 6481-3618 [employer]. Care instructions adapted under license by [employer] (which disclaims liability or warranty for this information). If you have questions about a medical condition or this instruction, always ask your healthcare professional. [address] any warranty or liability for your use of this information.

## 2020-02-11 LAB
BACTERIA SPEC CULT: NORMAL
SERVICE CMNT-IMP: NORMAL

## 2020-04-29 ENCOUNTER — TELEPHONE (OUTPATIENT)
Dept: FAMILY MEDICINE CLINIC | Age: 35
End: 2020-04-29

## 2020-04-29 NOTE — TELEPHONE ENCOUNTER
Pt called requesting a nurse call, believes has infection in his throat with soreness and swelling, wanted to speak to nurse before deciding to go into urgent care.     Please adv [phone]

## 2020-04-30 ENCOUNTER — TELEPHONE (OUTPATIENT)
Dept: FAMILY MEDICINE CLINIC | Age: 35
End: 2020-04-30

## 2020-05-06 ENCOUNTER — VIRTUAL VISIT (OUTPATIENT)
Dept: FAMILY MEDICINE CLINIC | Age: 35
End: 2020-05-06

## 2020-05-06 DIAGNOSIS — M54.30 SCIATICA, UNSPECIFIED LATERALITY: ICD-10-CM

## 2020-05-06 DIAGNOSIS — Z87.19 HISTORY OF PANCREATITIS: ICD-10-CM

## 2020-05-06 DIAGNOSIS — F41.1 GAD (GENERALIZED ANXIETY DISORDER): Primary | ICD-10-CM

## 2020-05-06 DIAGNOSIS — I10 ESSENTIAL HYPERTENSION: ICD-10-CM

## 2020-05-06 RX ORDER — GABAPENTIN 100 MG/1
100 CAPSULE ORAL 3 TIMES DAILY
Qty: 90 CAP | Refills: 1 | Status: SHIPPED | OUTPATIENT
Start: 2020-05-06 | End: 2020-07-12 | Stop reason: DRUGHIGH

## 2020-05-06 NOTE — PROGRESS NOTES
Del Montes De Oca is a 37 y.o. male who was seen by synchronous (real-time) audio-video technology on 5/6/2020. Consent: Del Montes De Oca, who was seen by synchronous (real-time) audio-video technology, and/or his healthcare decision maker, is aware that this patient-initiated, Telehealth encounter on 5/6/2020 is a billable service, with coverage as determined by his insurance carrier. He is aware that he may receive a bill and has provided verbal consent to proceed: Yes. I am working from home. Patient is at his home. Subjective:   Del Montes De Oca is a 37 y.o. male who was seen for anxiety and back pain    Patient states he takes Paxil 40mg a day and his anxiety is not controlled. He use to take Neurontin for his chronic sciatica and it also helped with his anxiety. He has been off of this for over a year. He use to be on 800mg a day. His sciatica has also been acting up lately. Buspar made his anxiety worse. Prior to Admission medications    Medication Sig Start Date End Date Taking? Authorizing Provider   ondansetron (ZOFRAN ODT) 4 mg disintegrating tablet Take 1-2 tablets every 6-8 hours as needed for nausea and vomiting. 2/9/20   Luz Dean PA-C   lisinopril (PRINIVIL, ZESTRIL) 20 mg tablet Take 1 Tab by mouth daily. 12/17/19   Shukri Oakley NP   PARoxetine (PAXIL) 20 mg tablet Take 2 Tabs by mouth daily. 12/17/19   Shukri Oakley NP   fluticasone propionate (FLONASE) 50 mcg/actuation nasal spray 2 Sprays by Both Nostrils route daily. 12/1/19   CARLITA Bermeo   guaiFENesin ER (MUCINEX) 600 mg ER tablet Take 1 Tab by mouth two (2) times a day. 12/1/19   Beena Damon PA   Camphor-Eucalyptus Oil-Menthol (VICKS VAPORUB) 4.8-1.2-2.6 % oint 1 Actuation(s) by Apply Externally route three (3) times daily as needed for Cough or Other (congestion).  12/1/19   Ananth Eric, PA   camphor-eucalyptus-menthol (VICKS VAPOSTEAM) liqd 1 Actuation(s) by Does Not Apply route three (3) times daily as needed for Cough or Other (congestion). 12/1/19   Lucho Damon PA   Vaporizers ([employer] WARM STEAM VAPORIZER) misc 1 Actuation(s) by Does Not Apply route three (3) times daily as needed for Cough or Other (congestion). 12/1/19   CARLITA Bhardwaj     Allergies   Allergen Reactions    Amoxicillin Swelling    Contrast Dye [Iodine] Hives     Pt had iv contrast prior to this study without problems, developed hives on 07/04/15 5 minutes post injection    Iodinated Contrast Media Hives     Pt got hives when he received IV contrast in the past    Penicillins Hives and Swelling    Wellbutrin [Bupropion] Other (comments)     Caused aggression       Patient Active Problem List    Diagnosis Date Noted    Mild depression ([facility] [address] 06/08/2018    MICHELLE (generalized anxiety disorder) 07/08/2015    Hyperlipidemia 12/05/2013    HTN (hypertension) 09/12/2012     Current Outpatient Medications   Medication Sig Dispense Refill    gabapentin (NEURONTIN) 100 mg capsule Take 1 Cap by mouth three (3) times daily. Max Daily Amount: 300 mg. 90 Cap 1    ondansetron (ZOFRAN ODT) 4 mg disintegrating tablet Take 1-2 tablets every 6-8 hours as needed for nausea and vomiting. 10 Tab 0    lisinopril (PRINIVIL, ZESTRIL) 20 mg tablet Take 1 Tab by mouth daily. 30 Tab 4    PARoxetine (PAXIL) 20 mg tablet Take 2 Tabs by mouth daily. 60 Tab 4    fluticasone propionate (FLONASE) 50 mcg/actuation nasal spray 2 Sprays by Both Nostrils route daily. 1 Bottle 0    guaiFENesin ER (MUCINEX) 600 mg ER tablet Take 1 Tab by mouth two (2) times a day. 20 Tab 0    Camphor-Eucalyptus Oil-Menthol (VICKS VAPORUB) 4.8-1.2-2.6 % oint 1 Actuation(s) by Apply Externally route three (3) times daily as needed for Cough or Other (congestion). 50 g 0    camphor-eucalyptus-menthol (VICKS VAPOSTEAM) liqd 1 Actuation(s) by Does Not Apply route three (3) times daily as needed for Cough or Other (congestion).  1 Bottle 0    Vaporizers ([employer] WARM STEAM VAPORIZER) misc 1 Actuation(s) by Does Not Apply route three (3) times daily as needed for Cough or Other (congestion). 1 Each 0     Allergies   Allergen Reactions    Amoxicillin Swelling    Contrast Dye [Iodine] Hives     Pt had iv contrast prior to this study without problems, developed hives on 07/04/15 5 minutes post injection    Iodinated Contrast Media Hives     Pt got hives when he received IV contrast in the past    Penicillins Hives and Swelling    Wellbutrin [Bupropion] Other (comments)     Caused aggression     Past Medical History:   Diagnosis Date    Anxiety     Cholelithiasis 08/18/2013    Note on U/S    Chronic gastritis 10/17/2014    Dr. Doyle Gupta (EGD Result) H. Pylori Neg    Depression     Dog bite of left hand including fingers with infection 11/16/2014    Adm 11/18/14 for IV antibiotics.  ETOH abuse     GERD (gastroesophageal reflux disease)     Hepatic steatosis 02/06/2014    Noted on U/S.     History of plasmapheresis 02/2014    Hypertriglyceridemia, Severe Pancreatitis    Hypertension     Hypertriglyceridemia     Hypomagnesemia 07/24/2016    1.6 mg/dL    Non compliance with medical treatment     Osteomyelitis of left hand ([address] 11/22/2014    MRI Finding @ [facility] 2nd MCP joint.  Pancreatitis 08/17/2013    Highest on Record (2/6/14) 1843. Dr. Doyle Gupta    PICC (peripherally inserted central catheter) in place 95/00/3146    R Basilic Vein (IV Antibiotics for LUE Hand Osteomyelitis).  Rectal bleeding     Substance abuse (HCC)     Several UDS + Opiates.  Vitamin D deficiency 02/17/2014    11.6ng/mL ( ng/mL)     Past Surgical History:   Procedure Laterality Date    HX CYST INCISION AND DRAINAGE Left 12/01/2014    L[facility] 2nd digit abscess I&D.      HX ENDOSCOPY  10/17/2014    Dr. Doyle Gupta: EGD/Colonoscopy    HX HEENT      wisdom tooth extraction    HX KNEE ARTHROSCOPY Right      Family History   Problem Relation Age of Onset    Elevated Lipids Mother     Thyroid Disease Mother     Hypertension Father     Alcohol abuse Maternal Grandmother     MS Paternal Grandmother      Social History     Tobacco Use    Smoking status: Former Smoker     Last attempt to quit: 2012     Years since quittin.6    Smokeless tobacco: Never Used    Tobacco comment: 2018    Substance Use Topics    Alcohol use: Not Currently     Alcohol/week: 13.3 standard drinks     Types: 8 Cans of beer, 8 Standard drinks or equivalent per week       Review of Systems   Constitutional: Negative. HENT: Negative. Respiratory: Negative. Cardiovascular: Negative. Musculoskeletal: Positive for joint pain (sciatica). Psychiatric/Behavioral: The patient is nervous/anxious. Objective:   Vital Signs: (As obtained by patient/caregiver at home)  There were no vitals taken for this visit. [INSTRUCTIONS:  \"[x]\" Indicates a positive item  \"[]\" Indicates a negative item  -- DELETE ALL ITEMS NOT EXAMINED]    Constitutional: [x] Appears well-developed and well-nourished [x] No apparent distress          Mental status: [x] Alert and awake  [x] Oriented to person/place/time [x] Able to follow commands        Eyes:   EOM    [x]  Normal       Sclera  [x]  Normal              Discharge [x]  None visible       HENT: [x] Normocephalic, atraumatic        Pulmonary/Chest: [x] Respiratory effort normal   [x] No visualized signs of difficulty breathing or respiratory distress       Musculoskeletal:   [x] Normal gait with no signs of ataxia         [x] Normal range of motion of neck           Neurological:        [x] No Facial Asymmetry (Cranial nerve 7 motor function) (limited exam due to video visit)                Psychiatric:       [x] Normal Affect          Diagnoses and all orders for this visit:    MICHELLE (generalized anxiety disorder)    Sciatica, unspecified laterality  -     gabapentin (NEURONTIN) 100 mg capsule; Take 1 Cap by mouth three (3) times daily.  Max Daily Amount: 300 mg., Normal, Disp-90 Cap, R-1    Essential hypertension    History of pancreatitis      PLAN:  We discussed his symptoms and treatment options. Pt will continue his paxil 40mg a day  Pt will restart the gabapentin 100mg tid  We discussed Atenolol as a treatment option for his anxiety. I have discussed the diagnosis with the patient and the intended plan as seen in the above orders. The patient has received an after-visit summary and questions were answered concerning future plans. I have discussed medication side effects and warnings with the patient as well. Patient will call for further questions. Follow-up and Dispositions    · Return in about 4 weeks (around 6/3/2020) for virtual.               Reba Gowers is a 37 y.o. male who was evaluated by a video visit encounter for concerns as above. Patient identification was verified prior to start of the visit. A caregiver was present when appropriate. Due to this being a TeleHealth encounter (During DAJXV-90 public health emergency), evaluation of the following organ systems was limited: Vitals/Constitutional/EENT/Resp/CV/GI//MS/Neuro/Skin/Heme-Lymph-Imm. Pursuant to the emergency declaration under the [facility] waiver authority and the [employer] Act, this Virtual  Visit was conducted, with patient's (and/or legal guardian's) consent, to reduce the patient's risk of exposure to COVID-19 and provide necessary medical care. Services were provided through a video synchronous discussion virtually to substitute for in-person clinic visit. Patient and provider were located at their individual homes.       Ashlee Weston NP

## 2020-05-07 ENCOUNTER — ANESTHESIA (OUTPATIENT)
Dept: SURGERY | Age: 35
End: 2020-05-07
Payer: MEDICAID

## 2020-05-07 ENCOUNTER — ANESTHESIA EVENT (OUTPATIENT)
Dept: SURGERY | Age: 35
End: 2020-05-07
Payer: MEDICAID

## 2020-05-07 ENCOUNTER — APPOINTMENT (OUTPATIENT)
Dept: CT IMAGING | Age: 35
End: 2020-05-07
Attending: EMERGENCY MEDICINE
Payer: MEDICAID

## 2020-05-07 ENCOUNTER — APPOINTMENT (OUTPATIENT)
Dept: GENERAL RADIOLOGY | Age: 35
End: 2020-05-07
Attending: EMERGENCY MEDICINE
Payer: MEDICAID

## 2020-05-07 ENCOUNTER — HOSPITAL ENCOUNTER (OUTPATIENT)
Age: 35
Setting detail: OBSERVATION
Discharge: HOME | End: 2020-05-08
Attending: EMERGENCY MEDICINE | Admitting: COLON & RECTAL SURGERY
Payer: MEDICAID

## 2020-05-07 DIAGNOSIS — K35.80 ACUTE APPENDICITIS, UNSPECIFIED ACUTE APPENDICITIS TYPE: Primary | ICD-10-CM

## 2020-05-07 LAB
ABO + RH BLD: NORMAL
ALBUMIN SERPL-MCNC: 4.1 G/DL (ref 3.4–5)
ALBUMIN/GLOB SERPL: 1.1 {RATIO} (ref 0.8–1.7)
ALP SERPL-CCNC: 60 U/L (ref 45–117)
ALT SERPL-CCNC: 30 U/L (ref 16–61)
ANION GAP SERPL CALC-SCNC: 15 MMOL/L (ref 3–18)
APPEARANCE UR: CLEAR
AST SERPL-CCNC: 65 U/L (ref 10–38)
ATRIAL RATE: 131 BPM
BASOPHILS # BLD: 0.1 K/UL (ref 0–0.1)
BASOPHILS NFR BLD: 0 % (ref 0–2)
BILIRUB SERPL-MCNC: 1.1 MG/DL (ref 0.2–1)
BILIRUB UR QL: NEGATIVE
BLOOD GROUP ANTIBODIES SERPL: NORMAL
BUN SERPL-MCNC: 9 MG/DL (ref 7–18)
BUN/CREAT SERPL: 10 (ref 12–20)
CALCIUM SERPL-MCNC: 8.2 MG/DL (ref 8.5–10.1)
CALCULATED P AXIS, ECG09: 56 DEGREES
CALCULATED R AXIS, ECG10: 12 DEGREES
CALCULATED T AXIS, ECG11: 55 DEGREES
CHLORIDE SERPL-SCNC: 101 MMOL/L (ref 100–111)
CK MB CFR SERPL CALC: NORMAL % (ref 0–4)
CK MB SERPL-MCNC: <1 NG/ML (ref 5–25)
CK SERPL-CCNC: 174 U/L (ref 39–308)
CO2 SERPL-SCNC: 19 MMOL/L (ref 21–32)
COLOR UR: YELLOW
CREAT SERPL-MCNC: 0.94 MG/DL (ref 0.6–1.3)
DIAGNOSIS, 93000: NORMAL
DIFFERENTIAL METHOD BLD: ABNORMAL
EOSINOPHIL # BLD: 0.3 K/UL (ref 0–0.4)
EOSINOPHIL NFR BLD: 2 % (ref 0–5)
ERYTHROCYTE [DISTWIDTH] IN BLOOD BY AUTOMATED COUNT: 12.1 % (ref 11.6–14.5)
GLOBULIN SER CALC-MCNC: 3.7 G/DL (ref 2–4)
GLUCOSE SERPL-MCNC: 108 MG/DL (ref 74–99)
GLUCOSE UR STRIP.AUTO-MCNC: NEGATIVE MG/DL
HCT VFR BLD AUTO: 41.8 % (ref 36–48)
HGB BLD-MCNC: 15 G/DL (ref 13–16)
HGB UR QL STRIP: NEGATIVE
INR PPP: 1 (ref 0.8–1.2)
KETONES UR QL STRIP.AUTO: NEGATIVE MG/DL
LACTATE BLD-SCNC: 1.39 MMOL/L (ref 0.4–2)
LEUKOCYTE ESTERASE UR QL STRIP.AUTO: NEGATIVE
LIPASE SERPL-CCNC: 105 U/L (ref 73–393)
LYMPHOCYTES # BLD: 1.2 K/UL (ref 0.9–3.6)
LYMPHOCYTES NFR BLD: 8 % (ref 21–52)
MCH RBC QN AUTO: 32.6 PG (ref 24–34)
MCHC RBC AUTO-ENTMCNC: 35.9 G/DL (ref 31–37)
MCV RBC AUTO: 90.9 FL (ref 74–97)
MONOCYTES # BLD: 1.4 K/UL (ref 0.05–1.2)
MONOCYTES NFR BLD: 9 % (ref 3–10)
NEUTS SEG # BLD: 11.8 K/UL (ref 1.8–8)
NEUTS SEG NFR BLD: 81 % (ref 40–73)
NITRITE UR QL STRIP.AUTO: NEGATIVE
P-R INTERVAL, ECG05: 136 MS
PH UR STRIP: 6.5 [PH] (ref 5–8)
PLATELET # BLD AUTO: 242 K/UL (ref 135–420)
PMV BLD AUTO: 9.1 FL (ref 9.2–11.8)
POTASSIUM SERPL-SCNC: 4.3 MMOL/L (ref 3.5–5.5)
PROT SERPL-MCNC: 7.8 G/DL (ref 6.4–8.2)
PROT UR STRIP-MCNC: NEGATIVE MG/DL
PROTHROMBIN TIME: 13.2 SEC (ref 11.5–15.2)
Q-T INTERVAL, ECG07: 380 MS
QRS DURATION, ECG06: 84 MS
QTC CALCULATION (BEZET), ECG08: 561 MS
RBC # BLD AUTO: 4.6 M/UL (ref 4.7–5.5)
SODIUM SERPL-SCNC: 135 MMOL/L (ref 136–145)
SP GR UR REFRACTOMETRY: <1.005 (ref 1–1.03)
SPECIMEN EXP DATE BLD: NORMAL
TROPONIN I SERPL-MCNC: <0.02 NG/ML (ref 0–0.04)
UROBILINOGEN UR QL STRIP.AUTO: 0.2 EU/DL (ref 0.2–1)
VENTRICULAR RATE, ECG03: 131 BPM
WBC # BLD AUTO: 14.7 K/UL (ref 4.6–13.2)

## 2020-05-07 PROCEDURE — 83690 ASSAY OF LIPASE: CPT

## 2020-05-07 PROCEDURE — 77030040830 HC CATH URETH FOL MDII -A: Performed by: COLON & RECTAL SURGERY

## 2020-05-07 PROCEDURE — 81003 URINALYSIS AUTO W/O SCOPE: CPT

## 2020-05-07 PROCEDURE — 96365 THER/PROPH/DIAG IV INF INIT: CPT

## 2020-05-07 PROCEDURE — 83605 ASSAY OF LACTIC ACID: CPT

## 2020-05-07 PROCEDURE — 93005 ELECTROCARDIOGRAM TRACING: CPT

## 2020-05-07 PROCEDURE — 77030012770 HC TRCR OPT FX AMR -B: Performed by: COLON & RECTAL SURGERY

## 2020-05-07 PROCEDURE — 76060000033 HC ANESTHESIA 1 TO 1.5 HR: Performed by: COLON & RECTAL SURGERY

## 2020-05-07 PROCEDURE — 85610 PROTHROMBIN TIME: CPT

## 2020-05-07 PROCEDURE — 96374 THER/PROPH/DIAG INJ IV PUSH: CPT

## 2020-05-07 PROCEDURE — 76010000149 HC OR TIME 1 TO 1.5 HR: Performed by: COLON & RECTAL SURGERY

## 2020-05-07 PROCEDURE — 86900 BLOOD TYPING SEROLOGIC ABO: CPT

## 2020-05-07 PROCEDURE — 74011250636 HC RX REV CODE- 250/636: Performed by: EMERGENCY MEDICINE

## 2020-05-07 PROCEDURE — 71045 X-RAY EXAM CHEST 1 VIEW: CPT

## 2020-05-07 PROCEDURE — 74011250637 HC RX REV CODE- 250/637: Performed by: COLON & RECTAL SURGERY

## 2020-05-07 PROCEDURE — 77030036733 HC ENDOLP LIG VCRL SUT J&J -C: Performed by: COLON & RECTAL SURGERY

## 2020-05-07 PROCEDURE — 99285 EMERGENCY DEPT VISIT HI MDM: CPT

## 2020-05-07 PROCEDURE — 74011250636 HC RX REV CODE- 250/636: Performed by: NURSE ANESTHETIST, CERTIFIED REGISTERED

## 2020-05-07 PROCEDURE — 87040 BLOOD CULTURE FOR BACTERIA: CPT

## 2020-05-07 PROCEDURE — 77030020829: Performed by: COLON & RECTAL SURGERY

## 2020-05-07 PROCEDURE — 99218 HC RM OBSERVATION: CPT

## 2020-05-07 PROCEDURE — 77030009851 HC PCH RTVR ENDOSC AMR -B: Performed by: COLON & RECTAL SURGERY

## 2020-05-07 PROCEDURE — 77030031139 HC SUT VCRL2 J&J -A: Performed by: COLON & RECTAL SURGERY

## 2020-05-07 PROCEDURE — 76210000016 HC OR PH I REC 1 TO 1.5 HR: Performed by: COLON & RECTAL SURGERY

## 2020-05-07 PROCEDURE — 77030018706 HC CORD MPLR COVD -A: Performed by: COLON & RECTAL SURGERY

## 2020-05-07 PROCEDURE — 74011000250 HC RX REV CODE- 250

## 2020-05-07 PROCEDURE — 77030040361 HC SLV COMPR DVT MDII -B: Performed by: COLON & RECTAL SURGERY

## 2020-05-07 PROCEDURE — 65270000029 HC RM PRIVATE

## 2020-05-07 PROCEDURE — 77030027138 HC INCENT SPIROMETER -A

## 2020-05-07 PROCEDURE — 77030002933 HC SUT MCRYL J&J -A: Performed by: COLON & RECTAL SURGERY

## 2020-05-07 PROCEDURE — 82550 ASSAY OF CK (CPK): CPT

## 2020-05-07 PROCEDURE — 74011250636 HC RX REV CODE- 250/636: Performed by: COLON & RECTAL SURGERY

## 2020-05-07 PROCEDURE — 77030008756 HC TU IRR SUC STRY -B: Performed by: COLON & RECTAL SURGERY

## 2020-05-07 PROCEDURE — 77030008608 HC TRCR ENDOSC SMTH AMR -B: Performed by: COLON & RECTAL SURGERY

## 2020-05-07 PROCEDURE — 77030011296 HC FCPS GRSP ENDOSC J&J -B: Performed by: COLON & RECTAL SURGERY

## 2020-05-07 PROCEDURE — 74011000250 HC RX REV CODE- 250: Performed by: NURSE ANESTHETIST, CERTIFIED REGISTERED

## 2020-05-07 PROCEDURE — 96366 THER/PROPH/DIAG IV INF ADDON: CPT

## 2020-05-07 PROCEDURE — 85025 COMPLETE CBC W/AUTO DIFF WBC: CPT

## 2020-05-07 PROCEDURE — 80053 COMPREHEN METABOLIC PANEL: CPT

## 2020-05-07 PROCEDURE — 96375 TX/PRO/DX INJ NEW DRUG ADDON: CPT

## 2020-05-07 PROCEDURE — 74011000250 HC RX REV CODE- 250: Performed by: COLON & RECTAL SURGERY

## 2020-05-07 PROCEDURE — 74011250636 HC RX REV CODE- 250/636

## 2020-05-07 PROCEDURE — 77030040922 HC BLNKT HYPOTHRM STRY -A: Performed by: COLON & RECTAL SURGERY

## 2020-05-07 PROCEDURE — 77030002967 HC SUT PDS J&J -B: Performed by: COLON & RECTAL SURGERY

## 2020-05-07 PROCEDURE — 77030018836 HC SOL IRR NACL ICUM -A: Performed by: COLON & RECTAL SURGERY

## 2020-05-07 PROCEDURE — 88304 TISSUE EXAM BY PATHOLOGIST: CPT

## 2020-05-07 PROCEDURE — 0DTJ4ZZ RESECTION OF APPENDIX, PERCUTANEOUS ENDOSCOPIC APPROACH: ICD-10-PCS | Performed by: COLON & RECTAL SURGERY

## 2020-05-07 PROCEDURE — 77030011810 HC STPLR ENDOSC J&J -G: Performed by: COLON & RECTAL SURGERY

## 2020-05-07 PROCEDURE — 74011250636 HC RX REV CODE- 250/636: Performed by: ANESTHESIOLOGY

## 2020-05-07 PROCEDURE — 74176 CT ABD & PELVIS W/O CONTRAST: CPT

## 2020-05-07 RX ORDER — SODIUM CHLORIDE, SODIUM LACTATE, POTASSIUM CHLORIDE, CALCIUM CHLORIDE 600; 310; 30; 20 MG/100ML; MG/100ML; MG/100ML; MG/100ML
75 INJECTION, SOLUTION INTRAVENOUS CONTINUOUS
Status: DISCONTINUED | OUTPATIENT
Start: 2020-05-07 | End: 2020-05-07 | Stop reason: HOSPADM

## 2020-05-07 RX ORDER — KETAMINE HYDROCHLORIDE 50 MG/ML
INJECTION, SOLUTION INTRAMUSCULAR; INTRAVENOUS AS NEEDED
Status: DISCONTINUED | OUTPATIENT
Start: 2020-05-07 | End: 2020-05-07 | Stop reason: HOSPADM

## 2020-05-07 RX ORDER — LABETALOL HCL 20 MG/4 ML
SYRINGE (ML) INTRAVENOUS
Status: COMPLETED
Start: 2020-05-07 | End: 2020-05-07

## 2020-05-07 RX ORDER — ROCURONIUM BROMIDE 10 MG/ML
INJECTION, SOLUTION INTRAVENOUS AS NEEDED
Status: DISCONTINUED | OUTPATIENT
Start: 2020-05-07 | End: 2020-05-07 | Stop reason: HOSPADM

## 2020-05-07 RX ORDER — DIPHENHYDRAMINE HYDROCHLORIDE 50 MG/ML
12.5 INJECTION, SOLUTION INTRAMUSCULAR; INTRAVENOUS
Status: DISCONTINUED | OUTPATIENT
Start: 2020-05-07 | End: 2020-05-07 | Stop reason: HOSPADM

## 2020-05-07 RX ORDER — LEVOFLOXACIN 5 MG/ML
750 INJECTION, SOLUTION INTRAVENOUS EVERY 24 HOURS
Status: DISCONTINUED | OUTPATIENT
Start: 2020-05-07 | End: 2020-05-08 | Stop reason: HOSPADM

## 2020-05-07 RX ORDER — MIDAZOLAM HYDROCHLORIDE 1 MG/ML
INJECTION, SOLUTION INTRAMUSCULAR; INTRAVENOUS
Status: COMPLETED
Start: 2020-05-07 | End: 2020-05-07

## 2020-05-07 RX ORDER — SODIUM CHLORIDE, SODIUM LACTATE, POTASSIUM CHLORIDE, CALCIUM CHLORIDE 600; 310; 30; 20 MG/100ML; MG/100ML; MG/100ML; MG/100ML
100 INJECTION, SOLUTION INTRAVENOUS CONTINUOUS
Status: DISCONTINUED | OUTPATIENT
Start: 2020-05-07 | End: 2020-05-08 | Stop reason: HOSPADM

## 2020-05-07 RX ORDER — HYDROMORPHONE HYDROCHLORIDE 2 MG/ML
0.5 INJECTION, SOLUTION INTRAMUSCULAR; INTRAVENOUS; SUBCUTANEOUS
Status: DISCONTINUED | OUTPATIENT
Start: 2020-05-07 | End: 2020-05-07 | Stop reason: HOSPADM

## 2020-05-07 RX ORDER — KETOROLAC TROMETHAMINE 15 MG/ML
INJECTION, SOLUTION INTRAMUSCULAR; INTRAVENOUS AS NEEDED
Status: DISCONTINUED | OUTPATIENT
Start: 2020-05-07 | End: 2020-05-07 | Stop reason: HOSPADM

## 2020-05-07 RX ORDER — LABETALOL HCL 20 MG/4 ML
SYRINGE (ML) INTRAVENOUS AS NEEDED
Status: DISCONTINUED | OUTPATIENT
Start: 2020-05-07 | End: 2020-05-07 | Stop reason: HOSPADM

## 2020-05-07 RX ORDER — ACETAMINOPHEN 500 MG
1000 TABLET ORAL EVERY 6 HOURS
Status: DISCONTINUED | OUTPATIENT
Start: 2020-05-07 | End: 2020-05-08 | Stop reason: HOSPADM

## 2020-05-07 RX ORDER — DIPHENHYDRAMINE HYDROCHLORIDE 50 MG/ML
25 INJECTION, SOLUTION INTRAMUSCULAR; INTRAVENOUS
Status: DISCONTINUED | OUTPATIENT
Start: 2020-05-07 | End: 2020-05-08 | Stop reason: HOSPADM

## 2020-05-07 RX ORDER — DEXAMETHASONE SODIUM PHOSPHATE 4 MG/ML
INJECTION, SOLUTION INTRA-ARTICULAR; INTRALESIONAL; INTRAMUSCULAR; INTRAVENOUS; SOFT TISSUE AS NEEDED
Status: DISCONTINUED | OUTPATIENT
Start: 2020-05-07 | End: 2020-05-07 | Stop reason: HOSPADM

## 2020-05-07 RX ORDER — MORPHINE SULFATE 2 MG/ML
2 INJECTION, SOLUTION INTRAMUSCULAR; INTRAVENOUS
Status: DISCONTINUED | OUTPATIENT
Start: 2020-05-07 | End: 2020-05-08 | Stop reason: HOSPADM

## 2020-05-07 RX ORDER — MIDAZOLAM HYDROCHLORIDE 1 MG/ML
2 INJECTION, SOLUTION INTRAMUSCULAR; INTRAVENOUS ONCE
Status: COMPLETED | OUTPATIENT
Start: 2020-05-07 | End: 2020-05-07

## 2020-05-07 RX ORDER — LIDOCAINE HYDROCHLORIDE 20 MG/ML
INJECTION, SOLUTION EPIDURAL; INFILTRATION; INTRACAUDAL; PERINEURAL AS NEEDED
Status: DISCONTINUED | OUTPATIENT
Start: 2020-05-07 | End: 2020-05-07 | Stop reason: HOSPADM

## 2020-05-07 RX ORDER — FENTANYL CITRATE 50 UG/ML
INJECTION, SOLUTION INTRAMUSCULAR; INTRAVENOUS AS NEEDED
Status: DISCONTINUED | OUTPATIENT
Start: 2020-05-07 | End: 2020-05-07 | Stop reason: HOSPADM

## 2020-05-07 RX ORDER — SODIUM CHLORIDE 0.9 % (FLUSH) 0.9 %
5-10 SYRINGE (ML) INJECTION AS NEEDED
Status: DISCONTINUED | OUTPATIENT
Start: 2020-05-07 | End: 2020-05-08 | Stop reason: HOSPADM

## 2020-05-07 RX ORDER — ONDANSETRON 2 MG/ML
INJECTION INTRAMUSCULAR; INTRAVENOUS AS NEEDED
Status: DISCONTINUED | OUTPATIENT
Start: 2020-05-07 | End: 2020-05-07 | Stop reason: HOSPADM

## 2020-05-07 RX ORDER — LORAZEPAM 2 MG/ML
1 INJECTION INTRAMUSCULAR
Status: COMPLETED | OUTPATIENT
Start: 2020-05-07 | End: 2020-05-07

## 2020-05-07 RX ORDER — NALBUPHINE HYDROCHLORIDE 10 MG/ML
5 INJECTION, SOLUTION INTRAMUSCULAR; INTRAVENOUS; SUBCUTANEOUS
Status: DISCONTINUED | OUTPATIENT
Start: 2020-05-07 | End: 2020-05-07 | Stop reason: HOSPADM

## 2020-05-07 RX ORDER — PROPOFOL 10 MG/ML
INJECTION, EMULSION INTRAVENOUS AS NEEDED
Status: DISCONTINUED | OUTPATIENT
Start: 2020-05-07 | End: 2020-05-07 | Stop reason: HOSPADM

## 2020-05-07 RX ORDER — ONDANSETRON 2 MG/ML
4 INJECTION INTRAMUSCULAR; INTRAVENOUS
Status: COMPLETED | OUTPATIENT
Start: 2020-05-07 | End: 2020-05-07

## 2020-05-07 RX ORDER — MIDAZOLAM HYDROCHLORIDE 1 MG/ML
INJECTION, SOLUTION INTRAMUSCULAR; INTRAVENOUS AS NEEDED
Status: DISCONTINUED | OUTPATIENT
Start: 2020-05-07 | End: 2020-05-07 | Stop reason: HOSPADM

## 2020-05-07 RX ORDER — SODIUM CHLORIDE 0.9 % (FLUSH) 0.9 %
5-40 SYRINGE (ML) INJECTION AS NEEDED
Status: DISCONTINUED | OUTPATIENT
Start: 2020-05-07 | End: 2020-05-07 | Stop reason: HOSPADM

## 2020-05-07 RX ORDER — SUCCINYLCHOLINE CHLORIDE 20 MG/ML
INJECTION INTRAMUSCULAR; INTRAVENOUS AS NEEDED
Status: DISCONTINUED | OUTPATIENT
Start: 2020-05-07 | End: 2020-05-07 | Stop reason: HOSPADM

## 2020-05-07 RX ORDER — LABETALOL HYDROCHLORIDE 5 MG/ML
5 INJECTION, SOLUTION INTRAVENOUS
Status: DISCONTINUED | OUTPATIENT
Start: 2020-05-07 | End: 2020-05-07 | Stop reason: HOSPADM

## 2020-05-07 RX ORDER — SODIUM CHLORIDE, SODIUM LACTATE, POTASSIUM CHLORIDE, CALCIUM CHLORIDE 600; 310; 30; 20 MG/100ML; MG/100ML; MG/100ML; MG/100ML
125 INJECTION, SOLUTION INTRAVENOUS CONTINUOUS
Status: DISCONTINUED | OUTPATIENT
Start: 2020-05-07 | End: 2020-05-07 | Stop reason: HOSPADM

## 2020-05-07 RX ORDER — METRONIDAZOLE 500 MG/100ML
500 INJECTION, SOLUTION INTRAVENOUS EVERY 8 HOURS
Status: DISCONTINUED | OUTPATIENT
Start: 2020-05-07 | End: 2020-05-08 | Stop reason: HOSPADM

## 2020-05-07 RX ORDER — MORPHINE SULFATE 2 MG/ML
4 INJECTION, SOLUTION INTRAMUSCULAR; INTRAVENOUS ONCE
Status: COMPLETED | OUTPATIENT
Start: 2020-05-07 | End: 2020-05-07

## 2020-05-07 RX ORDER — SODIUM CHLORIDE 0.9 % (FLUSH) 0.9 %
5-40 SYRINGE (ML) INJECTION EVERY 8 HOURS
Status: DISCONTINUED | OUTPATIENT
Start: 2020-05-07 | End: 2020-05-07 | Stop reason: HOSPADM

## 2020-05-07 RX ORDER — ONDANSETRON 2 MG/ML
4 INJECTION INTRAMUSCULAR; INTRAVENOUS ONCE
Status: DISCONTINUED | OUTPATIENT
Start: 2020-05-07 | End: 2020-05-07 | Stop reason: HOSPADM

## 2020-05-07 RX ORDER — MORPHINE SULFATE 2 MG/ML
INJECTION, SOLUTION INTRAMUSCULAR; INTRAVENOUS
Status: COMPLETED
Start: 2020-05-07 | End: 2020-05-07

## 2020-05-07 RX ORDER — ONDANSETRON 2 MG/ML
4 INJECTION INTRAMUSCULAR; INTRAVENOUS
Status: DISCONTINUED | OUTPATIENT
Start: 2020-05-07 | End: 2020-05-08 | Stop reason: HOSPADM

## 2020-05-07 RX ADMIN — FENTANYL CITRATE 50 MCG: 50 INJECTION INTRAMUSCULAR; INTRAVENOUS at 16:29

## 2020-05-07 RX ADMIN — DEXAMETHASONE SODIUM PHOSPHATE 4 MG: 4 INJECTION, SOLUTION INTRAMUSCULAR; INTRAVENOUS at 15:49

## 2020-05-07 RX ADMIN — SODIUM CHLORIDE 1000 ML: 900 INJECTION, SOLUTION INTRAVENOUS at 11:20

## 2020-05-07 RX ADMIN — FENTANYL CITRATE 150 MCG: 50 INJECTION INTRAMUSCULAR; INTRAVENOUS at 15:54

## 2020-05-07 RX ADMIN — LORAZEPAM 1 MG: 2 INJECTION, SOLUTION INTRAMUSCULAR; INTRAVENOUS at 11:13

## 2020-05-07 RX ADMIN — SUCCINYLCHOLINE CHLORIDE 100 MG: 20 INJECTION, SOLUTION INTRAMUSCULAR; INTRAVENOUS at 15:50

## 2020-05-07 RX ADMIN — FENTANYL CITRATE 50 MCG: 50 INJECTION INTRAMUSCULAR; INTRAVENOUS at 16:20

## 2020-05-07 RX ADMIN — LABETALOL 20 MG/4 ML (5 MG/ML) INTRAVENOUS SYRINGE 5 MG: at 16:13

## 2020-05-07 RX ADMIN — LIDOCAINE HYDROCHLORIDE 20 MG: 20 INJECTION, SOLUTION EPIDURAL; INFILTRATION; INTRACAUDAL; PERINEURAL at 15:50

## 2020-05-07 RX ADMIN — METRONIDAZOLE 500 MG: 500 INJECTION, SOLUTION INTRAVENOUS at 11:14

## 2020-05-07 RX ADMIN — ONDANSETRON 4 MG: 2 INJECTION, SOLUTION INTRAMUSCULAR; INTRAVENOUS at 08:17

## 2020-05-07 RX ADMIN — MIDAZOLAM 2 MG: 1 INJECTION INTRAMUSCULAR; INTRAVENOUS at 13:18

## 2020-05-07 RX ADMIN — ACETAMINOPHEN 1000 MG: 500 TABLET, FILM COATED ORAL at 20:40

## 2020-05-07 RX ADMIN — LEVOFLOXACIN 750 MG: 5 INJECTION, SOLUTION INTRAVENOUS at 11:20

## 2020-05-07 RX ADMIN — LABETALOL 20 MG/4 ML (5 MG/ML) INTRAVENOUS SYRINGE 5 MG: at 17:50

## 2020-05-07 RX ADMIN — FENTANYL CITRATE 100 MCG: 50 INJECTION INTRAMUSCULAR; INTRAVENOUS at 15:50

## 2020-05-07 RX ADMIN — SODIUM CHLORIDE, SODIUM LACTATE, POTASSIUM CHLORIDE, AND CALCIUM CHLORIDE 100 ML/HR: 600; 310; 30; 20 INJECTION, SOLUTION INTRAVENOUS at 20:26

## 2020-05-07 RX ADMIN — FAMOTIDINE 20 MG: 10 INJECTION INTRAVENOUS at 20:43

## 2020-05-07 RX ADMIN — ONDANSETRON 4 MG: 2 INJECTION INTRAMUSCULAR; INTRAVENOUS at 15:45

## 2020-05-07 RX ADMIN — MIDAZOLAM HYDROCHLORIDE 2 MG: 2 INJECTION, SOLUTION INTRAMUSCULAR; INTRAVENOUS at 15:45

## 2020-05-07 RX ADMIN — PROPOFOL 200 MG: 10 INJECTION, EMULSION INTRAVENOUS at 15:50

## 2020-05-07 RX ADMIN — KETOROLAC TROMETHAMINE 15 MG: 15 INJECTION, SOLUTION INTRAMUSCULAR; INTRAVENOUS at 15:49

## 2020-05-07 RX ADMIN — SODIUM CHLORIDE 1000 ML: 900 INJECTION, SOLUTION INTRAVENOUS at 08:17

## 2020-05-07 RX ADMIN — KETAMINE HYDROCHLORIDE 50 MG: 50 INJECTION INTRAMUSCULAR; INTRAVENOUS at 15:49

## 2020-05-07 RX ADMIN — SODIUM CHLORIDE 1000 ML: 900 INJECTION, SOLUTION INTRAVENOUS at 11:14

## 2020-05-07 RX ADMIN — SODIUM CHLORIDE, SODIUM LACTATE, POTASSIUM CHLORIDE, AND CALCIUM CHLORIDE 125 ML/HR: 600; 310; 30; 20 INJECTION, SOLUTION INTRAVENOUS at 13:01

## 2020-05-07 RX ADMIN — METRONIDAZOLE 500 MG: 500 INJECTION, SOLUTION INTRAVENOUS at 20:40

## 2020-05-07 RX ADMIN — LABETALOL 20 MG/4 ML (5 MG/ML) INTRAVENOUS SYRINGE 5 MG: at 17:19

## 2020-05-07 RX ADMIN — ROCURONIUM BROMIDE 10 MG: 10 INJECTION, SOLUTION INTRAVENOUS at 15:50

## 2020-05-07 RX ADMIN — MORPHINE SULFATE 4 MG: 2 INJECTION, SOLUTION INTRAMUSCULAR; INTRAVENOUS at 13:01

## 2020-05-07 RX ADMIN — MORPHINE SULFATE 2 MG: 2 INJECTION, SOLUTION INTRAMUSCULAR; INTRAVENOUS at 22:05

## 2020-05-07 RX ADMIN — MIDAZOLAM HYDROCHLORIDE 2 MG: 1 INJECTION, SOLUTION INTRAMUSCULAR; INTRAVENOUS at 13:18

## 2020-05-07 NOTE — ED TRIAGE NOTES
Patient states he woke up during the night with vomiting and RLQ abdominal pain. He states he had a fever this morning of 102. There is no fever during triage and patient denies taking any medication.

## 2020-05-07 NOTE — PROGRESS NOTES
Bedside and Verbal shift change report given to Caro RN (oncoming nurse) by Glenn Genao RN (offgoing nurse). Report included the following information SBAR and Kardex.

## 2020-05-07 NOTE — ANESTHESIA POSTPROCEDURE EVALUATION
Procedure(s):  LAPAROSCOPIC APPENDECTOMY. general    Anesthesia Post Evaluation      Multimodal analgesia: multimodal analgesia used between 6 hours prior to anesthesia start to PACU discharge  Patient location during evaluation: bedside  Patient participation: complete - patient participated  Level of consciousness: awake  Pain management: adequate  Airway patency: patent  Anesthetic complications: no  Cardiovascular status: stable  Respiratory status: acceptable  Hydration status: acceptable  Post anesthesia nausea and vomiting:  controlled      Vitals Value Taken Time   /100 5/7/2020  5:00 PM   Temp     Pulse     Resp     SpO2 100 % 5/7/2020  5:01 PM   Vitals shown include unvalidated device data.

## 2020-05-07 NOTE — ED NOTES
Patient came out of room stating something was \"going on with my heart\". EKG order placed and completed. Patient is sweating profusely. EKG sinus tachycardia.

## 2020-05-07 NOTE — ED NOTES
Patient updated, still awaiting lab results that had to be sent to [facility]. Patient resting comfortably.

## 2020-05-07 NOTE — H&P
HPI: Franny Ruano is a 37 y.o. male presenting with chief complain of abdominal pain. This began last night. He had one episode of vomiting followed by RLQ abdominal pain that is constant, sharp and severe. He felt feverish as well. No prior surgery or abdominal issues. Past Medical History:   Diagnosis Date    Anxiety     Cholelithiasis 08/18/2013    Note on U/S    Chronic gastritis 10/17/2014    Dr. Bruce Petersen (EGD Result) H. Pylori Neg    Depression     Dog bite of left hand including fingers with infection 11/16/2014    Adm 11/18/14 for IV antibiotics.  ETOH abuse     GERD (gastroesophageal reflux disease)     Hepatic steatosis 02/06/2014    Noted on U/S.     History of plasmapheresis 02/2014    Hypertriglyceridemia, Severe Pancreatitis    Hypertension     Hypertriglyceridemia     Hypomagnesemia 07/24/2016    1.6 mg/dL    Non compliance with medical treatment     Osteomyelitis of left hand ([facility] [address] 11/22/2014    MRI Finding @ LUE 2nd MCP joint.  Pancreatitis 08/17/2013    Highest on Record (2/6/14) 6313. Dr. Bruce Petersen    PICC (peripherally inserted central catheter) in place 47/61/0560    R Basilic Vein (IV Antibiotics for LUE Hand Osteomyelitis).  Rectal bleeding     Substance abuse (HCC)     Several UDS + Opiates.  Vitamin D deficiency 02/17/2014    11.6ng/mL ( ng/mL)       Past Surgical History:   Procedure Laterality Date    HX CYST INCISION AND DRAINAGE Left 12/01/2014    LUE 2nd digit abscess I&D.      HX ENDOSCOPY  10/17/2014    Dr. Bruce Petersen: EGD/Colonoscopy    HX HEENT      wisdom tooth extraction    HX KNEE ARTHROSCOPY Right        Family History   Problem Relation Age of Onset    Elevated Lipids Mother     Thyroid Disease Mother     Hypertension Father     Alcohol abuse Maternal Grandmother     MS Paternal Grandmother        Social History     Socioeconomic History    Marital status: SINGLE     Spouse name: Not on file    Number of children: Not on file    Years of education: Not on file    Highest education level: Not on file   Tobacco Use    Smoking status: Former Smoker     Last attempt to quit: 2012     Years since quittin.6    Smokeless tobacco: Never Used    Tobacco comment: 2018    Substance and Sexual Activity    Alcohol use: Not Currently     Alcohol/week: 13.3 standard drinks     Types: 8 Cans of beer, 8 Standard drinks or equivalent per week    Drug use: No       Review of Systems - aside from above all others negative    Outpatient Medications Marked as Taking for the 20 encounter [facility] Encounter)   Medication Sig Dispense Refill    gabapentin (NEURONTIN) 100 mg capsule Take 1 Cap by mouth three (3) times daily. Max Daily Amount: 300 mg. 90 Cap 1    ondansetron (ZOFRAN ODT) 4 mg disintegrating tablet Take 1-2 tablets every 6-8 hours as needed for nausea and vomiting. 10 Tab 0    lisinopril (PRINIVIL, ZESTRIL) 20 mg tablet Take 1 Tab by mouth daily. 30 Tab 4    PARoxetine (PAXIL) 20 mg tablet Take 2 Tabs by mouth daily. 60 Tab 4    fluticasone propionate (FLONASE) 50 mcg/actuation nasal spray 2 Sprays by Both Nostrils route daily. 1 Bottle 0    guaiFENesin ER (MUCINEX) 600 mg ER tablet Take 1 Tab by mouth two (2) times a day. 20 Tab 0    camphor-eucalyptus-menthol (VICKS VAPOSTEAM) liqd 1 Actuation(s) by Does Not Apply route three (3) times daily as needed for Cough or Other (congestion). 1 Bottle 0    Vaporizers ([employer] WARM STEAM VAPORIZER) misc 1 Actuation(s) by Does Not Apply route three (3) times daily as needed for Cough or Other (congestion).  1 Each 0       Allergies   Allergen Reactions    Amoxicillin Swelling    Contrast Dye [Iodine] Hives     Pt had iv contrast prior to this study without problems, developed hives on 07/04/15 5 minutes post injection    Iodinated Contrast Media Hives     Pt got hives when he received IV contrast in the past    Penicillins Hives and Swelling    Wellbutrin [Bupropion] Other (comments)     Caused aggression       Vitals:    05/07/20 0919 05/07/20 1030 05/07/20 1052 05/07/20 1302   BP:  (!) 170/101  (!) 169/92   Pulse:  (!) 120  (!) 104   Resp: 17 19     Temp:    98.5 °F (36.9 °C)   SpO2: 98% 100%  97%   Weight:   97.5 kg (215 lb)    Height:   6' 2\" (1.88 m)        Physical Exam  Constitutional:       Appearance: He is well-developed. HENT:      Head: Normocephalic and atraumatic. Eyes:      Conjunctiva/sclera: Conjunctivae normal.   Abdominal:      General: There is no distension. Palpations: Abdomen is soft. There is no mass. Tenderness: There is abdominal tenderness (RLQ, severe). Musculoskeletal: Normal range of motion. Lymphadenopathy:      Cervical: No cervical adenopathy. Skin:     General: Skin is warm and dry. Neurological:      Sensory: No sensory deficit. Psychiatric:         Speech: Speech normal.       CMP:   Lab Results   Component Value Date/Time     (L) 05/07/2020 08:00 AM    K 4.3 05/07/2020 08:00 AM     05/07/2020 08:00 AM    CO2 19 (L) 05/07/2020 08:00 AM    AGAP 15 05/07/2020 08:00 AM     (H) 05/07/2020 08:00 AM    BUN 9 05/07/2020 08:00 AM    CREA 0.94 05/07/2020 08:00 AM    GFRAA >60 05/07/2020 08:00 AM    GFRNA >60 05/07/2020 08:00 AM    CA 8.2 (L) 05/07/2020 08:00 AM    ALB 4.1 05/07/2020 08:00 AM    TP 7.8 05/07/2020 08:00 AM    GLOB 3.7 05/07/2020 08:00 AM    AGRAT 1.1 05/07/2020 08:00 AM    SGOT 65 (H) 05/07/2020 08:00 AM    ALT 30 05/07/2020 08:00 AM     CBC:   Lab Results   Component Value Date/Time    WBC 14.7 (H) 05/07/2020 08:00 AM    HGB 15.0 05/07/2020 08:00 AM    HCT 41.8 05/07/2020 08:00 AM     05/07/2020 08:00 AM     CT personally visualized; consistent with early appendicitis    Assessment / Plan    Likely early appendicitis  To OR for laparoscopic appendectomy    The diagnoses and plan were discussed with the patient. All questions answered.   Plan of care agreed to by all concerned.

## 2020-05-07 NOTE — ED PROVIDER NOTES
EMERGENCY DEPARTMENT HISTORY AND PHYSICAL EXAM    7:57 AM      Date: 5/7/2020  Patient Name: Zelalem Lange    History of Presenting Illness     Chief Complaint   Patient presents with    Abdominal Pain    Vomiting    Fever         History Provided By: Patient    Additional History (Context): Zelalem Lange is a 37 y.o. male with hypertension and hyperlipidemia who presents with abdominal pain. Patient states this started at 3 AM this morning. He states his pain is in the right lower quadrant. He feels nauseated and had 2 episodes of vomiting prior to arrival.  He states he took his temperature at home initially was 100.4 and then went up to 102. He is afebrile currently. He denies taking any over-the-counter medications. He denies cough, chest pain, shortness of breath. He states he had a UTI about a month ago was treated with antibiotics. He denies any smoking or recreational drug use. He does admit to alcohol use and drank last night.     PCP: Yao Londono NP        Current Facility-Administered Medications   Medication Dose Route Frequency Provider Last Rate Last Dose    sodium chloride (NS) flush 5-10 mL  5-10 mL IntraVENous PRN Bobby Mcbride DO        metroNIDAZOLE (FLAGYL) IVPB premix 500 mg  500 mg IntraVENous Q8H Bobby Mcbride DO        levoFLOXacin (LEVAQUIN) 750 mg in D5W IVPB  750 mg IntraVENous Q24H Bobby Mcbride DO        sodium chloride 0.9 % bolus infusion 1,000 mL  1,000 mL IntraVENous ONCE Bobby Mcbride DO        Followed by   [facility] sodium chloride 0.9 % bolus infusion 1,000 mL  1,000 mL IntraVENous ONCE Bobby Mcbride DO        Followed by   [facility] sodium chloride 0.9 % bolus infusion 859 mL  859 mL IntraVENous ONCE Bobby Mcbride DO        LORazepam (ATIVAN) injection 1 mg  1 mg IntraVENous NOW Tom Mcbride DO         Current Outpatient Medications   Medication Sig Dispense Refill    gabapentin (NEURONTIN) 100 mg capsule Take 1 Cap by mouth three (3) times daily. Max Daily Amount: 300 mg. 90 Cap 1    ondansetron (ZOFRAN ODT) 4 mg disintegrating tablet Take 1-2 tablets every 6-8 hours as needed for nausea and vomiting. 10 Tab 0    lisinopril (PRINIVIL, ZESTRIL) 20 mg tablet Take 1 Tab by mouth daily. 30 Tab 4    PARoxetine (PAXIL) 20 mg tablet Take 2 Tabs by mouth daily. 60 Tab 4    fluticasone propionate (FLONASE) 50 mcg/actuation nasal spray 2 Sprays by Both Nostrils route daily. 1 Bottle 0    guaiFENesin ER (MUCINEX) 600 mg ER tablet Take 1 Tab by mouth two (2) times a day. 20 Tab 0    Camphor-Eucalyptus Oil-Menthol (VICKS VAPORUB) 4.8-1.2-2.6 % oint 1 Actuation(s) by Apply Externally route three (3) times daily as needed for Cough or Other (congestion). 50 g 0    camphor-eucalyptus-menthol (VICKS VAPOSTEAM) liqd 1 Actuation(s) by Does Not Apply route three (3) times daily as needed for Cough or Other (congestion). 1 Bottle 0    Vaporizers ([employer] WARM STEAM VAPORIZER) misc 1 Actuation(s) by Does Not Apply route three (3) times daily as needed for Cough or Other (congestion). 1 Each 0       Past History     Past Medical History:  Past Medical History:   Diagnosis Date    Anxiety     Cholelithiasis 08/18/2013    Note on U/S    Chronic gastritis 10/17/2014    Dr. Gian Hannah (EGD Result) H. Pylori Neg    Depression     Dog bite of left hand including fingers with infection 11/16/2014    Adm 11/18/14 for IV antibiotics.  ETOH abuse     GERD (gastroesophageal reflux disease)     Hepatic steatosis 02/06/2014    Noted on U/S.     History of plasmapheresis 02/2014    Hypertriglyceridemia, Severe Pancreatitis    Hypertension     Hypertriglyceridemia     Hypomagnesemia 07/24/2016    1.6 mg/dL    Non compliance with medical treatment     Osteomyelitis of left hand ([address] 11/22/2014    MRI Finding @ LUE 2nd MCP joint.  Pancreatitis 08/17/2013    Highest on Record (2/6/14) 9582.  Dr. Gian Hannah    PICC (peripherally inserted central catheter) in place     R Basilic Vein (IV Antibiotics for LUE Hand Osteomyelitis).  Rectal bleeding     Substance abuse (HCC)     Several UDS + Opiates.  Vitamin D deficiency 2014    11.6ng/mL ( ng/mL)       Past Surgical History:  Past Surgical History:   Procedure Laterality Date    HX CYST INCISION AND DRAINAGE Left 2014    LUE 2nd digit abscess I&D.  HX ENDOSCOPY  10/17/2014    Dr. Phoebe Crawford: EGD/Colonoscopy    HX HEENT      wisdom tooth extraction    HX KNEE ARTHROSCOPY Right        Family History:  Family History   Problem Relation Age of Onset    Elevated Lipids Mother     Thyroid Disease Mother     Hypertension Father     Alcohol abuse Maternal Grandmother     MS Paternal Grandmother        Social History:  Social History     Tobacco Use    Smoking status: Former Smoker     Last attempt to quit: 2012     Years since quittin.6    Smokeless tobacco: Never Used    Tobacco comment: 2018    Substance Use Topics    Alcohol use: Not Currently     Alcohol/week: 13.3 standard drinks     Types: 8 Cans of beer, 8 Standard drinks or equivalent per week    Drug use: No       Allergies: Allergies   Allergen Reactions    Amoxicillin Swelling    Contrast Dye [Iodine] Hives     Pt had iv contrast prior to this study without problems, developed hives on 07/04/15 5 minutes post injection    Iodinated Contrast Media Hives     Pt got hives when he received IV contrast in the past    Penicillins Hives and Swelling    Wellbutrin [Bupropion] Other (comments)     Caused aggression         Review of Systems       Review of Systems   Constitutional: Negative. Negative for chills, diaphoresis and fever. HENT: Negative. Negative for congestion, rhinorrhea and sore throat. Eyes: Negative. Negative for pain, discharge and redness. Respiratory: Negative. Negative for cough, chest tightness, shortness of breath and wheezing. Cardiovascular: Negative. Negative for chest pain. Gastrointestinal: Negative. Negative for abdominal pain, constipation, diarrhea, nausea and vomiting. Genitourinary: Negative. Negative for dysuria, flank pain, frequency, hematuria and urgency. Musculoskeletal: Negative. Negative for back pain and neck pain. Skin: Negative. Negative for rash. Neurological: Negative. Negative for syncope, weakness, numbness and headaches. Psychiatric/Behavioral: Negative. All other systems reviewed and are negative. Physical Exam     Visit Vitals  BP (!) 170/101   Pulse (!) 120   Temp 98.9 °F (37.2 °C)   Resp 19   Ht 6' 2\" (1.88 m)   Wt 97.5 kg (215 lb)   SpO2 100%   BMI 27.60 kg/m²         Physical Exam  Vitals signs and nursing note reviewed. Constitutional:       General: He is not in acute distress. Appearance: Normal appearance. He is well-developed. He is not ill-appearing, toxic-appearing or diaphoretic. HENT:      Head: Normocephalic and atraumatic. Mouth/Throat:      Pharynx: No oropharyngeal exudate. Eyes:      General: No scleral icterus. Conjunctiva/sclera: Conjunctivae normal.      Pupils: Pupils are equal, round, and reactive to light. Neck:      Musculoskeletal: Normal range of motion and neck supple. Thyroid: No thyromegaly. Vascular: No hepatojugular reflux or JVD. Trachea: No tracheal deviation. Cardiovascular:      Rate and Rhythm: Normal rate and regular rhythm. Pulses: Normal pulses. Radial pulses are 2+ on the right side and 2+ on the left side. Dorsalis pedis pulses are 2+ on the right side and 2+ on the left side. Heart sounds: Normal heart sounds, S1 normal and S2 normal. No murmur. No gallop. No S3 or S4 sounds. Pulmonary:      Effort: Pulmonary effort is normal. No respiratory distress. Breath sounds: Normal breath sounds. No decreased breath sounds, wheezing, rhonchi or rales. Abdominal:      General: Abdomen is flat.  Bowel sounds are normal. There is no distension. Palpations: Abdomen is soft. Abdomen is not rigid. There is no mass. Tenderness: There is abdominal tenderness in the right lower quadrant. There is no guarding or rebound. Positive signs include McBurney's sign. Negative signs include Corral's sign, Rovsing's sign, psoas sign and obturator sign. Hernia: There is no hernia in the umbilical area, ventral area, right inguinal area, left inguinal area, right femoral area or left femoral area. Musculoskeletal: Normal range of motion. Comments: Strength 5 out of 5 throughout. Lymphadenopathy:      Head:      Right side of head: No submental, submandibular, preauricular or occipital adenopathy. Left side of head: No submental, submandibular, preauricular or occipital adenopathy. Cervical: No cervical adenopathy. Upper Body:      Right upper body: No supraclavicular adenopathy. Left upper body: No supraclavicular adenopathy. Skin:     General: Skin is warm and dry. Findings: No rash. Neurological:      Mental Status: He is alert. He is not disoriented. GCS: GCS eye subscore is 4. GCS verbal subscore is 5. GCS motor subscore is 6. Cranial Nerves: No cranial nerve deficit. Sensory: No sensory deficit. Coordination: Coordination normal.      Gait: Gait normal.      Deep Tendon Reflexes: Reflexes are normal and symmetric. Comments: Grossly intact    Psychiatric:         Speech: Speech normal.         Behavior: Behavior normal.         Thought Content:  Thought content normal.         Judgment: Judgment normal.           Diagnostic Study Results     Labs -  Recent Results (from the past 12 hour(s))   CBC WITH AUTOMATED DIFF    Collection Time: 05/07/20  8:00 AM   Result Value Ref Range    WBC 14.7 (H) 4.6 - 13.2 K/uL    RBC 4.60 (L) 4.70 - 5.50 M/uL    HGB 15.0 13.0 - 16.0 g/dL    HCT 41.8 36.0 - 48.0 %    MCV 90.9 74.0 - 97.0 FL    MCH 32.6 24.0 - 34.0 PG MCHC 35.9 31.0 - 37.0 g/dL    RDW 12.1 11.6 - 14.5 %    PLATELET 957 232 - 640 K/uL    MPV 9.1 (L) 9.2 - 11.8 FL    NEUTROPHILS 81 (H) 40 - 73 %    LYMPHOCYTES 8 (L) 21 - 52 %    MONOCYTES 9 3 - 10 %    EOSINOPHILS 2 0 - 5 %    BASOPHILS 0 0 - 2 %    ABS. NEUTROPHILS 11.8 (H) 1.8 - 8.0 K/UL    ABS. LYMPHOCYTES 1.2 0.9 - 3.6 K/UL    ABS. MONOCYTES 1.4 (H) 0.05 - 1.2 K/UL    ABS. EOSINOPHILS 0.3 0.0 - 0.4 K/UL    ABS. BASOPHILS 0.1 0.0 - 0.1 K/UL    DF AUTOMATED     EKG, 12 LEAD, INITIAL    Collection Time: 05/07/20  8:07 AM   Result Value Ref Range    Ventricular Rate 131 BPM    Atrial Rate 131 BPM    P-R Interval 136 ms    QRS Duration 84 ms    Q-T Interval 380 ms    QTC Calculation (Bezet) 561 ms    Calculated P Axis 56 degrees    Calculated R Axis 12 degrees    Calculated T Axis 55 degrees    Diagnosis       Sinus tachycardia  Possible Left atrial enlargement  Borderline ECG  When compared with ECG of 22-JUL-2019 01:20,  fusion complexes are no longer present  premature ventricular complexes are no longer present     URINALYSIS W/ RFLX MICROSCOPIC    Collection Time: 05/07/20  8:48 AM   Result Value Ref Range    Color YELLOW      Appearance CLEAR      Specific gravity <1.005 (L) 1.005 - 1.030    pH (UA) 6.5 5.0 - 8.0      Protein Negative NEG mg/dL    Glucose Negative NEG mg/dL    Ketone Negative NEG mg/dL    Bilirubin Negative NEG      Blood Negative NEG      Urobilinogen 0.2 0.2 - 1.0 EU/dL    Nitrites Negative NEG      Leukocyte Esterase Negative NEG         Radiologic Studies -   CT ABD PELV WO CONT   Final Result   Impression:      The appendix is mildly dilated measuring 10 mm and there is some slight adjacent   fat stranding. Findings are suspicious for early acute appendicitis although not   definitive. Clinical correlation is advised. No abscess or free air. Mild bladder wall thickening. Suggest correlation with urinalysis.       XR CHEST PORT    (Results Pending)         Medical Decision Making Provider Notes (Medical Decision Making):  MDM  Number of Diagnoses or Management Options  Acute appendicitis, unspecified acute appendicitis type:   Diagnosis management comments: DDX: Gastritis, gerd, peptic ulcer disease, cholecystitis, pancreatitis, gastroenteritis, hepatitis, constipation related pain, appendicitis pain, diverticulitis, urinary tract infection, obstruction, abdominal wall pain, atypical cardiac (ami or anginal pain), referred pain from pulmonary process (pneumonia, empyema), ectopic pregnancy,  or combination of the above versus many other processes. Insert abdominal pain differential    DDX: Gastritis, gerd, peptic ulcer disease, cholecystitis, pancreatitis, gastroenteritis, hepatitis, constipation related pain, appendicitis pain, diverticulitis, urinary tract infection, obstruction, abdominal wall pain, atypical cardiac (ami or anginal pain), referred pain from pulmonary process (pneumonia, empyema),  or combination of the above versus many other processes. I am the first provider for this patient. I reviewed the vital signs, available nursing notes, past medical history, past surgical history, family history and social history. Vital Signs-Reviewed the patient's vital signs. Records Reviewed: Nursing Notes (Time of Review: 7:57 AM)    ED Course: Progress Notes, Reevaluation, and Consults:    Labs showed wbc of 14.7. There is a large fat content on the blood in his CMP was sent to [facility] view. Results pending. EKG showed sinus tachycardia at a rate of 131 bpm. With no ST elevations or depression and non specific T wave changes. CT abdomen pelvis reviewed and discussed with patient. 10:47 AM 5/7/2020    Consult:  Discussed care with Dr Tita Urias. Surgery. Standard discussion; including history of patients chief complaint, available diagnostic results, and treatment course. Agrees with admission. Will take patient to the OR.   He would like type and screen PT/INR ordered. 10:55 AM        Diagnosis       Clinical Impression:   1. Acute appendicitis, unspecified acute appendicitis type        Disposition: Discharged home       Attestation        Provider Attestation:     I personally performed the services described in the documentation, reviewed the documentation and it accurately and completely records my words and actions utilizing the [address] May 07, 2020 at 10:57 AM - Alexandrea Mcbride DO    Disclaimer. It is dictated using utilizing voice recognition software. Unfortunately this leads to occasional typographical errors. I apologize in advance if the situation occurs. If questions arise please do not hesitate to contact me or call our department.

## 2020-05-07 NOTE — BRIEF OP NOTE
Brief Postoperative Note    Patient: Karmen Burkitt  YOB: 1985  MRN: [id number]    Date of Procedure: 5/7/2020     Pre-Op Diagnosis: ACUTE APPENDICITIS    Post-Op Diagnosis: Same as preoperative diagnosis.       Procedure(s):  LAPAROSCOPIC APPENDECTOMY    Surgeon(s):  Nhung Pack MD    Surgical Assistant: None    Anesthesia: General     Estimated Blood Loss (mL): less than 50     Complications: None    Specimens: * No specimens in log *     Implants: * No implants in log *    Drains: * No LDAs found *    Findings: Acute appendicitis    [id number]    Electronically Signed by Kimani Singh MD on 5/7/2020 at 4:45 PM

## 2020-05-07 NOTE — ANESTHESIA PREPROCEDURE EVALUATION
Relevant Problems   No relevant active problems       Anesthetic History               Review of Systems / Medical History  Patient summary reviewed, nursing notes reviewed and pertinent labs reviewed    Pulmonary                   Neuro/Psych         Psychiatric history     Cardiovascular    Hypertension              Exercise tolerance: >4 METS     GI/Hepatic/Renal     GERD      Liver disease     Endo/Other  Within defined limits           Other Findings   Comments: Substance abuse           Physical Exam    Airway  Mallampati: II    Neck ROM: normal range of motion   Mouth opening: Normal     Cardiovascular  Regular rate and rhythm,  S1 and S2 normal,  no murmur, click, rub, or gallop  Rhythm: regular  Rate: normal         Dental  No notable dental hx       Pulmonary  Breath sounds clear to auscultation               Abdominal  GI exam deferred       Other Findings            Anesthetic Plan    ASA: 3, emergent  Anesthesia type: general          Induction: Intravenous  Anesthetic plan and risks discussed with: Patient

## 2020-05-08 ENCOUNTER — TELEPHONE (OUTPATIENT)
Dept: SURGERY | Age: 35
End: 2020-05-08

## 2020-05-08 ENCOUNTER — TELEPHONE (OUTPATIENT)
Dept: FAMILY MEDICINE CLINIC | Age: 35
End: 2020-05-08

## 2020-05-08 VITALS
WEIGHT: 215 LBS | OXYGEN SATURATION: 100 % | TEMPERATURE: 97.7 F | RESPIRATION RATE: 18 BRPM | BODY MASS INDEX: 27.59 KG/M2 | SYSTOLIC BLOOD PRESSURE: 158 MMHG | HEART RATE: 97 BPM | DIASTOLIC BLOOD PRESSURE: 96 MMHG | HEIGHT: 74 IN

## 2020-05-08 LAB
ANION GAP SERPL CALC-SCNC: 6 MMOL/L (ref 3–18)
BUN SERPL-MCNC: 5 MG/DL (ref 7–18)
BUN/CREAT SERPL: 6 (ref 12–20)
CALCIUM SERPL-MCNC: 8 MG/DL (ref 8.5–10.1)
CHLORIDE SERPL-SCNC: 107 MMOL/L (ref 100–111)
CO2 SERPL-SCNC: 27 MMOL/L (ref 21–32)
CREAT SERPL-MCNC: 0.83 MG/DL (ref 0.6–1.3)
ERYTHROCYTE [DISTWIDTH] IN BLOOD BY AUTOMATED COUNT: 12.3 % (ref 11.6–14.5)
GLUCOSE SERPL-MCNC: 144 MG/DL (ref 74–99)
HCT VFR BLD AUTO: 35.1 % (ref 36–48)
HGB BLD-MCNC: 12.2 G/DL (ref 13–16)
MAGNESIUM SERPL-MCNC: 2 MG/DL (ref 1.6–2.6)
MCH RBC QN AUTO: 31.9 PG (ref 24–34)
MCHC RBC AUTO-ENTMCNC: 34.8 G/DL (ref 31–37)
MCV RBC AUTO: 91.6 FL (ref 74–97)
PHOSPHATE SERPL-MCNC: 2.7 MG/DL (ref 2.5–4.9)
PLATELET # BLD AUTO: 166 K/UL (ref 135–420)
PMV BLD AUTO: 9.3 FL (ref 9.2–11.8)
POTASSIUM SERPL-SCNC: 3.9 MMOL/L (ref 3.5–5.5)
RBC # BLD AUTO: 3.83 M/UL (ref 4.7–5.5)
SODIUM SERPL-SCNC: 140 MMOL/L (ref 136–145)
WBC # BLD AUTO: 7.9 K/UL (ref 4.6–13.2)

## 2020-05-08 PROCEDURE — 85027 COMPLETE CBC AUTOMATED: CPT

## 2020-05-08 PROCEDURE — 74011000250 HC RX REV CODE- 250: Performed by: COLON & RECTAL SURGERY

## 2020-05-08 PROCEDURE — 99218 HC RM OBSERVATION: CPT

## 2020-05-08 PROCEDURE — 84100 ASSAY OF PHOSPHORUS: CPT

## 2020-05-08 PROCEDURE — 83735 ASSAY OF MAGNESIUM: CPT

## 2020-05-08 PROCEDURE — 74011250637 HC RX REV CODE- 250/637: Performed by: COLON & RECTAL SURGERY

## 2020-05-08 PROCEDURE — 80048 BASIC METABOLIC PNL TOTAL CA: CPT

## 2020-05-08 PROCEDURE — 74011250636 HC RX REV CODE- 250/636: Performed by: COLON & RECTAL SURGERY

## 2020-05-08 PROCEDURE — 74011250636 HC RX REV CODE- 250/636: Performed by: EMERGENCY MEDICINE

## 2020-05-08 PROCEDURE — 36415 COLL VENOUS BLD VENIPUNCTURE: CPT

## 2020-05-08 RX ORDER — LISINOPRIL 20 MG/1
20 TABLET ORAL DAILY
Status: DISCONTINUED | OUTPATIENT
Start: 2020-05-08 | End: 2020-05-08 | Stop reason: HOSPADM

## 2020-05-08 RX ORDER — PAROXETINE HYDROCHLORIDE 20 MG/1
40 TABLET, FILM COATED ORAL DAILY
Status: DISCONTINUED | OUTPATIENT
Start: 2020-05-08 | End: 2020-05-08 | Stop reason: HOSPADM

## 2020-05-08 RX ORDER — GABAPENTIN 100 MG/1
100 CAPSULE ORAL 3 TIMES DAILY
Status: DISCONTINUED | OUTPATIENT
Start: 2020-05-08 | End: 2020-05-08 | Stop reason: HOSPADM

## 2020-05-08 RX ORDER — OXYCODONE AND ACETAMINOPHEN 5; 325 MG/1; MG/1
1 TABLET ORAL
Qty: 40 TAB | Refills: 0 | Status: SHIPPED | OUTPATIENT
Start: 2020-05-08 | End: 2020-05-15

## 2020-05-08 RX ADMIN — MORPHINE SULFATE 2 MG: 2 INJECTION, SOLUTION INTRAMUSCULAR; INTRAVENOUS at 00:59

## 2020-05-08 RX ADMIN — ACETAMINOPHEN 1000 MG: 500 TABLET, FILM COATED ORAL at 00:02

## 2020-05-08 RX ADMIN — ACETAMINOPHEN 1000 MG: 500 TABLET, FILM COATED ORAL at 05:27

## 2020-05-08 RX ADMIN — MORPHINE SULFATE 2 MG: 2 INJECTION, SOLUTION INTRAMUSCULAR; INTRAVENOUS at 04:08

## 2020-05-08 RX ADMIN — LISINOPRIL 20 MG: 20 TABLET ORAL at 10:31

## 2020-05-08 RX ADMIN — MORPHINE SULFATE 2 MG: 2 INJECTION, SOLUTION INTRAMUSCULAR; INTRAVENOUS at 08:14

## 2020-05-08 RX ADMIN — PAROXETINE HYDROCHLORIDE 40 MG: 20 TABLET, FILM COATED ORAL at 10:31

## 2020-05-08 RX ADMIN — FAMOTIDINE 20 MG: 10 INJECTION INTRAVENOUS at 10:32

## 2020-05-08 RX ADMIN — DIPHENHYDRAMINE HYDROCHLORIDE 25 MG: 50 INJECTION, SOLUTION INTRAMUSCULAR; INTRAVENOUS at 00:02

## 2020-05-08 RX ADMIN — METRONIDAZOLE 500 MG: 500 INJECTION, SOLUTION INTRAVENOUS at 02:05

## 2020-05-08 NOTE — PROGRESS NOTES
Problem: Patient Education: Go to Patient Education Activity  Goal: Patient/Family Education  Outcome: Progressing Towards Goal     Problem: Surgical Pathway Day of Surgery  Goal: Activity/Safety  Outcome: Progressing Towards Goal  Goal: Consults, if ordered  Outcome: Progressing Towards Goal  Goal: Nutrition/Diet  Outcome: Progressing Towards Goal  Goal: Medications  Outcome: Progressing Towards Goal  Goal: Respiratory  Outcome: Progressing Towards Goal  Goal: Treatments/Interventions/Procedures  Outcome: Progressing Towards Goal  Goal: Psychosocial  Outcome: Progressing Towards Goal  Goal: *No signs and symptoms of infection or wound complications  Outcome: Progressing Towards Goal  Goal: *Optimal pain control at patient's stated goal  Outcome: Progressing Towards Goal  Goal: *Adequate urinary output (equal to or greater than 30 milliliters/hour)  Description: Ambulatory Surgery patients voiding without difficulty.   Outcome: Progressing Towards Goal  Goal: *Hemodynamically stable  Outcome: Progressing Towards Goal  Goal: *Tolerating diet  Outcome: Progressing Towards Goal  Goal: *Demonstrates progressive activity  Outcome: Progressing Towards Goal

## 2020-05-08 NOTE — PROGRESS NOTES
conducted an initial consultation and Spiritual Assessment for Maritza Doran, who is a 37 y.o.,male. Patient's Primary Language is: [place]. According to the patient's EMR Bahai Affiliation is: [place]. The reason the Patient came to the hospital is:   Patient Active Problem List    Diagnosis Date Noted    Acute appendicitis 05/07/2020    Mild depression ([address] 06/08/2018    MICHELLE (generalized anxiety disorder) 07/08/2015    Hyperlipidemia 12/05/2013    HTN (hypertension) 09/12/2012        The  provided the following Interventions:  Initiated a relationship of care and support. Explored issues of mina, belief, spirituality and Tenriism/ritual needs while hospitalized. Listened empathically. Provided chaplaincy education. Provided information about Spiritual Care Services. Offered prayer and assurance of continued prayers on patient's behalf. Chart reviewed. The following outcomes where achieved:  Patient shared limited information about both their medical narrative and spiritual journey/beliefs.  confirmed Patient's Bahai Affiliation. Patient processed feeling about current hospitalization. Patient expressed gratitude for 's visit. Assessment:  Patient does not have any Tenriism/cultural needs that will affect patient's preferences in health care. There are no spiritual or Tenriism issues which require intervention at this time. Plan:  Chaplains will continue to follow and will provide pastoral care on an as needed/requested basis.  recommends bedside caregivers page  on duty if patient shows signs of acute spiritual or emotional distress.     [id number] [address]   [phone]

## 2020-05-08 NOTE — PROGRESS NOTES
Shift Summary Note:  Assumed  Care of patient in bed awake & alert. Medicated x 3 for pain, This morning c/o anxiety related to lack of drinking. CIWA score 10. Patient ambulating around hallway. No acute agitatio. Physician to be notified. Abdomen tender to touch, bowel sounds present. VSS.   Patient Vitals for the past 12 hrs:   Temp Pulse Resp BP SpO2   05/08/20 0521 98.2 °F (36.8 °C) 88 16 (!) 150/103    05/08/20 0202 97.8 °F (36.6 °C) 89 18 (!) 157/99 96 %   05/07/20 2230 98.6 °F (37 °C) 98 18 (!) 165/103 97 %   05/07/20 1907 98.7 °F (37.1 °C) 90 18 (!) 164/98 100 %

## 2020-05-08 NOTE — DISCHARGE INSTRUCTIONS
No heavy lifting 6 weeks  OK to shower 48 hours from surgery  No bath/pool for 1 month  Regular diet      DISCHARGE SUMMARY from Nurse    PATIENT INSTRUCTIONS:    After general anesthesia or intravenous sedation, for 24 hours or while taking prescription Narcotics:  · Limit your activities  · Do not drive and operate hazardous machinery  · Do not make important personal or business decisions  · Do  not drink alcoholic beverages  · If you have not urinated within 8 hours after discharge, please contact your surgeon on call. Report the following to your surgeon:  · Excessive pain, swelling, redness or odor of or around the surgical area  · Temperature over 100.5  · Nausea and vomiting lasting longer than 4 hours or if unable to take medications  · Any signs of decreased circulation or nerve impairment to extremity: change in color, persistent  numbness, tingling, coldness or increase pain  · Any questions    What to do at Home:  Recommended activity: No lifting, Driving, or Strenuous exercise until cleared by provider      *  Please give a list of your current medications to your Primary Care Provider. *  Please update this list whenever your medications are discontinued, doses are      changed, or new medications (including over-the-counter products) are added. *  Please carry medication information at all times in case of emergency situations. These are general instructions for a healthy lifestyle:    No smoking/ No tobacco products/ Avoid exposure to second hand smoke  Surgeon General's Warning:  Quitting smoking now greatly reduces serious risk to your health.     Obesity, smoking, and sedentary lifestyle greatly increases your risk for illness    A healthy diet, regular physical exercise & weight monitoring are important for maintaining a healthy lifestyle    You may be retaining fluid if you have a history of heart failure or if you experience any of the following symptoms:  Weight gain of 3 pounds or more overnight or 5 pounds in a week, increased swelling in our hands or feet or shortness of breath while lying flat in bed. Please call your doctor as soon as you notice any of these symptoms; do not wait until your next office visit. The discharge information has been reviewed with the patient. The patient verbalized understanding. Discharge medications reviewed with the patient and appropriate educational materials and side effects teaching were provided. Patient armband removed and shredded.     ___________________________________________________________________________________________________________________________________

## 2020-05-08 NOTE — TELEPHONE ENCOUNTER
PT called stating he had an appendectomy yesterday and was told his triglycerides were extremely high and this was causing him to have increased anxiety. He said he forgot to ask them about it when he was discharged. He is asking for meds for his triglycerides and feels he needs them today and can't wait because he feels it is dangerous and his anxiety is high. He says he has a history of PTSD and panic attacks and this is why it is important to take care of this now vice waiting. I contacted Dr. Deborah Martínez whom stated he did not test patients triglycerides and could not find any test in his record for this lab. He said to inform patient to follow up with his PCP as his PCP would be the one to follow him for his triglycerides. Pt most recent results in his labs for triglycerides was 2014. There are no current triglyceride labs to date. Informed patient to contact his PCP for evaluation of his triglycerides. Patient verbalized understanding and stated he appreciated me assisting him and he will contact his PCP.

## 2020-05-08 NOTE — PROGRESS NOTES
Problem: Patient Education: Go to Patient Education Activity  Goal: Patient/Family Education  5/8/2020 0434 by Jet Ramirez RN  Outcome: Progressing Towards Goal  5/7/2020 2052 by Jet Ramirez RN  Outcome: Progressing Towards Goal     Problem: Surgical Pathway Day of Surgery  Goal: Off Pathway (Use only if patient is Off Pathway)  Outcome: Progressing Towards Goal  Goal: Activity/Safety  5/8/2020 0434 by Jet Ramirez RN  Outcome: Progressing Towards Goal  5/7/2020 2052 by Jet Ramirez RN  Outcome: Progressing Towards Goal  Goal: Consults, if ordered  5/8/2020 0434 by Jet Ramirez RN  Outcome: Progressing Towards Goal  5/7/2020 2052 by Jet Ramirez RN  Outcome: Progressing Towards Goal  Goal: Nutrition/Diet  5/8/2020 0434 by Jet Ramirez RN  Outcome: Progressing Towards Goal  5/7/2020 2052 by Jet Ramirez RN  Outcome: Progressing Towards Goal  Goal: Medications  5/8/2020 0434 by Jet Ramirez RN  Outcome: Progressing Towards Goal  5/7/2020 2052 by Jet Ramirez RN  Outcome: Progressing Towards Goal  Goal: Respiratory  5/8/2020 0434 by Jet Ramirez RN  Outcome: Progressing Towards Goal  5/7/2020 2052 by Jet Ramirez RN  Outcome: Progressing Towards Goal  Goal: Treatments/Interventions/Procedures  5/8/2020 0434 by Jet Ramirez RN  Outcome: Progressing Towards Goal  5/7/2020 2052 by Jet Ramirez RN  Outcome: Progressing Towards Goal  Goal: Psychosocial  5/8/2020 0434 by Jet Ramirez RN  Outcome: Progressing Towards Goal  5/7/2020 2052 by Jet Ramirez RN  Outcome: Progressing Towards Goal  Goal: *No signs and symptoms of infection or wound complications  4/0/1652 7054 by Jet Ramirez RN  Outcome: Progressing Towards Goal  5/7/2020 2052 by Jet Ramirez RN  Outcome: Progressing Towards Goal  Goal: *Optimal pain control at patient's stated goal  5/8/2020 0434 by Jet Ramirez RN  Outcome: Progressing Towards Goal  5/7/2020 2052 by Zari Mathur RN  Outcome: Progressing Towards Goal  Goal: *Adequate urinary output (equal to or greater than 30 milliliters/hour)  Description: Ambulatory Surgery patients voiding without difficulty.   5/8/2020 0434 by Zari Mathur RN  Outcome: Progressing Towards Goal  5/7/2020 2052 by Zari Mathur RN  Outcome: Progressing Towards Goal  Goal: *Hemodynamically stable  5/8/2020 0434 by Zari Mathur RN  Outcome: Progressing Towards Goal  5/7/2020 2052 by Zari Mathur RN  Outcome: Progressing Towards Goal  Goal: *Tolerating diet  5/8/2020 0434 by Zari Mathur RN  Outcome: Progressing Towards Goal  5/7/2020 2052 by Zari Mathur RN  Outcome: Progressing Towards Goal  Goal: *Demonstrates progressive activity  5/8/2020 0434 by Zari Mathur RN  Outcome: Progressing Towards Goal  5/7/2020 2052 by Zari Mathur RN  Outcome: Progressing Towards Goal

## 2020-05-08 NOTE — PROGRESS NOTES
Reason for Admission:  Acute appendicitis [K35.80]  Acute appendicitis [K35.80]                 RRAT Score:    14%            Plan for utilizing home health:    No                      Likelihood of Readmission:   LOW                         Transition of Care Plan:              Initial assessment completed with patient. Cognitive status of patient: oriented to time, place, person and situation. Face sheet information confirmed:  yes. The patient designates his father Bartolome Olivares [phone] to participate in his discharge plan and to receive any needed information. This patient lives in a single family home with his father. Patient is able to navigate steps as needed. Prior to hospitalization, patient was considered to be independent with ADLs/IADLS : yes . Patient has a current ACP document on file: no  The patient will call an [employer] [facility] to take him  home upon discharge. The patient already has none reported,  medical equipment available in the home. Patient is not currently active with home health. Patient has not stayed in a skilled nursing facility or rehab. This patient is on dialysis :no    Currently, the discharge plan is Home with family assistance. The patient states that he can obtain his medications from the pharmacy, and take his medications as directed. Patient's current insurance is [employer] of [address] [facility]. Care Management Interventions  PCP Verified by CM:  Yes  Mode of Transport at Discharge: Self  Transition of Care Consult (CM Consult): Discharge Planning  Discharge Durable Medical Equipment: No  Physical Therapy Consult: No  Occupational Therapy Consult: No  Speech Therapy Consult: No  Current Support Network: Relative's Home  Confirm Follow Up Transport: Family  Discharge Location  Discharge Placement: Home with family assistance        Priscilla Osborne RN  Case Management [phone]

## 2020-05-08 NOTE — OP NOTES
[facility]  OPERATIVE REPORT    Name:  Alan Irvin  MR#:   [id number]  :  1985  ACCOUNT #:  [de-identified]  DATE OF SERVICE:  2020    PREOPERATIVE DIAGNOSIS:  Acute appendicitis. POSTOPERATIVE DIAGNOSIS:  Acute appendicitis. PROCEDURE PERFORMED:  Laparoscopic appendectomy. SURGEON:  Lucius Morris. Damion Sheridan MD    ASSISTANT:  None. ANESTHESIA:  General.    COMPLICATIONS:  None. SPECIMENS REMOVED:  Appendix to Pathology. IMPLANTS:  None. ESTIMATED BLOOD LOSS:  Less than 15 mL. FINDINGS:  Acute appendicitis. INDICATION:  The patient is a 78-year-old male with right lower quadrant abdominal pain. CT imaging suggestive of appendicitis. He was brought to the operating room for laparoscopic appendectomy. I explained the risks of the procedure including bleeding, infection, injury to surrounding structures. He understood and wished to proceed. PROCEDURE:  The patient was properly identified in the holding area and brought to the operating room. He was laid supine on the operating room table. General anesthesia was administered. Gore catheter was placed. The left arm was tucked at his side. The abdomen was prepped and draped in the usual sterile fashion. I made a 1-cm incision above the umbilicus, inserted a Veress needle, insufflated the abdomen to 15 mmHg, placed a 12-mm laparoscopic port there, a 5-mm port in the lower midline, and a 5-mm port in the left lower quadrant. The appendix was identified and adherent to the right pelvic sidewall. It was freed with a hook cautery, was circumferentially dissected at the base of the appendix and stapled across it with a single firing of a 45-mm blue load Endo-ANA stapler. The mesoappendix was transected with a single firing of a 45-mm white load stapler, but there was some pulsatile bleeding from the mesoappendix; therefore, further controlled the bleeding with a Vicryl Endoloop.   Subsequently, hemostasis was assured. Blood and fluid were suctioned from the abdominal cavity. The appendix was placed in an EndoCatch bag and removed from the abdominal cavity through the supraumbilical port site. The port site was then closed with 0 Vicryl suture. Subcutaneous tissues were irrigated. Skin incisions were closed with 4-0 Monocryl subcuticular suture. Steri-Strips were applied. The patient tolerated the procedure well. All instrument, sponge, and needle counts were correct at the end of the case x2. The patient awoke from anesthesia, was extubated and transported to the PACU in stable condition.       MD ANDRE [id number]  D:  05/07/2020 16:48  T:  05/08/2020 2:07  JOB #:  [id number]

## 2020-05-08 NOTE — PROGRESS NOTES
D/C order noted for today. Orders reviewed. Patient said he will call an [facility] to take him home at time of discharge today. No needs identified at this time. CM remains available if needed.      [place] Arredondo -[phone]

## 2020-05-08 NOTE — DISCHARGE SUMMARY
Colon and Rectal Surgery Discharge Summary     Patient: uArelia Bennett MRN: [id number]  SSN: [id number]    YOB: 1985  Age: 37 y.o. Sex: male       Admit Date: 5/7/2020    Discharge Date: 5/8/2020      Admission Diagnoses: Acute appendicitis [K35.80]; Acute appendicitis [K35.80]    Discharge Diagnoses: Same    Procedure: Lap Appendectomy    Problem List as of 5/8/2020 Date Reviewed: 5/6/2020          Codes Class Noted - Resolved    Acute appendicitis ICD-10-CM: K35.80  ICD-9-CM: 540.9  5/7/2020 - Present        Mild depression ([address] ICD-10-CM: F32.0  ICD-9-CM: 424  6/8/2018 - Present        MICHELLE (generalized anxiety disorder) ICD-10-CM: F41.1  ICD-9-CM: 300.02  7/8/2015 - Present    Overview Signed 7/8/2015  3:05 PM by Mohan Kaplan increased to 50mg - at max dose and will monitor. still awaiting Dr. Rocío Anderson notes from [facility].  will refax.  klonopin kept at 0.5mg. Hyperlipidemia ICD-10-CM: E78.5  ICD-9-CM: 272.4  12/5/2013 - Present        HTN (hypertension) ICD-10-CM: I10  ICD-9-CM: 401.9  9/12/2012 - Present        RESOLVED: Anxiety attack ICD-10-CM: F41.0  ICD-9-CM: 300.01  9/12/2012 - 7/9/2019               Discharge Condition: Good    Hospital Course: To OR for above. Diet advanced, remained afebrile. Consults: None    Significant Diagnostic Studies: CT showing appendicitis    Disposition: home    Discharge Medications:   Current Discharge Medication List      START taking these medications    Details   oxyCODONE-acetaminophen (PERCOCET) 5-325 mg per tablet Take 1 Tab by mouth every four (4) hours as needed for Pain for up to 7 days. Max Daily Amount: 6 Tabs. Qty: 40 Tab, Refills: 0    Associated Diagnoses: Acute appendicitis, unspecified acute appendicitis type         CONTINUE these medications which have NOT CHANGED    Details   gabapentin (NEURONTIN) 100 mg capsule Take 1 Cap by mouth three (3) times daily. Max Daily Amount: 300 mg.   Qty: 90 Cap, Refills: 1 Associated Diagnoses: Sciatica, unspecified laterality      ondansetron (ZOFRAN ODT) 4 mg disintegrating tablet Take 1-2 tablets every 6-8 hours as needed for nausea and vomiting. Qty: 10 Tab, Refills: 0      lisinopril (PRINIVIL, ZESTRIL) 20 mg tablet Take 1 Tab by mouth daily. Qty: 30 Tab, Refills: 4    Associated Diagnoses: Essential hypertension      PARoxetine (PAXIL) 20 mg tablet Take 2 Tabs by mouth daily. Qty: 60 Tab, Refills: 4      fluticasone propionate (FLONASE) 50 mcg/actuation nasal spray 2 Sprays by Both Nostrils route daily. Qty: 1 Bottle, Refills: 0      guaiFENesin ER (MUCINEX) 600 mg ER tablet Take 1 Tab by mouth two (2) times a day. Qty: 20 Tab, Refills: 0      camphor-eucalyptus-menthol (VICKS VAPOSTEAM) liqd 1 Actuation(s) by Does Not Apply route three (3) times daily as needed for Cough or Other (congestion). Qty: 1 Bottle, Refills: 0      Vaporizers (VICKS WARM STEAM VAPORIZER) misc 1 Actuation(s) by Does Not Apply route three (3) times daily as needed for Cough or Other (congestion). Qty: 1 Each, Refills: 0      Camphor-Eucalyptus Oil-Menthol (VICKS VAPORUB) 4.8-1.2-2.6 % oint 1 Actuation(s) by Apply Externally route three (3) times daily as needed for Cough or Other (congestion). Qty: 50 g, Refills: 0             Activity: No heavy lifting for 6 weeks  Diet: Regular Diet  Wound Care: None needed    Follow-up Appointments   Procedures    FOLLOW UP VISIT Appointment in: Two Weeks Virtual visit     Virtual visit     Standing Status:   Standing     Number of Occurrences:   1     Order Specific Question:   Appointment in     Answer: Two Weeks       Signed By: Shea Cerda MD     May 8, 2020       .

## 2020-05-09 NOTE — PROGRESS NOTES
Arm bands removed and shredded per protocol. Patient discharged to car via wheelchair with transporter in stable condition.

## 2020-05-12 ENCOUNTER — VIRTUAL VISIT (OUTPATIENT)
Dept: FAMILY MEDICINE CLINIC | Age: 35
End: 2020-05-12

## 2020-05-12 DIAGNOSIS — E78.2 MIXED HYPERLIPIDEMIA: Primary | ICD-10-CM

## 2020-05-12 DIAGNOSIS — I10 ESSENTIAL HYPERTENSION: ICD-10-CM

## 2020-05-12 DIAGNOSIS — F41.1 GAD (GENERALIZED ANXIETY DISORDER): ICD-10-CM

## 2020-05-12 RX ORDER — ATENOLOL 50 MG/1
100 TABLET ORAL DAILY
Qty: 90 TAB | Refills: 1 | Status: SHIPPED | OUTPATIENT
Start: 2020-05-12 | End: 2020-08-06 | Stop reason: SDUPTHER

## 2020-05-12 RX ORDER — ATORVASTATIN CALCIUM 20 MG/1
20 TABLET, FILM COATED ORAL DAILY
Qty: 90 TAB | Refills: 2 | Status: SHIPPED | OUTPATIENT
Start: 2020-05-12 | End: 2021-05-27 | Stop reason: SDUPTHER

## 2020-05-12 RX ORDER — HYDROXYZINE PAMOATE 25 MG/1
25 CAPSULE ORAL
Qty: 30 CAP | Refills: 5 | Status: SHIPPED | OUTPATIENT
Start: 2020-05-12 | End: 2020-07-11

## 2020-05-12 NOTE — PROGRESS NOTES
Mahad Perez is a 37 y.o. male who was seen by synchronous (real-time) audio-video technology on 5/12/2020. Consent: Mahad Perez, who was seen by synchronous (real-time) audio-video technology, and/or his healthcare decision maker, is aware that this patient-initiated, Telehealth encounter on 5/12/2020 is a billable service, with coverage as determined by his insurance carrier. He is aware that he may receive a bill and has provided verbal consent to proceed: Yes. I am working from home. Patient is at his home, outside. Subjective:   Mahad Perez is a 37 y.o. male who was seen for follow up anxiety    Patient has a follow up appointment with general surgeon tomorrow. He would like to start the blood pressure medicine for anxiety. He ahs started some supplements such as Omega 3 and garlic to help lower his cholesterol numbers. He has a paln on diet and exercise, he just needs the go ahead from the surgeon when he can start weight lifting. Prior to Admission medications    Medication Sig Start Date End Date Taking? Authorizing Provider   oxyCODONE-acetaminophen (PERCOCET) 5-325 mg per tablet Take 1 Tab by mouth every four (4) hours as needed for Pain for up to 7 days. Max Daily Amount: 6 Tabs. 5/8/20 5/15/20  Iam Velez MD   gabapentin (NEURONTIN) 100 mg capsule Take 1 Cap by mouth three (3) times daily. Max Daily Amount: 300 mg. 5/6/20   Jessica Darnell NP   ondansetron (ZOFRAN ODT) 4 mg disintegrating tablet Take 1-2 tablets every 6-8 hours as needed for nausea and vomiting. 2/9/20   Lucrecia Moyer PA-C   lisinopril (PRINIVIL, ZESTRIL) 20 mg tablet Take 1 Tab by mouth daily. 12/17/19   Ventura Oakley NP   PARoxetine (PAXIL) 20 mg tablet Take 2 Tabs by mouth daily. 12/17/19   Ventura Oakley NP   fluticasone propionate (FLONASE) 50 mcg/actuation nasal spray 2 Sprays by Both Nostrils route daily.  12/1/19   CARLITA ThompsonaiFENesin ER (MUCINEX) 600 mg ER tablet Take 1 Tab by mouth two (2) times a day. 12/1/19   Danna Damon PA   Camphor-Eucalyptus Oil-Menthol (VICKS VAPORUB) 4.8-1.2-2.6 % oint 1 Actuation(s) by Apply Externally route three (3) times daily as needed for Cough or Other (congestion). 12/1/19   Danna Damon PA   camphor-eucalyptus-menthol (VICKS VAPOSTEAM) liqd 1 Actuation(s) by Does Not Apply route three (3) times daily as needed for Cough or Other (congestion). 12/1/19   Danna Damon PA   Vaporizers ([employer] WARM STEAM VAPORIZER) misc 1 Actuation(s) by Does Not Apply route three (3) times daily as needed for Cough or Other (congestion). 12/1/19   CARLITA Godoy     Allergies   Allergen Reactions    Amoxicillin Swelling    Contrast Dye [Iodine] Hives     Pt had iv contrast prior to this study without problems, developed hives on 07/04/15 5 minutes post injection    Iodinated Contrast Media Hives     Pt got hives when he received IV contrast in the past    Penicillins Hives and Swelling    Wellbutrin [Bupropion] Other (comments)     Caused aggression       Patient Active Problem List    Diagnosis Date Noted    Acute appendicitis 05/07/2020    Mild depression ([facility] [address] 06/08/2018    MICHELLE (generalized anxiety disorder) 07/08/2015    Hyperlipidemia 12/05/2013    HTN (hypertension) 09/12/2012     Current Outpatient Medications   Medication Sig Dispense Refill    atorvastatin (LIPITOR) 20 mg tablet Take 1 Tab by mouth daily. 90 Tab 2    atenoloL (TENORMIN) 50 mg tablet Take 2 Tabs by mouth daily. 90 Tab 1    hydrOXYzine pamoate (VISTARIL) 25 mg capsule Take 1 Cap by mouth three (3) times daily as needed for Anxiety for up to 60 days. 30 Cap 5    oxyCODONE-acetaminophen (PERCOCET) 5-325 mg per tablet Take 1 Tab by mouth every four (4) hours as needed for Pain for up to 7 days. Max Daily Amount: 6 Tabs. 40 Tab 0    gabapentin (NEURONTIN) 100 mg capsule Take 1 Cap by mouth three (3) times daily.  Max Daily Amount: 300 mg. 90 Cap 1    ondansetron (ZOFRAN ODT) 4 mg disintegrating tablet Take 1-2 tablets every 6-8 hours as needed for nausea and vomiting. 10 Tab 0    lisinopril (PRINIVIL, ZESTRIL) 20 mg tablet Take 1 Tab by mouth daily. 30 Tab 4    PARoxetine (PAXIL) 20 mg tablet Take 2 Tabs by mouth daily. 60 Tab 4    fluticasone propionate (FLONASE) 50 mcg/actuation nasal spray 2 Sprays by Both Nostrils route daily. 1 Bottle 0    guaiFENesin ER (MUCINEX) 600 mg ER tablet Take 1 Tab by mouth two (2) times a day. 20 Tab 0    Camphor-Eucalyptus Oil-Menthol (VICKS VAPORUB) 4.8-1.2-2.6 % oint 1 Actuation(s) by Apply Externally route three (3) times daily as needed for Cough or Other (congestion). 50 g 0    camphor-eucalyptus-menthol (VICKS VAPOSTEAM) liqd 1 Actuation(s) by Does Not Apply route three (3) times daily as needed for Cough or Other (congestion). 1 Bottle 0    Vaporizers (Liquid5 WARM STEAM VAPORIZER) misc 1 Actuation(s) by Does Not Apply route three (3) times daily as needed for Cough or Other (congestion). 1 Each 0     Allergies   Allergen Reactions    Amoxicillin Swelling    Contrast Dye [Iodine] Hives     Pt had iv contrast prior to this study without problems, developed hives on 07/04/15 5 minutes post injection    Iodinated Contrast Media Hives     Pt got hives when he received IV contrast in the past    Penicillins Hives and Swelling    Wellbutrin [Bupropion] Other (comments)     Caused aggression     Past Medical History:   Diagnosis Date    Anxiety     Cholelithiasis 08/18/2013    Note on U/S    Chronic gastritis 10/17/2014    Dr. Pattie Gudino (EGD Result) H. Pylori Neg    Depression     Dog bite of left hand including fingers with infection 11/16/2014    Adm 11/18/14 for IV antibiotics.      ETOH abuse     GERD (gastroesophageal reflux disease)     Hepatic steatosis 02/06/2014    Noted on U/S.     History of plasmapheresis 02/2014    Hypertriglyceridemia, Severe Pancreatitis    Hypertension     Hypertriglyceridemia     Hypomagnesemia 2016    1.6 mg/dL    Non compliance with medical treatment     Osteomyelitis of left hand ([address] 2014    MRI Finding @ LUE 2nd MCP joint.  Pancreatitis 2013    Highest on Record [phone]. Dr. Doyle Gupta    PICC (peripherally inserted central catheter) in place     R Basilic Vein (IV Antibiotics for LUE Hand Osteomyelitis).  Rectal bleeding     Substance abuse (HCC)     Several UDS + Opiates.  Vitamin D deficiency 2014    11.6ng/mL ( ng/mL)     Past Surgical History:   Procedure Laterality Date    HX CYST INCISION AND DRAINAGE Left 2014    LUE 2nd digit abscess I&D.  HX ENDOSCOPY  10/17/2014    Dr. Doyle Gupta: EGD/Colonoscopy    HX HEENT      wisdom tooth extraction    HX KNEE ARTHROSCOPY Right      Family History   Problem Relation Age of Onset    Elevated Lipids Mother     Thyroid Disease Mother     Hypertension Father     Alcohol abuse Maternal Grandmother     MS Paternal Grandmother      Social History     Tobacco Use    Smoking status: Former Smoker     Last attempt to quit: 2012     Years since quittin.6    Smokeless tobacco: Never Used    Tobacco comment: 2018    Substance Use Topics    Alcohol use: Not Currently     Alcohol/week: 13.3 standard drinks     Types: 8 Cans of beer, 8 Standard drinks or equivalent per week     Review of Systems   Constitutional: Negative. Respiratory: Negative. Cardiovascular: Positive for palpitations (when he gets anxious. ). Psychiatric/Behavioral: The patient is nervous/anxious. Objective:   Vital Signs: (As obtained by patient/caregiver at home)  There were no vitals taken for this visit.      [INSTRUCTIONS:  \"[x]\" Indicates a positive item  \"[]\" Indicates a negative item  -- DELETE ALL ITEMS NOT EXAMINED]    Constitutional: [x] Appears well-developed and well-nourished [x] No apparent distress          Mental status: [x] Alert and awake  [x] Oriented to person/place/time [x] Able to follow commands         Eyes:   EOM    [x]  Normal      Sclera  [x]  Normal              Discharge [x]  None visible       HENT: [x] Normocephalic, atraumatic        Pulmonary/Chest: [x] Respiratory effort normal   [x] No visualized signs of difficulty breathing or respiratory distress            Musculoskeletal:            [x] Normal range of motion of neck  -     Neurological:        [x] No Facial Asymmetry (Cranial nerve 7 motor function) (limited exam due to video visit)                         Psychiatric:       [x] Normal Affect [x] Abnormal - anxious         Diagnoses and all orders for this visit:    Mixed hyperlipidemia  -     LIPID PANEL; Future    Essential hypertension  -     atenoloL (TENORMIN) 50 mg tablet; Take 2 Tabs by mouth daily. , Normal, Disp-90 Tab, R-1    MICHELLE (generalized anxiety disorder)  -     atenoloL (TENORMIN) 50 mg tablet; Take 2 Tabs by mouth daily. , Normal, Disp-90 Tab, R-1  -     hydrOXYzine pamoate (VISTARIL) 25 mg capsule; Take 1 Cap by mouth three (3) times daily as needed for Anxiety for up to 60 days. , Normal, Disp-30 Cap, R-5    Other orders  -     atorvastatin (LIPITOR) 20 mg tablet; Take 1 Tab by mouth daily. , Normal, Disp-90 Tab, R-2      PLAN:  We discussed his anxiety and atenolol. Pt will start with taking it once a day in the evening. After about a week, he can take this twice a day. Pt will monitor his blood pressure. Pt can take vistaril as needed for a panic attack. Pt will call the office to schedule a lab appointment for this week. Pt will work on diet. Advancing exercise at general surgeons recommendations      I have discussed the diagnosis with the patient and the intended plan as seen in the above orders. The patient has received an after-visit summary and questions were answered concerning future plans. I have discussed medication side effects and warnings with the patient as well. Patient will call for further questions. Follow-up and Dispositions    · Return in about 4 weeks (around 6/9/2020) for virtual.           We discussed the expected course, resolution and complications of the diagnosis(es) in detail. Medication risks, benefits, costs, interactions, and alternatives were discussed as indicated. I advised him to contact the office if his condition worsens, changes or fails to improve as anticipated. He expressed understanding with the diagnosis(es) and plan. Denzel Wellington is a 37 y.o. male who was evaluated by a video visit encounter for concerns as above. Patient identification was verified prior to start of the visit. A caregiver was present when appropriate. Due to this being a TeleHealth encounter (During MOYLH-47 public health emergency), evaluation of the following organ systems was limited: Vitals/Constitutional/EENT/Resp/CV/GI//MS/Neuro/Skin/Heme-Lymph-Imm. Pursuant to the emergency declaration under the [facility], 1135 waiver authority and the [employer] and [employer] Act, this Virtual  Visit was conducted, with patient's (and/or legal guardian's) consent, to reduce the patient's risk of exposure to COVID-19 and provide necessary medical care. Services were provided through a video synchronous discussion virtually to substitute for in-person clinic visit. Patient and provider were located at their individual homes.       Damaris Rao NP

## 2020-05-13 LAB
BACTERIA SPEC CULT: NORMAL
BACTERIA SPEC CULT: NORMAL
SERVICE CMNT-IMP: NORMAL
SERVICE CMNT-IMP: NORMAL

## 2020-05-21 ENCOUNTER — VIRTUAL VISIT (OUTPATIENT)
Dept: FAMILY MEDICINE CLINIC | Age: 35
End: 2020-05-21

## 2020-05-21 ENCOUNTER — VIRTUAL VISIT (OUTPATIENT)
Dept: SURGERY | Age: 35
End: 2020-05-21

## 2020-05-21 VITALS — BODY MASS INDEX: 27.59 KG/M2 | WEIGHT: 215 LBS | HEIGHT: 74 IN

## 2020-05-21 DIAGNOSIS — N39.0 URINARY TRACT INFECTION WITHOUT HEMATURIA, SITE UNSPECIFIED: Primary | ICD-10-CM

## 2020-05-21 DIAGNOSIS — K35.30 ACUTE APPENDICITIS WITH LOCALIZED PERITONITIS, WITHOUT PERFORATION, ABSCESS, OR GANGRENE: Primary | ICD-10-CM

## 2020-05-21 DIAGNOSIS — J34.89 NASAL SORE: ICD-10-CM

## 2020-05-21 RX ORDER — MUPIROCIN 20 MG/G
OINTMENT TOPICAL DAILY
Qty: 22 G | Refills: 0 | Status: SHIPPED | OUTPATIENT
Start: 2020-05-21 | End: 2021-09-30

## 2020-05-21 RX ORDER — CIPROFLOXACIN 500 MG/1
500 TABLET ORAL 2 TIMES DAILY
Qty: 20 TAB | Refills: 0 | Status: SHIPPED | OUTPATIENT
Start: 2020-05-21 | End: 2020-05-31

## 2020-05-21 NOTE — PROGRESS NOTES
Del Montes De Oca is a 37 y.o. male who was seen by synchronous (real-time) audio-video technology on 5/21/2020. Consent: Del Montes De Oca, who was seen by synchronous (real-time) audio-video technology, and/or his healthcare decision maker, is aware that this patient-initiated, Telehealth encounter on 5/21/2020 is a billable service, with coverage as determined by his insurance carrier. He is aware that he may receive a bill and has provided verbal consent to proceed: Yes. I am working from home  Patient is at home his home. Subjective:   Del Montes De Oca is a 37 y.o. male who was seen for Patient states he had a UTI earlier this year. He had a CT recently that showed that his bladder was thickening. He has since become aware tht he \"dribbles\" after urinating. He denies fever or chills, no nausea or vomiting. He also notes that he has a sore on the inside of his nose for over a month now tht has not cleared. It is on the right nostril. Prior to Admission medications    Medication Sig Start Date End Date Taking? Authorizing Provider   atorvastatin (LIPITOR) 20 mg tablet Take 1 Tab by mouth daily. 5/12/20   Millie Darnell NP   atenoloL (TENORMIN) 50 mg tablet Take 2 Tabs by mouth daily. 5/12/20   Millie Darnell NP   hydrOXYzine pamoate (VISTARIL) 25 mg capsule Take 1 Cap by mouth three (3) times daily as needed for Anxiety for up to 60 days. 5/12/20 7/11/20  Millie Darnell NP   gabapentin (NEURONTIN) 100 mg capsule Take 1 Cap by mouth three (3) times daily. Max Daily Amount: 300 mg. 5/6/20   Millie Darnell NP   ondansetron (ZOFRAN ODT) 4 mg disintegrating tablet Take 1-2 tablets every 6-8 hours as needed for nausea and vomiting. 2/9/20   Luz Dean PA-C   lisinopril (PRINIVIL, ZESTRIL) 20 mg tablet Take 1 Tab by mouth daily. 12/17/19   Samira Oakley, NP   PARoxetine (PAXIL) 20 mg tablet Take 2 Tabs by mouth daily.  12/17/19   Samira Oakley, NP   fluticasone propionate (FLONASE) 50 mcg/actuation nasal spray 2 Sprays by Both Nostrils route daily. 12/1/19   CARLITA Hernandez   guaiFENesin ER (MUCINEX) 600 mg ER tablet Take 1 Tab by mouth two (2) times a day. 12/1/19   Jennifer Damon PA   Camphor-Eucalyptus Oil-Menthol (VICKS VAPORUB) 4.8-1.2-2.6 % oint 1 Actuation(s) by Apply Externally route three (3) times daily as needed for Cough or Other (congestion). 12/1/19   Jennifer Damon PA   camphor-eucalyptus-menthol (VICKS VAPOSTEAM) liqd 1 Actuation(s) by Does Not Apply route three (3) times daily as needed for Cough or Other (congestion). 12/1/19   Jennifer Damon PA   Vaporizers ([employer] WARM STEAM VAPORIZER) misc 1 Actuation(s) by Does Not Apply route three (3) times daily as needed for Cough or Other (congestion). 12/1/19   CARLITA Hernandez     Allergies   Allergen Reactions    Amoxicillin Swelling    Contrast Dye [Iodine] Hives     Pt had iv contrast prior to this study without problems, developed hives on 07/04/15 5 minutes post injection    Iodinated Contrast Media Hives     Pt got hives when he received IV contrast in the past    Penicillins Hives and Swelling    Wellbutrin [Bupropion] Other (comments)     Caused aggression       Patient Active Problem List    Diagnosis Date Noted    Acute appendicitis 05/07/2020    Mild depression ([facility] [address] 06/08/2018    MICHELLE (generalized anxiety disorder) 07/08/2015    Hyperlipidemia 12/05/2013    HTN (hypertension) 09/12/2012     Current Outpatient Medications   Medication Sig Dispense Refill    ciprofloxacin HCl (CIPRO) 500 mg tablet Take 1 Tab by mouth two (2) times a day for 10 days. 20 Tab 0    mupirocin (BACTROBAN) 2 % ointment Apply  to affected area daily. 22 g 0    atorvastatin (LIPITOR) 20 mg tablet Take 1 Tab by mouth daily. 90 Tab 2    atenoloL (TENORMIN) 50 mg tablet Take 2 Tabs by mouth daily.  90 Tab 1    hydrOXYzine pamoate (VISTARIL) 25 mg capsule Take 1 Cap by mouth three (3) times daily as needed for Anxiety for up to 60 days. 30 Cap 5    gabapentin (NEURONTIN) 100 mg capsule Take 1 Cap by mouth three (3) times daily. Max Daily Amount: 300 mg. 90 Cap 1    ondansetron (ZOFRAN ODT) 4 mg disintegrating tablet Take 1-2 tablets every 6-8 hours as needed for nausea and vomiting. 10 Tab 0    lisinopril (PRINIVIL, ZESTRIL) 20 mg tablet Take 1 Tab by mouth daily. 30 Tab 4    PARoxetine (PAXIL) 20 mg tablet Take 2 Tabs by mouth daily. 60 Tab 4    fluticasone propionate (FLONASE) 50 mcg/actuation nasal spray 2 Sprays by Both Nostrils route daily. 1 Bottle 0    guaiFENesin ER (MUCINEX) 600 mg ER tablet Take 1 Tab by mouth two (2) times a day. 20 Tab 0    Camphor-Eucalyptus Oil-Menthol (VICKS VAPORUB) 4.8-1.2-2.6 % oint 1 Actuation(s) by Apply Externally route three (3) times daily as needed for Cough or Other (congestion). 50 g 0    camphor-eucalyptus-menthol (VICKS VAPOSTEAM) liqd 1 Actuation(s) by Does Not Apply route three (3) times daily as needed for Cough or Other (congestion). 1 Bottle 0    Vaporizers ([employer] WARM STEAM VAPORIZER) misc 1 Actuation(s) by Does Not Apply route three (3) times daily as needed for Cough or Other (congestion). 1 Each 0     Allergies   Allergen Reactions    Amoxicillin Swelling    Contrast Dye [Iodine] Hives     Pt had iv contrast prior to this study without problems, developed hives on 07/04/15 5 minutes post injection    Iodinated Contrast Media Hives     Pt got hives when he received IV contrast in the past    Penicillins Hives and Swelling    Wellbutrin [Bupropion] Other (comments)     Caused aggression     Past Medical History:   Diagnosis Date    Anxiety     Cholelithiasis 08/18/2013    Note on U/S    Chronic gastritis 10/17/2014    Dr. Doyle Gupta (EGD Result) H. Pylori Neg    Depression     Dog bite of left hand including fingers with infection 11/16/2014    Adm 11/18/14 for IV antibiotics.      ETOH abuse     GERD (gastroesophageal reflux disease)     Hepatic steatosis 2014    Noted on U/S.     History of plasmapheresis 2014    Hypertriglyceridemia, Severe Pancreatitis    Hypertension     Hypertriglyceridemia     Hypomagnesemia 2016    1.6 mg/dL    Non compliance with medical treatment     Osteomyelitis of left hand ([address] 2014    MRI Finding @ LUE 2nd MCP joint.  Pancreatitis 2013    Highest on Record [phone]. Dr. Kwadwo Guevara    PICC (peripherally inserted central catheter) in place     R Basilic Vein (IV Antibiotics for LUE Hand Osteomyelitis).  Rectal bleeding     Substance abuse (HCC)     Several UDS + Opiates.  Vitamin D deficiency 2014    11.6ng/mL ( ng/mL)     Past Surgical History:   Procedure Laterality Date    HX CYST INCISION AND DRAINAGE Left 2014    LUE 2nd digit abscess I&D.  HX ENDOSCOPY  10/17/2014    Dr. Kwadwo Guevara: EGD/Colonoscopy    HX HEENT      wisdom tooth extraction    HX KNEE ARTHROSCOPY Right      Family History   Problem Relation Age of Onset    Elevated Lipids Mother     Thyroid Disease Mother     Hypertension Father     Alcohol abuse Maternal Grandmother     MS Paternal Grandmother      Social History     Tobacco Use    Smoking status: Former Smoker     Last attempt to quit: 2012     Years since quittin.6    Smokeless tobacco: Never Used    Tobacco comment: 2018    Substance Use Topics    Alcohol use: Not Currently     Alcohol/week: 13.3 standard drinks     Types: 8 Cans of beer, 8 Standard drinks or equivalent per week       Review of Systems   Constitutional: Negative. HENT: Negative. Respiratory: Negative. Cardiovascular: Negative. Genitourinary: Negative. Psychiatric/Behavioral: The patient is nervous/anxious. Objective:   Vital Signs: (As obtained by patient/caregiver at home)  There were no vitals taken for this visit.      [INSTRUCTIONS:  \"[x]\" Indicates a positive item  \"[]\" Indicates a negative item  -- DELETE ALL ITEMS NOT EXAMINED]    Constitutional: [x] Appears well-developed and well-nourished [x] No apparent distress          Mental status: [x] Alert and awake  [x] Oriented to person/place/time [x] Able to follow commands        Eyes:   EOM    [x]  Normal      Sclera  [x]  Normal        HENT: [x] Normocephalic, atraumatic        Pulmonary/Chest: [x] Respiratory effort normal   [x] No visualized signs of difficulty breathing or respiratory distress            Neurological:        [x] No Facial Asymmetry (Cranial nerve 7 motor function) (limited exam due to video visit)                 Skin:        [x] No significant exanthematous lesions or discoloration noted on facial skin                  Psychiatric:       [x] Normal Affect          Diagnoses and all orders for this visit:    Urinary tract infection without hematuria, site unspecified  -     ciprofloxacin HCl (CIPRO) 500 mg tablet; Take 1 Tab by mouth two (2) times a day for 10 days. , Normal, Disp-20 Tab, R-0    Nasal sore  -     mupirocin (BACTROBAN) 2 % ointment; Apply  to affected area daily. , Normal, Disp-22 g, R-0        PLAN:  Nose sore: dif dx: MRSA  Urinary symptoms: dif dx: BPH    I reviewed UA which showed no signs of UTI nor did the culture. I reviewed the CT results. Pt will call if symptoms persist, next step urology ref. I have discussed the diagnosis with the patient and the intended plan as seen in the above orders. The patient has received an after-visit summary and questions were answered concerning future plans. I have discussed medication side effects and warnings with the patient as well. Patient will call for further questions. Follow-up and Dispositions    · Return if symptoms worsen or fail to improve. We discussed the expected course, resolution and complications of the diagnosis(es) in detail. Medication risks, benefits, costs, interactions, and alternatives were discussed as indicated.   I advised him to contact the office if his condition worsens, changes or fails to improve as anticipated. He expressed understanding with the diagnosis(es) and plan. Reba Gowers is a 37 y.o. male who was evaluated by a video visit encounter for concerns as above. Patient identification was verified prior to start of the visit. A caregiver was present when appropriate. Due to this being a TeleHealth encounter (During FLYEH-21 public health emergency), evaluation of the following organ systems was limited: Vitals/Constitutional/EENT/Resp/CV/GI//MS/Neuro/Skin/Heme-Lymph-Imm. Pursuant to the emergency declaration under the [facility], 1135 waiver authority and the [employer] and [employer] Act, this Virtual  Visit was conducted, with patient's (and/or legal guardian's) consent, to reduce the patient's risk of exposure to COVID-19 and provide necessary medical care. Services were provided through a video synchronous discussion virtually to substitute for in-person clinic visit. Patient and provider were located at their individual homes.       Ashlee Weston NP

## 2020-05-21 NOTE — PROGRESS NOTES
Subjective: He had developed some ecchymosis in his lower abdomen. Otherwise he is tolerating diet. His bowels are moving. He has no fevers. Past medical history and ROS were reviewed and unchanged. Physical exam; visualized on the screen is some ecchymosis in the lower abdomen    Pathology consistent with appendicitis    Assessment / Plan    Status post laparoscopic appendectomy  Call if ecchymosis worsens or does not resolve  No heavy lifting for 6 weeks  Follow-up PRN    The diagnoses and plan were discussed with patient. All questions answered. Plan of care agreed to by all concerned.

## 2020-07-12 DIAGNOSIS — G89.29 CHRONIC RIGHT-SIDED LOW BACK PAIN WITH RIGHT-SIDED SCIATICA: Primary | ICD-10-CM

## 2020-07-12 DIAGNOSIS — M54.41 CHRONIC RIGHT-SIDED LOW BACK PAIN WITH RIGHT-SIDED SCIATICA: Primary | ICD-10-CM

## 2020-07-12 RX ORDER — GABAPENTIN 400 MG/1
400 CAPSULE ORAL 3 TIMES DAILY
Qty: 90 CAP | Refills: 1 | Status: SHIPPED | OUTPATIENT
Start: 2020-07-12 | End: 2020-08-06 | Stop reason: SDUPTHER

## 2020-08-26 ENCOUNTER — VIRTUAL VISIT (OUTPATIENT)
Dept: FAMILY MEDICINE CLINIC | Age: 35
End: 2020-08-26

## 2020-08-26 DIAGNOSIS — F41.1 GAD (GENERALIZED ANXIETY DISORDER): Primary | ICD-10-CM

## 2020-08-26 DIAGNOSIS — N52.2 DRUG-INDUCED ERECTILE DYSFUNCTION: ICD-10-CM

## 2020-08-26 DIAGNOSIS — I10 ESSENTIAL HYPERTENSION: ICD-10-CM

## 2020-08-26 RX ORDER — SILDENAFIL 100 MG/1
100 TABLET, FILM COATED ORAL AS NEEDED
Qty: 12 TAB | Refills: 5 | Status: SHIPPED | OUTPATIENT
Start: 2020-08-26 | End: 2022-10-11 | Stop reason: SDUPTHER

## 2020-08-26 RX ORDER — PAROXETINE HYDROCHLORIDE 40 MG/1
40 TABLET, FILM COATED ORAL DAILY
Qty: 90 TAB | Refills: 2 | Status: SHIPPED | OUTPATIENT
Start: 2020-08-26 | End: 2020-12-09 | Stop reason: ALTCHOICE

## 2020-08-26 RX ORDER — LISINOPRIL 20 MG/1
20 TABLET ORAL DAILY
Qty: 90 TAB | Refills: 2 | Status: SHIPPED | OUTPATIENT
Start: 2020-08-26 | End: 2021-06-15 | Stop reason: SDUPTHER

## 2020-08-26 NOTE — PROGRESS NOTES
Andre August is a 38 y.o. male who was seen by synchronous (real-time) audio-video technology on 8/26/2020 for multiple issues    I am working from home. Patient is at his home. Subjective:     Pt is only having abdominal pain on and off. He has been watching his diet which helps control his gallbladder issues. Pt is in a new relationship and thinks that his medications are causing some issues with him not being able to get an erection. Patient states for about 3 weeks now he has been drinking about 12 beers a night. He wants to stop but he is concerned about going in to withdrawals. Prior to Admission medications    Medication Sig Start Date End Date Taking? Authorizing Provider   gabapentin (NEURONTIN) 400 mg capsule Take 1 Cap by mouth three (3) times daily. Max Daily Amount: 1,200 mg. 8/10/20   Priscilla Darnell NP   atenoloL (TENORMIN) 50 mg tablet Take 2 Tabs by mouth daily. 8/7/20   Priscilla Darnell NP   mupirocin (BACTROBAN) 2 % ointment Apply  to affected area daily. 5/21/20   Priscilla Darnell NP   atorvastatin (LIPITOR) 20 mg tablet Take 1 Tab by mouth daily. 5/12/20   Priscilla Darnell NP   ondansetron (ZOFRAN ODT) 4 mg disintegrating tablet Take 1-2 tablets every 6-8 hours as needed for nausea and vomiting. 2/9/20   Sugey Fleming PA-C   lisinopril (PRINIVIL, ZESTRIL) 20 mg tablet Take 1 Tab by mouth daily. 12/17/19   Tremaine Oakley NP   PARoxetine (PAXIL) 20 mg tablet Take 2 Tabs by mouth daily. 12/17/19   Tremaine Oakley NP   fluticasone propionate (FLONASE) 50 mcg/actuation nasal spray 2 Sprays by Both Nostrils route daily. 12/1/19   CARLITA Jones   guaiFENesin ER (MUCINEX) 600 mg ER tablet Take 1 Tab by mouth two (2) times a day. 12/1/19   Berlin Damon PA   Camphor-Eucalyptus Oil-Menthol (VICKS VAPORUB) 4.8-1.2-2.6 % oint 1 Actuation(s) by Apply Externally route three (3) times daily as needed for Cough or Other (congestion).  12/1/19   CARLITA Jones camphor-eucalyptus-menthol (VICKS VAPOSTEAM) liqd 1 Actuation(s) by Does Not Apply route three (3) times daily as needed for Cough or Other (congestion). 12/1/19   Savanna Damon PA   Vaporizers ([employer] WARM STEAM VAPORIZER) misc 1 Actuation(s) by Does Not Apply route three (3) times daily as needed for Cough or Other (congestion). 12/1/19   CARLITA Alvarez     Patient Active Problem List    Diagnosis Date Noted    Acute appendicitis 05/07/2020    Mild depression ([facility] [address] 06/08/2018    MICHELLE (generalized anxiety disorder) 07/08/2015    Hyperlipidemia 12/05/2013    HTN (hypertension) 09/12/2012     Current Outpatient Medications   Medication Sig Dispense Refill    lisinopriL (PRINIVIL, ZESTRIL) 20 mg tablet Take 1 Tab by mouth daily. 90 Tab 2    PARoxetine (PAXIL) 40 mg tablet Take 1 Tab by mouth daily. 90 Tab 2    sildenafil citrate (Viagra) 100 mg tablet Take 1 Tab by mouth as needed for Erectile Dysfunction. 12 Tab 5    gabapentin (NEURONTIN) 400 mg capsule Take 1 Cap by mouth three (3) times daily. Max Daily Amount: 1,200 mg. 90 Cap 1    atenoloL (TENORMIN) 50 mg tablet Take 2 Tabs by mouth daily. 90 Tab 1    mupirocin (BACTROBAN) 2 % ointment Apply  to affected area daily. 22 g 0    atorvastatin (LIPITOR) 20 mg tablet Take 1 Tab by mouth daily. 90 Tab 2    ondansetron (ZOFRAN ODT) 4 mg disintegrating tablet Take 1-2 tablets every 6-8 hours as needed for nausea and vomiting. 10 Tab 0    fluticasone propionate (FLONASE) 50 mcg/actuation nasal spray 2 Sprays by Both Nostrils route daily. 1 Bottle 0    guaiFENesin ER (MUCINEX) 600 mg ER tablet Take 1 Tab by mouth two (2) times a day. 20 Tab 0    Camphor-Eucalyptus Oil-Menthol (VICKS VAPORUB) 4.8-1.2-2.6 % oint 1 Actuation(s) by Apply Externally route three (3) times daily as needed for Cough or Other (congestion).  50 g 0    camphor-eucalyptus-menthol (VICKS VAPOSTEAM) liqd 1 Actuation(s) by Does Not Apply route three (3) times daily as needed for Cough or Other (congestion). 1 Bottle 0    Vaporizers ([employer] WARM STEAM VAPORIZER) misc 1 Actuation(s) by Does Not Apply route three (3) times daily as needed for Cough or Other (congestion). 1 Each 0     Allergies   Allergen Reactions    Amoxicillin Swelling    Contrast Dye [Iodine] Hives     Pt had iv contrast prior to this study without problems, developed hives on 07/04/15 5 minutes post injection    Iodinated Contrast Media Hives     Pt got hives when he received IV contrast in the past    Penicillins Hives and Swelling    Wellbutrin [Bupropion] Other (comments)     Caused aggression     Past Medical History:   Diagnosis Date    Anxiety     Cholelithiasis 08/18/2013    Note on U/S    Chronic gastritis 10/17/2014    Dr. Jolynn Ivory (EGD Result) H. Pylori Neg    Depression     Dog bite of left hand including fingers with infection 11/16/2014    Adm 11/18/14 for IV antibiotics.  ETOH abuse     GERD (gastroesophageal reflux disease)     Hepatic steatosis 02/06/2014    Noted on U/S.     History of plasmapheresis 02/2014    Hypertriglyceridemia, Severe Pancreatitis    Hypertension     Hypertriglyceridemia     Hypomagnesemia 07/24/2016    1.6 mg/dL    Non compliance with medical treatment     Osteomyelitis of left hand ([facility] [address] 11/22/2014    MRI Finding @ LUE 2nd MCP joint.  Pancreatitis 08/17/2013    Highest on Record (2/6/14) 1370. Dr. Jolynn Ivory    PICC (peripherally inserted central catheter) in place 86/54/6167    R Basilic Vein (IV Antibiotics for LUE Hand Osteomyelitis).  Rectal bleeding     Substance abuse (HCC)     Several UDS + Opiates.  Vitamin D deficiency 02/17/2014    11.6ng/mL ( ng/mL)     Past Surgical History:   Procedure Laterality Date    HX CYST INCISION AND DRAINAGE Left 12/01/2014    LUE 2nd digit abscess I&D.      HX ENDOSCOPY  10/17/2014    Dr. Jolynn Ivory: EGD/Colonoscopy    HX HEENT      wisdom tooth extraction    HX KNEE ARTHROSCOPY Right Family History   Problem Relation Age of Onset    Elevated Lipids Mother     Thyroid Disease Mother     Hypertension Father     Alcohol abuse Maternal Grandmother     MS Paternal Grandmother      Social History     Tobacco Use    Smoking status: Former Smoker     Last attempt to quit: 2012     Years since quittin.9    Smokeless tobacco: Never Used    Tobacco comment: 2018    Substance Use Topics    Alcohol use: Not Currently     Alcohol/week: 13.3 standard drinks     Types: 8 Cans of beer, 8 Standard drinks or equivalent per week       Review of Systems   Constitutional: Negative. Respiratory: Negative. Cardiovascular: Negative. Gastrointestinal: Positive for abdominal pain. Negative for heartburn, nausea and vomiting. Genitourinary: Negative. Psychiatric/Behavioral: The patient is nervous/anxious (stable). Objective:   No flowsheet data found. [INSTRUCTIONS:  \"[x]\" Indicates a positive item  \"[]\" Indicates a negative item  -- DELETE ALL ITEMS NOT EXAMINED]    Constitutional: [x] Appears well-developed and well-nourished [x] No apparent distress          Mental status: [x] Alert and awake  [x] Oriented to person/place/time [x] Able to follow commands         Eyes:   EOM    [x]  Normal       Sclera  [x]  Normal              Discharge [x]  None visible       HENT: [x] Normocephalic, atraumatic         Pulmonary/Chest: [x] Respiratory effort normal   [x] No visualized signs of difficulty breathing or respiratory distress       Musculoskeletal:          [x] Normal range of motion of neck            Neurological:        [x] No Facial Asymmetry (Cranial nerve 7 motor function) (limited exam due to video visit)          [x] No gaze palsy              Psychiatric:       [x] Normal Affect        [x] No Hallucinations      Diagnoses and all orders for this visit:    MICHELLE (generalized anxiety disorder)  -     PARoxetine (PAXIL) 40 mg tablet; Take 1 Tab by mouth daily. , Normal, Disp-90 Tab,R-2    Essential hypertension  -     lisinopriL (PRINIVIL, ZESTRIL) 20 mg tablet; Take 1 Tab by mouth daily. , Normal, Disp-90 Tab,R-2    Drug-induced erectile dysfunction  -     sildenafil citrate (Viagra) 100 mg tablet; Take 1 Tab by mouth as needed for Erectile Dysfunction. , Normal, Disp-12 Tab,R-5      PLAN:  We discussed ED and treatment options. Patient aware that this may not be a covered benefit and that he may have to pay out of pocket. Together we reviewed his medication list.    We discussed his CT which showed gallstones. Pt will call with any concerns. Pt advised to cut down on his beer by 4 cans every few days until he is not drinking any. Pt is to watch his diet and increase his exercise. I have discussed the diagnosis with the patient and the intended plan as seen in the above orders. The patient has received an after-visit summary and questions were answered concerning future plans. I have discussed medication side effects and warnings with the patient as well. Patient will call for further questions. Follow-up and Dispositions    · Return in about 6 months (around 2/26/2021) for chronic care. We discussed the expected course, resolution and complications of the diagnosis(es) in detail. Medication risks, benefits, costs, interactions, and alternatives were discussed as indicated. I advised him to contact the office if his condition worsens, changes or fails to improve as anticipated. He expressed understanding with the diagnosis(es) and plan. Kraus Bharat, who was evaluated through a patient-initiated, synchronous (real-time) audio-video encounter, and/or his healthcare decision maker, is aware that it is a billable service, with coverage as determined by his insurance carrier. He provided verbal consent to proceed: Yes, and patient identification was verified.  It was conducted pursuant to the emergency declaration under the [facility] and the [employer] Act, [facility] authority and the [employer] Act. A caregiver was present when appropriate. Ability to conduct physical exam was limited. I was at home. The patient was at home.       Lissy Arizmendi NP

## 2020-08-27 ENCOUNTER — TELEPHONE (OUTPATIENT)
Dept: FAMILY MEDICINE CLINIC | Age: 35
End: 2020-08-27

## 2020-09-05 ENCOUNTER — APPOINTMENT (OUTPATIENT)
Dept: ULTRASOUND IMAGING | Age: 35
End: 2020-09-05
Attending: EMERGENCY MEDICINE
Payer: MEDICAID

## 2020-09-05 ENCOUNTER — APPOINTMENT (OUTPATIENT)
Dept: GENERAL RADIOLOGY | Age: 35
End: 2020-09-05
Attending: EMERGENCY MEDICINE
Payer: MEDICAID

## 2020-09-05 ENCOUNTER — HOSPITAL ENCOUNTER (EMERGENCY)
Age: 35
Discharge: HOME | End: 2020-09-05
Attending: EMERGENCY MEDICINE | Admitting: EMERGENCY MEDICINE
Payer: MEDICAID

## 2020-09-05 ENCOUNTER — APPOINTMENT (OUTPATIENT)
Dept: CT IMAGING | Age: 35
End: 2020-09-05
Attending: EMERGENCY MEDICINE
Payer: MEDICAID

## 2020-09-05 VITALS
RESPIRATION RATE: 15 BRPM | DIASTOLIC BLOOD PRESSURE: 83 MMHG | HEART RATE: 67 BPM | HEIGHT: 74 IN | TEMPERATURE: 98.5 F | WEIGHT: 200 LBS | OXYGEN SATURATION: 97 % | SYSTOLIC BLOOD PRESSURE: 126 MMHG | BODY MASS INDEX: 25.67 KG/M2

## 2020-09-05 DIAGNOSIS — Z78.9 ALCOHOL USE: ICD-10-CM

## 2020-09-05 DIAGNOSIS — R10.13 ABDOMINAL PAIN, EPIGASTRIC: Primary | ICD-10-CM

## 2020-09-05 LAB
ALBUMIN SERPL-MCNC: 4.3 G/DL (ref 3.4–5)
ALBUMIN/GLOB SERPL: 1 {RATIO} (ref 0.8–1.7)
ALP SERPL-CCNC: 68 U/L (ref 45–117)
ALT SERPL-CCNC: 41 U/L (ref 16–61)
ANION GAP SERPL CALC-SCNC: 7 MMOL/L (ref 3–18)
AST SERPL-CCNC: 93 U/L (ref 10–38)
ATRIAL RATE: 70 BPM
BASOPHILS # BLD: 0 K/UL (ref 0–0.1)
BASOPHILS NFR BLD: 1 % (ref 0–2)
BILIRUB SERPL-MCNC: 0.8 MG/DL (ref 0.2–1)
BUN SERPL-MCNC: 16 MG/DL (ref 7–18)
BUN/CREAT SERPL: 15 (ref 12–20)
CALCIUM SERPL-MCNC: 9.3 MG/DL (ref 8.5–10.1)
CALCULATED P AXIS, ECG09: 45 DEGREES
CALCULATED R AXIS, ECG10: 8 DEGREES
CALCULATED T AXIS, ECG11: 40 DEGREES
CHLORIDE SERPL-SCNC: 102 MMOL/L (ref 100–111)
CO2 SERPL-SCNC: 27 MMOL/L (ref 21–32)
CREAT SERPL-MCNC: 1.09 MG/DL (ref 0.6–1.3)
DIAGNOSIS, 93000: NORMAL
DIFFERENTIAL METHOD BLD: ABNORMAL
EOSINOPHIL # BLD: 0.3 K/UL (ref 0–0.4)
EOSINOPHIL NFR BLD: 3 % (ref 0–5)
ERYTHROCYTE [DISTWIDTH] IN BLOOD BY AUTOMATED COUNT: 12.5 % (ref 11.6–14.5)
ETHANOL SERPL-MCNC: <3 MG/DL (ref 0–3)
GLOBULIN SER CALC-MCNC: 4.1 G/DL (ref 2–4)
GLUCOSE SERPL-MCNC: 87 MG/DL (ref 74–99)
HCT VFR BLD AUTO: 41.4 % (ref 36–48)
HGB BLD-MCNC: 14 G/DL (ref 13–16)
LIPASE SERPL-CCNC: 211 U/L (ref 73–393)
LYMPHOCYTES # BLD: 1.6 K/UL (ref 0.9–3.6)
LYMPHOCYTES NFR BLD: 19 % (ref 21–52)
MAGNESIUM SERPL-MCNC: 2 MG/DL (ref 1.6–2.6)
MCH RBC QN AUTO: 31.1 PG (ref 24–34)
MCHC RBC AUTO-ENTMCNC: 33.8 G/DL (ref 31–37)
MCV RBC AUTO: 92 FL (ref 74–97)
MONOCYTES # BLD: 0.5 K/UL (ref 0.05–1.2)
MONOCYTES NFR BLD: 6 % (ref 3–10)
NEUTS SEG # BLD: 6 K/UL (ref 1.8–8)
NEUTS SEG NFR BLD: 71 % (ref 40–73)
P-R INTERVAL, ECG05: 142 MS
PLATELET # BLD AUTO: 220 K/UL (ref 135–420)
PMV BLD AUTO: 10.2 FL (ref 9.2–11.8)
POTASSIUM SERPL-SCNC: 4.6 MMOL/L (ref 3.5–5.5)
PROT SERPL-MCNC: 8.4 G/DL (ref 6.4–8.2)
Q-T INTERVAL, ECG07: 422 MS
QRS DURATION, ECG06: 90 MS
QTC CALCULATION (BEZET), ECG08: 455 MS
RBC # BLD AUTO: 4.5 M/UL (ref 4.7–5.5)
SODIUM SERPL-SCNC: 136 MMOL/L (ref 136–145)
TROPONIN I SERPL-MCNC: <0.02 NG/ML (ref 0–0.04)
VENTRICULAR RATE, ECG03: 70 BPM
WBC # BLD AUTO: 8.4 K/UL (ref 4.6–13.2)

## 2020-09-05 PROCEDURE — 74176 CT ABD & PELVIS W/O CONTRAST: CPT

## 2020-09-05 PROCEDURE — 84484 ASSAY OF TROPONIN QUANT: CPT

## 2020-09-05 PROCEDURE — 83735 ASSAY OF MAGNESIUM: CPT

## 2020-09-05 PROCEDURE — 85025 COMPLETE CBC W/AUTO DIFF WBC: CPT

## 2020-09-05 PROCEDURE — 74011250637 HC RX REV CODE- 250/637: Performed by: EMERGENCY MEDICINE

## 2020-09-05 PROCEDURE — 71045 X-RAY EXAM CHEST 1 VIEW: CPT

## 2020-09-05 PROCEDURE — 80307 DRUG TEST PRSMV CHEM ANLYZR: CPT

## 2020-09-05 PROCEDURE — 99284 EMERGENCY DEPT VISIT MOD MDM: CPT

## 2020-09-05 PROCEDURE — 93005 ELECTROCARDIOGRAM TRACING: CPT

## 2020-09-05 PROCEDURE — 80053 COMPREHEN METABOLIC PANEL: CPT

## 2020-09-05 PROCEDURE — 83690 ASSAY OF LIPASE: CPT

## 2020-09-05 PROCEDURE — 76705 ECHO EXAM OF ABDOMEN: CPT

## 2020-09-05 RX ORDER — ONDANSETRON HYDROCHLORIDE 8 MG/1
8 TABLET, FILM COATED ORAL
Qty: 12 TAB | Refills: 0 | Status: SHIPPED | OUTPATIENT
Start: 2020-09-05 | End: 2021-04-21

## 2020-09-05 RX ORDER — DICYCLOMINE HYDROCHLORIDE 10 MG/1
10 CAPSULE ORAL 4 TIMES DAILY
Qty: 20 CAP | Refills: 0 | Status: SHIPPED | OUTPATIENT
Start: 2020-09-05 | End: 2020-09-10

## 2020-09-05 RX ORDER — PANTOPRAZOLE SODIUM 40 MG/1
40 TABLET, DELAYED RELEASE ORAL DAILY
Qty: 20 TAB | Refills: 0 | Status: SHIPPED | OUTPATIENT
Start: 2020-09-05 | End: 2020-09-25

## 2020-09-05 RX ORDER — GUAIFENESIN 100 MG/5ML
324 LIQUID (ML) ORAL
Status: COMPLETED | OUTPATIENT
Start: 2020-09-05 | End: 2020-09-05

## 2020-09-05 RX ADMIN — ASPIRIN 81 MG CHEWABLE TABLET 324 MG: 81 TABLET CHEWABLE at 12:49

## 2020-09-05 NOTE — DISCHARGE INSTRUCTIONS
Patient Education        Indigestion (Dyspepsia or Heartburn): Care Instructions  Your Care Instructions  Sometimes it can be hard to pinpoint the cause of indigestion. (It is also called dyspepsia or heartburn.) Most cases of an upset stomach with bloating, burning, burping, and nausea are minor and go away within several hours. Home treatment and over-the-counter medicine often are able to control symptoms. But if you take medicine to relieve your indigestion without making diet and lifestyle changes, your symptoms are likely to return again and again. If you get indigestion often, it may be a sign of a more serious medical problem. Be sure to follow up with your doctor, who may want to do tests to be sure of the cause of your indigestion. Follow-up care is a key part of your treatment and safety. Be sure to make and go to all appointments, and call your doctor if you are having problems. It's also a good idea to know your test results and keep a list of the medicines you take. How can you care for yourself at home? · Your doctor may recommend over-the-counter medicine. For mild or occasional indigestion, antacids such as Gaviscon, Mylanta, Maalox, or Tums, may help. Be safe with medicines. Be careful when you take over-the-counter antacid medicines. Many of these medicines have aspirin in them. Read the label to make sure that you are not taking more than the recommended dose. Too much aspirin can be harmful. · Your doctor also may recommend over-the-counter acid reducers, such as Pepcid AC (famotidine), Tagamet HB (cimetidine), or Prilosec (omeprazole). Read and follow all instructions on the label. If you use these medicines often, talk with your doctor. · Change your eating habits. ? It's best to eat several small meals instead of two or three large meals. ? After you eat, wait 2 to 3 hours before you lie down. ? Chocolate, mint, and alcohol can make GERD worse. ?  Spicy foods, foods that have a lot of acid (like tomatoes and oranges), and coffee can make GERD symptoms worse in some people. If your symptoms are worse after you eat a certain food, you may want to stop eating that food to see if your symptoms get better. · Do not smoke or chew tobacco. Smoking can make GERD worse. If you need help quitting, talk to your doctor about stop-smoking programs and medicines. These can increase your chances of quitting for good. · If you have GERD symptoms at night, raise the head of your bed 6 to 8 inches. You can do this by putting the frame on blocks or placing a foam wedge under the head of your mattress. (Adding extra pillows does not work.)  · Do not wear tight clothing around your middle. · Lose weight if you need to. Losing just 5 to 10 pounds can help. · Do not take anti-inflammatory medicines, such as aspirin, ibuprofen (Advil, Motrin), or naproxen (Aleve). These can irritate the stomach. If you need a pain medicine, try acetaminophen (Tylenol), which does not cause stomach upset. When should you call for help? Call your doctor now or seek immediate medical care if:    · You have new or worse belly pain.     · You are vomiting. Watch closely for changes in your health, and be sure to contact your doctor if:    · You have new or worse symptoms of indigestion.     · You have trouble or pain swallowing.     · You are losing weight.     · You do not get better as expected. Where can you learn more? Go to http://alistair-kalpesh.info/  Enter [id number] in the search box to learn more about \"Indigestion (Dyspepsia or Heartburn): Care Instructions. \"  Current as of: April 15, 2020               Content Version: 12.6  © 9285-2246 [employer]. Care instructions adapted under license by [employer] (which disclaims liability or warranty for this information).  If you have questions about a medical condition or this instruction, always ask your healthcare professional. [employer] disclaims any warranty or liability for your use of this information.

## 2020-09-05 NOTE — ED TRIAGE NOTES
Patient states I have gallstone, I started profusely sweating I wanted to make sure I was not having a heart attack. I have pressure in my abdomen.

## 2020-09-05 NOTE — ED PROVIDER NOTES
EMERGENCY DEPARTMENT HISTORY AND PHYSICAL EXAM    Date: 9/5/2020  Patient Name: Maria Alejandra Henderson    History of Presenting Illness     Chief Complaint   Patient presents with    Abdominal Pain         History Provided By: Patient    Additional History (Context): Maria Alejandra Henderson is a 38 y.o. male with hypertension, obesity and pancreatitis, ETOH abuse who presents with upper abdominal pain starting this morning. He was sweaty diaphoretic nauseous but no vomiting. Episode began around 830 8:00 this morning episode lasted about 45 minutes he still has a dull pressure pain so he came in for evaluation. He says normally his blood pressure is not this elevated. He drank 2 beers last night. He admits he can drink much more up to a 12 pack of beers. N.p.o. since last night. PCP: Bethany Kaba NP    Current Outpatient Medications   Medication Sig Dispense Refill    pantoprazole (Protonix) 40 mg tablet Take 1 Tab by mouth daily for 20 days. 20 Tab 0    ondansetron hcl (ZOFRAN) 8 mg tablet Take 1 Tab by mouth every eight (8) hours as needed for Nausea. 12 Tab 0    dicyclomine (BentyL) 10 mg capsule Take 1 Cap by mouth four (4) times daily for 5 days. 20 Cap 0    lisinopriL (PRINIVIL, ZESTRIL) 20 mg tablet Take 1 Tab by mouth daily. 90 Tab 2    PARoxetine (PAXIL) 40 mg tablet Take 1 Tab by mouth daily. 90 Tab 2    sildenafil citrate (Viagra) 100 mg tablet Take 1 Tab by mouth as needed for Erectile Dysfunction. 12 Tab 5    gabapentin (NEURONTIN) 400 mg capsule Take 1 Cap by mouth three (3) times daily. Max Daily Amount: 1,200 mg. 90 Cap 1    atenoloL (TENORMIN) 50 mg tablet Take 2 Tabs by mouth daily. 90 Tab 1    mupirocin (BACTROBAN) 2 % ointment Apply  to affected area daily. 22 g 0    atorvastatin (LIPITOR) 20 mg tablet Take 1 Tab by mouth daily. 90 Tab 2    ondansetron (ZOFRAN ODT) 4 mg disintegrating tablet Take 1-2 tablets every 6-8 hours as needed for nausea and vomiting.  10 Tab 0    fluticasone propionate (FLONASE) 50 mcg/actuation nasal spray 2 Sprays by Both Nostrils route daily. 1 Bottle 0    guaiFENesin ER (MUCINEX) 600 mg ER tablet Take 1 Tab by mouth two (2) times a day. 20 Tab 0    Camphor-Eucalyptus Oil-Menthol (VICKS VAPORUB) 4.8-1.2-2.6 % oint 1 Actuation(s) by Apply Externally route three (3) times daily as needed for Cough or Other (congestion). 50 g 0    camphor-eucalyptus-menthol (VICKS VAPOSTEAM) liqd 1 Actuation(s) by Does Not Apply route three (3) times daily as needed for Cough or Other (congestion). 1 Bottle 0    Vaporizers ([employer] WARM STEAM VAPORIZER) misc 1 Actuation(s) by Does Not Apply route three (3) times daily as needed for Cough or Other (congestion). 1 Each 0       Past History     Past Medical History:  Past Medical History:   Diagnosis Date    Anxiety     Cholelithiasis 08/18/2013    Note on U/S    Chronic gastritis 10/17/2014    Dr. Sydney Smith (EGD Result) H. Pylori Neg    Depression     Dog bite of left hand including fingers with infection 11/16/2014    Adm 11/18/14 for IV antibiotics.  ETOH abuse     GERD (gastroesophageal reflux disease)     Hepatic steatosis 02/06/2014    Noted on U/S.     History of plasmapheresis 02/2014    Hypertriglyceridemia, Severe Pancreatitis    Hypertension     Hypertriglyceridemia     Hypomagnesemia 07/24/2016    1.6 mg/dL    Non compliance with medical treatment     Osteomyelitis of left hand ([facility] [address] 11/22/2014    MRI Finding @ LUE 2nd MCP joint.  Pancreatitis 08/17/2013    Highest on Record (2/6/14) 4393. Dr. Sydney Smith    PICC (peripherally inserted central catheter) in place 27/98/1160    R Basilic Vein (IV Antibiotics for LUE Hand Osteomyelitis).  Rectal bleeding     Substance abuse (HCC)     Several UDS + Opiates.      Vitamin D deficiency 02/17/2014    11.6ng/mL ( ng/mL)       Past Surgical History:  Past Surgical History:   Procedure Laterality Date    HX CYST INCISION AND DRAINAGE Left 2014    LUE 2nd digit abscess I&D.  HX ENDOSCOPY  10/17/2014    Dr. Mann Giraldo: EGD/Colonoscopy    HX HEENT      wisdom tooth extraction    HX KNEE ARTHROSCOPY Right        Family History:  Family History   Problem Relation Age of Onset    Elevated Lipids Mother     Thyroid Disease Mother     Hypertension Father     Alcohol abuse Maternal Grandmother     MS Paternal Grandmother        Social History:  Social History     Tobacco Use    Smoking status: Former Smoker     Last attempt to quit: 2012     Years since quittin.9    Smokeless tobacco: Never Used    Tobacco comment: 2018    Substance Use Topics    Alcohol use: Not Currently     Alcohol/week: 13.3 standard drinks     Types: 8 Cans of beer, 8 Standard drinks or equivalent per week    Drug use: No       Allergies: Allergies   Allergen Reactions    Amoxicillin Swelling    Contrast Dye [Iodine] Hives     Pt had iv contrast prior to this study without problems, developed hives on 07/04/15 5 minutes post injection    Iodinated Contrast Media Hives     Pt got hives when he received IV contrast in the past    Penicillins Hives and Swelling    Wellbutrin [Bupropion] Other (comments)     Caused aggression         Review of Systems   Review of Systems   Constitutional: Negative for fever. Respiratory: Negative for shortness of breath. Cardiovascular: Positive for chest pain. Gastrointestinal: Positive for abdominal pain and nausea. Negative for diarrhea and vomiting. All Other Systems Negative  Physical Exam     Vitals:    20 1315 20 1330 20 1345 20 1400   BP: 126/78 128/73 126/89 126/83   Pulse:  70 65 67   Resp:  15     Temp:       SpO2: 98% 95% 98% 97%   Weight:       Height:         Physical Exam  Vitals signs and nursing note reviewed. Constitutional:       General: He is not in acute distress. Appearance: He is well-developed. He is obese.  He is not ill-appearing, toxic-appearing or diaphoretic. HENT:      Head: Normocephalic and atraumatic. Neck:      Musculoskeletal: Normal range of motion and neck supple. Thyroid: No thyromegaly. Vascular: No carotid bruit. Trachea: No tracheal deviation. Cardiovascular:      Rate and Rhythm: Normal rate and regular rhythm. Heart sounds: Normal heart sounds. No murmur. No friction rub. No gallop. Pulmonary:      Effort: Pulmonary effort is normal. No respiratory distress. Breath sounds: Normal breath sounds. No stridor. No wheezing or rales. Chest:      Chest wall: No tenderness. Abdominal:      General: There is no distension. Palpations: Abdomen is soft. There is no mass. Tenderness: There is abdominal tenderness in the right upper quadrant, epigastric area and left upper quadrant. There is no guarding or rebound. Musculoskeletal: Normal range of motion. Skin:     General: Skin is warm and dry. Coloration: Skin is not pale. Neurological:      Mental Status: He is alert. Psychiatric:         Speech: Speech normal.         Behavior: Behavior normal.         Thought Content: Thought content normal.         Judgment: Judgment normal.            Diagnostic Study Results     Labs -     Recent Results (from the past 12 hour(s))   EKG, 12 LEAD, INITIAL    Collection Time: 09/05/20 10:55 AM   Result Value Ref Range    Ventricular Rate 70 BPM    Atrial Rate 70 BPM    P-R Interval 142 ms    QRS Duration 90 ms    Q-T Interval 422 ms    QTC Calculation (Bezet) 455 ms    Calculated P Axis 45 degrees    Calculated R Axis 8 degrees    Calculated T Axis 40 degrees    Diagnosis       Normal sinus rhythm  Normal ECG  When compared with ECG of 07-MAY-2020 08:07,  Vent.  rate has decreased BY  61 BPM     METABOLIC PANEL, COMPREHENSIVE    Collection Time: 09/05/20 12:56 PM   Result Value Ref Range    Sodium 136 136 - 145 mmol/L    Potassium 4.6 3.5 - 5.5 mmol/L    Chloride 102 100 - 111 mmol/L    CO2 27 21 - 32 mmol/L    Anion gap 7 3.0 - 18 mmol/L    Glucose 87 74 - 99 mg/dL    BUN 16 7.0 - 18 MG/DL    Creatinine 1.09 0.6 - 1.3 MG/DL    BUN/Creatinine ratio 15 12 - 20      GFR est AA >60 >60 ml/min/1.73m2    GFR est non-AA >60 >60 ml/min/1.73m2    Calcium 9.3 8.5 - 10.1 MG/DL    Bilirubin, total 0.8 0.2 - 1.0 MG/DL    ALT (SGPT) 41 16 - 61 U/L    AST (SGOT) 93 (H) 10 - 38 U/L    Alk. phosphatase 68 45 - 117 U/L    Protein, total 8.4 (H) 6.4 - 8.2 g/dL    Albumin 4.3 3.4 - 5.0 g/dL    Globulin 4.1 (H) 2.0 - 4.0 g/dL    A-G Ratio 1.0 0.8 - 1.7     CBC WITH AUTOMATED DIFF    Collection Time: 09/05/20 12:56 PM   Result Value Ref Range    WBC 8.4 4.6 - 13.2 K/uL    RBC 4.50 (L) 4.70 - 5.50 M/uL    HGB 14.0 13.0 - 16.0 g/dL    HCT 41.4 36.0 - 48.0 %    MCV 92.0 74.0 - 97.0 FL    MCH 31.1 24.0 - 34.0 PG    MCHC 33.8 31.0 - 37.0 g/dL    RDW 12.5 11.6 - 14.5 %    PLATELET 127 644 - 569 K/uL    MPV 10.2 9.2 - 11.8 FL    NEUTROPHILS 71 40 - 73 %    LYMPHOCYTES 19 (L) 21 - 52 %    MONOCYTES 6 3 - 10 %    EOSINOPHILS 3 0 - 5 %    BASOPHILS 1 0 - 2 %    ABS. NEUTROPHILS 6.0 1.8 - 8.0 K/UL    ABS. LYMPHOCYTES 1.6 0.9 - 3.6 K/UL    ABS. MONOCYTES 0.5 0.05 - 1.2 K/UL    ABS. EOSINOPHILS 0.3 0.0 - 0.4 K/UL    ABS. BASOPHILS 0.0 0.0 - 0.1 K/UL    DF AUTOMATED     TROPONIN I    Collection Time: 09/05/20 12:56 PM   Result Value Ref Range    Troponin-I, QT <0.02 0.0 - 0.045 NG/ML   ETHYL ALCOHOL    Collection Time: 09/05/20 12:56 PM   Result Value Ref Range    ALCOHOL(ETHYL),SERUM <3 0 - 3 MG/DL   LIPASE    Collection Time: 09/05/20 12:56 PM   Result Value Ref Range    Lipase 211 73 - 393 U/L   MAGNESIUM    Collection Time: 09/05/20 12:56 PM   Result Value Ref Range    Magnesium 2.0 1.6 - 2.6 mg/dL       Radiologic Studies -   CT ABD PELV WO CONT   Final Result   IMPRESSION:        No CT evidence for acute process in the abdomen or pelvis. Distended bladder.       US ABD LTD   Final Result   IMPRESSION:      Echogenic liver, may be seen with hepatic steatosis or other intrinsic liver   disease. Cholelithiasis without secondary evidence for acute cholecystitis. XR CHEST PORT   Final Result   IMPRESSION: No acute cardiopulmonary process. CT Results  (Last 48 hours)               09/05/20 1222  CT ABD PELV WO CONT Final result    Impression:  IMPRESSION:         No CT evidence for acute process in the abdomen or pelvis. Distended bladder. Narrative:  INDICATION: Pain       COMPARISON: 5/20 and other priors       TECHNIQUE:    Unenhanced multislice helical CT was performed from the diaphragm to the   symphysis pubis without oral or intravenous contrast administration. All CT   scans at this facility are performed using doze optimization technique as   appropriate to a performed exam, to include automated exposure control,   adjustment of the mA and/or KV according to patient size (including appropriate   matching for site specific examinations), or use of iterative reconstruction   technique. FINDINGS:   The visualized portions of the lung bases are clear. The absence of intravenous contrast material reduces the sensitivity for   evaluation of the solid parenchymal organs of the abdomen. No focal abnormalities are seen within the liver, spleen, pancreas or adrenal   glands or kidneys. No renal or ureteral calculus or obstruction is identified. Cholelithiasis without secondary evidence for acute cholecystitis. .       The aorta tapers without aneurysm. There is no retroperitoneal adenopathy or   mass. Bladder is distended. The bowel is not obstructed. Surgical clips right lower quadrant likely   representing appendectomy. There is no ascites or free intraperitoneal air. No pelvic mass or adenopathy. No acute osseous abnormality.                 CXR Results  (Last 48 hours)               09/05/20 1143  XR CHEST PORT Final result    Impression:  IMPRESSION: No acute cardiopulmonary process. Narrative:  INDICATION:  Chest pain. COMPARISON:  5/20       FINDINGS: A portable AP radiograph of the chest was obtained at 1139 hours. The   lungs are clear. The cardiac and mediastinal contours and pulmonary vascularity   are normal. No acute osseous abnormality. Medical Decision Making   I am the first provider for this patient. I reviewed the vital signs, available nursing notes, past medical history, past surgical history, family history and social history. Vital Signs-Reviewed the patient's vital signs. Records Reviewed: Nursing Notes    Procedures:  Procedures    Provider Notes (Medical Decision Making): Known gallstones; will send for ultrasound and CT. Keeping patient n.p.o. Patient has known gallstones unchanged. Single episode of his discomfort this morning with ultrasound showing no cholecystitis acutely and as discussed with patient as a singular episode will not treat him as biliary colic. He is on a statin for his cholesterol. He has been on that medication for about a month and a half and advised him to follow-up with his PCP for recheck of his cholesterol level. CT scan shows nothing acute was suspected potentially pancreatitis with large amount of alcohol consumed regularly and discussed gradual declination in this consumption pattern. Discussed with him his abnormal AST ALT level ratio as indicative of regular large alcohol use. His labs are otherwise unremarkable and will discharge home. Tonics Bentyl and Zofran given for symptoms. MED RECONCILIATION:  No current facility-administered medications for this encounter. Current Outpatient Medications   Medication Sig    pantoprazole (Protonix) 40 mg tablet Take 1 Tab by mouth daily for 20 days.  ondansetron hcl (ZOFRAN) 8 mg tablet Take 1 Tab by mouth every eight (8) hours as needed for Nausea.     dicyclomine (BentyL) 10 mg capsule Take 1 Cap by mouth four (4) times daily for 5 days.    lisinopriL (PRINIVIL, ZESTRIL) 20 mg tablet Take 1 Tab by mouth daily.  PARoxetine (PAXIL) 40 mg tablet Take 1 Tab by mouth daily.  sildenafil citrate (Viagra) 100 mg tablet Take 1 Tab by mouth as needed for Erectile Dysfunction.  gabapentin (NEURONTIN) 400 mg capsule Take 1 Cap by mouth three (3) times daily. Max Daily Amount: 1,200 mg.  atenoloL (TENORMIN) 50 mg tablet Take 2 Tabs by mouth daily.  mupirocin (BACTROBAN) 2 % ointment Apply  to affected area daily.  atorvastatin (LIPITOR) 20 mg tablet Take 1 Tab by mouth daily.  ondansetron (ZOFRAN ODT) 4 mg disintegrating tablet Take 1-2 tablets every 6-8 hours as needed for nausea and vomiting.  fluticasone propionate (FLONASE) 50 mcg/actuation nasal spray 2 Sprays by Both Nostrils route daily.  guaiFENesin ER (MUCINEX) 600 mg ER tablet Take 1 Tab by mouth two (2) times a day.  Camphor-Eucalyptus Oil-Menthol (VICKS VAPORUB) 4.8-1.2-2.6 % oint 1 Actuation(s) by Apply Externally route three (3) times daily as needed for Cough or Other (congestion).  camphor-eucalyptus-menthol (VICKS VAPOSTEAM) liqd 1 Actuation(s) by Does Not Apply route three (3) times daily as needed for Cough or Other (congestion).  Vaporizers ([employer] WARM STEAM VAPORIZER) misc 1 Actuation(s) by Does Not Apply route three (3) times daily as needed for Cough or Other (congestion). Disposition:  home    DISCHARGE NOTE:   2:45 PM    Pt has been reexamined. Patient has no new complaints, changes, or physical findings. Care plan outlined and precautions discussed. Results of labs, [address], [place] were reviewed with the patient. All medications were reviewed with the patient; will d/c home with see below. All of pt's questions and concerns were addressed. Patient was instructed and agrees to follow up with PCP, as well as to return to the ED upon further deterioration. Patient is ready to go home.     Follow-up Information     Follow up With Specialties Details Why Contact Nathan Oakley, NP Nurse Practitioner Schedule an appointment as soon as possible for a visit in 3 days  [address]  [address]      [facility] EMERGENCY DEPT Emergency Medicine  If symptoms worsen return immediately [address]  [phone]          Current Discharge Medication List      START taking these medications    Details   pantoprazole (Protonix) 40 mg tablet Take 1 Tab by mouth daily for 20 days. Qty: 20 Tab, Refills: 0      ondansetron hcl (ZOFRAN) 8 mg tablet Take 1 Tab by mouth every eight (8) hours as needed for Nausea. Qty: 12 Tab, Refills: 0      dicyclomine (BentyL) 10 mg capsule Take 1 Cap by mouth four (4) times daily for 5 days. Qty: 20 Cap, Refills: 0               Diagnosis     Clinical Impression:   1. Abdominal pain, epigastric    2.  Alcohol use

## 2020-09-07 ENCOUNTER — PATIENT OUTREACH (OUTPATIENT)
Dept: CASE MANAGEMENT | Age: 35
End: 2020-09-07

## 2020-09-07 NOTE — PROGRESS NOTES
Care Transitions Nurse ( CTN) attempted to contact patient via telephone call regarding ED Discharge and Covid-19 risk information. There was no response. A voicemail message was left requesting a non-emergency return telephone call. CTN  contact information provided.

## 2020-10-14 DIAGNOSIS — F41.1 GAD (GENERALIZED ANXIETY DISORDER): ICD-10-CM

## 2020-10-14 DIAGNOSIS — I10 ESSENTIAL HYPERTENSION: ICD-10-CM

## 2020-10-14 RX ORDER — ATENOLOL 50 MG/1
TABLET ORAL
Qty: 90 TAB | Refills: 0 | Status: SHIPPED | OUTPATIENT
Start: 2020-10-14 | End: 2020-11-25 | Stop reason: SDUPTHER

## 2020-11-06 ENCOUNTER — APPOINTMENT (OUTPATIENT)
Dept: GENERAL RADIOLOGY | Age: 35
End: 2020-11-06
Attending: PHYSICIAN ASSISTANT
Payer: MEDICAID

## 2020-11-06 ENCOUNTER — HOSPITAL ENCOUNTER (EMERGENCY)
Age: 35
Discharge: HOME | End: 2020-11-06
Attending: EMERGENCY MEDICINE
Payer: MEDICAID

## 2020-11-06 VITALS
DIASTOLIC BLOOD PRESSURE: 88 MMHG | HEIGHT: 74 IN | WEIGHT: 215 LBS | BODY MASS INDEX: 27.59 KG/M2 | RESPIRATION RATE: 18 BRPM | OXYGEN SATURATION: 100 % | HEART RATE: 78 BPM | SYSTOLIC BLOOD PRESSURE: 142 MMHG

## 2020-11-06 DIAGNOSIS — R06.02 SOB (SHORTNESS OF BREATH): Primary | ICD-10-CM

## 2020-11-06 LAB
ALBUMIN SERPL-MCNC: 4.1 G/DL (ref 3.4–5)
ALBUMIN/GLOB SERPL: 1.2 {RATIO} (ref 0.8–1.7)
ALP SERPL-CCNC: 49 U/L (ref 45–117)
ALT SERPL-CCNC: 30 U/L (ref 16–61)
ANION GAP SERPL CALC-SCNC: 7 MMOL/L (ref 3–18)
AST SERPL-CCNC: 44 U/L (ref 10–38)
BASOPHILS # BLD: 0 K/UL (ref 0–0.1)
BASOPHILS NFR BLD: 1 % (ref 0–2)
BILIRUB SERPL-MCNC: 0.6 MG/DL (ref 0.2–1)
BNP SERPL-MCNC: 60 PG/ML (ref 0–450)
BUN SERPL-MCNC: 18 MG/DL (ref 7–18)
BUN/CREAT SERPL: 15 (ref 12–20)
CALCIUM SERPL-MCNC: 8.7 MG/DL (ref 8.5–10.1)
CHLORIDE SERPL-SCNC: 103 MMOL/L (ref 100–111)
CK MB CFR SERPL CALC: 0.8 % (ref 0–4)
CK MB CFR SERPL CALC: 1.1 % (ref 0–4)
CK MB SERPL-MCNC: 1.6 NG/ML (ref 5–25)
CK MB SERPL-MCNC: 1.9 NG/ML (ref 5–25)
CK SERPL-CCNC: 180 U/L (ref 39–308)
CK SERPL-CCNC: 193 U/L (ref 39–308)
CO2 SERPL-SCNC: 25 MMOL/L (ref 21–32)
CREAT SERPL-MCNC: 1.19 MG/DL (ref 0.6–1.3)
D DIMER PPP FEU-MCNC: 0.29 UG/ML(FEU)
DIFFERENTIAL METHOD BLD: ABNORMAL
EOSINOPHIL # BLD: 0.3 K/UL (ref 0–0.4)
EOSINOPHIL NFR BLD: 5 % (ref 0–5)
ERYTHROCYTE [DISTWIDTH] IN BLOOD BY AUTOMATED COUNT: 12.4 % (ref 11.6–14.5)
GLOBULIN SER CALC-MCNC: 3.3 G/DL (ref 2–4)
GLUCOSE SERPL-MCNC: 121 MG/DL (ref 74–99)
HCT VFR BLD AUTO: 38.3 % (ref 36–48)
HGB BLD-MCNC: 13.7 G/DL (ref 13–16)
LIPASE SERPL-CCNC: 267 U/L (ref 73–393)
LYMPHOCYTES # BLD: 1.8 K/UL (ref 0.9–3.6)
LYMPHOCYTES NFR BLD: 37 % (ref 21–52)
MAGNESIUM SERPL-MCNC: 1.8 MG/DL (ref 1.6–2.6)
MCH RBC QN AUTO: 32 PG (ref 24–34)
MCHC RBC AUTO-ENTMCNC: 35.8 G/DL (ref 31–37)
MCV RBC AUTO: 89.5 FL (ref 74–97)
MONOCYTES # BLD: 0.3 K/UL (ref 0.05–1.2)
MONOCYTES NFR BLD: 7 % (ref 3–10)
NEUTS SEG # BLD: 2.5 K/UL (ref 1.8–8)
NEUTS SEG NFR BLD: 50 % (ref 40–73)
PLATELET # BLD AUTO: 203 K/UL (ref 135–420)
PMV BLD AUTO: 9.3 FL (ref 9.2–11.8)
POTASSIUM SERPL-SCNC: 4 MMOL/L (ref 3.5–5.5)
PROT SERPL-MCNC: 7.4 G/DL (ref 6.4–8.2)
RBC # BLD AUTO: 4.28 M/UL (ref 4.7–5.5)
SODIUM SERPL-SCNC: 135 MMOL/L (ref 136–145)
TROPONIN I SERPL-MCNC: <0.02 NG/ML (ref 0–0.04)
TROPONIN I SERPL-MCNC: <0.02 NG/ML (ref 0–0.04)
WBC # BLD AUTO: 5 K/UL (ref 4.6–13.2)

## 2020-11-06 PROCEDURE — 80053 COMPREHEN METABOLIC PANEL: CPT

## 2020-11-06 PROCEDURE — 85025 COMPLETE CBC W/AUTO DIFF WBC: CPT

## 2020-11-06 PROCEDURE — 83690 ASSAY OF LIPASE: CPT

## 2020-11-06 PROCEDURE — 85379 FIBRIN DEGRADATION QUANT: CPT

## 2020-11-06 PROCEDURE — 99283 EMERGENCY DEPT VISIT LOW MDM: CPT

## 2020-11-06 PROCEDURE — 83880 ASSAY OF NATRIURETIC PEPTIDE: CPT

## 2020-11-06 PROCEDURE — 71045 X-RAY EXAM CHEST 1 VIEW: CPT

## 2020-11-06 PROCEDURE — 93005 ELECTROCARDIOGRAM TRACING: CPT

## 2020-11-06 PROCEDURE — 83735 ASSAY OF MAGNESIUM: CPT

## 2020-11-06 PROCEDURE — 99284 EMERGENCY DEPT VISIT MOD MDM: CPT

## 2020-11-06 PROCEDURE — 82550 ASSAY OF CK (CPK): CPT

## 2020-11-06 NOTE — DISCHARGE INSTRUCTIONS

## 2020-11-06 NOTE — ED PROVIDER NOTES
EMERGENCY DEPARTMENT HISTORY AND PHYSICAL EXAM  This was created with voice recognition software and transcription errors may be present. 3:18 PM  Date: 11/6/2020  Patient Name: Sunil Cordero past medical history    History of Presenting Illness     Chief Complaint:    History Provided By:     HPI: Sunil Cordero is a 38 y.o. male anxiety cholelithiasis chronic gastritis depression hypertension hypertriglyceridemia hypomagnesemia osteomyelitis substance abuse who presents with shortness of breath patient states he cannot get a deep breath in. Associated with some numbness to his abdomen and some lightheadedness with standing. No leg swelling no history of similar. Patient reportedly extracts morphine from poppy seeds and has been doing it for some time and did today just before his symptoms started. Actually denies chest pain. PCP: Priscilla Mckeon NP      Past History     Past Medical History:  Past Medical History:   Diagnosis Date    Anxiety     Cholelithiasis 08/18/2013    Note on U/S    Chronic gastritis 10/17/2014    Dr. Arnulfo Bernstein (EGD Result) H. Pylori Neg    Depression     Dog bite of left hand including fingers with infection 11/16/2014    Adm 11/18/14 for IV antibiotics.  ETOH abuse     GERD (gastroesophageal reflux disease)     Hepatic steatosis 02/06/2014    Noted on U/S.     History of plasmapheresis 02/2014    Hypertriglyceridemia, Severe Pancreatitis    Hypertension     Hypertriglyceridemia     Hypomagnesemia 07/24/2016    1.6 mg/dL    Non compliance with medical treatment     Osteomyelitis of left hand ([address] 11/22/2014    MRI Finding @ LUE 2nd MCP joint.  Pancreatitis 08/17/2013    Highest on Record (2/6/14) 6436. Dr. Arnulfo Bernstein    PICC (peripherally inserted central catheter) in place 14/85/6254    R Basilic Vein (IV Antibiotics for LUE Hand Osteomyelitis).  Rectal bleeding     Substance abuse (HCC)     Several UDS + Opiates.      Vitamin D deficiency 2014    11.6ng/mL ( ng/mL)       Past Surgical History:  Past Surgical History:   Procedure Laterality Date    HX CYST INCISION AND DRAINAGE Left 2014    LUE 2nd digit abscess I&D.  HX ENDOSCOPY  10/17/2014    Dr. Shania Caicedo: EGD/Colonoscopy    HX HEENT      wisdom tooth extraction    HX KNEE ARTHROSCOPY Right        Family History:  Family History   Problem Relation Age of Onset    Elevated Lipids Mother     Thyroid Disease Mother     Hypertension Father     Alcohol abuse Maternal Grandmother     MS Paternal Grandmother        Social History:  Social History     Tobacco Use    Smoking status: Former Smoker     Last attempt to quit: 2012     Years since quittin.1    Smokeless tobacco: Never Used    Tobacco comment: 2018    Substance Use Topics    Alcohol use: Not Currently     Alcohol/week: 13.3 standard drinks     Types: 8 Cans of beer, 8 Standard drinks or equivalent per week    Drug use: No       Allergies: Allergies   Allergen Reactions    Amoxicillin Swelling    Contrast Dye [Iodine] Hives     Pt had iv contrast prior to this study without problems, developed hives on 07/04/15 5 minutes post injection    Iodinated Contrast Media Hives     Pt got hives when he received IV contrast in the past    Penicillins Hives and Swelling    Wellbutrin [Bupropion] Other (comments)     Caused aggression       Review of Systems     Review of Systems   All other systems reviewed and are negative. 10 point review of systems otherwise negative unless noted in HPI. Physical Exam       Physical Exam  Constitutional:       Appearance: He is well-developed. Comments: Slightly diaphoretic   HENT:      Head: Normocephalic and atraumatic. Eyes:      Pupils: Pupils are equal, round, and reactive to light. Neck:      Musculoskeletal: Normal range of motion and neck supple. Cardiovascular:      Rate and Rhythm: Normal rate and regular rhythm. Heart sounds: Normal heart sounds. No murmur. No friction rub. Pulmonary:      Effort: Pulmonary effort is normal. No respiratory distress. Breath sounds: Normal breath sounds. No wheezing. Abdominal:      General: There is no distension. Palpations: Abdomen is soft. Tenderness: There is no abdominal tenderness. There is no guarding or rebound. Musculoskeletal: Normal range of motion. Skin:     General: Skin is warm and dry. Neurological:      Mental Status: He is oriented to person, place, and time. Psychiatric:         Behavior: Behavior normal.         Thought Content: Thought content normal.         Diagnostic Study Results     Vital Signs  EKG: EKG shows sinus at 87 with a normal axis normal intervals there is no ST elevation or depression no hypertrophy  Labs: BC unremarkable chemistry unremarkable dimer negative troponin negative x2  Imaging: Trays negative per radiology    Medical Decision Making     ED Course: Progress Notes, Reevaluation, and Consults:      Provider Notes (Medical Decision Making): This is a 73-year-old gentleman who presents with shortness of breath slightly diaphoretic. EKG is essentially unremarkable no nausea no vomiting. No history of coronary disease actually no chest pain but he is short of breath will obtain basic labs and a cxr. Was feeling better activated slight around for second troponin it was negative. He is requesting to leave will discharge for outpatient follow-up       Diagnosis     Clinical Impression: No diagnosis found. Disposition:    Patient's Medications   Start Taking    No medications on file   Continue Taking    ATENOLOL (TENORMIN) 50 MG TABLET    Take 2 tablets by mouth once daily    ATORVASTATIN (LIPITOR) 20 MG TABLET    Take 1 Tab by mouth daily. CAMPHOR-EUCALYPTUS OIL-MENTHOL (VICKS VAPORUB) 4.8-1.2-2.6 % OINT    1 Actuation(s) by Apply Externally route three (3) times daily as needed for Cough or Other (congestion). CAMPHOR-EUCALYPTUS-MENTHOL (VICKS VAPOSTEAM) LIQD    1 Actuation(s) by Does Not Apply route three (3) times daily as needed for Cough or Other (congestion). FLUTICASONE PROPIONATE (FLONASE) 50 MCG/ACTUATION NASAL SPRAY    2 Sprays by Both Nostrils route daily. GABAPENTIN (NEURONTIN) 400 MG CAPSULE    Take 1 Cap by mouth three (3) times daily. Max Daily Amount: 1,200 mg. GUAIFENESIN ER (MUCINEX) 600 MG ER TABLET    Take 1 Tab by mouth two (2) times a day. LISINOPRIL (PRINIVIL, ZESTRIL) 20 MG TABLET    Take 1 Tab by mouth daily. MUPIROCIN (BACTROBAN) 2 % OINTMENT    Apply  to affected area daily. ONDANSETRON (ZOFRAN ODT) 4 MG DISINTEGRATING TABLET    Take 1-2 tablets every 6-8 hours as needed for nausea and vomiting. ONDANSETRON HCL (ZOFRAN) 8 MG TABLET    Take 1 Tab by mouth every eight (8) hours as needed for Nausea. PAROXETINE (PAXIL) 40 MG TABLET    Take 1 Tab by mouth daily. SILDENAFIL CITRATE (VIAGRA) 100 MG TABLET    Take 1 Tab by mouth as needed for Erectile Dysfunction. VAPORIZERS ([employer] WARM STEAM VAPORIZER) MISC    1 Actuation(s) by Does Not Apply route three (3) times daily as needed for Cough or Other (congestion).    These Medications have changed    No medications on file   Stop Taking    No medications on file

## 2020-11-06 NOTE — PROGRESS NOTES
conducted an initial consultation and Spiritual Assessment for Deny Damon, who is a 38 y.o.,male. Patient's Primary Language is: [place]. According to the patient's EMR Sabianist Affiliation is: [place]. The reason the Patient came to the hospital is:   Patient Active Problem List    Diagnosis Date Noted    Acute appendicitis 05/07/2020    Mild depression ([address] 06/08/2018    MICHELLE (generalized anxiety disorder) 07/08/2015    Hyperlipidemia 12/05/2013    HTN (hypertension) 09/12/2012        The  provided the following Interventions:  Initiated a relationship of care and support. Listened empathically. Provided chaplaincy education. Provided information about Spiritual Care Services. Offered prayer and assurance of continued prayers on patient's behalf. Chart reviewed. The following outcomes where achieved:  Patient expressed gratitude for 's visit. Assessment:  Patient does not have any Jehovah's witness/cultural needs that will affect patient's preferences in health care. There are no spiritual or Jehovah's witness issues which require intervention at this time. Plan:  Chaplains will continue to follow and will provide pastoral care on an as needed/requested basis.     [facility] [address]   [phone]

## 2020-11-07 LAB
ATRIAL RATE: 87 BPM
CALCULATED P AXIS, ECG09: 45 DEGREES
CALCULATED R AXIS, ECG10: -6 DEGREES
CALCULATED T AXIS, ECG11: 45 DEGREES
DIAGNOSIS, 93000: NORMAL
P-R INTERVAL, ECG05: 142 MS
Q-T INTERVAL, ECG07: 392 MS
QRS DURATION, ECG06: 92 MS
QTC CALCULATION (BEZET), ECG08: 471 MS
VENTRICULAR RATE, ECG03: 87 BPM

## 2020-11-09 ENCOUNTER — VIRTUAL VISIT (OUTPATIENT)
Dept: FAMILY MEDICINE CLINIC | Age: 35
End: 2020-11-09
Payer: MEDICAID

## 2020-11-09 DIAGNOSIS — G89.29 CHRONIC RIGHT-SIDED LOW BACK PAIN WITH RIGHT-SIDED SCIATICA: ICD-10-CM

## 2020-11-09 DIAGNOSIS — R25.3 TWITCHING: Primary | ICD-10-CM

## 2020-11-09 DIAGNOSIS — M54.32 BILATERAL SCIATICA: ICD-10-CM

## 2020-11-09 DIAGNOSIS — M54.31 BILATERAL SCIATICA: ICD-10-CM

## 2020-11-09 DIAGNOSIS — M54.41 CHRONIC RIGHT-SIDED LOW BACK PAIN WITH RIGHT-SIDED SCIATICA: ICD-10-CM

## 2020-11-09 PROCEDURE — 99213 OFFICE O/P EST LOW 20 MIN: CPT | Performed by: NURSE PRACTITIONER

## 2020-11-09 RX ORDER — CETIRIZINE HCL 10 MG
10 TABLET ORAL
Qty: 30 TAB | Refills: 1 | Status: SHIPPED | OUTPATIENT
Start: 2020-11-09

## 2020-11-09 RX ORDER — GABAPENTIN 400 MG/1
400 CAPSULE ORAL 3 TIMES DAILY
Qty: 90 CAP | Refills: 1 | Status: SHIPPED | OUTPATIENT
Start: 2020-11-09 | End: 2021-01-29 | Stop reason: SDUPTHER

## 2020-11-09 RX ORDER — HYDROXYZINE 25 MG/1
25 TABLET, FILM COATED ORAL
Qty: 30 TAB | Refills: 0 | Status: SHIPPED | OUTPATIENT
Start: 2020-11-09 | End: 2020-11-19

## 2020-11-09 NOTE — PROGRESS NOTES
Hillary Primrose is a 38 y.o. male who was seen by synchronous (real-time) audio-video technology on 11/9/2020 for Other (muscle twitches) and LOW BACK PAIN        Assessment & Plan:   Diagnoses and all orders for this visit:    1. Twitching  -     REFERRAL TO NEUROLOGY    2. Bilateral sciatica  -     REFERRAL TO PHYSICIAL MEDICINE REHAB    3. Chronic right-sided low back pain with right-sided sciatica  -     gabapentin (NEURONTIN) 400 mg capsule; Take 1 Cap by mouth three (3) times daily. Max Daily Amount: 1,200 mg. Other orders  -     cetirizine (ZYRTEC) 10 mg tablet; Take 1 Tab by mouth nightly. -     hydrOXYzine HCL (ATARAX) 25 mg tablet; Take 1 Tab by mouth three (3) times daily as needed for Anxiety for up to 10 days. Follow-up and Dispositions    · Return in about 3 months (around 2/9/2021) for HTN/HLD, in office follow up, fasting labs 1 wk prior. I spent at least 20 minutes on this visit with this established patient. 712  Subjective:   Comments he has been having worsening of sciatic nerve pain. States he is having twitching and spasms in his arms/legs, 'throughout' his whole body. States symptoms have been worsening over the last month. Comments after twitching he will noticed he has increase muscle pain. States he's very sore. States he also is very fatigued. Comments he is currently having muscle twitches intermittently in his legs at the time of this visit. States this also occurs in his sleep. Was seen in the ED due to possible allergic reaction, sob. States he has a feeling like his nasal passage is on fire/hard to inhale. States he will feel this in his nose, throat and then will feel it in his lungs. Comments he takes claritin and just began using flonase. Comments he has noticed some redness in his throat. Prior to Admission medications    Medication Sig Start Date End Date Taking?  Authorizing Provider   atenoloL (TENORMIN) 50 mg tablet Take 2 tablets by mouth once daily 10/14/20   Rossi Darnell NP   ondansetron hcl (ZOFRAN) 8 mg tablet Take 1 Tab by mouth every eight (8) hours as needed for Nausea. 9/5/20   CARLITA Bobby   lisinopriL (PRINIVIL, ZESTRIL) 20 mg tablet Take 1 Tab by mouth daily. 8/26/20   Rossi Darnell NP   PARoxetine (PAXIL) 40 mg tablet Take 1 Tab by mouth daily. 8/26/20   Rossi Darnell NP   sildenafil citrate (Viagra) 100 mg tablet Take 1 Tab by mouth as needed for Erectile Dysfunction. 8/26/20   Rossi Darnell NP   gabapentin (NEURONTIN) 400 mg capsule Take 1 Cap by mouth three (3) times daily. Max Daily Amount: 1,200 mg. 8/10/20   Rossi Darnell NP   mupirocin (BACTROBAN) 2 % ointment Apply  to affected area daily. 5/21/20   Rossi Darnell NP   atorvastatin (LIPITOR) 20 mg tablet Take 1 Tab by mouth daily. 5/12/20   Rossi Darnell NP   ondansetron (ZOFRAN ODT) 4 mg disintegrating tablet Take 1-2 tablets every 6-8 hours as needed for nausea and vomiting. 2/9/20   Tushar Prather PA-C   fluticasone propionate (FLONASE) 50 mcg/actuation nasal spray 2 Sprays by Both Nostrils route daily. 12/1/19   CARLITA Rascon   guaiFENesin ER (MUCINEX) 600 mg ER tablet Take 1 Tab by mouth two (2) times a day. 12/1/19   Elida Damon PA   Camphor-Eucalyptus Oil-Menthol (VICKS VAPORUB) 4.8-1.2-2.6 % oint 1 Actuation(s) by Apply Externally route three (3) times daily as needed for Cough or Other (congestion). 12/1/19   Elida Damon PA   camphor-eucalyptus-menthol (VICKS VAPOSTEAM) liqd 1 Actuation(s) by Does Not Apply route three (3) times daily as needed for Cough or Other (congestion). 12/1/19   Elida Damon PA   Vaporizers (VICKS WARM STEAM VAPORIZER) misc 1 Actuation(s) by Does Not Apply route three (3) times daily as needed for Cough or Other (congestion).  12/1/19   CARLITA Rascon     Patient Active Problem List   Diagnosis Code    HTN (hypertension) I10    Hyperlipidemia E78.5    MICHELLE (generalized anxiety disorder) F41.1    Mild depression ([facility] F32.0    Acute appendicitis K35.80     Patient Active Problem List    Diagnosis Date Noted    Acute appendicitis 05/07/2020    Mild depression ([facility] 06/08/2018    MICHELLE (generalized anxiety disorder) 07/08/2015    Hyperlipidemia 12/05/2013    HTN (hypertension) 09/12/2012     Current Outpatient Medications   Medication Sig Dispense Refill    atenoloL (TENORMIN) 50 mg tablet Take 2 tablets by mouth once daily 90 Tab 0    ondansetron hcl (ZOFRAN) 8 mg tablet Take 1 Tab by mouth every eight (8) hours as needed for Nausea. 12 Tab 0    lisinopriL (PRINIVIL, ZESTRIL) 20 mg tablet Take 1 Tab by mouth daily. 90 Tab 2    PARoxetine (PAXIL) 40 mg tablet Take 1 Tab by mouth daily. 90 Tab 2    sildenafil citrate (Viagra) 100 mg tablet Take 1 Tab by mouth as needed for Erectile Dysfunction. 12 Tab 5    gabapentin (NEURONTIN) 400 mg capsule Take 1 Cap by mouth three (3) times daily. Max Daily Amount: 1,200 mg. 90 Cap 1    mupirocin (BACTROBAN) 2 % ointment Apply  to affected area daily. 22 g 0    atorvastatin (LIPITOR) 20 mg tablet Take 1 Tab by mouth daily. 90 Tab 2    ondansetron (ZOFRAN ODT) 4 mg disintegrating tablet Take 1-2 tablets every 6-8 hours as needed for nausea and vomiting. 10 Tab 0    fluticasone propionate (FLONASE) 50 mcg/actuation nasal spray 2 Sprays by Both Nostrils route daily. 1 Bottle 0    guaiFENesin ER (MUCINEX) 600 mg ER tablet Take 1 Tab by mouth two (2) times a day. 20 Tab 0    Camphor-Eucalyptus Oil-Menthol (VICKS VAPORUB) 4.8-1.2-2.6 % oint 1 Actuation(s) by Apply Externally route three (3) times daily as needed for Cough or Other (congestion). 50 g 0    camphor-eucalyptus-menthol (VICKS VAPOSTEAM) liqd 1 Actuation(s) by Does Not Apply route three (3) times daily as needed for Cough or Other (congestion). 1 Bottle 0    Vaporizers ([employer] WARM STEAM VAPORIZER) misc 1 Actuation(s) by Does Not Apply route three (3) times daily as needed for Cough or Other (congestion). 1 Each 0     Allergies   Allergen Reactions    Amoxicillin Swelling    Contrast Dye [Iodine] Hives     Pt had iv contrast prior to this study without problems, developed hives on 07/04/15 5 minutes post injection    Iodinated Contrast Media Hives     Pt got hives when he received IV contrast in the past    Penicillins Hives and Swelling    Wellbutrin [Bupropion] Other (comments)     Caused aggression     Past Medical History:   Diagnosis Date    Anxiety     Cholelithiasis 08/18/2013    Note on U/S    Chronic gastritis 10/17/2014    Dr. Delmis Rahman (EGD Result) H. Pylori Neg    Depression     Dog bite of left hand including fingers with infection 11/16/2014    Adm 11/18/14 for IV antibiotics.  ETOH abuse     GERD (gastroesophageal reflux disease)     Hepatic steatosis 02/06/2014    Noted on U/S.     History of plasmapheresis 02/2014    Hypertriglyceridemia, Severe Pancreatitis    Hypertension     Hypertriglyceridemia     Hypomagnesemia 07/24/2016    1.6 mg/dL    Non compliance with medical treatment     Osteomyelitis of left hand ([address] 11/22/2014    MRI Finding @ LUE 2nd MCP joint.  Pancreatitis 08/17/2013    Highest on Record (2/6/14) 2723. Dr. Delmis Rahman    PICC (peripherally inserted central catheter) in place 49/05/4750    R Basilic Vein (IV Antibiotics for LUE Hand Osteomyelitis).  Rectal bleeding     Substance abuse (HCC)     Several UDS + Opiates.  Vitamin D deficiency 02/17/2014    11.6ng/mL ( ng/mL)     Past Surgical History:   Procedure Laterality Date    HX CYST INCISION AND DRAINAGE Left 12/01/2014    LUE 2nd digit abscess I&D.      HX ENDOSCOPY  10/17/2014    Dr. Delmis Rahman: EGD/Colonoscopy    HX HEENT      wisdom tooth extraction    HX KNEE ARTHROSCOPY Right      Family History   Problem Relation Age of Onset    Elevated Lipids Mother     Thyroid Disease Mother     Hypertension Father     Alcohol abuse Maternal Grandmother     MS Paternal Grandmother      Social History     Tobacco Use    Smoking status: Former Smoker     Last attempt to quit: 2012     Years since quittin.1    Smokeless tobacco: Never Used    Tobacco comment: 2018    Substance Use Topics    Alcohol use: Not Currently     Alcohol/week: 13.3 standard drinks     Types: 8 Cans of beer, 8 Standard drinks or equivalent per week       ROS    Objective:   No flowsheet data found. General: alert, cooperative, no distress   Mental  status: normal mood, behavior, speech, dress, motor activity, and thought processes, able to follow commands   HENT: NCAT   Neck: no visualized mass   Resp: no respiratory distress   Neuro: no gross deficits   Skin: no discoloration or lesions of concern on visible areas   Psychiatric: normal affect, consistent with stated mood, no evidence of hallucinations     Additional exam findings: We discussed the expected course, resolution and complications of the diagnosis(es) in detail. Medication risks, benefits, costs, interactions, and alternatives were discussed as indicated. I advised him to contact the office if his condition worsens, changes or fails to improve as anticipated. He expressed understanding with the diagnosis(es) and plan. Eliz Izaguirre, who was evaluated through a patient-initiated, synchronous (real-time) audio-video encounter, and/or his healthcare decision maker, is aware that it is a billable service, with coverage as determined by his insurance carrier. He provided verbal consent to proceed: Yes, and patient identification was verified. It was conducted pursuant to the emergency declaration under the [facility], [address] [place] [address] authority and the [employer] Act. A caregiver was present when appropriate. Ability to conduct physical exam was limited. I was in the office. The patient was at home.       Bernrad Dior NP

## 2020-11-17 ENCOUNTER — OFFICE VISIT (OUTPATIENT)
Dept: ORTHOPEDIC SURGERY | Age: 35
End: 2020-11-17
Payer: MEDICAID

## 2020-11-17 VITALS
TEMPERATURE: 98.6 F | SYSTOLIC BLOOD PRESSURE: 160 MMHG | RESPIRATION RATE: 18 BRPM | DIASTOLIC BLOOD PRESSURE: 116 MMHG | WEIGHT: 231 LBS | HEART RATE: 80 BPM | BODY MASS INDEX: 29.65 KG/M2 | HEIGHT: 74 IN

## 2020-11-17 DIAGNOSIS — M54.50 LUMBAR SPINE PAIN: ICD-10-CM

## 2020-11-17 DIAGNOSIS — R25.2 SPASTICITY: ICD-10-CM

## 2020-11-17 DIAGNOSIS — G57.11 MERALGIA PARAESTHETICA, RIGHT: ICD-10-CM

## 2020-11-17 DIAGNOSIS — R29.2 GENERALIZED HYPERREFLEXIA: ICD-10-CM

## 2020-11-17 DIAGNOSIS — R29.898 WEAKNESS OF BOTH UPPER EXTREMITIES: Primary | ICD-10-CM

## 2020-11-17 DIAGNOSIS — G95.9 MYELOPATHY (HCC): ICD-10-CM

## 2020-11-17 PROCEDURE — 72040 X-RAY EXAM NECK SPINE 2-3 VW: CPT | Performed by: PHYSICAL MEDICINE & REHABILITATION

## 2020-11-17 PROCEDURE — 99204 OFFICE O/P NEW MOD 45 MIN: CPT | Performed by: PHYSICAL MEDICINE & REHABILITATION

## 2020-11-17 PROCEDURE — 72100 X-RAY EXAM L-S SPINE 2/3 VWS: CPT | Performed by: PHYSICAL MEDICINE & REHABILITATION

## 2020-11-17 RX ORDER — TIZANIDINE 4 MG/1
4 TABLET ORAL
Qty: 90 TAB | Refills: 2 | Status: SHIPPED | OUTPATIENT
Start: 2020-11-17 | End: 2021-09-30

## 2020-11-17 NOTE — PROGRESS NOTES
Allegra Garcia [facility] [address]  [address], [address]  [place] [address] [address]  Phone: [phone]  Fax: [phone]        Heather Mixon  : 1985  PCP: Estella Rodriguez NP  2020    NEW PATIENT      HISTORY OF PRESENT ILLNESS  Allyn Flanagan is a 38 y.o. male c/o low back pain radiating into the BLE into the bottom of the toes and numbness in the lateral RLE, more progressive x 6 months. He also c/o neck pain. He alslo c/o muscle weakness, twitching, spasms in all extremities x 6 months. He has also been referred to neurology by his PCP. He has difficulty writing/dexterity, walking up stairs. He feels as if he has lifted a lot of weights, and his extremities are tired. 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 Pain Score: 5/10. Treatments patient has tried:  Physical therapy:No  Doing HEP: No  Non-opioid medications: Yes  Spinal injections: No  Spinal surgery- No.   Last Cervical MRI: None  Last Lumbar MRI: None. PmHx: Unknown. ASSESSMENT  This a 38year-old male with c/o low back pain radiating into the posterior BLE, RLE numbness, and a sense of weakness, trembling/twitches/tremor in all extremities. He does have some signs(hyperreflexia without +araya or babinski and spasticity(grade 1-1+ on MAS)) consistent with an UMN syndrome. We can try to rule out cervical myelopathy with MRI. Otherwise, he will be scheduled to see neurology soon, which may be able to explain his tremors. PLAN  1. Cervical MRI - spasticity, generalized hyperreflexia, evaluate myelopathy   2. Tizanidine 4 mg TID PRN    Pt will f/u after MRI or sooner if needed. Diagnoses and all orders for this visit:    1. Weakness of both upper extremities  -     AMB POC XRAY, SPINE, CERVICAL; 2 OR 3  -     MRI CERV SPINE WO CONT; Future    2. Lumbar spine pain  -     AMB POC XRAY, SPINE, LUMBOSACRAL; 2 O    3.  Generalized hyperreflexia  -     MRI CERV SPINE WO CONT; Future    4. Spasticity  -     MRI CERV SPINE WO CONT; Future  -     tiZANidine (ZANAFLEX) 4 mg tablet; Take 1 Tab by mouth three (3) times daily as needed for Muscle Spasm(s). 5. Meralgia paraesthetica, right    6. Myelopathy ([facility]  -     MRI CERV SPINE WO CONT; Future       CHIEF COMPLAINT  Luigi Vizcarra is seen today in consultation at the request of Esmer Pablo NP for complaints of neck, low back pain radiating into BLE (R>L) with weakness, tremors/twitches in all extremities. PAST MEDICAL HISTORY   Past Medical History:   Diagnosis Date    Anxiety     Cholelithiasis 08/18/2013    Note on U/S    Chronic gastritis 10/17/2014    Dr. Jemma Myers (EGD Result) H. Pylori Neg    Depression     Dog bite of left hand including fingers with infection 11/16/2014    Adm 11/18/14 for IV antibiotics.  ETOH abuse     GERD (gastroesophageal reflux disease)     Hepatic steatosis 02/06/2014    Noted on U/S.     History of plasmapheresis 02/2014    Hypertriglyceridemia, Severe Pancreatitis    Hypertension     Hypertriglyceridemia     Hypomagnesemia 07/24/2016    1.6 mg/dL    Non compliance with medical treatment     Osteomyelitis of left hand ([facility] 11/22/2014    MRI Finding @ LUE 2nd MCP joint.  Pancreatitis 08/17/2013    Highest on Record (2/6/14) 6919. Dr. Jemma Myers    PICC (peripherally inserted central catheter) in place 76/99/9082    R Basilic Vein (IV Antibiotics for LUE Hand Osteomyelitis).  Rectal bleeding     Substance abuse (HCC)     Several UDS + Opiates.  Vitamin D deficiency 02/17/2014    11.6ng/mL ( ng/mL)       Past Surgical History:   Procedure Laterality Date    HX CYST INCISION AND DRAINAGE Left 12/01/2014    LUE 2nd digit abscess I&D.      HX ENDOSCOPY  10/17/2014    Dr. Jemma Myers: EGD/Colonoscopy    HX HEENT      wisdom tooth extraction    HX KNEE ARTHROSCOPY Right        MEDICATIONS      Current Outpatient Medications   Medication Sig Dispense Refill    cetirizine (ZYRTEC) 10 mg tablet Take 1 Tab by mouth nightly. 30 Tab 1    hydrOXYzine HCL (ATARAX) 25 mg tablet Take 1 Tab by mouth three (3) times daily as needed for Anxiety for up to 10 days. 30 Tab 0    gabapentin (NEURONTIN) 400 mg capsule Take 1 Cap by mouth three (3) times daily. Max Daily Amount: 1,200 mg. 90 Cap 1    atenoloL (TENORMIN) 50 mg tablet Take 2 tablets by mouth once daily 90 Tab 0    ondansetron hcl (ZOFRAN) 8 mg tablet Take 1 Tab by mouth every eight (8) hours as needed for Nausea. 12 Tab 0    lisinopriL (PRINIVIL, ZESTRIL) 20 mg tablet Take 1 Tab by mouth daily. 90 Tab 2    PARoxetine (PAXIL) 40 mg tablet Take 1 Tab by mouth daily. 90 Tab 2    atorvastatin (LIPITOR) 20 mg tablet Take 1 Tab by mouth daily. 90 Tab 2    ondansetron (ZOFRAN ODT) 4 mg disintegrating tablet Take 1-2 tablets every 6-8 hours as needed for nausea and vomiting. 10 Tab 0    fluticasone propionate (FLONASE) 50 mcg/actuation nasal spray 2 Sprays by Both Nostrils route daily. 1 Bottle 0    sildenafil citrate (Viagra) 100 mg tablet Take 1 Tab by mouth as needed for Erectile Dysfunction. 12 Tab 5    mupirocin (BACTROBAN) 2 % ointment Apply  to affected area daily. 22 g 0    guaiFENesin ER (MUCINEX) 600 mg ER tablet Take 1 Tab by mouth two (2) times a day. 20 Tab 0    Camphor-Eucalyptus Oil-Menthol (VICKS VAPORUB) 4.8-1.2-2.6 % oint 1 Actuation(s) by Apply Externally route three (3) times daily as needed for Cough or Other (congestion). 50 g 0    camphor-eucalyptus-menthol (VICKS VAPOSTEAM) liqd 1 Actuation(s) by Does Not Apply route three (3) times daily as needed for Cough or Other (congestion). 1 Bottle 0    Vaporizers ([employer] WARM STEAM VAPORIZER) misc 1 Actuation(s) by Does Not Apply route three (3) times daily as needed for Cough or Other (congestion).  1 Each 0       ALLERGIES    Allergies   Allergen Reactions    Amoxicillin Swelling    Contrast Dye [Iodine] Hives     Pt had iv contrast prior to this study without problems, developed hives on 07/04/15 5 minutes post injection    Iodinated Contrast Media Hives     Pt got hives when he received IV contrast in the past    Penicillins Hives and Swelling    Wellbutrin [Bupropion] Other (comments)     Caused aggression          SOCIAL HISTORY    Social History     Socioeconomic History    Marital status: SINGLE     Spouse name: Not on file    Number of children: Not on file    Years of education: Not on file    Highest education level: Not on file   Tobacco Use    Smoking status: Former Smoker     Last attempt to quit: 2012     Years since quittin.1    Smokeless tobacco: Never Used    Tobacco comment: 2018    Substance and Sexual Activity    Alcohol use: Not Currently     Alcohol/week: 13.3 standard drinks     Types: 8 Cans of beer, 8 Standard drinks or equivalent per week    Drug use: No       FAMILY HISTORY    Family History   Problem Relation Age of Onset    Elevated Lipids Mother     Thyroid Disease Mother     Hypertension Father     Alcohol abuse Maternal Grandmother     MS Paternal Grandmother          REVIEW OF SYSTEMS  Review of Systems   Constitutional: Negative for chills, fever and weight loss. Respiratory: Negative for shortness of breath. Cardiovascular: Negative for chest pain. Gastrointestinal: Negative for constipation. Negative for fecal incontinence    Genitourinary: Negative for dysuria. Negative for urinary incontinence   Musculoskeletal: Positive for back pain and neck pain. Skin: Negative for rash. Neurological: Positive for tingling ( BLE, RLE numbness), tremors ( all extremities) and weakness ( all extremities). Negative for dizziness, focal weakness and headaches. Endo/Heme/Allergies: Does not bruise/bleed easily. Psychiatric/Behavioral: The patient does not have insomnia.           PHYSICAL EXAMINATION  Visit Vitals  BP (!) 160/116 Comment: patient do not take other BP medication when bp was taken   Pulse 80   Temp 98.6 °F (37 °C) (Temporal)   Resp 18   Ht 6' 2\" (1.88 m)   Wt 231 lb (104.8 kg)   BMI 29.66 kg/m²         Pain Assessment  11/17/2020   Location of Pain Back; Arm   Location Modifiers Left;Right   Severity of Pain 5   Quality of Pain Other (Comment); Sharp   Quality of Pain Comment spasm, n/t, sharp sciatica LBP   Duration of Pain Persistent   Frequency of Pain Constant         Constitutional:  Well developed, well nourished, in no acute distress. Psychiatric: Affect and mood are appropriate. HEENT: Normocephalic, atraumatic. Extraocular movements intact. Integumentary: No rashes or abrasions noted on exposed areas. Cardiovascular: Regular rate and rhythm. Pulmonary: Clear to auscultation bilaterally. SPINE/MUSCULOSKELETAL EXAM    Cervical spine:  Neck is midline. Normal muscle tone. No focal atrophy is noted. ROM pain free. Shoulder ROM intact. No tenderness to palpation. Negative Spurling's sign. Negative Tinel's sign. Negative Elder's sign. Sensation in the bilateral arms grossly intact to light touch. Lumbar spine:  No rash, ecchymosis, or gross obliquity. No fasciculations. No focal atrophy is noted. No pain with hip ROM. Full range of motion. No tenderness to palpation. No tenderness to palpation at the sciatic notch. SI joints non-tender. Trochanters non tender. Decreased sensation in lateral femoral cutaneous nerve on the R.    Negative Babinski  No clear traditional clonus      MOTOR:      Biceps  Triceps Deltoids Wrist Ext Wrist Flex Hand Intrin   Right 5/5 5/5 5/5 5/5 5/5 5/5   Left 5/5 5/5 5/5 5/5 5/5 5/5             Hip Flex  Quads Hamstrings Ankle DF EHL Ankle PF   Right 5/5 5/5 5/5 5/5 5/5 5/5   Left 5/5 5/5 5/5 5/5 5/5 5/5     DTRs are 3+ biceps, triceps, brachioradialis, patella, and Achilles. Negative Straight Leg raise. Squat not tested. No difficulty with tandem gait. Ambulation without assistive device. FWB. RADIOGRAPHS/DATA  2V Cervical XR images taken on 11/17/2020 personally reviewed with patient:  Straightening of the cervical lordosis  Otherwise unremarkable    2V Lumbar XR images taken on 11/17/2020 personally reviewed with patient:  Mild spondylitic change  Normal alignment. Mild posterolisthesis of L5 on S1- inadequately visualized        reviewed    Mr. Ry Mendes has a reminder for a \"due or due soon\" health maintenance. I have asked that he contact his primary care provider for follow-up on this health maintenance. 20 minutes of face-to-face contact were spent with the patient during today's visit extensively discussing symptoms and treatment plan. All questions were answered. More than half of this visit today was spent on counseling. Written by Poonam Bravo, as dictated by Dr. Ancelmo Traore. I, Dr. Ancelmo Traore, confirm that all documentation is accurate.

## 2020-11-17 NOTE — PATIENT INSTRUCTIONS
Meralgia Paresthetica: Care Instructions Your Care Instructions Meralgia paresthetica (say \"muh-RAL-juh bzi-xsy-ZOZZ-ick-uh\") is pain and numbness in the outer part of your thigh. The pain might get worse after you walk or stand for a long time. This pain and numbness occur when a nerve in your thigh is pinched (compressed). Sometimes the problem is caused by wearing tight clothing or being overweight. Most of the time the problem goes away on its own in a few months. Lowering any pressure on the thigh area may help. Wear loose clothes, and lose weight if you need to. Follow-up care is a key part of your treatment and safety. Be sure to make and go to all appointments, and call your doctor if you are having problems. It's also a good idea to know your test results and keep a list of the medicines you take. How can you care for yourself at home? · Most times the problem gets better on its own. Try wearing loose clothing to see if this helps. · Lose weight if you need to. Talk with your doctor if you need help. When should you call for help? Watch closely for changes in your health, and be sure to contact your doctor if: 
  · You have new symptoms, such as pain that gets worse or new numbness in your thigh.  
  · You do not get better as expected. Where can you learn more? Go to http://www.gray.com/ Enter W578 in the search box to learn more about \"Meralgia Paresthetica: Care Instructions. \" Current as of: November 20, 2019               Content Version: 12.6 © 7916-9638 [employer]. Care instructions adapted under license by [employer] (which disclaims liability or warranty for this information). If you have questions about a medical condition or this instruction, always ask your healthcare professional. [address] any warranty or liability for your use of this information.

## 2020-11-20 ENCOUNTER — OFFICE VISIT (OUTPATIENT)
Dept: NEUROLOGY | Age: 35
End: 2020-11-20
Payer: MEDICAID

## 2020-11-20 VITALS
DIASTOLIC BLOOD PRESSURE: 98 MMHG | HEIGHT: 74 IN | SYSTOLIC BLOOD PRESSURE: 130 MMHG | HEART RATE: 72 BPM | TEMPERATURE: 98.1 F | BODY MASS INDEX: 28.49 KG/M2 | WEIGHT: 222 LBS | RESPIRATION RATE: 18 BRPM | OXYGEN SATURATION: 97 %

## 2020-11-20 DIAGNOSIS — F10.10 ETOH ABUSE: ICD-10-CM

## 2020-11-20 DIAGNOSIS — F39 MOOD DISORDER (HCC): Primary | ICD-10-CM

## 2020-11-20 DIAGNOSIS — F52.9 SEXUAL BEHAVIOR DISORDER: ICD-10-CM

## 2020-11-20 DIAGNOSIS — F41.1 GAD (GENERALIZED ANXIETY DISORDER): ICD-10-CM

## 2020-11-20 DIAGNOSIS — I10 ESSENTIAL HYPERTENSION: ICD-10-CM

## 2020-11-20 PROCEDURE — 99213 OFFICE O/P EST LOW 20 MIN: CPT | Performed by: PSYCHIATRY & NEUROLOGY

## 2020-11-20 NOTE — PROGRESS NOTES
Yaneth Plaza presents today for   Chief Complaint   Patient presents with    Spasms    New Patient       Is someone accompanying this pt? no    Is the patient using any DME equipment during OV? no    Depression Screening:  3 most recent PHQ Screens 10/18/2018   PHQ Not Done -   Little interest or pleasure in doing things -   Feeling down, depressed, irritable, or hopeless Several days   Total Score PHQ 2 -   Trouble falling or staying asleep, or sleeping too much More than half the days   Feeling tired or having little energy Several days   Poor appetite, weight loss, or overeating Several days   Feeling bad about yourself - or that you are a failure or have let yourself or your family down Several days   Trouble concentrating on things such as school, work, reading, or watching TV Not at all   Moving or speaking so slowly that other people could have noticed; or the opposite being so fidgety that others notice Not at all   Thoughts of being better off dead, or hurting yourself in some way Not at all       Learning Assessment:  Learning Assessment 4/3/2017   PRIMARY LEARNER Patient   HIGHEST LEVEL OF EDUCATION - PRIMARY LEARNER  -   [place] [id number] LEARNER -   CO-LEARNER CAREGIVER -   PRIMARY LANGUAGE ENGLISH   LEARNER PREFERENCE PRIMARY DEMONSTRATION   ANSWERED BY Patient   RELATIONSHIP SELF       Abuse Screening:  Abuse Screening Questionnaire 4/3/2017   Do you ever feel afraid of your partner? N   Are you in a relationship with someone who physically or mentally threatens you? N   Is it safe for you to go home? Y       Fall Risk  No flowsheet data found. Coordination of Care:  1. Have you been to the ER, urgent care clinic since your last visit? Hospitalized since your last visit? Yes, [facility] 2 weeks ago    2. Have you seen or consulted any other health care providers outside of the [address] [place] [address] since your last visit? Include any pap smears or colon screening.  no
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
## 2020-11-24 ENCOUNTER — PATIENT MESSAGE (OUTPATIENT)
Dept: FAMILY MEDICINE CLINIC | Age: 35
End: 2020-11-24

## 2020-11-24 ENCOUNTER — PATIENT MESSAGE (OUTPATIENT)
Dept: ORTHOPEDIC SURGERY | Age: 35
End: 2020-11-24

## 2020-11-24 DIAGNOSIS — G95.9 MYELOPATHY (HCC): ICD-10-CM

## 2020-11-24 DIAGNOSIS — R29.2 GENERALIZED HYPERREFLEXIA: ICD-10-CM

## 2020-11-24 DIAGNOSIS — R25.2 SPASTICITY: ICD-10-CM

## 2020-11-24 DIAGNOSIS — R29.898 WEAKNESS OF BOTH UPPER EXTREMITIES: ICD-10-CM

## 2020-11-25 ENCOUNTER — PATIENT MESSAGE (OUTPATIENT)
Dept: FAMILY MEDICINE CLINIC | Age: 35
End: 2020-11-25

## 2020-11-25 ENCOUNTER — TELEPHONE (OUTPATIENT)
Dept: NEUROLOGY | Age: 35
End: 2020-11-25

## 2020-11-25 DIAGNOSIS — I10 ESSENTIAL HYPERTENSION: ICD-10-CM

## 2020-11-25 DIAGNOSIS — F41.1 GAD (GENERALIZED ANXIETY DISORDER): ICD-10-CM

## 2020-11-25 RX ORDER — ATENOLOL 50 MG/1
TABLET ORAL
Qty: 90 TAB | Refills: 0 | Status: CANCELLED | OUTPATIENT
Start: 2020-11-25

## 2020-11-25 NOTE — TELEPHONE ENCOUNTER
Last visit 11/09/2020 Virtual visit JESSE Douglass   Next appointment 02/09/2021 JESSE Douglass   Previous refill encounter(s)   10/14/2020 Tenormin #[id number]    Requested Prescriptions     Pending Prescriptions Disp Refills    atenoloL (TENORMIN) 50 mg tablet 180 Tab 0     Sig: Take 2 Tabs by mouth daily.

## 2020-11-25 NOTE — TELEPHONE ENCOUNTER
Patient called into office and stated he has a new referral to be seen by another provider here patient seen Dr. Isai Rocha on 11/20/2020 and walked out of the appointment. Patient stated he did not want to see Dr. Isai Rocha again because \"he was rude and condescending\". I notified the  of the call and let the patient know we will look further into the case and give him a call back.

## 2020-11-27 RX ORDER — ATENOLOL 50 MG/1
100 TABLET ORAL DAILY
Qty: 180 TAB | Refills: 0 | Status: SHIPPED | OUTPATIENT
Start: 2020-11-27 | End: 2021-01-29 | Stop reason: SDUPTHER

## 2020-12-02 ENCOUNTER — DOCUMENTATION ONLY (OUTPATIENT)
Dept: NEUROLOGY | Age: 35
End: 2020-12-02

## 2020-12-02 RX ORDER — ATENOLOL 50 MG/1
TABLET ORAL
Qty: 90 TAB | Refills: 0 | OUTPATIENT
Start: 2020-12-02

## 2020-12-02 NOTE — PROGRESS NOTES
Spoke with patient on 12/1/20 regarding compliant submitted thru [employer]. Patient was not pleased with provider that he was seen by during his visit. I did have other providers review patient chart. Other providers in office feel that patient is diagnosis are complex and unable to manage patients care or need. I did explain to patient and will assist patient with referral to another neurology practice.   I will also reach out to PCP for assist with referral.

## 2020-12-03 NOTE — TELEPHONE ENCOUNTER
From: Jaye Zhao  To: Glendy Muniz MD  Sent: 11/24/2020 12:07 PM EST  Subject: Referral Request    Hello, I was just wondering if you could send a referral over to the neuroscience (or any neurologist) center again? I have to make a new appointment. Thank you!!   Sammi Sánchez

## 2020-12-08 ENCOUNTER — TELEPHONE (OUTPATIENT)
Dept: FAMILY MEDICINE CLINIC | Age: 35
End: 2020-12-08

## 2020-12-08 DIAGNOSIS — R25.3 TWITCHING: Primary | ICD-10-CM

## 2020-12-08 NOTE — TELEPHONE ENCOUNTER
Patient needs a new referral to neurology    it needs to be anyone out side of [facility] , they are not able to service him.

## 2020-12-08 NOTE — TELEPHONE ENCOUNTER
Spoke with Serafin Pyle with NP [facility] to redirect referral to another providing office for neurology outside of [address] @ [facility].

## 2020-12-09 ENCOUNTER — VIRTUAL VISIT (OUTPATIENT)
Dept: FAMILY MEDICINE CLINIC | Age: 35
End: 2020-12-09
Payer: MEDICAID

## 2020-12-09 DIAGNOSIS — F41.9 MODERATE ANXIETY: ICD-10-CM

## 2020-12-09 DIAGNOSIS — F32.1 MODERATE MAJOR DEPRESSION (HCC): Primary | ICD-10-CM

## 2020-12-09 DIAGNOSIS — F41.0 PANIC ATTACKS: ICD-10-CM

## 2020-12-09 PROCEDURE — 99214 OFFICE O/P EST MOD 30 MIN: CPT | Performed by: NURSE PRACTITIONER

## 2020-12-09 RX ORDER — ESCITALOPRAM OXALATE 10 MG/1
TABLET ORAL
Qty: 30 TAB | Refills: 1 | Status: SHIPPED | OUTPATIENT
Start: 2020-12-09 | End: 2021-03-01 | Stop reason: SDUPTHER

## 2020-12-09 RX ORDER — CLONAZEPAM 1 MG/1
1 TABLET ORAL
Qty: 30 TAB | Refills: 0 | Status: SHIPPED | OUTPATIENT
Start: 2020-12-09 | End: 2021-01-21 | Stop reason: SDUPTHER

## 2020-12-09 NOTE — PROGRESS NOTES
Ajit Serna is a 38 y.o. male who was seen by synchronous (real-time) audio-video technology on 12/9/2020 for Anxiety    Assessment & Plan:   Diagnoses and all orders for this visit:    1. Moderate major depression ([address]    2. Moderate anxiety    3. Panic attacks  -     clonazePAM (KlonoPIN) 1 mg tablet; Take 1 Tab by mouth three (3) times daily as needed for Anxiety. Max Daily Amount: 3 mg. Other orders  -     escitalopram oxalate (LEXAPRO) 10 mg tablet; 1/2 tab every other day x1 week then 1 tab daily      Informed a temporary supply of clonazepam approved while tapering off of paxil and beginning lexapro. Follow-up and Dispositions    · Return in about 4 weeks (around 1/6/2021) for anxiety/depression, virtual follow up. I spent at least 25 minutes on this visit with this established patient. 712  Subjective:   Comments over the last couple of months he has been having worsening of anxiety. States he recently went through a breakup as well. Comments he is currently in the process of looking for psychiatrist.  States he has heard from one office however, that office is currently not taking any new patients. Comments at times his anxiety is unbearable. States he has taken otc supplements after researching to help with anxiety, took for the first time today. Ashwagandha, L-theanine. States he has noticed minimal improvement. States has tried buspar which caused increased anxiety. States he was on lexapro which seemed to help but had to discontinue due lack of insurance and had to switched back to paxil. Also reports he has a history of panic attacks for which he has taken ativan and clonezapam with improvement in the past.  Requesting temporary rx to help him through his increased anxiety.     3 most recent [facility] Screens 12/9/2020 10/18/2018 7/8/2015   PHQ Not Done - - Active Diagnosis of Depression or Bipolar Disorder   Little interest or pleasure in doing things Several days - -   Feeling down, depressed, irritable, or hopeless Several days Several days -   Total Score PHQ 2 2 - -   Trouble falling or staying asleep, or sleeping too much More than half the days More than half the days -   Feeling tired or having little energy More than half the days Several days -   Poor appetite, weight loss, or overeating Nearly every day Several days -   Feeling bad about yourself - or that you are a failure or have let yourself or your family down Not at all Several days -   Trouble concentrating on things such as school, work, reading, or watching TV More than half the days Not at all -   Moving or speaking so slowly that other people could have noticed; or the opposite being so fidgety that others notice Several days Not at all -   Thoughts of being better off dead, or hurting yourself in some way Not at all Not at all -   PHQ 9 Score 12 - -   How difficult have these problems made it for you to do your work, take care of your home and get along with others Somewhat difficult - -     MICHELLE 2/7 12/9/2020   Feeling nervous, anxious or on edge? 3   Not being able to stop or control worrying? 2   MICHELLE-2 Subtotal 5   Worrying too much about different things? 2   Trouble relaxing? 1   Being so restless that it is hard to sit still? 1   Becoming easily annoyed or irritable? 1   Feeling afraid as if something awful might happen? 1   MICHELLE-7 Total Score 11   If you checked off any problems, how difficult have these problems made it for you to do your work, take care of thinks at home, or get along with other people? Somewhat difficult     Depression Severity:   0-4 None, 5-9 mild, 10-14 moderate, 15-19 moderately severe, 20-27 severe. Anxiety Severity:   0-4 None/minimal, 5-9 mild, 10-14 moderate, 15-21 severe. Prior to Admission medications    Medication Sig Start Date End Date Taking? Authorizing Provider   atenoloL (TENORMIN) 50 mg tablet Take 2 Tabs by mouth daily.  11/27/20   Estella Rodriguez NP   tiZANidine (ZANAFLEX) 4 mg tablet Take 1 Tab by mouth three (3) times daily as needed for Muscle Spasm(s). 11/17/20   Casimiro Roche MD   cetirizine (ZYRTEC) 10 mg tablet Take 1 Tab by mouth nightly. 11/9/20   Lonza Grams T, NP   gabapentin (NEURONTIN) 400 mg capsule Take 1 Cap by mouth three (3) times daily. Max Daily Amount: 1,200 mg. 11/9/20   Lonza Grams T, NP   ondansetron hcl (ZOFRAN) 8 mg tablet Take 1 Tab by mouth every eight (8) hours as needed for Nausea. 9/5/20   CARLITA Mcclain   lisinopriL (PRINIVIL, ZESTRIL) 20 mg tablet Take 1 Tab by mouth daily. 8/26/20   Johnathon Darnell NP   PARoxetine (PAXIL) 40 mg tablet Take 1 Tab by mouth daily. 8/26/20   Johnathon Darnell NP   sildenafil citrate (Viagra) 100 mg tablet Take 1 Tab by mouth as needed for Erectile Dysfunction. 8/26/20   Johnathon Darnell NP   mupirocin (BACTROBAN) 2 % ointment Apply  to affected area daily. 5/21/20   Johnathon Darnell NP   atorvastatin (LIPITOR) 20 mg tablet Take 1 Tab by mouth daily. 5/12/20   Johnathon Darnell NP   ondansetron (ZOFRAN ODT) 4 mg disintegrating tablet Take 1-2 tablets every 6-8 hours as needed for nausea and vomiting. 2/9/20   Alexa Milan PA-C   fluticasone propionate (FLONASE) 50 mcg/actuation nasal spray 2 Sprays by Both Nostrils route daily. 12/1/19   CARLITA Garcia   guaiFENesin ER (MUCINEX) 600 mg ER tablet Take 1 Tab by mouth two (2) times a day. 12/1/19   Elsa Damon PA   Camphor-Eucalyptus Oil-Menthol (VICKS VAPORUB) 4.8-1.2-2.6 % oint 1 Actuation(s) by Apply Externally route three (3) times daily as needed for Cough or Other (congestion). 12/1/19   Elsa Damon PA   Vaporizers ([employer] WARM STEAM VAPORIZER) misc 1 Actuation(s) by Does Not Apply route three (3) times daily as needed for Cough or Other (congestion).  12/1/19   CARLITA Garcia     Patient Active Problem List   Diagnosis Code    HTN (hypertension) I10    Hyperlipidemia E78.5    MICHELLE (generalized anxiety disorder) F41.1    Mild depression ([facility] F32.0    Acute appendicitis K35.80     Patient Active Problem List    Diagnosis Date Noted    Acute appendicitis 05/07/2020    Mild depression ([facility] 06/08/2018    MICHELLE (generalized anxiety disorder) 07/08/2015    Hyperlipidemia 12/05/2013    HTN (hypertension) 09/12/2012     Allergies   Allergen Reactions    Amoxicillin Swelling    Contrast Dye [Iodine] Hives     Pt had iv contrast prior to this study without problems, developed hives on 07/04/15 5 minutes post injection    Iodinated Contrast Media Hives     Pt got hives when he received IV contrast in the past    Penicillins Hives and Swelling    Wellbutrin [Bupropion] Other (comments)     Caused aggression     Past Medical History:   Diagnosis Date    Anxiety     Cholelithiasis 08/18/2013    Note on U/S    Chronic gastritis 10/17/2014    Dr. Boyd Donovan (EGD Result) H. Pylori Neg    Depression     Dog bite of left hand including fingers with infection 11/16/2014    Adm 11/18/14 for IV antibiotics.  ETOH abuse     GERD (gastroesophageal reflux disease)     Hepatic steatosis 02/06/2014    Noted on U/S.     History of plasmapheresis 02/2014    Hypertriglyceridemia, Severe Pancreatitis    Hypertension     Hypertriglyceridemia     Hypomagnesemia 07/24/2016    1.6 mg/dL    Non compliance with medical treatment     Osteomyelitis of left hand ([facility] 11/22/2014    MRI Finding @ LUE 2nd MCP joint.  Pancreatitis 08/17/2013    Highest on Record (2/6/14) 6523. Dr. Boyd Donovan    PICC (peripherally inserted central catheter) in place 38/01/4249    R Basilic Vein (IV Antibiotics for LUE Hand Osteomyelitis).  Rectal bleeding     Substance abuse (HCC)     Several UDS + Opiates.  Vitamin D deficiency 02/17/2014    11.6ng/mL ( ng/mL)     Past Surgical History:   Procedure Laterality Date    HX CYST INCISION AND DRAINAGE Left 12/01/2014    LUE 2nd digit abscess I&D.      HX ENDOSCOPY  10/17/2014    Dr. Mansi Montano Maegan Specter: EGD/Colonoscopy    HX HEENT      wisdom tooth extraction    HX KNEE ARTHROSCOPY Right      Family History   Problem Relation Age of Onset    Elevated Lipids Mother     Thyroid Disease Mother     Hypertension Father     Alcohol abuse Maternal Grandmother     MS Paternal Grandmother      Social History     Tobacco Use    Smoking status: Former Smoker     Last attempt to quit: 2012     Years since quittin.2    Smokeless tobacco: Never Used    Tobacco comment: 2018    Substance Use Topics    Alcohol use: Not Currently     Alcohol/week: 13.3 standard drinks     Types: 8 Cans of beer, 8 Standard drinks or equivalent per week     Review of Systems   Psychiatric/Behavioral: The patient is nervous/anxious. Objective:   No flowsheet data found. General: alert, cooperative, no distress   Mental  status: normal mood, behavior, speech, dress, motor activity, and thought processes, able to follow commands   HENT: NCAT   Neck: no visualized mass   Resp: no respiratory distress   Neuro: no gross deficits   Skin: no discoloration or lesions of concern on visible areas   Psychiatric: normal affect, consistent with stated mood, no evidence of hallucinations     Additional exam findings: We discussed the expected course, resolution and complications of the diagnosis(es) in detail. Medication risks, benefits, costs, interactions, and alternatives were discussed as indicated. I advised him to contact the office if his condition worsens, changes or fails to improve as anticipated. He expressed understanding with the diagnosis(es) and plan. Ayse Mayer, who was evaluated through a patient-initiated, synchronous (real-time) audio-video encounter, and/or his healthcare decision maker, is aware that it is a billable service, with coverage as determined by his insurance carrier. He provided verbal consent to proceed: Yes, and patient identification was verified.  It was conducted pursuant to the emergency declaration under the [facility], [facility] authority and the [employer] and [employer] Act. A caregiver was present when appropriate. Ability to conduct physical exam was limited. I was in the office. The patient was at home.       Isaias Welch NP

## 2020-12-15 ENCOUNTER — TELEPHONE (OUTPATIENT)
Dept: NEUROLOGY | Age: 35
End: 2020-12-15

## 2020-12-15 NOTE — TELEPHONE ENCOUNTER
Spoke with [facility] with office of Dr. Lata Park, Neurology. Verified Name and , and patient's telephone number. Patient called per their office on 2020, with no return call to set appointment. [facility] reports will reach out to patient (2) two more times before referral to cancel out.

## 2020-12-16 ENCOUNTER — HOSPITAL ENCOUNTER (OUTPATIENT)
Dept: MRI IMAGING | Age: 35
Discharge: HOME | End: 2020-12-16
Attending: PHYSICAL MEDICINE & REHABILITATION
Payer: MEDICAID

## 2020-12-16 PROCEDURE — 72141 MRI NECK SPINE W/O DYE: CPT

## 2021-01-07 ENCOUNTER — VIRTUAL VISIT (OUTPATIENT)
Dept: FAMILY MEDICINE CLINIC | Age: 36
End: 2021-01-07

## 2021-01-07 DIAGNOSIS — Z91.199 NO-SHOW FOR APPOINTMENT: Primary | ICD-10-CM

## 2021-01-21 DIAGNOSIS — F41.0 PANIC ATTACKS: ICD-10-CM

## 2021-01-22 RX ORDER — CLONAZEPAM 1 MG/1
1 TABLET ORAL
Qty: 30 TAB | Refills: 0 | Status: SHIPPED | OUTPATIENT
Start: 2021-01-22 | End: 2021-03-01 | Stop reason: SDUPTHER

## 2021-01-22 NOTE — TELEPHONE ENCOUNTER
[facility]opin as 12/10/20 for a 10 d/s. Last Visit: 12/9/20 with JESSE Welch  Next Appointment: 2/9/21 with JESSE Welch  Previous Refill Encounter(s): 12/9/20 #30    Requested Prescriptions     Pending Prescriptions Disp Refills    clonazePAM (KlonoPIN) 1 mg tablet 30 Tab 0     Sig: Take 1 Tab by mouth three (3) times daily as needed for Anxiety. Max Daily Amount: 3 mg.

## 2021-01-29 DIAGNOSIS — G89.29 CHRONIC RIGHT-SIDED LOW BACK PAIN WITH RIGHT-SIDED SCIATICA: ICD-10-CM

## 2021-01-29 DIAGNOSIS — F41.1 GAD (GENERALIZED ANXIETY DISORDER): ICD-10-CM

## 2021-01-29 DIAGNOSIS — M54.41 CHRONIC RIGHT-SIDED LOW BACK PAIN WITH RIGHT-SIDED SCIATICA: ICD-10-CM

## 2021-01-29 DIAGNOSIS — I10 ESSENTIAL HYPERTENSION: ICD-10-CM

## 2021-02-01 RX ORDER — ATENOLOL 50 MG/1
100 TABLET ORAL DAILY
Qty: 180 TAB | Refills: 0 | Status: SHIPPED | OUTPATIENT
Start: 2021-02-01 | End: 2021-03-28 | Stop reason: SDUPTHER

## 2021-02-01 RX ORDER — GABAPENTIN 400 MG/1
400 CAPSULE ORAL 3 TIMES DAILY
Qty: 90 CAP | Refills: 1 | Status: SHIPPED | OUTPATIENT
Start: 2021-02-01 | End: 2021-09-03 | Stop reason: SDUPTHER

## 2021-02-01 NOTE — TELEPHONE ENCOUNTER
[facility] Neurontin as 12/18/20 for a 30 d/s. Last Visit: 12/9/20 with NP Charles Gupta  Next Appointment: 2/9/21 with NP Charles Gupta  Previous Refill Encounter(s): 11/27/20 Tenormin #180, 11/9/20 Neurontin #90 with 1 refill    Requested Prescriptions     Pending Prescriptions Disp Refills    gabapentin (NEURONTIN) 400 mg capsule 90 Cap 1     Sig: Take 1 Cap by mouth three (3) times daily. Max Daily Amount: 1,200 mg.  atenoloL (TENORMIN) 50 mg tablet 180 Tab 0     Sig: Take 2 Tabs by mouth daily.

## 2021-03-01 DIAGNOSIS — F41.0 PANIC ATTACKS: ICD-10-CM

## 2021-03-01 NOTE — TELEPHONE ENCOUNTER
[facility] Kl[facility]opin as 1/23/21 for a 10 d/s. Last Visit: 12/9/20 with NP Mariaa Viera Next Appointment: no show 1/7/21 & 2/9/21 Previous Refill Encounter(s): 12/9/20 Lexapro #30 with 1 refill, 1/22/21 Klonopin #30 Requested Prescriptions Pending Prescriptions Disp Refills  escitalopram oxalate (LEXAPRO) 10 mg tablet 30 Tab 0 Sig: Take 1 Tab by mouth daily.  clonazePAM (KlonoPIN) 1 mg tablet 30 Tab 0 Sig: Take 1 Tab by mouth three (3) times daily as needed for Anxiety. Max Daily Amount: 3 mg.

## 2021-03-02 RX ORDER — ESCITALOPRAM OXALATE 10 MG/1
10 TABLET ORAL DAILY
Qty: 30 TAB | Refills: 1 | Status: SHIPPED | OUTPATIENT
Start: 2021-03-02 | End: 2021-04-21 | Stop reason: SDUPTHER

## 2021-03-02 RX ORDER — CLONAZEPAM 1 MG/1
1 TABLET ORAL
Qty: 30 TAB | Refills: 0 | Status: SHIPPED | OUTPATIENT
Start: 2021-03-02 | End: 2021-04-07 | Stop reason: SDUPTHER

## 2021-03-02 NOTE — TELEPHONE ENCOUNTER
Please advise patient an appt is required for further refills of klonopin after current supply. Thanks!

## 2021-03-10 DIAGNOSIS — I10 ESSENTIAL HYPERTENSION: Primary | ICD-10-CM

## 2021-03-10 DIAGNOSIS — E78.2 MIXED HYPERLIPIDEMIA: ICD-10-CM

## 2021-03-10 DIAGNOSIS — E66.3 OVERWEIGHT (BMI 25.0-29.9): ICD-10-CM

## 2021-03-10 DIAGNOSIS — F41.1 GAD (GENERALIZED ANXIETY DISORDER): ICD-10-CM

## 2021-03-16 ENCOUNTER — VIRTUAL VISIT (OUTPATIENT)
Dept: FAMILY MEDICINE CLINIC | Age: 36
End: 2021-03-16

## 2021-03-16 DIAGNOSIS — Z91.199 NO-SHOW FOR APPOINTMENT: Primary | ICD-10-CM

## 2021-03-28 DIAGNOSIS — I10 ESSENTIAL HYPERTENSION: ICD-10-CM

## 2021-03-28 DIAGNOSIS — F41.1 GAD (GENERALIZED ANXIETY DISORDER): ICD-10-CM

## 2021-03-29 NOTE — TELEPHONE ENCOUNTER
Last Visit: 12/9/20 with JESSE Lemus  Next Appointment: 5/14/21 with JESSE Lemus  Previous Refill Encounter(s): 2/1/21 #180    Requested Prescriptions     Pending Prescriptions Disp Refills   • atenoloL (TENORMIN) 50 mg tablet 180 Tab 0     Sig: Take 2 Tabs by mouth daily.

## 2021-03-31 RX ORDER — ATENOLOL 50 MG/1
100 TABLET ORAL DAILY
Qty: 180 TAB | Refills: 0 | Status: SHIPPED | OUTPATIENT
Start: 2021-03-31 | End: 2021-05-27

## 2021-04-07 DIAGNOSIS — F41.0 PANIC ATTACKS: ICD-10-CM

## 2021-04-07 NOTE — TELEPHONE ENCOUNTER
No UDS on file    Last Visit: 12/9/2020 with JESSE Durbin  Next Appointment: 4/20/2021 with JESSE Durbin  Previous Refill Encounter(s): 3/2/2021 per JESSE Durbin #30    Requested Prescriptions     Pending Prescriptions Disp Refills    clonazePAM (KlonoPIN) 1 mg tablet 30 Tab 0     Sig: Take 1 Tab by mouth three (3) times daily as needed for Anxiety. Max Daily Amount: 3 mg.

## 2021-04-13 RX ORDER — CLONAZEPAM 1 MG/1
1 TABLET ORAL
Qty: 30 TAB | Refills: 0 | Status: SHIPPED | OUTPATIENT
Start: 2021-04-13 | End: 2021-05-07

## 2021-04-20 ENCOUNTER — VIRTUAL VISIT (OUTPATIENT)
Dept: FAMILY MEDICINE CLINIC | Age: 36
End: 2021-04-20

## 2021-04-20 NOTE — PROGRESS NOTES
Doxy link sent x2 without response. No-show for virtual appointment. This encounter was created in error - please disregard.

## 2021-04-21 ENCOUNTER — VIRTUAL VISIT (OUTPATIENT)
Dept: FAMILY MEDICINE CLINIC | Age: 36
End: 2021-04-21
Payer: MEDICAID

## 2021-04-21 DIAGNOSIS — F41.9 MILD ANXIETY: ICD-10-CM

## 2021-04-21 DIAGNOSIS — R11.0 NAUSEA: ICD-10-CM

## 2021-04-21 DIAGNOSIS — F32.A MILD DEPRESSION: Primary | ICD-10-CM

## 2021-04-21 PROCEDURE — 99213 OFFICE O/P EST LOW 20 MIN: CPT | Performed by: NURSE PRACTITIONER

## 2021-04-21 RX ORDER — OMEPRAZOLE 20 MG/1
20 CAPSULE, DELAYED RELEASE ORAL
Qty: 30 CAP | Refills: 1 | Status: SHIPPED | OUTPATIENT
Start: 2021-04-21 | End: 2022-07-14 | Stop reason: ALTCHOICE

## 2021-04-21 RX ORDER — ESCITALOPRAM OXALATE 20 MG/1
20 TABLET ORAL DAILY
Qty: 90 TAB | Refills: 1 | Status: SHIPPED | OUTPATIENT
Start: 2021-04-21 | End: 2021-08-27 | Stop reason: SDUPTHER

## 2021-04-21 RX ORDER — ONDANSETRON 4 MG/1
4 TABLET, ORALLY DISINTEGRATING ORAL
Qty: 10 TAB | Refills: 0 | Status: SHIPPED | OUTPATIENT
Start: 2021-04-21 | End: 2022-05-18 | Stop reason: SDUPTHER

## 2021-04-21 NOTE — PROGRESS NOTES
Amina Guzman is a 38 y.o. male who was seen by synchronous (real-time) audio-video technology on 4/21/2021 for Anxiety and Depression    Assessment & Plan:   Diagnoses and all orders for this visit:    1. Mild depression ([address]    2. Mild anxiety    3. Nausea    Other orders  -     escitalopram oxalate (LEXAPRO) 20 mg tablet; Take 1 Tab by mouth daily. -     omeprazole (PRILOSEC) 20 mg capsule; Take 1 Cap by mouth Daily (before breakfast). -     ondansetron (Zofran ODT) 4 mg disintegrating tablet; Take 1 Tab by mouth every eight (8) hours as needed for Nausea or Nausea or Vomiting. Increase lexapro as above  Keep scheduled appt    I spent at least 20 minutes on this visit with this established patient. [id number]  Subjective:   Patient states he has been experiencing increased anxiety. Requesting to have lexapro increased to 20 mg daily. Reports has noticed he does have increased anxiety since tapering off of paxil and beginning lexapro but also realizes he is on a lower dosage. Reports anxiety/panic attacks occur more in the morning upon awakening. States he tends to be nauseated in the morning. Comments he also vomits at least once per week. Reports he does continue to have some numbness in his legs and also intermittent twitching although symptoms have improved.   Advised to ensure to call and reschedule appt with neurologist.    3 most recent [facility] Screens 4/21/2021 12/9/2020 10/18/2018   PHQ Not Done - - -   Little interest or pleasure in doing things Several days Several days -   Feeling down, depressed, irritable, or hopeless Not at all Several days Several days   Total Score PHQ 2 1 2 -   Trouble falling or staying asleep, or sleeping too much Several days More than half the days More than half the days   Feeling tired or having little energy Not at all More than half the days Several days   Poor appetite, weight loss, or overeating Not at all Nearly every day Several days   Feeling bad about yourself - or that you are a failure or have let yourself or your family down Not at all Not at all Several days   Trouble concentrating on things such as school, work, reading, or watching TV Not at all More than half the days Not at all   Moving or speaking so slowly that other people could have noticed; or the opposite being so fidgety that others notice More than half the days Several days Not at all   Thoughts of being better off dead, or hurting yourself in some way Not at all Not at all Not at all   PHQ 9 Score 4 12 -   How difficult have these problems made it for you to do your work, take care of your home and get along with others Somewhat difficult Somewhat difficult -     MICHELLE 2/7 4/21/2021 12/9/2020   Feeling nervous, anxious or on edge? 2 3   Not being able to stop or control worrying? 1 2   MICHELLE-2 Subtotal 3 5   Worrying too much about different things? 1 2   Trouble relaxing? 2 1   Being so restless that it is hard to sit still? 1 1   Becoming easily annoyed or irritable? 0 1   Feeling afraid as if something awful might happen? 1 1   MICHELLE-7 Total Score 8 11   If you checked off any problems, how difficult have these problems made it for you to do your work, take care of thinks at home, or get along with other people? Somewhat difficult Somewhat difficult     Prior to Admission medications    Medication Sig Start Date End Date Taking? Authorizing Provider   clonazePAM (KlonoPIN) 1 mg tablet Take 1 Tab by mouth three (3) times daily as needed for Anxiety. Max Daily Amount: 3 mg. 4/13/21   Huma CLARK NP   atenoloL (TENORMIN) 50 mg tablet Take 2 Tabs by mouth daily. 3/31/21   Huma CLARK NP   escitalopram oxalate (LEXAPRO) 10 mg tablet Take 1 Tab by mouth daily. 3/2/21   Huma CLARK NP   gabapentin (NEURONTIN) 400 mg capsule Take 1 Cap by mouth three (3) times daily.  Max Daily Amount: 1,200 mg. 2/1/21   Huma CLARK NP   tiZANidine (ZANAFLEX) 4 mg tablet Take 1 Tab by mouth three (3) times daily as needed for Muscle Spasm(s). 11/17/20   Gracie Swift MD   cetirizine (ZYRTEC) 10 mg tablet Take 1 Tab by mouth nightly. 11/9/20   Mónica CLARK NP   ondansetron hcl (ZOFRAN) 8 mg tablet Take 1 Tab by mouth every eight (8) hours as needed for Nausea. 9/5/20   CARLITA Angelo   lisinopriL (PRINIVIL, ZESTRIL) 20 mg tablet Take 1 Tab by mouth daily. 8/26/20   Phillip Darnell NP   sildenafil citrate (Viagra) 100 mg tablet Take 1 Tab by mouth as needed for Erectile Dysfunction. 8/26/20   Phillip Darnell NP   mupirocin (BACTROBAN) 2 % ointment Apply  to affected area daily. 5/21/20   Phillip Darnell NP   atorvastatin (LIPITOR) 20 mg tablet Take 1 Tab by mouth daily. 5/12/20   Phillip Darnell NP   ondansetron (ZOFRAN ODT) 4 mg disintegrating tablet Take 1-2 tablets every 6-8 hours as needed for nausea and vomiting. 2/9/20   Willard Pacheco PA-C   fluticasone propionate (FLONASE) 50 mcg/actuation nasal spray 2 Sprays by Both Nostrils route daily. 12/1/19   CARLITA Schultz   guaiFENesin ER (MUCINEX) 600 mg ER tablet Take 1 Tab by mouth two (2) times a day. 12/1/19   Yaquelin Damon PA   Camphor-Eucalyptus Oil-Menthol (VICKS VAPORUB) 4.8-1.2-2.6 % oint 1 Actuation(s) by Apply Externally route three (3) times daily as needed for Cough or Other (congestion). 12/1/19   Yaquelin Damon PA   Vaporizers ([employer] WARM STEAM VAPORIZER) misc 1 Actuation(s) by Does Not Apply route three (3) times daily as needed for Cough or Other (congestion).  12/1/19   CARLITA Schultz     Patient Active Problem List   Diagnosis Code    HTN (hypertension) I10    Hyperlipidemia E78.5    MICHELLE (generalized anxiety disorder) F41.1    Mild depression ([facility] F32.0    Acute appendicitis K35.80     Patient Active Problem List    Diagnosis Date Noted    Acute appendicitis 05/07/2020    Mild depression ([facility] 06/08/2018    MICHELLE (generalized anxiety disorder) 07/08/2015    Hyperlipidemia 12/05/2013    HTN (hypertension) 09/12/2012     Current Outpatient Medications   Medication Sig Dispense Refill    escitalopram oxalate (LEXAPRO) 20 mg tablet Take 1 Tab by mouth daily. 90 Tab 1    omeprazole (PRILOSEC) 20 mg capsule Take 1 Cap by mouth Daily (before breakfast). 30 Cap 1    ondansetron (Zofran ODT) 4 mg disintegrating tablet Take 1 Tab by mouth every eight (8) hours as needed for Nausea or Nausea or Vomiting. 10 Tab 0    clonazePAM (KlonoPIN) 1 mg tablet Take 1 Tab by mouth three (3) times daily as needed for Anxiety. Max Daily Amount: 3 mg. 30 Tab 0    atenoloL (TENORMIN) 50 mg tablet Take 2 Tabs by mouth daily. 180 Tab 0    gabapentin (NEURONTIN) 400 mg capsule Take 1 Cap by mouth three (3) times daily. Max Daily Amount: 1,200 mg. 90 Cap 1    tiZANidine (ZANAFLEX) 4 mg tablet Take 1 Tab by mouth three (3) times daily as needed for Muscle Spasm(s). 90 Tab 2    cetirizine (ZYRTEC) 10 mg tablet Take 1 Tab by mouth nightly. 30 Tab 1    lisinopriL (PRINIVIL, ZESTRIL) 20 mg tablet Take 1 Tab by mouth daily. 90 Tab 2    sildenafil citrate (Viagra) 100 mg tablet Take 1 Tab by mouth as needed for Erectile Dysfunction. 12 Tab 5    mupirocin (BACTROBAN) 2 % ointment Apply  to affected area daily. 22 g 0    atorvastatin (LIPITOR) 20 mg tablet Take 1 Tab by mouth daily. 90 Tab 2    fluticasone propionate (FLONASE) 50 mcg/actuation nasal spray 2 Sprays by Both Nostrils route daily. 1 Bottle 0    guaiFENesin ER (MUCINEX) 600 mg ER tablet Take 1 Tab by mouth two (2) times a day. 20 Tab 0    Camphor-Eucalyptus Oil-Menthol (VICKS VAPORUB) 4.8-1.2-2.6 % oint 1 Actuation(s) by Apply Externally route three (3) times daily as needed for Cough or Other (congestion). 50 g 0    Vaporizers ([employer] WARM STEAM VAPORIZER) misc 1 Actuation(s) by Does Not Apply route three (3) times daily as needed for Cough or Other (congestion).  1 Each 0     Allergies   Allergen Reactions    Amoxicillin Swelling    Contrast Dye [Iodine] Hives     Pt had iv contrast prior to this study without problems, developed hives on 07/04/15 5 minutes post injection    Iodinated Contrast Media Hives     Pt got hives when he received IV contrast in the past    Penicillins Hives and Swelling    Wellbutrin [Bupropion] Other (comments)     Caused aggression     Past Medical History:   Diagnosis Date    Anxiety     Cholelithiasis 2013    Note on U/S    Chronic gastritis 10/17/2014    Dr. Emmie Dakin (EGD Result) H. Pylori Neg    Depression     Dog bite of left hand including fingers with infection 2014    Adm 14 for IV antibiotics.  ETOH abuse     GERD (gastroesophageal reflux disease)     Hepatic steatosis 2014    Noted on U/S.     History of plasmapheresis 2014    Hypertriglyceridemia, Severe Pancreatitis    Hypertension     Hypertriglyceridemia     Hypomagnesemia 2016    1.6 mg/dL    Non compliance with medical treatment     Osteomyelitis of left hand ([facility] 2014    MRI Finding @ [facility] 2nd MCP joint.  Pancreatitis 2013    Highest on Record [phone]. Dr. Emmie Dakin    PICC (peripherally inserted central catheter) in place     R Basilic Vein (IV Antibiotics for LUE Hand Osteomyelitis).  Rectal bleeding     Substance abuse (HCC)     Several UDS + Opiates.  Vitamin D deficiency 2014    11.6ng/mL ( ng/mL)     Past Surgical History:   Procedure Laterality Date    HX CYST INCISION AND DRAINAGE Left 2014    L[facility] 2nd digit abscess I&D.      HX ENDOSCOPY  10/17/2014    Dr. Emmie Dakin: EGD/Colonoscopy    HX HEENT      wisdom tooth extraction    HX KNEE ARTHROSCOPY Right      Family History   Problem Relation Age of Onset    Elevated Lipids Mother     Thyroid Disease Mother     Hypertension Father     Alcohol abuse Maternal Grandmother     MS Paternal Grandmother      Social History     Tobacco Use    Smoking status: Former Smoker     Quit date: 2012     Years since quittin.5    Smokeless tobacco: Never Used    Tobacco comment: November 2018    Substance Use Topics    Alcohol use: Not Currently     Alcohol/week: 13.3 standard drinks     Types: 8 Cans of beer, 8 Standard drinks or equivalent per week       ROS    Objective:   No flowsheet data found. General: alert, cooperative, no distress   Mental  status: normal mood, behavior, speech, dress, motor activity, and thought processes, able to follow commands   HENT: NCAT   Neck: no visualized mass   Resp: no respiratory distress   Neuro: no gross deficits   Skin: no discoloration or lesions of concern on visible areas   Psychiatric: normal affect, consistent with stated mood, no evidence of hallucinations     Additional exam findings: We discussed the expected course, resolution and complications of the diagnosis(es) in detail. Medication risks, benefits, costs, interactions, and alternatives were discussed as indicated. I advised him to contact the office if his condition worsens, changes or fails to improve as anticipated. He expressed understanding with the diagnosis(es) and plan. Shanel Monica, was evaluated through a synchronous (real-time) audio-video encounter. The patient (or guardian if applicable) is aware that this is a billable service. Verbal consent to proceed has been obtained within the past 12 months. The visit was conducted pursuant to the emergency declaration under the [address] [place] [address] authority and the [employer] General Act. Patient identification was verified, and a caregiver was present when appropriate. The patient was located in a state where the provider was credentialed to provide care.     Vaibhav Celaya NP

## 2021-05-07 DIAGNOSIS — F41.0 PANIC ATTACKS: ICD-10-CM

## 2021-05-07 RX ORDER — CLONAZEPAM 1 MG/1
TABLET ORAL
Qty: 30 TAB | Refills: 0 | Status: SHIPPED | OUTPATIENT
Start: 2021-05-07 | End: 2021-06-04 | Stop reason: SDUPTHER

## 2021-05-07 RX ORDER — CLONAZEPAM 1 MG/1
1 TABLET ORAL
Qty: 30 TAB | Refills: 0 | Status: CANCELLED | OUTPATIENT
Start: 2021-05-07

## 2021-06-15 DIAGNOSIS — I10 ESSENTIAL HYPERTENSION: ICD-10-CM

## 2021-06-15 NOTE — TELEPHONE ENCOUNTER
Last Visit: 4/21/21 with JESSE Newsome  Next Appointment: no show 5/14/21  Previous Refill Encounter(s): 8/26/20 #90 with 2 refills    Requested Prescriptions     Pending Prescriptions Disp Refills    lisinopriL (PRINIVIL, ZESTRIL) 20 mg tablet 90 Tablet 0     Sig: Take 1 Tablet by mouth daily.

## 2021-06-18 RX ORDER — LISINOPRIL 20 MG/1
20 TABLET ORAL DAILY
Qty: 90 TABLET | Refills: 0 | Status: SHIPPED | OUTPATIENT
Start: 2021-06-18 | End: 2021-09-03 | Stop reason: SDUPTHER

## 2021-06-25 DIAGNOSIS — F41.1 GAD (GENERALIZED ANXIETY DISORDER): ICD-10-CM

## 2021-06-25 DIAGNOSIS — I10 ESSENTIAL HYPERTENSION: ICD-10-CM

## 2021-07-01 RX ORDER — ATENOLOL 50 MG/1
100 TABLET ORAL DAILY
Qty: 180 TABLET | Refills: 0 | Status: SHIPPED | OUTPATIENT
Start: 2021-07-01 | End: 2021-09-15 | Stop reason: SDUPTHER

## 2021-08-27 DIAGNOSIS — F41.0 PANIC ATTACKS: ICD-10-CM

## 2021-08-27 RX ORDER — CLONAZEPAM 1 MG/1
1 TABLET ORAL
Qty: 15 TABLET | Refills: 0 | Status: CANCELLED | OUTPATIENT
Start: 2021-08-27

## 2021-09-02 RX ORDER — ATORVASTATIN CALCIUM 20 MG/1
20 TABLET, FILM COATED ORAL DAILY
Qty: 30 TABLET | Refills: 1 | Status: SHIPPED | OUTPATIENT
Start: 2021-09-02 | End: 2021-10-26 | Stop reason: SDUPTHER

## 2021-09-02 RX ORDER — ESCITALOPRAM OXALATE 20 MG/1
20 TABLET ORAL DAILY
Qty: 30 TABLET | Refills: 1 | Status: SHIPPED | OUTPATIENT
Start: 2021-09-02 | End: 2021-10-26 | Stop reason: SDUPTHER

## 2021-09-25 ENCOUNTER — APPOINTMENT (OUTPATIENT)
Dept: GENERAL RADIOLOGY | Age: 36
DRG: 313 | End: 2021-09-25
Attending: EMERGENCY MEDICINE
Payer: MEDICAID

## 2021-09-25 ENCOUNTER — HOSPITAL ENCOUNTER (EMERGENCY)
Age: 36
Discharge: LEFT BEFORE DISCHARGE | DRG: 313 | End: 2021-09-25
Attending: EMERGENCY MEDICINE | Admitting: STUDENT IN AN ORGANIZED HEALTH CARE EDUCATION/TRAINING PROGRAM
Payer: MEDICAID

## 2021-09-25 VITALS
BODY MASS INDEX: 28.88 KG/M2 | DIASTOLIC BLOOD PRESSURE: 85 MMHG | WEIGHT: 225 LBS | OXYGEN SATURATION: 100 % | HEART RATE: 82 BPM | SYSTOLIC BLOOD PRESSURE: 145 MMHG | TEMPERATURE: 99 F | HEIGHT: 74 IN | RESPIRATION RATE: 18 BRPM

## 2021-09-25 DIAGNOSIS — M25.472 LEFT ANKLE SWELLING: ICD-10-CM

## 2021-09-25 DIAGNOSIS — M00.80 ARTHRITIS DUE TO OTHER BACTERIA, SEPTIC ARTHRITIS OF UNSPECIFIED LOCATION (HCC): ICD-10-CM

## 2021-09-25 DIAGNOSIS — M25.572 ACUTE LEFT ANKLE PAIN: Primary | ICD-10-CM

## 2021-09-25 DIAGNOSIS — R50.9 FEVER, UNSPECIFIED FEVER CAUSE: ICD-10-CM

## 2021-09-25 PROBLEM — R68.89 SUSPECTED SOFT TISSUE INFECTION: Status: ACTIVE | Noted: 2021-09-25

## 2021-09-25 PROBLEM — M25.579 ANKLE PAIN: Status: ACTIVE | Noted: 2021-09-25

## 2021-09-25 PROBLEM — M25.473 ANKLE SWELLING: Status: ACTIVE | Noted: 2021-09-25

## 2021-09-25 LAB
ALBUMIN SERPL-MCNC: 3.6 G/DL (ref 3.4–5)
ALBUMIN/GLOB SERPL: 1.1 {RATIO} (ref 0.8–1.7)
ALP SERPL-CCNC: 61 U/L (ref 45–117)
ALT SERPL-CCNC: 27 U/L (ref 16–61)
ANION GAP SERPL CALC-SCNC: 8 MMOL/L (ref 3–18)
APPEARANCE FLD: ABNORMAL
AST SERPL-CCNC: 36 U/L (ref 10–38)
BASOPHILS # BLD: 0.1 K/UL (ref 0–0.1)
BASOPHILS NFR BLD: 1 % (ref 0–2)
BILIRUB SERPL-MCNC: 1.2 MG/DL (ref 0.2–1)
BODY FLD TYPE: NORMAL
BUN SERPL-MCNC: 7 MG/DL (ref 7–18)
BUN/CREAT SERPL: 8 (ref 12–20)
CALCIUM SERPL-MCNC: 8.3 MG/DL (ref 8.5–10.1)
CHLORIDE SERPL-SCNC: 105 MMOL/L (ref 100–111)
CO2 SERPL-SCNC: 26 MMOL/L (ref 21–32)
COLOR FLD: ABNORMAL
CREAT SERPL-MCNC: 0.86 MG/DL (ref 0.6–1.3)
CRYSTALS FLD MICRO: NORMAL
DIFFERENTIAL METHOD BLD: ABNORMAL
EOSINOPHIL # BLD: 0.1 K/UL (ref 0–0.4)
EOSINOPHIL NFR BLD: 1 % (ref 0–5)
EOSINOPHIL NFR FLD MANUAL: 0 %
ERYTHROCYTE [DISTWIDTH] IN BLOOD BY AUTOMATED COUNT: 13 % (ref 11.6–14.5)
ERYTHROCYTE [SEDIMENTATION RATE] IN BLOOD: 39 MM/HR (ref 0–15)
GLOBULIN SER CALC-MCNC: 3.3 G/DL (ref 2–4)
GLUCOSE SERPL-MCNC: 84 MG/DL (ref 74–99)
HCT VFR BLD AUTO: 34.9 % (ref 36–48)
HGB BLD-MCNC: 12.6 G/DL (ref 13–16)
LACTATE BLD-SCNC: 1.56 MMOL/L (ref 0.4–2)
LYMPHOCYTES # BLD: 2.2 K/UL (ref 0.9–3.6)
LYMPHOCYTES NFR BLD: 24 % (ref 21–52)
LYMPHOCYTES NFR FLD: 1 %
MCH RBC QN AUTO: 33.3 PG (ref 24–34)
MCHC RBC AUTO-ENTMCNC: 36.1 G/DL (ref 31–37)
MCV RBC AUTO: 92.3 FL (ref 78–100)
MONOCYTES # BLD: 1.2 K/UL (ref 0.05–1.2)
MONOCYTES NFR BLD: 14 % (ref 3–10)
MONOCYTES NFR FLD: 2 %
NEUTROPHILS NFR FLD: 97 %
NEUTS BAND # FLD: 0 %
NEUTS SEG # BLD: 5.5 K/UL (ref 1.8–8)
NEUTS SEG NFR BLD: 61 % (ref 40–73)
NUC CELL # FLD: ABNORMAL /CU MM
PLATELET # BLD AUTO: 192 K/UL (ref 135–420)
PMV BLD AUTO: 9 FL (ref 9.2–11.8)
POTASSIUM SERPL-SCNC: 3.4 MMOL/L (ref 3.5–5.5)
PROT SERPL-MCNC: 6.9 G/DL (ref 6.4–8.2)
RBC # BLD AUTO: 3.78 M/UL (ref 4.35–5.65)
RBC # FLD: ABNORMAL /CU MM
SODIUM SERPL-SCNC: 139 MMOL/L (ref 136–145)
SPECIMEN SOURCE FLD: ABNORMAL
WBC # BLD AUTO: 9.1 K/UL (ref 4.6–13.2)

## 2021-09-25 PROCEDURE — 74011250636 HC RX REV CODE- 250/636: Performed by: EMERGENCY MEDICINE

## 2021-09-25 PROCEDURE — 85652 RBC SED RATE AUTOMATED: CPT

## 2021-09-25 PROCEDURE — 87040 BLOOD CULTURE FOR BACTERIA: CPT

## 2021-09-25 PROCEDURE — 89060 EXAM SYNOVIAL FLUID CRYSTALS: CPT

## 2021-09-25 PROCEDURE — 74011250637 HC RX REV CODE- 250/637: Performed by: PHYSICIAN ASSISTANT

## 2021-09-25 PROCEDURE — 80053 COMPREHEN METABOLIC PANEL: CPT

## 2021-09-25 PROCEDURE — 99285 EMERGENCY DEPT VISIT HI MDM: CPT

## 2021-09-25 PROCEDURE — 74011000250 HC RX REV CODE- 250: Performed by: EMERGENCY MEDICINE

## 2021-09-25 PROCEDURE — 87070 CULTURE OTHR SPECIMN AEROBIC: CPT

## 2021-09-25 PROCEDURE — 85025 COMPLETE CBC W/AUTO DIFF WBC: CPT

## 2021-09-25 PROCEDURE — 99284 EMERGENCY DEPT VISIT MOD MDM: CPT

## 2021-09-25 PROCEDURE — 83605 ASSAY OF LACTIC ACID: CPT

## 2021-09-25 PROCEDURE — 75810000054 HC ARTHOCENTISIS JOINT

## 2021-09-25 PROCEDURE — 65270000029 HC RM PRIVATE

## 2021-09-25 PROCEDURE — 86140 C-REACTIVE PROTEIN: CPT

## 2021-09-25 PROCEDURE — 73610 X-RAY EXAM OF ANKLE: CPT

## 2021-09-25 PROCEDURE — 89051 BODY FLUID CELL COUNT: CPT

## 2021-09-25 PROCEDURE — 84560 ASSAY OF URINE/URIC ACID: CPT

## 2021-09-25 RX ORDER — DIPHENHYDRAMINE HYDROCHLORIDE 50 MG/ML
25 INJECTION, SOLUTION INTRAMUSCULAR; INTRAVENOUS
Status: DISCONTINUED | OUTPATIENT
Start: 2021-09-25 | End: 2021-09-26 | Stop reason: HOSPADM

## 2021-09-25 RX ORDER — VANCOMYCIN 2 GRAM/500 ML IN 0.9 % SODIUM CHLORIDE INTRAVENOUS
2000 ONCE
Status: DISCONTINUED | OUTPATIENT
Start: 2021-09-25 | End: 2021-09-25

## 2021-09-25 RX ORDER — CEPHALEXIN 500 MG/1
500 CAPSULE ORAL 4 TIMES DAILY
Qty: 40 CAPSULE | Refills: 0 | Status: SHIPPED | OUTPATIENT
Start: 2021-09-25 | End: 2021-09-30

## 2021-09-25 RX ORDER — CEPHALEXIN 500 MG/1
500 CAPSULE ORAL 4 TIMES DAILY
Qty: 40 CAPSULE | Refills: 0 | Status: SHIPPED | OUTPATIENT
Start: 2021-09-25 | End: 2021-09-25 | Stop reason: SDUPTHER

## 2021-09-25 RX ORDER — ACETAMINOPHEN 500 MG
1000 TABLET ORAL
Status: COMPLETED | OUTPATIENT
Start: 2021-09-25 | End: 2021-09-25

## 2021-09-25 RX ORDER — ACETAMINOPHEN 500 MG
1000 TABLET ORAL
Status: DISCONTINUED | OUTPATIENT
Start: 2021-09-25 | End: 2021-09-25 | Stop reason: SDUPTHER

## 2021-09-25 RX ADMIN — VANCOMYCIN HYDROCHLORIDE 1000 MG: 1 INJECTION, POWDER, LYOPHILIZED, FOR SOLUTION INTRAVENOUS at 18:24

## 2021-09-25 RX ADMIN — CEFEPIME HYDROCHLORIDE 1 G: 1 INJECTION, POWDER, FOR SOLUTION INTRAMUSCULAR; INTRAVENOUS at 18:25

## 2021-09-25 RX ADMIN — ACETAMINOPHEN 1000 MG: 500 TABLET ORAL at 17:01

## 2021-09-25 RX ADMIN — VANCOMYCIN HYDROCHLORIDE 1000 MG: 1 INJECTION, POWDER, LYOPHILIZED, FOR SOLUTION INTRAVENOUS at 19:38

## 2021-09-25 RX ADMIN — DIPHENHYDRAMINE HYDROCHLORIDE 25 MG: 50 INJECTION, SOLUTION INTRAMUSCULAR; INTRAVENOUS at 18:24

## 2021-09-25 NOTE — ED PROVIDER NOTES
EMERGENCY DEPARTMENT HISTORY AND PHYSICAL EXAM    5:09 PM  Date: 9/25/2021  Patient Name: Carlota Park    History of Presenting Illness       History Provided By:     HPI: Carlota Park is a 43 y.o. male with past medical history of gastritis, left hand osteomyelitis after dog bite in 2014, alcohol abuse, pancreatitis presents with left ankle pain, redness, swelling which started 2 days ago. Patient states that he feels severe ankle pain when walking. Denies any IV drug use. Drinks 04-44 alcoholic drinks a day. PCP: Katie Mathews NP    Past History     Past Medical History:  Past Medical History:   Diagnosis Date    Anxiety     Cholelithiasis 08/18/2013    Note on U/S    Chronic gastritis 10/17/2014    Dr. Pattie Gudino (EGD Result) H. Pylori Neg    Depression     Dog bite of left hand including fingers with infection 11/16/2014    Adm 11/18/14 for IV antibiotics.  ETOH abuse     GERD (gastroesophageal reflux disease)     Hepatic steatosis 02/06/2014    Noted on U/S.     History of plasmapheresis 02/2014    Hypertriglyceridemia, Severe Pancreatitis    Hypertension     Hypertriglyceridemia     Hypomagnesemia 07/24/2016    1.6 mg/dL    Non compliance with medical treatment     Osteomyelitis of left hand ([facility] [address] 11/22/2014    MRI Finding @ LUE 2nd MCP joint.  Pancreatitis 08/17/2013    Highest on Record (2/6/14) 7823. Dr. Pattie Gudino    PICC (peripherally inserted central catheter) in place 98/30/3652    R Basilic Vein (IV Antibiotics for LUE Hand Osteomyelitis).  Rectal bleeding     Substance abuse (HCC)     Several UDS + Opiates.  Vitamin D deficiency 02/17/2014    11.6ng/mL ( ng/mL)       Past Surgical History:  Past Surgical History:   Procedure Laterality Date    HX CYST INCISION AND DRAINAGE Left 12/01/2014    LUE 2nd digit abscess I&D.      HX ENDOSCOPY  10/17/2014    Dr. Pattie Gudino: EGD/Colonoscopy    HX HEENT      wisdom tooth extraction    HX KNEE ARTHROSCOPY Right        Family History:  Family History   Problem Relation Age of Onset    Elevated Lipids Mother     Thyroid Disease Mother     Hypertension Father     Alcohol abuse Maternal Grandmother     MS Paternal Grandmother        Social History:  Social History     Tobacco Use    Smoking status: Former Smoker     Quit date: 2012     Years since quittin.0    Smokeless tobacco: Never Used    Tobacco comment: 2018    Substance Use Topics    Alcohol use: Not Currently     Alcohol/week: 13.3 standard drinks     Types: 8 Cans of beer, 8 Standard drinks or equivalent per week    Drug use: No       Allergies: Allergies   Allergen Reactions    Amoxicillin Swelling    Contrast Dye [Iodine] Hives     Pt had iv contrast prior to this study without problems, developed hives on 07/04/15 5 minutes post injection    Iodinated Contrast Media Hives     Pt got hives when he received IV contrast in the past    Penicillins Hives and Swelling    Wellbutrin [Bupropion] Other (comments)     Caused aggression       Review of Systems   Review of Systems   Constitutional: Negative for activity change, appetite change and chills. HENT: Negative for congestion, ear discharge, ear pain and sore throat. Eyes: Negative for photophobia and pain. Respiratory: Negative for cough and choking. Cardiovascular: Negative for palpitations and leg swelling. Gastrointestinal: Negative for anal bleeding and rectal pain. Endocrine: Negative for polydipsia and polyuria. Genitourinary: Negative for genital sores and urgency. Musculoskeletal: Positive for arthralgias. Negative for myalgias. Neurological: Negative for dizziness, seizures and speech difficulty. Psychiatric/Behavioral: Negative for hallucinations, self-injury and suicidal ideas.         Physical Exam     Patient Vitals for the past 12 hrs:   Temp Pulse Resp BP SpO2   21 1650 (!) 100.7 °F (38.2 °C) 87 18 (!) 167/107 100 % Physical Exam  Vitals and nursing note reviewed. Constitutional:       Appearance: He is well-developed. HENT:      Head: Normocephalic and atraumatic. Eyes:      General:         Right eye: No discharge. Left eye: No discharge. Cardiovascular:      Rate and Rhythm: Normal rate and regular rhythm. Heart sounds: Normal heart sounds. No murmur heard. Pulmonary:      Effort: Pulmonary effort is normal. No respiratory distress. Breath sounds: Normal breath sounds. No stridor. No wheezing or rales. Chest:      Chest wall: No tenderness. Abdominal:      General: Bowel sounds are normal. There is no distension. Palpations: Abdomen is soft. Tenderness: There is no abdominal tenderness. There is no guarding or rebound. Musculoskeletal:         General: Normal range of motion. Cervical back: Normal range of motion and neck supple. Skin:     General: Skin is warm and dry. Comments: Erythema, swelling over the medial and lateral malleolus  Tenderness on palpation of the medial and lateral malleolus  Full range of movement-Pain on movement of ankle  Left lower extremity neurovascularly intact  DP pulses present     Neurological:      Mental Status: He is alert and oriented to person, place, and time. Diagnostic Study Results     Labs -  No results found for this or any previous visit (from the past 12 hour(s)). Radiologic Studies -   No results found. Medical Decision Making     ED Course: Progress Notes, Reevaluation, and Consults:    5:09 PM Initial assessment performed. The patients presenting problems have been discussed, and they/their family are in agreement with the care plan formulated and outlined with them. I have encouraged them to ask questions as they arise throughout their visit.       Provider Notes (Medical Decision Making):   Patient presents with left ankle swelling, pain and redness  Patient denies any IV drug use or injury  Patient noted to be febrile  Concern for septic joint as there is pain on passive and active movement  Arthrocentesis performed as described below  Cell count, culture and crystals sent  Blood cultures also sent. No elevated white cell count. Elevated ESR noted  Received IV antibiotics after consult with ID Dr. Luciana Soriano  X-ray ankle no evidence of osteomyelitis  Discussed with hospitalist Dr. Wilton Stevenson will need to be admitted  Dr. Arelis Tafoya to call orthopedics once cell count of synovial fluid available      8:26 PM : Pt care transferred to Dr. Arelis Tafoya  ,ED provider. History of patient complaint(s), available diagnostic reports and current treatment plan has been discussed thoroughly. Intended disposition of patient :admit  Pending diagnostics reports and/or labs (please list): cell count    Dr. Bishop Casillas assistance in completion of this plan is greatly appreciated but it should be noted that I will be the provider of record for this patient. Arthrocentesis    Date/Time: 9/25/2021 6:10 PM  Performed by: Donna Alcantar MD  Authorized by: Donna Alcantar MD     Consent:     Consent obtained:  Written    Consent given by:  Patient    Risks discussed:  Bleeding, infection, pain, nerve damage and incomplete drainage    Alternatives discussed:  No treatment  Location:     Location:  Ankle    Ankle:  L ankle  Anesthesia (see MAR for exact dosages): Anesthesia method:  Local infiltration    Local anesthetic:  Lidocaine 1% w/o epi  Procedure details:     Preparation: Patient was prepped and draped in usual sterile fashion      Needle gauge:  20 G    Ultrasound guidance: no      Aspirate characteristics: Serosanguinous. Specimen collected: yes    Post-procedure details:     Patient tolerance of procedure: Tolerated well, no immediate complications  Comments:       Insertion site between the medial malleolus and medial border of tibialis anterior tendon  Neurological exam performed before and after procedure            Vital Signs-Reviewed the patient's vital signs. Reviewed pt's pulse ox reading. Records Reviewed: old medical records  -I am the first provider for this patient.  -I reviewed the vital signs, available nursing notes, past medical history, past surgical history, family history and social history. Current Outpatient Medications   Medication Sig Dispense Refill    gabapentin (NEURONTIN) 400 mg capsule Take 1 Capsule by mouth three (3) times daily. Max Daily Amount: 1,200 mg. 90 Capsule 0    lisinopriL (PRINIVIL, ZESTRIL) 20 mg tablet Take 1 Tablet by mouth daily. 90 Tablet 0    atenoloL (TENORMIN) 50 mg tablet Take 2 Tablets by mouth daily. 180 Tablet 0    escitalopram oxalate (LEXAPRO) 20 mg tablet Take 1 Tablet by mouth daily. 30 Tablet 1    atorvastatin (LIPITOR) 20 mg tablet Take 1 Tablet by mouth daily. 30 Tablet 1    clonazePAM (KlonoPIN) 1 mg tablet Take 1 Tablet by mouth three (3) times daily as needed for Anxiety. Max Daily Amount: 3 mg. 15 Tablet 0    omeprazole (PRILOSEC) 20 mg capsule Take 1 Cap by mouth Daily (before breakfast). 30 Cap 1    ondansetron (Zofran ODT) 4 mg disintegrating tablet Take 1 Tab by mouth every eight (8) hours as needed for Nausea or Nausea or Vomiting. 10 Tab 0    tiZANidine (ZANAFLEX) 4 mg tablet Take 1 Tab by mouth three (3) times daily as needed for Muscle Spasm(s). 90 Tab 2    cetirizine (ZYRTEC) 10 mg tablet Take 1 Tab by mouth nightly. 30 Tab 1    sildenafil citrate (Viagra) 100 mg tablet Take 1 Tab by mouth as needed for Erectile Dysfunction. 12 Tab 5    mupirocin (BACTROBAN) 2 % ointment Apply  to affected area daily. 22 g 0    fluticasone propionate (FLONASE) 50 mcg/actuation nasal spray 2 Sprays by Both Nostrils route daily. 1 Bottle 0    guaiFENesin ER (MUCINEX) 600 mg ER tablet Take 1 Tab by mouth two (2) times a day.  20 Tab 0    Camphor-Eucalyptus Oil-Menthol (VICKS VAPORUB) 4.8-1.2-2.6 % oint 1 Actuation(s) by Apply Externally route three (3) times daily as needed for Cough or Other (congestion). 50 g 0    Vaporizers (VICKS WARM STEAM VAPORIZER) misc 1 Actuation(s) by Does Not Apply route three (3) times daily as needed for Cough or Other (congestion). 1 Each 0        Clinical Impression     Clinical Impression: Septic joint    Disposition: AMA    This note was dictated utilizing voice recognition software which may lead to typographical errors. I apologize in advance if the situation occurs. If questions arise please do not hesitate to contact me or call our department. Guillermo Veronica MD  5:09 PM  8:30 PM :Pt care assumed from Dr. Jarek Luna , ED provider. Pt complaint(s), current treatment plan, progression and available diagnostic results have been discussed thoroughly. Patient refused admission for treated of a septic joint. He was told of the risk of having a lost of ankle/foot, permanent severe arhtitis and disbilites from not curt admitted. Patient signed AMA  *    Patient given a prescription for keflex.

## 2021-09-25 NOTE — Clinical Note
Status[de-identified] INPATIENT [101]   Type of Bed: Telemetry [19]   Cardiac Monitoring Required?: Yes   Inpatient Hospitalization Certified Necessary for the Following Reasons: 3.  Patient receiving treatment that can only be provided in an inpatient setting (further clarification in H&P documentation)   Admitting Diagnosis: Ankle pain [827366]   Admitting Diagnosis: Fever [[id number]]   Admitting Diagnosis: Ankle swelling [[id number]]   Admitting Diagnosis: Suspected soft tissue infection [5808515]   Admitting Physician: Mk Moreno [[id number]]   Attending Physician: Mk Moreno [[id number]]   Estimated Length of Stay: 2 Midnights   Discharge Plan[de-identified] Home with Office Follow-up

## 2021-09-26 ENCOUNTER — HOSPITAL ENCOUNTER (INPATIENT)
Age: 36
LOS: 4 days | Discharge: HOME HEALTH | DRG: 313 | End: 2021-09-30
Attending: STUDENT IN AN ORGANIZED HEALTH CARE EDUCATION/TRAINING PROGRAM | Admitting: EMERGENCY MEDICINE
Payer: MEDICAID

## 2021-09-26 ENCOUNTER — APPOINTMENT (OUTPATIENT)
Dept: MRI IMAGING | Age: 36
DRG: 313 | End: 2021-09-26
Attending: PHYSICIAN ASSISTANT
Payer: MEDICAID

## 2021-09-26 DIAGNOSIS — M00.9 SEPTIC ARTHRITIS OF LEFT ANKLE, DUE TO UNSPECIFIED ORGANISM (HCC): Primary | ICD-10-CM

## 2021-09-26 LAB
ALBUMIN SERPL-MCNC: 3.4 G/DL (ref 3.4–5)
ALBUMIN/GLOB SERPL: 0.9 {RATIO} (ref 0.8–1.7)
ALP SERPL-CCNC: 57 U/L (ref 45–117)
ALT SERPL-CCNC: 23 U/L (ref 16–61)
ANION GAP SERPL CALC-SCNC: 5 MMOL/L (ref 3–18)
AST SERPL-CCNC: 42 U/L (ref 10–38)
BASOPHILS # BLD: 0 K/UL (ref 0–0.1)
BASOPHILS NFR BLD: 1 % (ref 0–2)
BILIRUB SERPL-MCNC: 0.9 MG/DL (ref 0.2–1)
BUN SERPL-MCNC: 7 MG/DL (ref 7–18)
BUN/CREAT SERPL: 9 (ref 12–20)
CALCIUM SERPL-MCNC: 8.9 MG/DL (ref 8.5–10.1)
CHLORIDE SERPL-SCNC: 112 MMOL/L (ref 100–111)
CO2 SERPL-SCNC: 25 MMOL/L (ref 21–32)
CREAT SERPL-MCNC: 0.76 MG/DL (ref 0.6–1.3)
CRP SERPL-MCNC: 2.2 MG/DL (ref 0–0.3)
DIFFERENTIAL METHOD BLD: ABNORMAL
EOSINOPHIL # BLD: 0.1 K/UL (ref 0–0.4)
EOSINOPHIL NFR BLD: 1 % (ref 0–5)
ERYTHROCYTE [DISTWIDTH] IN BLOOD BY AUTOMATED COUNT: 13 % (ref 11.6–14.5)
GLOBULIN SER CALC-MCNC: 3.9 G/DL (ref 2–4)
GLUCOSE SERPL-MCNC: 87 MG/DL (ref 74–99)
HCT VFR BLD AUTO: 33.4 % (ref 36–48)
HGB BLD-MCNC: 11.5 G/DL (ref 13–16)
LACTATE BLD-SCNC: 1.42 MMOL/L (ref 0.4–2)
LYMPHOCYTES # BLD: 1 K/UL (ref 0.9–3.6)
LYMPHOCYTES NFR BLD: 18 % (ref 21–52)
MCH RBC QN AUTO: 32.1 PG (ref 24–34)
MCHC RBC AUTO-ENTMCNC: 34.4 G/DL (ref 31–37)
MCV RBC AUTO: 93.3 FL (ref 78–100)
MONOCYTES # BLD: 0.6 K/UL (ref 0.05–1.2)
MONOCYTES NFR BLD: 10 % (ref 3–10)
NEUTS SEG # BLD: 3.8 K/UL (ref 1.8–8)
NEUTS SEG NFR BLD: 70 % (ref 40–73)
PLATELET # BLD AUTO: 164 K/UL (ref 135–420)
PMV BLD AUTO: 9 FL (ref 9.2–11.8)
POTASSIUM SERPL-SCNC: 4.2 MMOL/L (ref 3.5–5.5)
PROT SERPL-MCNC: 7.3 G/DL (ref 6.4–8.2)
RBC # BLD AUTO: 3.58 M/UL (ref 4.35–5.65)
SODIUM SERPL-SCNC: 142 MMOL/L (ref 136–145)
SPECIMEN SOURCE FLD: NORMAL
URATE FLD-MCNC: 8.8 MG/DL
WBC # BLD AUTO: 5.5 K/UL (ref 4.6–13.2)

## 2021-09-26 PROCEDURE — 74011000250 HC RX REV CODE- 250: Performed by: PHYSICIAN ASSISTANT

## 2021-09-26 PROCEDURE — 65270000029 HC RM PRIVATE

## 2021-09-26 PROCEDURE — 96374 THER/PROPH/DIAG INJ IV PUSH: CPT

## 2021-09-26 PROCEDURE — 74011250636 HC RX REV CODE- 250/636

## 2021-09-26 PROCEDURE — 85025 COMPLETE CBC W/AUTO DIFF WBC: CPT

## 2021-09-26 PROCEDURE — 99223 1ST HOSP IP/OBS HIGH 75: CPT | Performed by: EMERGENCY MEDICINE

## 2021-09-26 PROCEDURE — 99284 EMERGENCY DEPT VISIT MOD MDM: CPT

## 2021-09-26 PROCEDURE — 74011250636 HC RX REV CODE- 250/636: Performed by: EMERGENCY MEDICINE

## 2021-09-26 PROCEDURE — 83605 ASSAY OF LACTIC ACID: CPT

## 2021-09-26 PROCEDURE — 74011250636 HC RX REV CODE- 250/636: Performed by: PHYSICIAN ASSISTANT

## 2021-09-26 PROCEDURE — 94761 N-INVAS EAR/PLS OXIMETRY MLT: CPT

## 2021-09-26 PROCEDURE — 74011000250 HC RX REV CODE- 250: Performed by: EMERGENCY MEDICINE

## 2021-09-26 PROCEDURE — 80053 COMPREHEN METABOLIC PANEL: CPT

## 2021-09-26 PROCEDURE — 73723 MRI JOINT LWR EXTR W/O&W/DYE: CPT

## 2021-09-26 PROCEDURE — 96375 TX/PRO/DX INJ NEW DRUG ADDON: CPT

## 2021-09-26 PROCEDURE — A9577 INJ MULTIHANCE: HCPCS | Performed by: EMERGENCY MEDICINE

## 2021-09-26 RX ORDER — VANCOMYCIN/0.9 % SOD CHLORIDE 1.5G/250ML
1500 PLASTIC BAG, INJECTION (ML) INTRAVENOUS EVERY 12 HOURS
Status: DISCONTINUED | OUTPATIENT
Start: 2021-09-26 | End: 2021-09-27

## 2021-09-26 RX ORDER — SODIUM CHLORIDE 0.9 % (FLUSH) 0.9 %
5-40 SYRINGE (ML) INJECTION EVERY 8 HOURS
Status: DISCONTINUED | OUTPATIENT
Start: 2021-09-26 | End: 2021-09-28 | Stop reason: SDUPTHER

## 2021-09-26 RX ORDER — ATENOLOL 50 MG/1
50 TABLET ORAL DAILY
Status: DISCONTINUED | OUTPATIENT
Start: 2021-09-27 | End: 2021-09-29

## 2021-09-26 RX ORDER — ATORVASTATIN CALCIUM 20 MG/1
20 TABLET, FILM COATED ORAL DAILY
Status: DISCONTINUED | OUTPATIENT
Start: 2021-09-27 | End: 2021-09-30 | Stop reason: HOSPADM

## 2021-09-26 RX ORDER — LORAZEPAM 2 MG/ML
1 INJECTION INTRAMUSCULAR
Status: DISCONTINUED | OUTPATIENT
Start: 2021-09-26 | End: 2021-09-30 | Stop reason: HOSPADM

## 2021-09-26 RX ORDER — LISINOPRIL 20 MG/1
20 TABLET ORAL DAILY
Status: DISCONTINUED | OUTPATIENT
Start: 2021-09-27 | End: 2021-09-28

## 2021-09-26 RX ORDER — SODIUM CHLORIDE 0.9 % (FLUSH) 0.9 %
5-40 SYRINGE (ML) INJECTION AS NEEDED
Status: DISCONTINUED | OUTPATIENT
Start: 2021-09-26 | End: 2021-09-26

## 2021-09-26 RX ORDER — ONDANSETRON 4 MG/1
4 TABLET, ORALLY DISINTEGRATING ORAL
Status: DISCONTINUED | OUTPATIENT
Start: 2021-09-26 | End: 2021-09-30 | Stop reason: HOSPADM

## 2021-09-26 RX ORDER — HYDROMORPHONE HYDROCHLORIDE 1 MG/ML
1 INJECTION, SOLUTION INTRAMUSCULAR; INTRAVENOUS; SUBCUTANEOUS
Status: DISCONTINUED | OUTPATIENT
Start: 2021-09-26 | End: 2021-09-28

## 2021-09-26 RX ORDER — LORAZEPAM 2 MG/ML
3 INJECTION INTRAMUSCULAR
Status: DISCONTINUED | OUTPATIENT
Start: 2021-09-26 | End: 2021-09-30 | Stop reason: HOSPADM

## 2021-09-26 RX ORDER — ACETAMINOPHEN 650 MG/1
650 SUPPOSITORY RECTAL
Status: DISCONTINUED | OUTPATIENT
Start: 2021-09-26 | End: 2021-09-30 | Stop reason: HOSPADM

## 2021-09-26 RX ORDER — NALOXONE HYDROCHLORIDE 0.4 MG/ML
0.4 INJECTION, SOLUTION INTRAMUSCULAR; INTRAVENOUS; SUBCUTANEOUS AS NEEDED
Status: DISCONTINUED | OUTPATIENT
Start: 2021-09-26 | End: 2021-09-30 | Stop reason: HOSPADM

## 2021-09-26 RX ORDER — ACETAMINOPHEN 325 MG/1
650 TABLET ORAL
Status: DISCONTINUED | OUTPATIENT
Start: 2021-09-26 | End: 2021-09-30 | Stop reason: HOSPADM

## 2021-09-26 RX ORDER — SODIUM CHLORIDE 0.9 % (FLUSH) 0.9 %
5-40 SYRINGE (ML) INJECTION EVERY 8 HOURS
Status: DISCONTINUED | OUTPATIENT
Start: 2021-09-26 | End: 2021-09-26

## 2021-09-26 RX ORDER — POLYETHYLENE GLYCOL 3350 17 G/17G
17 POWDER, FOR SOLUTION ORAL DAILY PRN
Status: DISCONTINUED | OUTPATIENT
Start: 2021-09-26 | End: 2021-09-30 | Stop reason: HOSPADM

## 2021-09-26 RX ORDER — MORPHINE SULFATE 4 MG/ML
4 INJECTION INTRAVENOUS
Status: COMPLETED | OUTPATIENT
Start: 2021-09-26 | End: 2021-09-26

## 2021-09-26 RX ORDER — ONDANSETRON 2 MG/ML
4 INJECTION INTRAMUSCULAR; INTRAVENOUS
Status: DISCONTINUED | OUTPATIENT
Start: 2021-09-26 | End: 2021-09-30 | Stop reason: HOSPADM

## 2021-09-26 RX ORDER — LORAZEPAM 1 MG/1
2 TABLET ORAL
Status: DISCONTINUED | OUTPATIENT
Start: 2021-09-26 | End: 2021-09-30 | Stop reason: HOSPADM

## 2021-09-26 RX ORDER — LORAZEPAM 2 MG/ML
2 INJECTION INTRAMUSCULAR
Status: DISCONTINUED | OUTPATIENT
Start: 2021-09-26 | End: 2021-09-30 | Stop reason: HOSPADM

## 2021-09-26 RX ORDER — VANCOMYCIN 2 GRAM/500 ML IN 0.9 % SODIUM CHLORIDE INTRAVENOUS
2000 ONCE
Status: COMPLETED | OUTPATIENT
Start: 2021-09-26 | End: 2021-09-26

## 2021-09-26 RX ORDER — PANTOPRAZOLE SODIUM 20 MG/1
20 TABLET, DELAYED RELEASE ORAL
Status: DISCONTINUED | OUTPATIENT
Start: 2021-09-27 | End: 2021-09-30 | Stop reason: HOSPADM

## 2021-09-26 RX ORDER — SODIUM CHLORIDE 0.9 % (FLUSH) 0.9 %
5-40 SYRINGE (ML) INJECTION AS NEEDED
Status: DISCONTINUED | OUTPATIENT
Start: 2021-09-26 | End: 2021-09-28 | Stop reason: SDUPTHER

## 2021-09-26 RX ORDER — SODIUM CHLORIDE 9 MG/ML
75 INJECTION, SOLUTION INTRAVENOUS CONTINUOUS
Status: DISCONTINUED | OUTPATIENT
Start: 2021-09-26 | End: 2021-09-28

## 2021-09-26 RX ORDER — ONDANSETRON 2 MG/ML
INJECTION INTRAMUSCULAR; INTRAVENOUS
Status: COMPLETED
Start: 2021-09-26 | End: 2021-09-26

## 2021-09-26 RX ORDER — ESCITALOPRAM OXALATE 20 MG/1
20 TABLET ORAL DAILY
Status: DISCONTINUED | OUTPATIENT
Start: 2021-09-27 | End: 2021-09-30 | Stop reason: HOSPADM

## 2021-09-26 RX ORDER — LORAZEPAM 1 MG/1
1 TABLET ORAL
Status: DISCONTINUED | OUTPATIENT
Start: 2021-09-26 | End: 2021-09-30 | Stop reason: HOSPADM

## 2021-09-26 RX ORDER — ONDANSETRON 2 MG/ML
4 INJECTION INTRAMUSCULAR; INTRAVENOUS
Status: COMPLETED | OUTPATIENT
Start: 2021-09-26 | End: 2021-09-26

## 2021-09-26 RX ADMIN — FOLIC ACID: 5 INJECTION, SOLUTION INTRAMUSCULAR; INTRAVENOUS; SUBCUTANEOUS at 20:02

## 2021-09-26 RX ADMIN — MORPHINE SULFATE 4 MG: 4 INJECTION INTRAVENOUS at 14:15

## 2021-09-26 RX ADMIN — ONDANSETRON 4 MG: 2 INJECTION INTRAMUSCULAR; INTRAVENOUS at 14:15

## 2021-09-26 RX ADMIN — GADOBENATE DIMEGLUMINE 20 ML: 529 INJECTION, SOLUTION INTRAVENOUS at 15:51

## 2021-09-26 RX ADMIN — VANCOMYCIN HYDROCHLORIDE 2000 MG: 10 INJECTION, POWDER, LYOPHILIZED, FOR SOLUTION INTRAVENOUS at 14:14

## 2021-09-26 RX ADMIN — VANCOMYCIN HYDROCHLORIDE 1500 MG: 10 INJECTION, POWDER, LYOPHILIZED, FOR SOLUTION INTRAVENOUS at 21:55

## 2021-09-26 RX ADMIN — WATER 2 G: 1 INJECTION INTRAMUSCULAR; INTRAVENOUS; SUBCUTANEOUS at 21:51

## 2021-09-26 RX ADMIN — WATER 2 G: 1 INJECTION INTRAMUSCULAR; INTRAVENOUS; SUBCUTANEOUS at 14:14

## 2021-09-26 RX ADMIN — Medication 10 ML: at 22:09

## 2021-09-26 RX ADMIN — SODIUM CHLORIDE 75 ML/HR: 900 INJECTION, SOLUTION INTRAVENOUS at 19:07

## 2021-09-26 RX ADMIN — HYDROMORPHONE HYDROCHLORIDE 1 MG: 1 INJECTION, SOLUTION INTRAMUSCULAR; INTRAVENOUS; SUBCUTANEOUS at 19:05

## 2021-09-26 RX ADMIN — HYDROMORPHONE HYDROCHLORIDE 1 MG: 1 INJECTION, SOLUTION INTRAMUSCULAR; INTRAVENOUS; SUBCUTANEOUS at 23:53

## 2021-09-26 NOTE — ED NOTES
Dr. Cheryl Leo have recommended that this patient have to be admitted for further management but he declines at this time. MD Yan Bragg have discussed the risks and benefits of this examination with him. The patient verbalizes understanding.   Patient signed AMA form, witnessed by this nurse

## 2021-09-26 NOTE — ED NOTES
Called patient numerous times no response  Left voicemail that he needs to contact us immediately about his results  Spoke to patient's father Mary Reuben who is going to try to contact patient

## 2021-09-26 NOTE — Clinical Note
Status[de-identified] INPATIENT [101]   Type of Bed: Medical [8]   Inpatient Hospitalization Certified Necessary for the Following Reasons: 3.  Patient receiving treatment that can only be provided in an inpatient setting (further clarification in H&P documentation)   Admitting Diagnosis: Septic arthritis [facility]) [[id number]]   Admitting Physician: Boris Chirinos   Attending Physician: Boris Chirinos   Estimated Length of Stay: 2 Midnights   Discharge Plan[de-identified] Home with Office Follow-up

## 2021-09-26 NOTE — ED NOTES
Informed that patient signed out AMA last night.   Attempted to call him to suggest that he returns to the emergency department  Patient did not answer phone call

## 2021-09-26 NOTE — ED PROVIDER NOTES
[address] [facility],4Th Floor  [facility] EMERGENCY DEPT    Date: 9/26/2021  Patient Name: Zelalem Lange    History of Presenting Illness     Chief Complaint   Patient presents with    Ankle swelling     43 y.o. male with a past medical history of alcohol and substance abuse, anxiety, depression, hypertension, hypercholesterolemia presents to the ED for redness, swelling, warmth, pain to the left ankle onset 3 days ago. Patient was seen yesterday at [facility] ER, had a tap of the left ankle, but signed out AMA. He was given vancomycin while in the ED, Rx for Keflex at home. Patient was called today and told to come here for septic arthritis as his results returned. He states that he has had intermittent fever, T-max was 100.7. He reports improvement since yesterday with the antibiotics. Denies prior surgery, chance of STD, or other symptoms at this time. Patient denies any other associated signs or symptoms. Patient denies any other complaints. Nursing notes regarding the HPI and triage nursing notes were reviewed. Prior medical records were reviewed.      Current Facility-Administered Medications   Medication Dose Route Frequency Provider Last Rate Last Admin    cefepime (MAXIPIME) 2 g in sterile water (preservative free) 10 mL IV syringe  2 g IntraVENous Q8H Abimbola Tenorio  mL/hr at 09/26/21 1414 2 g at 09/26/21 1414    vancomycin (VANCOCIN) 1500 mg in  ml infusion  1,500 mg IntraVENous Q12H Abimbola Tenorio PA        LORazepam (ATIVAN) tablet 1 mg  1 mg Oral Q1H PRN Abimbola Hand PA        Or    LORazepam (ATIVAN) injection 1 mg  1 mg IntraVENous Q1H PRN Abimbola Hand PA        LORazepam (ATIVAN) tablet 2 mg  2 mg Oral Q1H PRN Abimbola Hand PA        Or    LORazepam (ATIVAN) injection 2 mg  2 mg IntraVENous Q1H PRN Abimbola Hand PA        LORazepam (ATIVAN) injection 3 mg  3 mg IntraVENous Q15MIN PRN CARLITA Thurston        [START ON 9/27/2021] atenoloL (TENORMIN) tablet 50 mg  50 mg Oral DAILY Marcelo Huynh MD        [START ON 9/27/2021] escitalopram oxalate (LEXAPRO) tablet 20 mg  20 mg Oral DAILY Marcelo Huynh MD        [START ON 9/27/2021] lisinopriL (PRINIVIL, ZESTRIL) tablet 20 mg  20 mg Oral DAILY MD Deng Livingston [START ON 9/27/2021] atorvastatin (LIPITOR) tablet 20 mg  20 mg Oral DAILY MD Deng Livingston [START ON 9/27/2021] pantoprazole (PROTONIX) tablet 20 mg  20 mg Oral ACB Marcelo Huynh MD        0.9% sodium chloride 1,000 mL with mvi (adult no. 4 with vit K) 10 mL, thiamine 000 mg, folic acid 1 mg infusion   IntraVENous Q24H Marcelo Huynh MD        0.9% sodium chloride infusion  75 mL/hr IntraVENous CONTINUOUS Marcelo Huynh MD        sodium chloride (NS) flush 5-40 mL  5-40 mL IntraVENous Q8H Micah Palmer MD        sodium chloride (NS) flush 5-40 mL  5-40 mL IntraVENous PRN Marcelo Huynh MD        acetaminophen (TYLENOL) tablet 650 mg  650 mg Oral Q6H PRN Marcelo Huynh MD        Or    acetaminophen (TYLENOL) suppository 650 mg  650 mg Rectal Q6H PRN Marcelo Huynh MD        polyethylene glycol (MIRALAX) packet 17 g  17 g Oral DAILY PRN Marcelo Huynh MD        ondansetron (ZOFRAN ODT) tablet 4 mg  4 mg Oral Q8H PRN Marcelo Huynh MD        Or    ondansetron (ZOFRAN) injection 4 mg  4 mg IntraVENous Q6H PRN Marcelo Huynh MD         Current Outpatient Medications   Medication Sig Dispense Refill    cephALEXin (Keflex) 500 mg capsule Take 1 Capsule by mouth four (4) times daily for 10 days. 40 Capsule 0    cephALEXin (Keflex) 500 mg capsule Take 1 Capsule by mouth four (4) times daily for 10 days. 40 Capsule 0    gabapentin (NEURONTIN) 400 mg capsule Take 1 Capsule by mouth three (3) times daily. Max Daily Amount: 1,200 mg. 90 Capsule 0    lisinopriL (PRINIVIL, ZESTRIL) 20 mg tablet Take 1 Tablet by mouth daily. 90 Tablet 0    atenoloL (TENORMIN) 50 mg tablet Take 2 Tablets by mouth daily.  180 Tablet 0    escitalopram oxalate (LEXAPRO) 20 mg tablet Take 1 Tablet by mouth daily. 30 Tablet 1    atorvastatin (LIPITOR) 20 mg tablet Take 1 Tablet by mouth daily. 30 Tablet 1    clonazePAM (KlonoPIN) 1 mg tablet Take 1 Tablet by mouth three (3) times daily as needed for Anxiety. Max Daily Amount: 3 mg. 15 Tablet 0    omeprazole (PRILOSEC) 20 mg capsule Take 1 Cap by mouth Daily (before breakfast). 30 Cap 1    ondansetron (Zofran ODT) 4 mg disintegrating tablet Take 1 Tab by mouth every eight (8) hours as needed for Nausea or Nausea or Vomiting. 10 Tab 0    tiZANidine (ZANAFLEX) 4 mg tablet Take 1 Tab by mouth three (3) times daily as needed for Muscle Spasm(s). 90 Tab 2    cetirizine (ZYRTEC) 10 mg tablet Take 1 Tab by mouth nightly. 30 Tab 1    sildenafil citrate (Viagra) 100 mg tablet Take 1 Tab by mouth as needed for Erectile Dysfunction. 12 Tab 5    mupirocin (BACTROBAN) 2 % ointment Apply  to affected area daily. 22 g 0    fluticasone propionate (FLONASE) 50 mcg/actuation nasal spray 2 Sprays by Both Nostrils route daily. 1 Bottle 0    guaiFENesin ER (MUCINEX) 600 mg ER tablet Take 1 Tab by mouth two (2) times a day. 20 Tab 0    Camphor-Eucalyptus Oil-Menthol (VICKS VAPORUB) 4.8-1.2-2.6 % oint 1 Actuation(s) by Apply Externally route three (3) times daily as needed for Cough or Other (congestion). 50 g 0    Vaporizers (Lighting by LED WARM STEAM VAPORIZER) misc 1 Actuation(s) by Does Not Apply route three (3) times daily as needed for Cough or Other (congestion). 1 Each 0       Past History     Past Medical History:  Past Medical History:   Diagnosis Date    Anxiety     Cholelithiasis 08/18/2013    Note on U/S    Chronic gastritis 10/17/2014    Dr. Gian Hannah (EGD Result) H. Pylori Neg    Depression     Dog bite of left hand including fingers with infection 11/16/2014    Adm 11/18/14 for IV antibiotics.      ETOH abuse     GERD (gastroesophageal reflux disease)     Hepatic steatosis 02/06/2014 Noted on U/S.     History of plasmapheresis 2014    Hypertriglyceridemia, Severe Pancreatitis    Hypertension     Hypertriglyceridemia     Hypomagnesemia 2016    1.6 mg/dL    Non compliance with medical treatment     Osteomyelitis of left hand ([address] 2014    MRI Finding @ [facility] 2nd MCP joint.  Pancreatitis 2013    Highest on Record [phone]. Dr. Rajesh Reed    PICC (peripherally inserted central catheter) in place     R Basilic Vein (IV Antibiotics for L[facility] Hand Osteomyelitis).  Rectal bleeding     Substance abuse (HCC)     Several UDS + Opiates.  Vitamin D deficiency 2014    11.6ng/mL ( ng/mL)       Past Surgical History:  Past Surgical History:   Procedure Laterality Date    HX CYST INCISION AND DRAINAGE Left 2014    [facility] 2nd digit abscess I&D.  HX ENDOSCOPY  10/17/2014    Dr. Rajesh Reed: EGD/Colonoscopy    HX HEENT      wisdom tooth extraction    HX KNEE ARTHROSCOPY Right        Family History:  Family History   Problem Relation Age of Onset    Elevated Lipids Mother     Thyroid Disease Mother     Hypertension Father     Alcohol abuse Maternal Grandmother     MS Paternal Grandmother        Social History:  Social History     Tobacco Use    Smoking status: Former Smoker     Quit date: 2012     Years since quittin.0    Smokeless tobacco: Never Used    Tobacco comment: 2018    Substance Use Topics    Alcohol use: Not Currently     Alcohol/week: 13.3 standard drinks     Types: 8 Cans of beer, 8 Standard drinks or equivalent per week    Drug use: No       Allergies:   Allergies   Allergen Reactions    Amoxicillin Swelling    Contrast Dye [Iodine] Hives     Pt had iv contrast prior to this study without problems, developed hives on 07/04/15 5 minutes post injection    Iodinated Contrast Media Hives     Pt got hives when he received IV contrast in the past    Penicillins Hives and Swelling    Wellbutrin [Bupropion] Other (comments)     Caused aggression       Patient's primary care provider (as noted in EPIC):  Lindsay Looney NP    Review of Systems   Constitutional:  Denies malaise, fever, chills. Respiratory:  Denies shortness of breath. Back:  Denies injury or pain. Extremity/MS: + left ankle pain, swelling, redness, warmth. Neuro:  Denies headache, LOC, dizziness, neurologic symptoms/deficits/paresthesias. Skin: Denies injury, rash, itching or skin changes. All other systems negative as reviewed. Visit Vitals  BP (!) 159/92   Pulse 76   Temp 99 °F (37.2 °C)   Resp 16   Ht 6' 2\" (1.88 m)   Wt 102.1 kg (225 lb)   SpO2 100%   BMI 28.89 kg/m²       PHYSICAL EXAM:    CONSTITUTIONAL:  Alert, in no apparent distress;  well developed;  well nourished. HEAD:  Normocephalic, atraumatic. EYES:  EOMI. Non-icteric sclera. Normal conjunctiva. ENTM:  Nose:  no rhinorrhea. Throat:  no erythema or exudate, mucous membranes moist.  NECK:  Supple  RESPIRATORY:  Chest clear, equal breath sounds, good air movement. CARDIOVASCULAR:  Regular rate and rhythm. No murmurs, rubs, or gallops. UPPER EXT:  Normal inspection. LOWER EXT: Left ankle with reproducible tenderness palpation throughout, edema, warmth; erythema noted to the lateral aspect, neurovascular intact distally with 5 5 strength. Able to perform passive range of motion without significant pain. NEURO:  Moves all four extremities, and grossly normal motor exam.  SKIN:  No rashes;  Normal for age. PSYCH:  Alert and normal affect.       IMPRESSION AND MEDICAL DECISION MAKING:  Recent Results (from the past 12 hour(s))   METABOLIC PANEL, COMPREHENSIVE    Collection Time: 09/26/21  1:29 PM   Result Value Ref Range    Sodium 142 136 - 145 mmol/L    Potassium 4.2 3.5 - 5.5 mmol/L    Chloride 112 (H) 100 - 111 mmol/L    CO2 25 21 - 32 mmol/L    Anion gap 5 3.0 - 18 mmol/L    Glucose 87 74 - 99 mg/dL    BUN 7 7.0 - 18 MG/DL    Creatinine 0.76 0.6 - 1.3 MG/DL    BUN/Creatinine ratio 9 (L) 12 - 20      GFR est AA >60 >60 ml/min/1.73m2    GFR est non-AA >60 >60 ml/min/1.73m2    Calcium 8.9 8.5 - 10.1 MG/DL    Bilirubin, total 0.9 0.2 - 1.0 MG/DL    ALT (SGPT) 23 16 - 61 U/L    AST (SGOT) 42 (H) 10 - 38 U/L    Alk. phosphatase 57 45 - 117 U/L    Protein, total 7.3 6.4 - 8.2 g/dL    Albumin 3.4 3.4 - 5.0 g/dL    Globulin 3.9 2.0 - 4.0 g/dL    A-G Ratio 0.9 0.8 - 1.7     CBC WITH AUTOMATED DIFF    Collection Time: 09/26/21  4:40 PM   Result Value Ref Range    WBC 5.5 4.6 - 13.2 K/uL    RBC 3.58 (L) 4.35 - 5.65 M/uL    HGB 11.5 (L) 13.0 - 16.0 g/dL    HCT 33.4 (L) 36.0 - 48.0 %    MCV 93.3 78.0 - 100.0 FL    MCH 32.1 24.0 - 34.0 PG    MCHC 34.4 31.0 - 37.0 g/dL    RDW 13.0 11.6 - 14.5 %    PLATELET 081 234 - 925 K/uL    MPV 9.0 (L) 9.2 - 11.8 FL    NEUTROPHILS 70 40 - 73 %    LYMPHOCYTES 18 (L) 21 - 52 %    MONOCYTES 10 3 - 10 %    EOSINOPHILS 1 0 - 5 %    BASOPHILS 1 0 - 2 %    ABS. NEUTROPHILS 3.8 1.8 - 8.0 K/UL    ABS. LYMPHOCYTES 1.0 0.9 - 3.6 K/UL    ABS. MONOCYTES 0.6 0.05 - 1.2 K/UL    ABS. EOSINOPHILS 0.1 0.0 - 0.4 K/UL    ABS. BASOPHILS 0.0 0.0 - 0.1 K/UL    DF AUTOMATED     POC LACTIC ACID    Collection Time: 09/26/21  4:40 PM   Result Value Ref Range    Lactic Acid (POC) 1.42 0.40 - 2.00 mmol/L      Patient was given vancomycin and cefepime here, blood cultures were performed yesterday. WBC count 5.5, lactate 1.42.     2:46 PM consult with Dr. Leanne Duenas, he states he will perform surgery on the patient tomorrow morning, requesting to make the patient n.p.o. after midnight. Consult with Dr. Tsering Wagner, he will accept the patient for admission. Diagnosis:   1. Septic arthritis of left ankle, due to unspecified organism [facility])      Disposition: Admission    Patient's Medications   Start Taking    No medications on file   Continue Taking    ATENOLOL (TENORMIN) 50 MG TABLET    Take 2 Tablets by mouth daily.     ATORVASTATIN (LIPITOR) 20 MG TABLET    Take 1 Tablet by mouth daily. CAMPHOR-EUCALYPTUS OIL-MENTHOL (VICKS VAPORUB) 4.8-1.2-2.6 % OINT    1 Actuation(s) by Apply Externally route three (3) times daily as needed for Cough or Other (congestion). CEPHALEXIN (KEFLEX) 500 MG CAPSULE    Take 1 Capsule by mouth four (4) times daily for 10 days. CEPHALEXIN (KEFLEX) 500 MG CAPSULE    Take 1 Capsule by mouth four (4) times daily for 10 days. CETIRIZINE (ZYRTEC) 10 MG TABLET    Take 1 Tab by mouth nightly. CLONAZEPAM (KLONOPIN) 1 MG TABLET    Take 1 Tablet by mouth three (3) times daily as needed for Anxiety. Max Daily Amount: 3 mg. ESCITALOPRAM OXALATE (LEXAPRO) 20 MG TABLET    Take 1 Tablet by mouth daily. FLUTICASONE PROPIONATE (FLONASE) 50 MCG/ACTUATION NASAL SPRAY    2 Sprays by Both Nostrils route daily. GABAPENTIN (NEURONTIN) 400 MG CAPSULE    Take 1 Capsule by mouth three (3) times daily. Max Daily Amount: 1,200 mg. GUAIFENESIN ER (MUCINEX) 600 MG ER TABLET    Take 1 Tab by mouth two (2) times a day. LISINOPRIL (PRINIVIL, ZESTRIL) 20 MG TABLET    Take 1 Tablet by mouth daily. MUPIROCIN (BACTROBAN) 2 % OINTMENT    Apply  to affected area daily. OMEPRAZOLE (PRILOSEC) 20 MG CAPSULE    Take 1 Cap by mouth Daily (before breakfast). ONDANSETRON (ZOFRAN ODT) 4 MG DISINTEGRATING TABLET    Take 1 Tab by mouth every eight (8) hours as needed for Nausea or Nausea or Vomiting. SILDENAFIL CITRATE (VIAGRA) 100 MG TABLET    Take 1 Tab by mouth as needed for Erectile Dysfunction. TIZANIDINE (ZANAFLEX) 4 MG TABLET    Take 1 Tab by mouth three (3) times daily as needed for Muscle Spasm(s). VAPORIZERS (VICKS WARM STEAM VAPORIZER) MISC    1 Actuation(s) by Does Not Apply route three (3) times daily as needed for Cough or Other (congestion).    These Medications have changed    No medications on file   Stop Taking    No medications on file     CARLITA Clarke

## 2021-09-26 NOTE — H&P
Hospitalist Admission History and Physical    NAME:  Luci Ortiz   :   1985   MRN:   [id number]     PCP:  Sheela Talley NP  Date/Time:  2021   Subjective:   CHIEF COMPLAINT: Left ankle pain and swelling    HISTORY OF PRESENT ILLNESS:     This is a 27-year-old male who presented to the ED today with pain and swelling of the left ankle. Patient had actually presented to [facility] ED yesterday and underwent tap of the left ankle but subsequently left AGAINST MEDICAL ADVICE. The fluid from the ankle did show septic arthritis. Patient was called today and he came to the ED. When I saw the patient he was sitting in bed in no apparent distress. Patient states that his symptoms had started 3 days ago. Patient denies any trauma to the left foot or ankle. Patient denies any insect bite. Patient states that he has pet dogs but denies any dog bite or scratch. Patient does give history of some fevers. No history of any chest pain or shortness of breath. No history of any cough. No history of any sick contacts. No history of any headaches numbness or focal weakness. No history of any abdominal pain urinary complaints diarrhea rectal bleeding. No history of any black melanotic stools. No history of any other leg swelling. Patient does have some redness around the left ankle. Patient does state that he has been drinking a 12 pack of beer every night. Past Medical History:   Diagnosis Date    Anxiety     Cholelithiasis 2013    Note on U/S    Chronic gastritis 10/17/2014    Dr. Rajesh Reed (EGD Result) H. Pylori Neg    Depression     Dog bite of left hand including fingers with infection 2014    Adm 14 for IV antibiotics.      ETOH abuse     GERD (gastroesophageal reflux disease)     Hepatic steatosis 2014    Noted on U/S.     History of plasmapheresis 2014    Hypertriglyceridemia, Severe Pancreatitis    Hypertension     Hypertriglyceridemia     Hypomagnesemia 2016    1.6 mg/dL    Non compliance with medical treatment     Osteomyelitis of left hand ([address] 2014    MRI Finding @ LUE 2nd MCP joint.  Pancreatitis 2013    Highest on Record [phone]. Dr. Melo Story    PICC (peripherally inserted central catheter) in place     R Basilic Vein (IV Antibiotics for LUE Hand Osteomyelitis).  Rectal bleeding     Substance abuse (HCC)     Several UDS + Opiates.  Vitamin D deficiency 2014    11.6ng/mL ( ng/mL)        Past Surgical History:   Procedure Laterality Date    HX CYST INCISION AND DRAINAGE Left 2014    LUE 2nd digit abscess I&D.  HX ENDOSCOPY  10/17/2014    Dr. Melo Story: EGD/Colonoscopy    HX HEENT      wisdom tooth extraction    HX KNEE ARTHROSCOPY Right        Social History     Tobacco Use    Smoking status: Former Smoker     Quit date: 2012     Years since quittin.0    Smokeless tobacco: Never Used    Tobacco comment: 2018    Substance Use Topics    Alcohol use: Not Currently     Alcohol/week: 13.3 standard drinks     Types: 8 Cans of beer, 8 Standard drinks or equivalent per week        Family History   Problem Relation Age of Onset    Elevated Lipids Mother     Thyroid Disease Mother     Hypertension Father     Alcohol abuse Maternal Grandmother     MS Paternal Grandmother         Allergies   Allergen Reactions    Amoxicillin Swelling    Contrast Dye [Iodine] Hives     Pt had iv contrast prior to this study without problems, developed hives on 07/04/15 5 minutes post injection    Iodinated Contrast Media Hives     Pt got hives when he received IV contrast in the past    Penicillins Hives and Swelling    Wellbutrin [Bupropion] Other (comments)     Caused aggression        Prior to Admission Medications   Prescriptions Last Dose Informant Patient Reported? Taking?    Camphor-Eucalyptus Oil-Menthol (VICKS VAPORUB) 4.8-1.2-2.6 % oint   No No   Sig: 1 Actuation(s) by Apply Externally route three (3) times daily as needed for Cough or Other (congestion). Vaporizers ([employer] WARM STEAM VAPORIZER) misc   No No   Si Actuation(s) by Does Not Apply route three (3) times daily as needed for Cough or Other (congestion). atenoloL (TENORMIN) 50 mg tablet   No No   Sig: Take 2 Tablets by mouth daily. atorvastatin (LIPITOR) 20 mg tablet   No No   Sig: Take 1 Tablet by mouth daily. cephALEXin (Keflex) 500 mg capsule   No No   Sig: Take 1 Capsule by mouth four (4) times daily for 10 days. cephALEXin (Keflex) 500 mg capsule   No No   Sig: Take 1 Capsule by mouth four (4) times daily for 10 days. cetirizine (ZYRTEC) 10 mg tablet   No No   Sig: Take 1 Tab by mouth nightly. clonazePAM (KlonoPIN) 1 mg tablet   No No   Sig: Take 1 Tablet by mouth three (3) times daily as needed for Anxiety. Max Daily Amount: 3 mg.   escitalopram oxalate (LEXAPRO) 20 mg tablet   No No   Sig: Take 1 Tablet by mouth daily. fluticasone propionate (FLONASE) 50 mcg/actuation nasal spray   No No   Si Sprays by Both Nostrils route daily. gabapentin (NEURONTIN) 400 mg capsule   No No   Sig: Take 1 Capsule by mouth three (3) times daily. Max Daily Amount: 1,200 mg.   guaiFENesin ER (MUCINEX) 600 mg ER tablet   No No   Sig: Take 1 Tab by mouth two (2) times a day. lisinopriL (PRINIVIL, ZESTRIL) 20 mg tablet   No No   Sig: Take 1 Tablet by mouth daily. mupirocin (BACTROBAN) 2 % ointment   No No   Sig: Apply  to affected area daily. omeprazole (PRILOSEC) 20 mg capsule   No No   Sig: Take 1 Cap by mouth Daily (before breakfast). ondansetron (Zofran ODT) 4 mg disintegrating tablet   No No   Sig: Take 1 Tab by mouth every eight (8) hours as needed for Nausea or Nausea or Vomiting.   sildenafil citrate (Viagra) 100 mg tablet   No No   Sig: Take 1 Tab by mouth as needed for Erectile Dysfunction.    tiZANidine (ZANAFLEX) 4 mg tablet   No No   Sig: Take 1 Tab by mouth three (3) times daily as needed for Muscle Spasm(s). Facility-Administered Medications: None       REVIEW OF SYSTEMS:    Review of Systems  GENERAL: Patient alert, awake and oriented times 3, able to communicate full sentences and not in distress. HEENT: No change in vision, no earache, tinnitus, sore throat or sinus congestion. NECK: No pain or stiffness. PULMONARY: No shortness of breath, cough or wheeze. Cardiovascular: no pnd or orthopnea, no CP  GASTROINTESTINAL: No abdominal pain, nausea, vomiting or diarrhea, melena or bright red blood per rectum. GENITOURINARY: No urinary frequency, urgency, hesitancy or dysuria. MUSCULOSKELETAL: No back pain, patient has some pain and swelling around the left ankle. DERMATOLOGIC: No rash, no itching, no lesions. ENDOCRINE: No polyuria, polydipsia, no heat or cold intolerance. No recent change in weight. HEMATOLOGICAL: No anemia or easy bruising or bleeding. NEUROLOGIC: No headache, seizures, numbness, tingling or weakness. Objective:   VITALS:    Visit Vitals  BP (!) 159/92   Pulse 76   Temp 99 °F (37.2 °C)   Resp 16   Ht 6' 2\" (1.88 m)   Wt 102.1 kg (225 lb)   SpO2 100%   BMI 28.89 kg/m²     Temp (24hrs), Av °F (37.2 °C), Min:99 °F (37.2 °C), Max:99 °F (37.2 °C)      PHYSICAL EXAM:   General:    Sitting in bed in no acute distress. HEENT:  Pupils equal.  Sclera anicteric. Conjunctiva pink. Mucous membranes                           Moist, no ear or nasal discharge  Neck:  Supple. Trachea midline. No accessory muscle use. No thyromegaly. No jugular venous distention, no carotid bruit  CV:                  Regular rate and rhythm. S1S2+  Lungs:   Clear to auscultation bilaterally. No Wheezing or Rhonchi. No rales. Abdomen:   Soft, non-tender. Not distended. Bowel sounds normal.   Extremities: No cyanosis. There is some swelling and redness around the left ankle and foot.   There is tenderness to palpation around the left ankle.  Range of motion is limited secondary to pain. There is no edema on the right lower extremity. Pulses are 2+ bilaterally equal.  Neurologic: Alert and oriented X 3. Follows commands, responds appropriately. No focal neurological deficit was noted  Skin:                Warm and dry. No rashes. LAB DATA REVIEWED:    No components found for: [place]  Recent Labs     09/26/21  1640 09/26/21  1329 09/25/21  1700   NA  --  142 139   K  --  4.2 3.4*   CL  --  112* 105   CO2  --  25 26   BUN  --  7 7   CREA  --  0.76 0.86   GLU  --  87 84   CA  --  8.9 8.3*   ALB  --  3.4 3.6   WBC 5.5  --  9.1   HGB 11.5*  --  12.6*   HCT 33.4*  --  34.9*     --  192           CBC WITH AUTOMATED DIFF    Collection Time: 09/26/21  4:40 PM   Result Value Ref Range    WBC 5.5 4.6 - 13.2 K/uL    RBC 3.58 (L) 4.35 - 5.65 M/uL    HGB 11.5 (L) 13.0 - 16.0 g/dL    HCT 33.4 (L) 36.0 - 48.0 %    MCV 93.3 78.0 - 100.0 FL    MCH 32.1 24.0 - 34.0 PG    MCHC 34.4 31.0 - 37.0 g/dL    RDW 13.0 11.6 - 14.5 %    PLATELET 518 708 - 671 K/uL    MPV 9.0 (L) 9.2 - 11.8 FL    NEUTROPHILS 70 40 - 73 %    LYMPHOCYTES 18 (L) 21 - 52 %    MONOCYTES 10 3 - 10 %    EOSINOPHILS 1 0 - 5 %    BASOPHILS 1 0 - 2 %    ABS. NEUTROPHILS 3.8 1.8 - 8.0 K/UL    ABS. LYMPHOCYTES 1.0 0.9 - 3.6 K/UL    ABS. MONOCYTES 0.6 0.05 - 1.2 K/UL    ABS. EOSINOPHILS 0.1 0.0 - 0.4 K/UL    ABS. BASOPHILS 0.0 0.0 - 0.1 K/UL    DF AUTOMATED           Assessment/Plan:      Active Problems:    Septic arthritis ([address] (9/26/2021)    Septic arthritis of the left ankle  Hypertension   alcohol abuse  History of anxiety and depression. ___________________________________________________  PLAN:    Orthopedics was consulted by the ED and orthopedic surgeon recommends to keep patient n.p.o. after midnight and he will take the patient to the OR in the morning. We will continue broad-spectrum antibiotics. Follow cultures. Please consult ID in the morning.   Continue to monitor the blood pressure. Continue prior to admission blood pressure medications. CIWA protocol has been initiated in the ED. Counseled patient regarding abstinence from alcohol. IV fluids with thiamine  Rest depending on patient's further hospital course. I discussed the plan of care with the patient and he verbalized understanding and agreed. I also discussed the level of care and patient wishes to be a full code. We will place the patient on mechanical DVT prophylaxis for now. Please consider transitioning the patient to pharmacologic DVT prophylaxis when okay with orthopedics.         Prophylaxis:  []Lovenox  []Coumadin  []Hep SQ  [x]SCDs  []H2B/PPI    Discussed Code Status:    [x]Full Code      []DNR     ___________________________________________________    Care Plan discussed with:    [x]Patient   []Family    []ED Care Manager  [x]ED Doc   []Specialist :    Total Time Coordinating Admission: 70   minutes    []Total Critical Care Time:     ___________________________________________________  Admitting Physician: Danya Florence MD

## 2021-09-26 NOTE — ED NOTES
12:43 PM  Patient's father went to his home and found patient. Spoke to patient and advised to go to the emergency department immediately. Patient lives in [place] and states he is going to go to [facility].   I spoke to Dr. Jatinder Gomes ED attending and informed that patient will be coming to [facility] and to inform orthopedic when patient arrives

## 2021-09-26 NOTE — ED TRIAGE NOTES
Patient reports seen at [facility] last night and diagnosed with septic arthritis of left ankle. Receiving iv antibiotics but chose to go home and come back today instead of being transferred.

## 2021-09-27 ENCOUNTER — APPOINTMENT (OUTPATIENT)
Dept: GENERAL RADIOLOGY | Age: 36
DRG: 313 | End: 2021-09-27
Attending: HOSPITALIST
Payer: MEDICAID

## 2021-09-27 ENCOUNTER — ANESTHESIA (OUTPATIENT)
Dept: SURGERY | Age: 36
DRG: 313 | End: 2021-09-27
Payer: MEDICAID

## 2021-09-27 ENCOUNTER — ANESTHESIA EVENT (OUTPATIENT)
Dept: SURGERY | Age: 36
DRG: 313 | End: 2021-09-27
Payer: MEDICAID

## 2021-09-27 LAB
AMPHET UR QL SCN: NEGATIVE
ANION GAP SERPL CALC-SCNC: 7 MMOL/L (ref 3–18)
ATRIAL RATE: 69 BPM
BARBITURATES UR QL SCN: NEGATIVE
BASOPHILS # BLD: 0 K/UL (ref 0–0.1)
BASOPHILS NFR BLD: 1 % (ref 0–2)
BENZODIAZ UR QL: NEGATIVE
BUN SERPL-MCNC: 5 MG/DL (ref 7–18)
BUN/CREAT SERPL: 7 (ref 12–20)
CALCIUM SERPL-MCNC: 8.1 MG/DL (ref 8.5–10.1)
CALCULATED P AXIS, ECG09: 50 DEGREES
CALCULATED R AXIS, ECG10: -12 DEGREES
CALCULATED T AXIS, ECG11: 6 DEGREES
CANNABINOIDS UR QL SCN: NEGATIVE
CHLORIDE SERPL-SCNC: 112 MMOL/L (ref 100–111)
CO2 SERPL-SCNC: 24 MMOL/L (ref 21–32)
COCAINE UR QL SCN: NEGATIVE
COVID-19 RAPID TEST, COVR: NOT DETECTED
CREAT SERPL-MCNC: 0.68 MG/DL (ref 0.6–1.3)
DIAGNOSIS, 93000: NORMAL
DIFFERENTIAL METHOD BLD: ABNORMAL
EOSINOPHIL # BLD: 0.1 K/UL (ref 0–0.4)
EOSINOPHIL NFR BLD: 3 % (ref 0–5)
ERYTHROCYTE [DISTWIDTH] IN BLOOD BY AUTOMATED COUNT: 12.9 % (ref 11.6–14.5)
GLUCOSE SERPL-MCNC: 81 MG/DL (ref 74–99)
HCT VFR BLD AUTO: 32.3 % (ref 36–48)
HDSCOM,HDSCOM: ABNORMAL
HGB BLD-MCNC: 11.1 G/DL (ref 13–16)
INR PPP: 1.2 (ref 0.8–1.2)
LYMPHOCYTES # BLD: 1.7 K/UL (ref 0.9–3.6)
LYMPHOCYTES NFR BLD: 40 % (ref 21–52)
MAGNESIUM SERPL-MCNC: 1.9 MG/DL (ref 1.6–2.6)
MCH RBC QN AUTO: 32.6 PG (ref 24–34)
MCHC RBC AUTO-ENTMCNC: 34.4 G/DL (ref 31–37)
MCV RBC AUTO: 95 FL (ref 78–100)
METHADONE UR QL: NEGATIVE
MONOCYTES # BLD: 0.5 K/UL (ref 0.05–1.2)
MONOCYTES NFR BLD: 11 % (ref 3–10)
NEUTS SEG # BLD: 1.9 K/UL (ref 1.8–8)
NEUTS SEG NFR BLD: 45 % (ref 40–73)
OPIATES UR QL: POSITIVE
P-R INTERVAL, ECG05: 126 MS
PCP UR QL: NEGATIVE
PLATELET # BLD AUTO: 159 K/UL (ref 135–420)
PMV BLD AUTO: 9.4 FL (ref 9.2–11.8)
POTASSIUM SERPL-SCNC: 3.6 MMOL/L (ref 3.5–5.5)
PROTHROMBIN TIME: 14.5 SEC (ref 11.5–15.2)
Q-T INTERVAL, ECG07: 448 MS
QRS DURATION, ECG06: 90 MS
QTC CALCULATION (BEZET), ECG08: 480 MS
RBC # BLD AUTO: 3.4 M/UL (ref 4.35–5.65)
SODIUM SERPL-SCNC: 143 MMOL/L (ref 136–145)
SOURCE, COVRS: NORMAL
VENTRICULAR RATE, ECG03: 69 BPM
WBC # BLD AUTO: 4.1 K/UL (ref 4.6–13.2)

## 2021-09-27 PROCEDURE — 76210000000 HC OR PH I REC 2 TO 2.5 HR: Performed by: ORTHOPAEDIC SURGERY

## 2021-09-27 PROCEDURE — 77030040361 HC SLV COMPR DVT MDII -B: Performed by: ORTHOPAEDIC SURGERY

## 2021-09-27 PROCEDURE — 77030018836 HC SOL IRR NACL ICUM -A: Performed by: ORTHOPAEDIC SURGERY

## 2021-09-27 PROCEDURE — 74011000272 HC RX REV CODE- 272: Performed by: ORTHOPAEDIC SURGERY

## 2021-09-27 PROCEDURE — 85025 COMPLETE CBC W/AUTO DIFF WBC: CPT

## 2021-09-27 PROCEDURE — 74011250636 HC RX REV CODE- 250/636: Performed by: PHYSICIAN ASSISTANT

## 2021-09-27 PROCEDURE — 2709999900 HC NON-CHARGEABLE SUPPLY

## 2021-09-27 PROCEDURE — 74011250636 HC RX REV CODE- 250/636: Performed by: NURSE ANESTHETIST, CERTIFIED REGISTERED

## 2021-09-27 PROCEDURE — 87635 SARS-COV-2 COVID-19 AMP PRB: CPT

## 2021-09-27 PROCEDURE — 77030013708 HC HNDPC SUC IRR PULS STRY –B: Performed by: ORTHOPAEDIC SURGERY

## 2021-09-27 PROCEDURE — 74011000250 HC RX REV CODE- 250: Performed by: NURSE ANESTHETIST, CERTIFIED REGISTERED

## 2021-09-27 PROCEDURE — 74011250637 HC RX REV CODE- 250/637: Performed by: EMERGENCY MEDICINE

## 2021-09-27 PROCEDURE — 85610 PROTHROMBIN TIME: CPT

## 2021-09-27 PROCEDURE — 87205 SMEAR GRAM STAIN: CPT

## 2021-09-27 PROCEDURE — 74011000250 HC RX REV CODE- 250: Performed by: PHYSICIAN ASSISTANT

## 2021-09-27 PROCEDURE — 74011000250 HC RX REV CODE- 250: Performed by: INTERNAL MEDICINE

## 2021-09-27 PROCEDURE — 93005 ELECTROCARDIOGRAM TRACING: CPT

## 2021-09-27 PROCEDURE — 36415 COLL VENOUS BLD VENIPUNCTURE: CPT

## 2021-09-27 PROCEDURE — 2709999900 HC NON-CHARGEABLE SUPPLY: Performed by: ORTHOPAEDIC SURGERY

## 2021-09-27 PROCEDURE — 74011250636 HC RX REV CODE- 250/636: Performed by: HOSPITALIST

## 2021-09-27 PROCEDURE — 01470 ANES PX NRV MSC LW L/A/F NOS: CPT | Performed by: ANESTHESIOLOGY

## 2021-09-27 PROCEDURE — 71045 X-RAY EXAM CHEST 1 VIEW: CPT

## 2021-09-27 PROCEDURE — 87491 CHLMYD TRACH DNA AMP PROBE: CPT

## 2021-09-27 PROCEDURE — 80048 BASIC METABOLIC PNL TOTAL CA: CPT

## 2021-09-27 PROCEDURE — 77030002966 HC SUT PDS J&J -A: Performed by: ORTHOPAEDIC SURGERY

## 2021-09-27 PROCEDURE — 74011250636 HC RX REV CODE- 250/636: Performed by: EMERGENCY MEDICINE

## 2021-09-27 PROCEDURE — 77030002933 HC SUT MCRYL J&J -A: Performed by: ORTHOPAEDIC SURGERY

## 2021-09-27 PROCEDURE — 77030040922 HC BLNKT HYPOTHRM STRY -A: Performed by: ORTHOPAEDIC SURGERY

## 2021-09-27 PROCEDURE — 0S9G0ZZ DRAINAGE OF LEFT ANKLE JOINT, OPEN APPROACH: ICD-10-PCS | Performed by: ORTHOPAEDIC SURGERY

## 2021-09-27 PROCEDURE — 74011250636 HC RX REV CODE- 250/636: Performed by: ANESTHESIOLOGY

## 2021-09-27 PROCEDURE — 01470 ANES PX NRV MSC LW L/A/F NOS: CPT | Performed by: NURSE ANESTHETIST, CERTIFIED REGISTERED

## 2021-09-27 PROCEDURE — 74011250636 HC RX REV CODE- 250/636: Performed by: ORTHOPAEDIC SURGERY

## 2021-09-27 PROCEDURE — 74011000250 HC RX REV CODE- 250: Performed by: EMERGENCY MEDICINE

## 2021-09-27 PROCEDURE — 87075 CULTR BACTERIA EXCEPT BLOOD: CPT

## 2021-09-27 PROCEDURE — 65270000029 HC RM PRIVATE

## 2021-09-27 PROCEDURE — 76060000033 HC ANESTHESIA 1 TO 1.5 HR: Performed by: ORTHOPAEDIC SURGERY

## 2021-09-27 PROCEDURE — 99140 ANES COMP EMERGENCY COND: CPT | Performed by: NURSE ANESTHETIST, CERTIFIED REGISTERED

## 2021-09-27 PROCEDURE — 77030011265 HC ELECTRD BLD HEX COVD -A: Performed by: ORTHOPAEDIC SURGERY

## 2021-09-27 PROCEDURE — 74011250636 HC RX REV CODE- 250/636: Performed by: INTERNAL MEDICINE

## 2021-09-27 PROCEDURE — 80307 DRUG TEST PRSMV CHEM ANLYZR: CPT

## 2021-09-27 PROCEDURE — 76010000149 HC OR TIME 1 TO 1.5 HR: Performed by: ORTHOPAEDIC SURGERY

## 2021-09-27 PROCEDURE — 0SBG0ZZ EXCISION OF LEFT ANKLE JOINT, OPEN APPROACH: ICD-10-PCS | Performed by: ORTHOPAEDIC SURGERY

## 2021-09-27 PROCEDURE — 99232 SBSQ HOSP IP/OBS MODERATE 35: CPT | Performed by: HOSPITALIST

## 2021-09-27 PROCEDURE — 83735 ASSAY OF MAGNESIUM: CPT

## 2021-09-27 PROCEDURE — 77030012406 HC DRN WND PENRS BARD -A: Performed by: ORTHOPAEDIC SURGERY

## 2021-09-27 PROCEDURE — 99140 ANES COMP EMERGENCY COND: CPT | Performed by: ANESTHESIOLOGY

## 2021-09-27 PROCEDURE — 99223 1ST HOSP IP/OBS HIGH 75: CPT | Performed by: ORTHOPAEDIC SURGERY

## 2021-09-27 RX ORDER — HYDROMORPHONE HYDROCHLORIDE 2 MG/ML
2 INJECTION, SOLUTION INTRAMUSCULAR; INTRAVENOUS; SUBCUTANEOUS
Status: DISCONTINUED | OUTPATIENT
Start: 2021-09-27 | End: 2021-09-27 | Stop reason: CLARIF

## 2021-09-27 RX ORDER — HYDROMORPHONE HYDROCHLORIDE 1 MG/ML
0.5 INJECTION, SOLUTION INTRAMUSCULAR; INTRAVENOUS; SUBCUTANEOUS
Status: DISCONTINUED | OUTPATIENT
Start: 2021-09-27 | End: 2021-09-27 | Stop reason: HOSPADM

## 2021-09-27 RX ORDER — HYDRALAZINE HYDROCHLORIDE 20 MG/ML
10 INJECTION INTRAMUSCULAR; INTRAVENOUS
Status: DISCONTINUED | OUTPATIENT
Start: 2021-09-27 | End: 2021-09-30 | Stop reason: HOSPADM

## 2021-09-27 RX ORDER — LIDOCAINE HYDROCHLORIDE 20 MG/ML
INJECTION, SOLUTION EPIDURAL; INFILTRATION; INTRACAUDAL; PERINEURAL AS NEEDED
Status: DISCONTINUED | OUTPATIENT
Start: 2021-09-27 | End: 2021-09-27 | Stop reason: HOSPADM

## 2021-09-27 RX ORDER — HYDROMORPHONE HYDROCHLORIDE 1 MG/ML
0.5 INJECTION, SOLUTION INTRAMUSCULAR; INTRAVENOUS; SUBCUTANEOUS
Status: DISCONTINUED | OUTPATIENT
Start: 2021-09-27 | End: 2021-09-27

## 2021-09-27 RX ORDER — SODIUM CHLORIDE 0.9 % (FLUSH) 0.9 %
5-40 SYRINGE (ML) INJECTION AS NEEDED
Status: DISCONTINUED | OUTPATIENT
Start: 2021-09-27 | End: 2021-09-30 | Stop reason: HOSPADM

## 2021-09-27 RX ORDER — KETOROLAC TROMETHAMINE 15 MG/ML
15 INJECTION, SOLUTION INTRAMUSCULAR; INTRAVENOUS
Status: COMPLETED | OUTPATIENT
Start: 2021-09-27 | End: 2021-09-27

## 2021-09-27 RX ORDER — VANCOMYCIN/0.9 % SOD CHLORIDE 1.5G/250ML
1500 PLASTIC BAG, INJECTION (ML) INTRAVENOUS EVERY 8 HOURS
Status: DISCONTINUED | OUTPATIENT
Start: 2021-09-27 | End: 2021-09-30 | Stop reason: HOSPADM

## 2021-09-27 RX ORDER — MIDAZOLAM HYDROCHLORIDE 1 MG/ML
INJECTION, SOLUTION INTRAMUSCULAR; INTRAVENOUS AS NEEDED
Status: DISCONTINUED | OUTPATIENT
Start: 2021-09-27 | End: 2021-09-27 | Stop reason: HOSPADM

## 2021-09-27 RX ORDER — PROPOFOL 10 MG/ML
INJECTION, EMULSION INTRAVENOUS AS NEEDED
Status: DISCONTINUED | OUTPATIENT
Start: 2021-09-27 | End: 2021-09-27 | Stop reason: HOSPADM

## 2021-09-27 RX ORDER — SODIUM CHLORIDE 0.9 % (FLUSH) 0.9 %
5-40 SYRINGE (ML) INJECTION EVERY 8 HOURS
Status: DISCONTINUED | OUTPATIENT
Start: 2021-09-27 | End: 2021-09-30 | Stop reason: HOSPADM

## 2021-09-27 RX ORDER — ONDANSETRON 2 MG/ML
4 INJECTION INTRAMUSCULAR; INTRAVENOUS ONCE
Status: DISCONTINUED | OUTPATIENT
Start: 2021-09-27 | End: 2021-09-27 | Stop reason: HOSPADM

## 2021-09-27 RX ORDER — FENTANYL CITRATE 50 UG/ML
50 INJECTION, SOLUTION INTRAMUSCULAR; INTRAVENOUS AS NEEDED
Status: DISCONTINUED | OUTPATIENT
Start: 2021-09-27 | End: 2021-09-27 | Stop reason: HOSPADM

## 2021-09-27 RX ORDER — DEXAMETHASONE SODIUM PHOSPHATE 4 MG/ML
INJECTION, SOLUTION INTRA-ARTICULAR; INTRALESIONAL; INTRAMUSCULAR; INTRAVENOUS; SOFT TISSUE AS NEEDED
Status: DISCONTINUED | OUTPATIENT
Start: 2021-09-27 | End: 2021-09-27 | Stop reason: HOSPADM

## 2021-09-27 RX ORDER — HYDROMORPHONE HYDROCHLORIDE 2 MG/ML
INJECTION, SOLUTION INTRAMUSCULAR; INTRAVENOUS; SUBCUTANEOUS AS NEEDED
Status: DISCONTINUED | OUTPATIENT
Start: 2021-09-27 | End: 2021-09-27 | Stop reason: HOSPADM

## 2021-09-27 RX ORDER — SODIUM CHLORIDE, SODIUM LACTATE, POTASSIUM CHLORIDE, CALCIUM CHLORIDE 600; 310; 30; 20 MG/100ML; MG/100ML; MG/100ML; MG/100ML
50 INJECTION, SOLUTION INTRAVENOUS CONTINUOUS
Status: DISCONTINUED | OUTPATIENT
Start: 2021-09-27 | End: 2021-09-27 | Stop reason: HOSPADM

## 2021-09-27 RX ORDER — FENTANYL CITRATE 50 UG/ML
INJECTION, SOLUTION INTRAMUSCULAR; INTRAVENOUS AS NEEDED
Status: DISCONTINUED | OUTPATIENT
Start: 2021-09-27 | End: 2021-09-27 | Stop reason: HOSPADM

## 2021-09-27 RX ORDER — ONDANSETRON 2 MG/ML
INJECTION INTRAMUSCULAR; INTRAVENOUS AS NEEDED
Status: DISCONTINUED | OUTPATIENT
Start: 2021-09-27 | End: 2021-09-27 | Stop reason: HOSPADM

## 2021-09-27 RX ORDER — HYDROMORPHONE HYDROCHLORIDE 2 MG/ML
0.5 INJECTION, SOLUTION INTRAMUSCULAR; INTRAVENOUS; SUBCUTANEOUS
Status: COMPLETED | OUTPATIENT
Start: 2021-09-27 | End: 2021-09-27

## 2021-09-27 RX ADMIN — VANCOMYCIN HYDROCHLORIDE 1500 MG: 10 INJECTION, POWDER, LYOPHILIZED, FOR SOLUTION INTRAVENOUS at 22:21

## 2021-09-27 RX ADMIN — LORAZEPAM 1 MG: 2 INJECTION INTRAMUSCULAR; INTRAVENOUS at 21:56

## 2021-09-27 RX ADMIN — LISINOPRIL 20 MG: 20 TABLET ORAL at 08:34

## 2021-09-27 RX ADMIN — HYDROMORPHONE HYDROCHLORIDE 1 MG: 1 INJECTION, SOLUTION INTRAMUSCULAR; INTRAVENOUS; SUBCUTANEOUS at 23:30

## 2021-09-27 RX ADMIN — LORAZEPAM 1 MG: 2 INJECTION INTRAMUSCULAR; INTRAVENOUS at 19:06

## 2021-09-27 RX ADMIN — KETOROLAC TROMETHAMINE 15 MG: 15 INJECTION, SOLUTION INTRAMUSCULAR; INTRAVENOUS at 16:31

## 2021-09-27 RX ADMIN — LORAZEPAM 2 MG: 2 INJECTION INTRAMUSCULAR; INTRAVENOUS at 04:23

## 2021-09-27 RX ADMIN — HYDROMORPHONE HYDROCHLORIDE 0.5 MG: 2 INJECTION, SOLUTION INTRAMUSCULAR; INTRAVENOUS; SUBCUTANEOUS at 16:34

## 2021-09-27 RX ADMIN — HYDROMORPHONE HYDROCHLORIDE 0.5 MG: 2 INJECTION, SOLUTION INTRAMUSCULAR; INTRAVENOUS; SUBCUTANEOUS at 16:44

## 2021-09-27 RX ADMIN — Medication 10 ML: at 22:30

## 2021-09-27 RX ADMIN — HYDROMORPHONE HYDROCHLORIDE 0.6 MG: 2 INJECTION, SOLUTION INTRAMUSCULAR; INTRAVENOUS; SUBCUTANEOUS at 15:03

## 2021-09-27 RX ADMIN — PROPOFOL 50 MG: 10 INJECTION, EMULSION INTRAVENOUS at 15:11

## 2021-09-27 RX ADMIN — WATER 2 G: 1 INJECTION INTRAMUSCULAR; INTRAVENOUS; SUBCUTANEOUS at 08:30

## 2021-09-27 RX ADMIN — HYDROMORPHONE HYDROCHLORIDE 1 MG: 1 INJECTION, SOLUTION INTRAMUSCULAR; INTRAVENOUS; SUBCUTANEOUS at 19:31

## 2021-09-27 RX ADMIN — HYDROMORPHONE HYDROCHLORIDE 0.5 MG: 1 INJECTION, SOLUTION INTRAMUSCULAR; INTRAVENOUS; SUBCUTANEOUS at 17:49

## 2021-09-27 RX ADMIN — ESCITALOPRAM OXALATE 20 MG: 20 TABLET ORAL at 09:07

## 2021-09-27 RX ADMIN — HYDROMORPHONE HYDROCHLORIDE 1 MG: 1 INJECTION, SOLUTION INTRAMUSCULAR; INTRAVENOUS; SUBCUTANEOUS at 03:53

## 2021-09-27 RX ADMIN — SODIUM CHLORIDE, SODIUM LACTATE, POTASSIUM CHLORIDE, AND CALCIUM CHLORIDE 50 ML/HR: 600; 310; 30; 20 INJECTION, SOLUTION INTRAVENOUS at 19:00

## 2021-09-27 RX ADMIN — SODIUM CHLORIDE 75 ML/HR: 900 INJECTION, SOLUTION INTRAVENOUS at 08:36

## 2021-09-27 RX ADMIN — HYDROMORPHONE HYDROCHLORIDE 1 MG: 1 INJECTION, SOLUTION INTRAMUSCULAR; INTRAVENOUS; SUBCUTANEOUS at 09:06

## 2021-09-27 RX ADMIN — ATORVASTATIN CALCIUM 20 MG: 20 TABLET, FILM COATED ORAL at 08:33

## 2021-09-27 RX ADMIN — ONDANSETRON 4 MG: 2 INJECTION INTRAMUSCULAR; INTRAVENOUS at 15:02

## 2021-09-27 RX ADMIN — DEXAMETHASONE SODIUM PHOSPHATE 4 MG: 4 INJECTION, SOLUTION INTRAMUSCULAR; INTRAVENOUS at 15:02

## 2021-09-27 RX ADMIN — PANTOPRAZOLE SODIUM 20 MG: 20 TABLET, DELAYED RELEASE ORAL at 08:33

## 2021-09-27 RX ADMIN — ATENOLOL 50 MG: 25 TABLET ORAL at 08:33

## 2021-09-27 RX ADMIN — LIDOCAINE HYDROCHLORIDE 100 MG: 20 INJECTION, SOLUTION EPIDURAL; INFILTRATION; INTRACAUDAL; PERINEURAL at 14:58

## 2021-09-27 RX ADMIN — HYDROMORPHONE HYDROCHLORIDE 0.4 MG: 2 INJECTION, SOLUTION INTRAMUSCULAR; INTRAVENOUS; SUBCUTANEOUS at 15:37

## 2021-09-27 RX ADMIN — MIDAZOLAM HYDROCHLORIDE 2 MG: 2 INJECTION, SOLUTION INTRAMUSCULAR; INTRAVENOUS at 14:51

## 2021-09-27 RX ADMIN — HYDROMORPHONE HYDROCHLORIDE 1 MG: 2 INJECTION, SOLUTION INTRAMUSCULAR; INTRAVENOUS; SUBCUTANEOUS at 15:10

## 2021-09-27 RX ADMIN — HYDROMORPHONE HYDROCHLORIDE 0.5 MG: 2 INJECTION, SOLUTION INTRAMUSCULAR; INTRAVENOUS; SUBCUTANEOUS at 16:23

## 2021-09-27 RX ADMIN — FENTANYL CITRATE 50 MCG: 50 INJECTION, SOLUTION INTRAMUSCULAR; INTRAVENOUS at 15:05

## 2021-09-27 RX ADMIN — Medication 10 ML: at 22:29

## 2021-09-27 RX ADMIN — CEFTRIAXONE SODIUM 2 G: 2 INJECTION, POWDER, FOR SOLUTION INTRAMUSCULAR; INTRAVENOUS at 21:59

## 2021-09-27 RX ADMIN — HYDRALAZINE HYDROCHLORIDE 10 MG: 20 INJECTION INTRAMUSCULAR; INTRAVENOUS at 17:48

## 2021-09-27 RX ADMIN — VANCOMYCIN HYDROCHLORIDE 1500 MG: 10 INJECTION, POWDER, LYOPHILIZED, FOR SOLUTION INTRAVENOUS at 08:34

## 2021-09-27 RX ADMIN — HYDROMORPHONE HYDROCHLORIDE 0.5 MG: 2 INJECTION, SOLUTION INTRAMUSCULAR; INTRAVENOUS; SUBCUTANEOUS at 16:10

## 2021-09-27 RX ADMIN — LORAZEPAM 1 MG: 2 INJECTION INTRAMUSCULAR; INTRAVENOUS at 12:47

## 2021-09-27 RX ADMIN — PROPOFOL 250 MG: 10 INJECTION, EMULSION INTRAVENOUS at 14:59

## 2021-09-27 RX ADMIN — FENTANYL CITRATE 50 MCG: 50 INJECTION, SOLUTION INTRAMUSCULAR; INTRAVENOUS at 14:57

## 2021-09-27 RX ADMIN — WATER 2 G: 1 INJECTION INTRAMUSCULAR; INTRAVENOUS; SUBCUTANEOUS at 12:46

## 2021-09-27 RX ADMIN — FOLIC ACID: 5 INJECTION, SOLUTION INTRAMUSCULAR; INTRAVENOUS; SUBCUTANEOUS at 22:02

## 2021-09-27 NOTE — ANESTHESIA PREPROCEDURE EVALUATION
Relevant Problems   No relevant active problems       Anesthetic History   No history of anesthetic complications            Review of Systems / Medical History  Patient summary reviewed, nursing notes reviewed and pertinent labs reviewed    Pulmonary  Within defined limits                 Neuro/Psych         Psychiatric history    Comments: Anxiety. Cardiovascular    Hypertension: well controlled              Exercise tolerance: >4 METS     GI/Hepatic/Renal     GERD: well controlled      Liver disease    Comments: Triglyceridemia, hx of pancreatitis in past.  Had plasmapharesis in past for it. Endo/Other        Arthritis    Comments: Hx of polysubstance abuse, heavy EtOH. Also homemade opiates from poppy seeds.    Other Findings   Comments: Substance abuse           Physical Exam    Airway  Mallampati: II    Neck ROM: normal range of motion   Mouth opening: Normal     Cardiovascular  Regular rate and rhythm,  S1 and S2 normal,  no murmur, click, rub, or gallop  Rhythm: regular  Rate: normal         Dental  No notable dental hx       Pulmonary  Breath sounds clear to auscultation               Abdominal  GI exam deferred       Other Findings            Anesthetic Plan    ASA: 3, emergent  Anesthesia type: general          Induction: Intravenous  Anesthetic plan and risks discussed with: Patient

## 2021-09-27 NOTE — ED NOTES
Assume care of patient, patient resting this time, easy to arouse, patient on monitor, SR noted at this time, skin warm and dry,   Purposeful rounding completed:    Side rails up x 2:  YES  Bed in low position and wheels locked: YES  Call bell within reach: YES  Comfort addressed: YES    Toileting needs addressed: YES  Plan of care reviewed/updated with patient and or family members: YES  IV site assessed: YES  Pain assessed and addressed: YES, 0

## 2021-09-27 NOTE — PROGRESS NOTES
Date of Surgery Update:  Jerri Hutson was seen and examined. History and physical has been reviewed. The patient has been examined.  There have been no significant clinical changes since the completion of the originally dated History and Physical.    Signed By: Kingston Pedroza MD     September 27, 2021 2:36 PM

## 2021-09-27 NOTE — CONSULTS
Infectious Disease Consultation Note        Reason: Left ankle septic arthritis, penicillin allergy    Current abx Prior abx   Cefepime, vancomycin since 9/26/2021  cefepime, vancomycinx1 on 9/25     Lines:       Assessment :    23-year-old male with past medical history significant for depression, anxiety, substance abuse admitted to [facility] on 9/26/2021 with increasing left ankle pain, concerns for infection. Clinical presentation consistent with left ankle septic arthritis  Status post arthrocentesis 9/25/2021. Cultures 9/25-no growth till date  Joint fluid analysis 9/25-grossly bloody specimen. , 111.  97% neutrophils. Negative for crystals    Improved erythema/pain left ankle on today's exam.    Exact microbial etiology of infection not entirely clear at this time. Recommend to rule out gonorrhea/chlamydia infection    Antibiotic management complicated due to documented history of penicillin allergy. Patient states that he had severe allergic reaction including hives/swelling of the throat about 5 years ago. Is currently tolerating cefepime without difficulties    Recommendations:    1. Discontinue cefepime. Start ceftriaxone. Continue vancomycin for now  2. Send GC/chlamydia PCR in the urine  3. Follow-up Ortho surgery recommendations regarding I&D left ankle  4. Follow-up joint fluid cultures 9/25/2021  5. Further recommendations based on the above test results, clinical course        Thank you for consultation request. Above plan was discussed in details with patient, RN and dr Marvin Ashley. Please call me if any further questions or concerns. Will continue to participate in the care of this patient. HPI:    23-year-old male with past medical history significant for depression, anxiety, substance abuse admitted to [facility] on 9/26/2021 with increasing left ankle pain, concerns for infection. Patient initially presented to [facility] ED on 9/25/2021 and underwent tap of the left ankle.   It was consistent with septic arthritis. I was consulted by the ED physician since patient had history of penicillin allergy. I recommended to give cefepime, vancomycin. But he subsequently left AGAINST MEDICAL ADVICE. The fluid from the ankle did show septic arthritis. Patient was called back on 9/26/2021 and he came to the ED. he was reinitiated on cefepime, vancomycin. Orthopedic surgery consulted. I have been consulted for further recommendations. Patient states that his symptoms had started 4 days ago. He states that he got up from sleep and noted significant pain left ankle. As he came to the emergency room next day. Patient denies any trauma to the left foot or ankle. Patient denies any insect bite. Patient states that he has pet dogs but denies any dog bite or scratch. Patient does give history of some fevers. No history of any chest pain or shortness of breath. No history of any cough. No history of any sick contacts. No history of any headaches numbness or focal weakness. No history of any abdominal pain urinary complaints diarrhea rectal bleeding. No history of any black melanotic stools. No history of any other leg swelling. Denies injecting any illicit drugs. No prior history of MRSA colonization or infection. Last sexual intercourse was few months ago. States that he was tested negative for GC and chlamydia around that time. Past Medical History:   Diagnosis Date    Anxiety     Cholelithiasis 08/18/2013    Note on U/S    Chronic gastritis 10/17/2014    Dr. Doyle Gupta (EGD Result) H. Pylori Neg    Depression     Dog bite of left hand including fingers with infection 11/16/2014    Adm 11/18/14 for IV antibiotics.      ETOH abuse     GERD (gastroesophageal reflux disease)     Hepatic steatosis 02/06/2014    Noted on U/S.     History of plasmapheresis 02/2014    Hypertriglyceridemia, Severe Pancreatitis    Hypertension     Hypertriglyceridemia     Hypomagnesemia 07/24/2016    1.6 mg/dL    Non compliance with medical treatment     Osteomyelitis of left hand ([facility] [address] 11/22/2014    MRI Finding @ LUE 2nd MCP joint.  Pancreatitis 08/17/2013    Highest on Record (2/6/14) 0900. Dr. Estefania Ghosh    PICC (peripherally inserted central catheter) in place 35/37/0817    R Basilic Vein (IV Antibiotics for LUE Hand Osteomyelitis).  Rectal bleeding     Substance abuse (HCC)     Several UDS + Opiates.  Vitamin D deficiency 02/17/2014    11.6ng/mL ( ng/mL)       Past Surgical History:   Procedure Laterality Date    HX CYST INCISION AND DRAINAGE Left 12/01/2014    LUE 2nd digit abscess I&D.  HX ENDOSCOPY  10/17/2014    Dr. Estefania Ghosh: EGD/Colonoscopy    HX HEENT      wisdom tooth extraction    HX KNEE ARTHROSCOPY Right        Current Discharge Medication List      CONTINUE these medications which have NOT CHANGED    Details   !! cephALEXin (Keflex) 500 mg capsule Take 1 Capsule by mouth four (4) times daily for 10 days. Qty: 40 Capsule, Refills: 0      !! cephALEXin (Keflex) 500 mg capsule Take 1 Capsule by mouth four (4) times daily for 10 days. Qty: 40 Capsule, Refills: 0      gabapentin (NEURONTIN) 400 mg capsule Take 1 Capsule by mouth three (3) times daily. Max Daily Amount: 1,200 mg. Qty: 90 Capsule, Refills: 0    Associated Diagnoses: Chronic right-sided low back pain with right-sided sciatica      lisinopriL (PRINIVIL, ZESTRIL) 20 mg tablet Take 1 Tablet by mouth daily. Qty: 90 Tablet, Refills: 0    Associated Diagnoses: Essential hypertension      atenoloL (TENORMIN) 50 mg tablet Take 2 Tablets by mouth daily. Qty: 180 Tablet, Refills: 0    Associated Diagnoses: Essential hypertension; MICHELLE (generalized anxiety disorder)      escitalopram oxalate (LEXAPRO) 20 mg tablet Take 1 Tablet by mouth daily. Qty: 30 Tablet, Refills: 1      atorvastatin (LIPITOR) 20 mg tablet Take 1 Tablet by mouth daily.   Qty: 30 Tablet, Refills: 1      clonazePAM (KlonoPIN) 1 mg tablet Take 1 Tablet by mouth three (3) times daily as needed for Anxiety. Max Daily Amount: 3 mg. Qty: 15 Tablet, Refills: 0    Associated Diagnoses: Panic attacks      omeprazole (PRILOSEC) 20 mg capsule Take 1 Cap by mouth Daily (before breakfast). Qty: 30 Cap, Refills: 1      ondansetron (Zofran ODT) 4 mg disintegrating tablet Take 1 Tab by mouth every eight (8) hours as needed for Nausea or Nausea or Vomiting. Qty: 10 Tab, Refills: 0      tiZANidine (ZANAFLEX) 4 mg tablet Take 1 Tab by mouth three (3) times daily as needed for Muscle Spasm(s). Qty: 90 Tab, Refills: 2    Associated Diagnoses: Spasticity      cetirizine (ZYRTEC) 10 mg tablet Take 1 Tab by mouth nightly. Qty: 30 Tab, Refills: 1      sildenafil citrate (Viagra) 100 mg tablet Take 1 Tab by mouth as needed for Erectile Dysfunction. Qty: 12 Tab, Refills: 5    Associated Diagnoses: Drug-induced erectile dysfunction      mupirocin (BACTROBAN) 2 % ointment Apply  to affected area daily. Qty: 22 g, Refills: 0    Associated Diagnoses: Nasal sore      fluticasone propionate (FLONASE) 50 mcg/actuation nasal spray 2 Sprays by Both Nostrils route daily. Qty: 1 Bottle, Refills: 0      guaiFENesin ER (MUCINEX) 600 mg ER tablet Take 1 Tab by mouth two (2) times a day. Qty: 20 Tab, Refills: 0      Camphor-Eucalyptus Oil-Menthol (VICKS VAPORUB) 4.8-1.2-2.6 % oint 1 Actuation(s) by Apply Externally route three (3) times daily as needed for Cough or Other (congestion). Qty: 50 g, Refills: 0      Vaporizers (VICKS WARM STEAM VAPORIZER) misc 1 Actuation(s) by Does Not Apply route three (3) times daily as needed for Cough or Other (congestion). Qty: 1 Each, Refills: 0       !! - Potential duplicate medications found. Please discuss with provider.           Current Facility-Administered Medications   Medication Dose Route Frequency    vancomycin (VANCOCIN) 1500 mg in  ml infusion  1,500 mg IntraVENous Q8H    [START ON 9/28/2021] vancomycin trough due 09/28 at 0730 (draw before dose)  1 Each Other ONCE    cefTRIAXone (ROCEPHIN) 2 g in sterile water (preservative free) 20 mL IV syringe  2 g IntraVENous Q24H    LORazepam (ATIVAN) tablet 1 mg  1 mg Oral Q1H PRN    Or    LORazepam (ATIVAN) injection 1 mg  1 mg IntraVENous Q1H PRN    LORazepam (ATIVAN) tablet 2 mg  2 mg Oral Q1H PRN    Or    LORazepam (ATIVAN) injection 2 mg  2 mg IntraVENous Q1H PRN    LORazepam (ATIVAN) injection 3 mg  3 mg IntraVENous Q15MIN PRN    atenoloL (TENORMIN) tablet 50 mg  50 mg Oral DAILY    escitalopram oxalate (LEXAPRO) tablet 20 mg  20 mg Oral DAILY    lisinopriL (PRINIVIL, ZESTRIL) tablet 20 mg  20 mg Oral DAILY    atorvastatin (LIPITOR) tablet 20 mg  20 mg Oral DAILY    pantoprazole (PROTONIX) tablet 20 mg  20 mg Oral ACB    0.9% sodium chloride 1,000 mL with mvi (adult no. 4 with vit K) 10 mL, thiamine 484 mg, folic acid 1 mg infusion   IntraVENous Q24H    0.9% sodium chloride infusion  75 mL/hr IntraVENous CONTINUOUS    sodium chloride (NS) flush 5-40 mL  5-40 mL IntraVENous Q8H    sodium chloride (NS) flush 5-40 mL  5-40 mL IntraVENous PRN    acetaminophen (TYLENOL) tablet 650 mg  650 mg Oral Q6H PRN    Or    acetaminophen (TYLENOL) suppository 650 mg  650 mg Rectal Q6H PRN    polyethylene glycol (MIRALAX) packet 17 g  17 g Oral DAILY PRN    ondansetron (ZOFRAN ODT) tablet 4 mg  4 mg Oral Q8H PRN    Or    ondansetron (ZOFRAN) injection 4 mg  4 mg IntraVENous Q6H PRN    HYDROmorphone (DILAUDID) injection 1 mg  1 mg IntraVENous Q4H PRN    naloxone (NARCAN) injection 0.4 mg  0.4 mg IntraVENous PRN       Allergies: Amoxicillin, Contrast dye [iodine], Iodinated contrast media, Penicillins, and Wellbutrin [bupropion]    Family History   Problem Relation Age of Onset    Elevated Lipids Mother     Thyroid Disease Mother     Hypertension Father     Alcohol abuse Maternal Grandmother     MS Paternal Grandmother      Social History Socioeconomic History    Marital status: SINGLE     Spouse name: Not on file    Number of children: Not on file    Years of education: Not on file    Highest education level: Not on file   Occupational History    Not on file   Tobacco Use    Smoking status: Former Smoker     Quit date: 2012     Years since quittin.0    Smokeless tobacco: Never Used    Tobacco comment: 2018    Substance and Sexual Activity    Alcohol use: Not Currently     Alcohol/week: 13.3 standard drinks     Types: 8 Cans of beer, 8 Standard drinks or equivalent per week    Drug use: No    Sexual activity: Not on file   Other Topics Concern    Not on file   Social History Narrative    Not on file     Social Determinants of Health     Financial Resource Strain:     Difficulty of Paying Living Expenses:    Food Insecurity:     Worried About Running Out of Food in the Last Year:     Dina of Food in the Last Year:    Transportation Needs:     Lack of Transportation (Medical):  Lack of Transportation (Non-Medical):    Physical Activity:     Days of Exercise per Week:     Minutes of Exercise per Session:    Stress:     Feeling of Stress :    Social Connections:     Frequency of Communication with Friends and Family:     Frequency of Social Gatherings with Friends and Family:     Attends Islam Services:     Active Member of Clubs or Organizations:     Attends Club or Organization Meetings:     Marital Status:    Intimate Partner Violence:     Fear of Current or Ex-Partner:     Emotionally Abused:     Physically Abused:     Sexually Abused:      Social History     Tobacco Use   Smoking Status Former Smoker    Quit date: 2012    Years since quittin.0   Smokeless Tobacco Never Used   Tobacco Comment    2018         No data recorded.     Visit Vitals  BP (!) 141/90   Pulse 68   Temp 99 °F (37.2 °C)   Resp 19   Ht 6' 2\" (1.88 m)   Wt 102.1 kg (225 lb)   SpO2 99%   BMI 28.89 kg/m²       ROS: 12 point ROS obtained in details. Pertinent positives as mentioned in HPI,   otherwise negative    Physical Exam:    General: Well developed, well nourished male laying on the bed AAOx3 in no acute distress. General:   awake alert and oriented   HEENT:  Normocephalic, atraumatic, EOMI, no scleral icterus or pallor; no conjunctival hemmohage;  nasal and oral mucous are moist and without evidence of lesions. Neck supple, no bruits. Lymph Nodes:   not examined   Lungs:   non-labored, bilaterally clear to auscultation- no crackles wheezes rales or rhonchi   Heart:  RRR, s1 and s2; no  rubs or gallops, no edema   Abdomen:  soft, non-distended, active bowel sounds, no hepatomegaly, no splenomegaly. Non-tender   Genitourinary:  deferred   Extremities:   no clubbing, cyanosis; pain on movements of the left ankle, no significant erythema overlying the left ankle. No pain/swelling of any other joints; muscle mass appropriate for age   Neurologic:  No gross focal sensory abnormalities; 5/5 muscle strength to upper and lower extremities. Speech appropriate.  Cranial nerves intact                        Skin:  No rash or ulcers noted   Back:  no spinal or paraspinal muscle tenderness or rigidity, no CVA tenderness     Psychiatric:  No suicidal or homicidal ideations, appropriate mood and affect         Labs: Results:   Chemistry Recent Labs     09/27/21  0400 09/26/21  1329 09/25/21  1700   GLU 81 87 84    142 139   K 3.6 4.2 3.4*   * 112* 105   CO2 24 25 26   BUN 5* 7 7   CREA 0.68 0.76 0.86   CA 8.1* 8.9 8.3*   AGAP 7 5 8   BUCR 7* 9* 8*   AP  --  57 61   TP  --  7.3 6.9   ALB  --  3.4 3.6   GLOB  --  3.9 3.3   AGRAT  --  0.9 1.1      CBC w/Diff Recent Labs     09/27/21  0400 09/26/21  1640 09/25/21  1700   WBC 4.1* 5.5 9.1   RBC 3.40* 3.58* 3.78*   HGB 11.1* 11.5* 12.6*   HCT 32.3* 33.4* 34.9*    164 192   GRANS 45 70 61   LYMPH 40 18* 24   EOS 3 1 1      Microbiology Recent Labs 09/25/21  1822 09/25/21  1733 09/25/21  1719   CULT NO GROWTH 2 DAYS Culture performed on Unspun Fluid NO GROWTH THUS FAR NO GROWTH 2 DAYS          RADIOLOGY:    All available imaging studies/reports in [facility] for this admission were reviewed      Disclaimer: Sections of this note are dictated utilizing voice recognition software, which may have resulted in some phonetic based errors in grammar and contents. Even though attempts were made to correct all the mistakes, some may have been missed, and remained in the body of the document. If questions arise, please contact our department.     Dr. Olga Huston, Infectious Disease Specialist  [phone]  September 27, 2021  2:31 PM

## 2021-09-27 NOTE — CONSULTS
[facility]    Consult Note    Patient: Beth Samaniego               Sex: male          DOA: 9/26/2021         YOB: 1985      Age:  43 y.o.        LOS:  LOS: 1 day              HPI:     Beth Samaniego is a 43 y.o. male who has been seen for left ankle pain. Patient history of left ankle pain since Thursday when started having difficulty with pain and swelling. He was seen in the emergency room at [facility] on Saturday at which time the ankle was tapped. The fluid examination was consistent with a septic ankle and the patient was called and told to go to the emergency room on Sunday. At this time patient continues to have pain and swelling of the ankle though has improved since he antibiotics were started. Past Medical History:   Diagnosis Date    Anxiety     Cholelithiasis 08/18/2013    Note on U/S    Chronic gastritis 10/17/2014    Dr. Rere Loera (EGD Result) H. Pylori Neg    Depression     Dog bite of left hand including fingers with infection 11/16/2014    Adm 11/18/14 for IV antibiotics.  ETOH abuse     GERD (gastroesophageal reflux disease)     Hepatic steatosis 02/06/2014    Noted on U/S.     History of plasmapheresis 02/2014    Hypertriglyceridemia, Severe Pancreatitis    Hypertension     Hypertriglyceridemia     Hypomagnesemia 07/24/2016    1.6 mg/dL    Non compliance with medical treatment     Osteomyelitis of left hand ([address] 11/22/2014    MRI Finding @ LUE 2nd MCP joint.  Pancreatitis 08/17/2013    Highest on Record (2/6/14) 3870. Dr. Rere Loera    PICC (peripherally inserted central catheter) in place 38/24/9046    R Basilic Vein (IV Antibiotics for LUE Hand Osteomyelitis).  Rectal bleeding     Substance abuse (HCC)     Several UDS + Opiates.      Vitamin D deficiency 02/17/2014    11.6ng/mL ( ng/mL)       Past Surgical History:   Procedure Laterality Date    HX CYST INCISION AND DRAINAGE Left 12/01/2014 LUE 2nd digit abscess I&D.  HX ENDOSCOPY  10/17/2014    Dr. Ramón Thompson: EGD/Colonoscopy    HX HEENT      wisdom tooth extraction    HX KNEE ARTHROSCOPY Right        Family History   Problem Relation Age of Onset    Elevated Lipids Mother     Thyroid Disease Mother     Hypertension Father     Alcohol abuse Maternal Grandmother     MS Paternal Grandmother        Social History     Socioeconomic History    Marital status: SINGLE     Spouse name: Not on file    Number of children: Not on file    Years of education: Not on file    Highest education level: Not on file   Tobacco Use    Smoking status: Former Smoker     Quit date: 2012     Years since quittin.0    Smokeless tobacco: Never Used    Tobacco comment: 2018    Substance and Sexual Activity    Alcohol use: Not Currently     Alcohol/week: 13.3 standard drinks     Types: 8 Cans of beer, 8 Standard drinks or equivalent per week    Drug use: No     Social Determinants of Health     Financial Resource Strain:     Difficulty of Paying Living Expenses:    Food Insecurity:     Worried About Running Out of Food in the Last Year:     Ran Out of Food in the Last Year:    Transportation Needs:     Lack of Transportation (Medical):  Lack of Transportation (Non-Medical):    Physical Activity:     Days of Exercise per Week:     Minutes of Exercise per Session:    Stress:     Feeling of Stress :    Social Connections:     Frequency of Communication with Friends and Family:     Frequency of Social Gatherings with Friends and Family:     Attends Jainism Services:     Active Member of Clubs or Organizations:     Attends Club or Organization Meetings:     Marital Status:        Prior to Admission medications    Medication Sig Start Date End Date Taking? Authorizing Provider   cephALEXin (Keflex) 500 mg capsule Take 1 Capsule by mouth four (4) times daily for 10 days.  9/25/21 10/5/21  Myla Garcia MD   cephALEXin (Keflex) 500 mg capsule Take 1 Capsule by mouth four (4) times daily for 10 days. 9/25/21 10/5/21  Cruz Black MD   gabapentin (NEURONTIN) 400 mg capsule Take 1 Capsule by mouth three (3) times daily. Max Daily Amount: 1,200 mg. 9/17/21   Cecilia CLARK NP   lisinopriL (PRINIVIL, ZESTRIL) 20 mg tablet Take 1 Tablet by mouth daily. 9/17/21   Cecilia CLARK NP   atenoloL (TENORMIN) 50 mg tablet Take 2 Tablets by mouth daily. 9/17/21   Cecilia CLARK NP   escitalopram oxalate (LEXAPRO) 20 mg tablet Take 1 Tablet by mouth daily. 9/2/21   Cecilia CLARK NP   atorvastatin (LIPITOR) 20 mg tablet Take 1 Tablet by mouth daily. 9/2/21   Cecilia CLARK NP   clonazePAM (KlonoPIN) 1 mg tablet Take 1 Tablet by mouth three (3) times daily as needed for Anxiety. Max Daily Amount: 3 mg. 6/11/21   Cecilia CLARK NP   omeprazole (PRILOSEC) 20 mg capsule Take 1 Cap by mouth Daily (before breakfast). 4/21/21   Cecilia CLARK NP   ondansetron (Zofran ODT) 4 mg disintegrating tablet Take 1 Tab by mouth every eight (8) hours as needed for Nausea or Nausea or Vomiting. 4/21/21   Cecilia CLARK NP   tiZANidine (ZANAFLEX) 4 mg tablet Take 1 Tab by mouth three (3) times daily as needed for Muscle Spasm(s). 11/17/20   Aye Forte MD   cetirizine (ZYRTEC) 10 mg tablet Take 1 Tab by mouth nightly. 11/9/20   Cecilia CLARK NP   sildenafil citrate (Viagra) 100 mg tablet Take 1 Tab by mouth as needed for Erectile Dysfunction. 8/26/20   Baxter, Zenia Boxer, NP   mupirocin (BACTROBAN) 2 % ointment Apply  to affected area daily. 5/21/20   Baxter, Zenia Boxer, NP   fluticasone propionate (FLONASE) 50 mcg/actuation nasal spray 2 Sprays by Both Nostrils route daily. 12/1/19   CARLITA Zuñiga   guaiFENesin ER (MUCINEX) 600 mg ER tablet Take 1 Tab by mouth two (2) times a day.  12/1/19   Lindsey Damon PA   Camphor-Eucalyptus Oil-Menthol (VICKS VAPORUB) 4.8-1.2-2.6 % oint 1 Actuation(s) by Apply Externally route three (3) times daily as needed for Cough or Other (congestion). 12/1/19   Paulino Damon PA   Vaporizers (VICKS WARM STEAM VAPORIZER) misc 1 Actuation(s) by Does Not Apply route three (3) times daily as needed for Cough or Other (congestion). 12/1/19   CARLITA Roach       Allergies   Allergen Reactions    Amoxicillin Swelling    Contrast Dye [Iodine] Hives     Pt had iv contrast prior to this study without problems, developed hives on 07/04/15 5 minutes post injection    Iodinated Contrast Media Hives     Pt got hives when he received IV contrast in the past    Penicillins Hives and Swelling    Wellbutrin [Bupropion] Other (comments)     Caused aggression       Review of Systems  Negative for any fever, chills, sweats, headache, blurred vision, cough, congestion, SOB, abdominal pain, bleeding, bruising, numbness, or tingling. 12 point ROS otherwise negative other than noted in the HPI      Physical Exam:      Visit Vitals  BP (!) 149/102   Pulse 75   Temp 99 °F (37.2 °C)   Resp 22   Ht 6' 2\" (1.88 m)   Wt 225 lb (102.1 kg)   SpO2 97%   BMI 28.89 kg/m²       Physical Exam:  General: Well developed, well nourished, 43 y.o. male in  NAD   Vitals: Blood pressure (!) 149/102, pulse 75, temperature 99 °F (37.2 °C), resp. rate 22, height 6' 2\" (1.88 m), weight 225 lb (102.1 kg), SpO2 97 %. Skin: No rashs, ulcers, icterus, or other obvious lesions/ lacerations  Eyes: EOM intact, PERRLA   ENM: Without obvious lesions. Nares patent. Moist mucous membranes. Neck: Supple, FROM   Lungs: CTAB   Heart: RRR, normal S1, S2. No murmurs, rubs, or gallops  Abdomen: Soft, NT, bowel sounds present all 4 quadrants   Musculoskeletal: Left ankle diffuse swelling minimal redness discomfort with range of motion but no significant point tenderness  Neuro: AAOx3. Without obvious deficits     Labs Reviewed: All lab results for the last 24 hours reviewed.   MRI obtained showed changes consistent with a synovitis and a collection of fluid in the ankle  Assessment/Plan   [unfilled]  Active Problems:    Septic arthritis ([facility] [address] (9/26/2021)      I discussed the risks and benefits and potential adverse outcomes of both operative vs non operative treatment of septic arthritis of the left ankle with the patient. Risks of operative intervention include but not limited to bleeding, infection, deep vein thrombosis, pulmonary embolism, death, limb length discrepancy, reflexive sympathetic dystrophy, fat embolism syndrome,damage to blood vessels and nerves, malunion, non-union, delayed union, failure of hardware, post traumatic arthritis, stroke, heart attack, and death. Patient understands that infection may arise and may require numerous surgeries, plastic surgical intervention, and possible  amputation. Risks of non-operative intervention includes but not limited to pain, malunion, non-union, gait disturbance, etc.  Pt understands and agrees to proceed with irrigation debridement of the left ankle operative . Will tentatively schedule for OR for today at 2:00 date  (pending medical clearance by hospitalist). Thank you for this consult.      Kip Joseph M.D.   [facility]   [phone]

## 2021-09-27 NOTE — PERIOP NOTES
TRANSFER - OUT REPORT:    Verbal report given to Suzy Jones on Naldo Allred  being transferred to [facility] for routine post - op       Report consisted of patients Situation, Background, Assessment and   Recommendations(SBAR). Information from the following report(s) Procedure Summary, Intake/Output, MAR, Recent Results and Cardiac Rhythm SR was reviewed with the receiving nurse. Lines:   Peripheral IV 09/26/21 Right Antecubital (Active)   Site Assessment Clean, dry, & intact 09/27/21 1556   Phlebitis Assessment 0 09/27/21 1556   Infiltration Assessment 0 09/27/21 1556   Dressing Status Clean, dry, & intact 09/27/21 1556   Dressing Type Transparent 09/27/21 1556   Hub Color/Line Status Pink 09/27/21 1556       Peripheral IV 09/26/21 Left Antecubital (Active)   Site Assessment Clean, dry, & intact 09/27/21 1556   Phlebitis Assessment 0 09/27/21 1556   Infiltration Assessment 0 09/27/21 1556   Dressing Status Clean, dry, & intact 09/27/21 1556   Dressing Type Transparent 09/27/21 1556   Hub Color/Line Status Green 09/27/21 1556        Opportunity for questions and clarification was provided.       Patient transported with:   [employer]

## 2021-09-27 NOTE — ANESTHESIA POSTPROCEDURE EVALUATION
Procedure(s):  INCISION AND DRAINAGE LEFT ANKLE. general    Anesthesia Post Evaluation      Multimodal analgesia: multimodal analgesia used between 6 hours prior to anesthesia start to PACU discharge  Patient location during evaluation: PACU  Patient participation: complete - patient participated  Level of consciousness: awake and alert  Pain management: adequate  Airway patency: patent  Anesthetic complications: no  Cardiovascular status: acceptable  Respiratory status: acceptable  Hydration status: acceptable  Post anesthesia nausea and vomiting:  none  Final Post Anesthesia Temperature Assessment:  Normothermia (36.0-37.5 degrees C)      INITIAL Post-op Vital signs:   Vitals Value Taken Time   /89 09/27/21 1757   Temp 36.2 °C (97.1 °F) 09/27/21 1603   Pulse 73 09/27/21 1802   Resp 16 09/27/21 1802   SpO2 98 % 09/27/21 1752   Vitals shown include unvalidated device data.

## 2021-09-27 NOTE — PROGRESS NOTES
conducted an initial consultation and Spiritual Assessment for Kal Gayle, who is a 43 y. o.,male. Patient's Primary Language is: [place]. According to the patient's EMR Christianity Affiliation is: [place]. The reason the Patient came to the hospital is:   Patient Active Problem List    Diagnosis Date Noted    [facility] 09/26/2021    Ankle pain 09/25/2021    Ankle swelling 09/25/2021    Fever 09/25/2021    Suspected soft tissue infection 09/25/2021    Acute appendicitis 05/07/2020    Mild depression ([facility] [facility] 06/08/2018    MICHELLE (generalized anxiety disorder) 07/08/2015    Hyperlipidemia 12/05/2013    HTN (hypertension) 09/12/2012        The  provided the following Interventions:  Initiated a relationship of care and support. Explored for spiritual needs while hospitalized. Listened supportively as he shred about his spiritual support system outside the hospital  Provided information about [id number]. Offered assurance of prayers as patient requested. Chart reviewed. The following outcomes where achieved:  Patient shared limited information about both their medical narrative and spiritual journey/beliefs. Patient expressed concerning his copingcduring current hospitalization. Patient expressed gratitude for 's visit. Assessment:  Patient did not express any Anabaptist/cultural needs that will affect patient's preferences in health care. There are no spiritual or Anabaptist issues which require intervention at this time. Plan:  Chaplains will continue to follow and will provide pastoral care on an as needed/requested basis.  recommends bedside caregivers page  on duty if patient shows signs of acute spiritual or emotional distress.     [address] [place]   [phone]

## 2021-09-27 NOTE — ROUTINE PROCESS
TRANSFER - IN REPORT:    Verbal report received from Michaela Laughlin RN on Lars Chain  being received from ER for ordered procedure      Report consisted of patients Situation, Background, Assessment and   Recommendations(SBAR). Information from the following report(s) SBAR was reviewed with the receiving nurse. Opportunity for questions and clarification was provided. Assessment completed upon patients arrival to unit and care assumed.

## 2021-09-27 NOTE — BRIEF OP NOTE
Brief Postoperative Note    Patient: Kal Gayle  YOB: 1985  MRN: [id number]    Date of Procedure: 9/27/2021     Pre-Op Diagnosis: Left septic ankle    Post-Op Diagnosis: Same as preoperative diagnosis. Procedure(s):  Irrigation debridement LEFT ANKLE    Surgeon(s):  Ria Lopes MD    Surgical Assistant: Surg Asst-1: Tamra Payne    Anesthesia: General     Estimated Blood Loss (mL): Minimal    Complications: None    Specimens:   ID Type Source Tests Collected by Time Destination   1 : left ankle Body Fluid Wound CULTURE, ANAEROBIC, CULTURE, BODY FLUID, Ignacio Amin MD 9/27/2021 1514 Microbiology        Implants: * No implants in log *    Drains:   Hemovac Anterior; Left Other (comment) (Active)       Findings: As above    Electronically Signed by Gustavo Bueno MD on 9/27/2021 at 3:39 PM

## 2021-09-27 NOTE — PROGRESS NOTES
Reason for Admission:  Septic arthritis ([facility] [M00.9]                 RUR Score:    15            Plan for utilizing home health:    TBD                      Likelihood of Readmission:   LOW                         Transition of Care Plan:              Initial assessment completed with patient. Cognitive status of patient: oriented to time, place, person and situation. Face sheet information confirmed:  yes. The patient designates father Saroj Horton [phone] to participate in his discharge plan and to receive any needed information. This patient lives in a single family home with patient and other:  maria eugenia. Patient is able to navigate steps as needed. Prior to hospitalization, patient was considered to be independent with ADLs/IADLS : yes . Patient has a current ACP document on file: no      Healthcare Decision Maker:     Click here to complete [address] including selection of the Healthcare Decision Maker Relationship (ie \"Primary\")    The patient and father will be available to transport patient home upon discharge. The patient has none reported, medical equipment available in the home. Patient is not currently active with home health. Patient has not stayed in a skilled nursing facility or rehab. This patient is on dialysis :no  .  Currently, the discharge plan is Home. The patient states that he can obtain his medications from the pharmacy, and take his medications as directed. Patient's current insurance is [facility] [employer]     Care Management Interventions  PCP Verified by CM: Yes (last visit is about 6 months ago. Has appointment scheduled on 9/30/21)  Palliative Care Criteria Met (RRAT>21 & CHF Dx)?: No  Mode of Transport at Discharge:  Other (see comment) (father)  Transition of Care Consult (CM Consult): Discharge Planning (possible home health)  Physical Therapy Consult: No  Occupational Therapy Consult: No  Speech Therapy Consult: No  Support Systems: Parent(s), Friend/Neighbor  Confirm Follow Up Transport: Self  The Plan for Transition of Care is Related to the Following Treatment Goals : home possible home health  The Patient and/or Patient Representative was Provided with a Choice of Provider and Agrees with the Discharge Plan?: No  Freedom of Choice List was Provided with Basic Dialogue that Supports the Patient's Individualized Plan of Care/Goals, Treatment Preferences and Shares the Quality Data Associated with the Providers?: No  Discharge Location  Discharge Placement: Home (possible home health)         Elaine COPELAND RN

## 2021-09-27 NOTE — PROGRESS NOTES
[facility]  [facility]    Patient: Jenny Clayton Age: 43 y.o. : 1985 MR#: [id number] SSN: [id number]  Date/Time: 2021     Subjective: Patient feels fine, says ankle pain and swelling improved. No chest pain, no shortness of breath. No history of CAD. Patient has good functional capacity. Assessment/Plan:   1. Septic arthritis of the left ankle  2. Hypertension  3. Alcohol abuse  4. History of anxiety and depression. Plan  We will keep n.p.o., plan for joint debridement by Ortho. Discussed with Dr. Abraham Wiley. Patient low risk for low risk surgery. EKG and chest x-ray noted. We will continue empiric antibiotics, ID been consulted. Discussed with Dr. Liza Escobar  We will continue to hold, adjust medications. Continue thiamine, folic acid and multivitamin. Continue CIWA protocol    Discussed with the patient at the bedside and explained in detail about my above plan care. Case discussed with:  [x]Patient  []Family  [x]Nursing  []Case Management  DVT Prophylaxis:  [x]Lovenox  []Hep SQ  []SCDs  []Coumadin   []Eliquis/Xarelto     Objective:   VS:   Visit Vitals  BP (!) 149/102   Pulse 75   Temp 99 °F (37.2 °C)   Resp 22   Ht 6' 2\" (1.88 m)   Wt 102.1 kg (225 lb)   SpO2 97%   BMI 28.89 kg/m²      Tmax/24hrs: Temp (24hrs), Av °F (37.2 °C), Min:99 °F (37.2 °C), Max:99 °F (37.2 °C)  IOBRIEF    Intake/Output Summary (Last 24 hours) at 2021 1101  Last data filed at 2021 0427  Gross per 24 hour   Intake 2020 ml   Output    Net 2020 ml       General:  Alert, cooperative, no acute distress    HEENT: PERRLA, anicteric sclerae. Pulmonary:  CTA Bilaterally. No Wheezing/Rales. Cardiovascular: Regular rate and Rhythm. GI:  Soft, Non distended, Non tender. + Bowel sounds. Extremities: Ankle red, swelling present, warmth, tenderness+, decreased range of motion. No edema. No calf tenderness. Psych: Good insight.  Not anxious or agitated. Neurologic: Alert and oriented X 3. Moves all ext. Additional:    Medications:   Current Facility-Administered Medications   Medication Dose Route Frequency    vancomycin (VANCOCIN) 1500 mg in  ml infusion  1,500 mg IntraVENous Q8H    [START ON 9/28/2021] vancomycin trough due 09/28 at 0730 (draw before dose)  1 Each Other ONCE    cefepime (MAXIPIME) 2 g in sterile water (preservative free) 10 mL IV syringe  2 g IntraVENous Q8H    LORazepam (ATIVAN) tablet 1 mg  1 mg Oral Q1H PRN    Or    LORazepam (ATIVAN) injection 1 mg  1 mg IntraVENous Q1H PRN    LORazepam (ATIVAN) tablet 2 mg  2 mg Oral Q1H PRN    Or    LORazepam (ATIVAN) injection 2 mg  2 mg IntraVENous Q1H PRN    LORazepam (ATIVAN) injection 3 mg  3 mg IntraVENous Q15MIN PRN    atenoloL (TENORMIN) tablet 50 mg  50 mg Oral DAILY    escitalopram oxalate (LEXAPRO) tablet 20 mg  20 mg Oral DAILY    lisinopriL (PRINIVIL, ZESTRIL) tablet 20 mg  20 mg Oral DAILY    atorvastatin (LIPITOR) tablet 20 mg  20 mg Oral DAILY    pantoprazole (PROTONIX) tablet 20 mg  20 mg Oral ACB    0.9% sodium chloride 1,000 mL with mvi (adult no. 4 with vit K) 10 mL, thiamine 177 mg, folic acid 1 mg infusion   IntraVENous Q24H    0.9% sodium chloride infusion  75 mL/hr IntraVENous CONTINUOUS    sodium chloride (NS) flush 5-40 mL  5-40 mL IntraVENous Q8H    sodium chloride (NS) flush 5-40 mL  5-40 mL IntraVENous PRN    acetaminophen (TYLENOL) tablet 650 mg  650 mg Oral Q6H PRN    Or    acetaminophen (TYLENOL) suppository 650 mg  650 mg Rectal Q6H PRN    polyethylene glycol (MIRALAX) packet 17 g  17 g Oral DAILY PRN    ondansetron (ZOFRAN ODT) tablet 4 mg  4 mg Oral Q8H PRN    Or    ondansetron (ZOFRAN) injection 4 mg  4 mg IntraVENous Q6H PRN    HYDROmorphone (DILAUDID) injection 1 mg  1 mg IntraVENous Q4H PRN    naloxone (NARCAN) injection 0.4 mg  0.4 mg IntraVENous PRN     Current Outpatient Medications   Medication Sig    cephALEXin (Keflex) 500 mg capsule Take 1 Capsule by mouth four (4) times daily for 10 days.  cephALEXin (Keflex) 500 mg capsule Take 1 Capsule by mouth four (4) times daily for 10 days.  gabapentin (NEURONTIN) 400 mg capsule Take 1 Capsule by mouth three (3) times daily. Max Daily Amount: 1,200 mg.    lisinopriL (PRINIVIL, ZESTRIL) 20 mg tablet Take 1 Tablet by mouth daily.  atenoloL (TENORMIN) 50 mg tablet Take 2 Tablets by mouth daily.  escitalopram oxalate (LEXAPRO) 20 mg tablet Take 1 Tablet by mouth daily.  atorvastatin (LIPITOR) 20 mg tablet Take 1 Tablet by mouth daily.  clonazePAM (KlonoPIN) 1 mg tablet Take 1 Tablet by mouth three (3) times daily as needed for Anxiety. Max Daily Amount: 3 mg.  omeprazole (PRILOSEC) 20 mg capsule Take 1 Cap by mouth Daily (before breakfast).  ondansetron (Zofran ODT) 4 mg disintegrating tablet Take 1 Tab by mouth every eight (8) hours as needed for Nausea or Nausea or Vomiting.  tiZANidine (ZANAFLEX) 4 mg tablet Take 1 Tab by mouth three (3) times daily as needed for Muscle Spasm(s).  cetirizine (ZYRTEC) 10 mg tablet Take 1 Tab by mouth nightly.  sildenafil citrate (Viagra) 100 mg tablet Take 1 Tab by mouth as needed for Erectile Dysfunction.  mupirocin (BACTROBAN) 2 % ointment Apply  to affected area daily.  fluticasone propionate (FLONASE) 50 mcg/actuation nasal spray 2 Sprays by Both Nostrils route daily.  guaiFENesin ER (MUCINEX) 600 mg ER tablet Take 1 Tab by mouth two (2) times a day.  Camphor-Eucalyptus Oil-Menthol (VICKS VAPORUB) 4.8-1.2-2.6 % oint 1 Actuation(s) by Apply Externally route three (3) times daily as needed for Cough or Other (congestion).  Vaporizers ([employer] WARM STEAM VAPORIZER) misc 1 Actuation(s) by Does Not Apply route three (3) times daily as needed for Cough or Other (congestion).        Labs:    Recent Results (from the past 24 hour(s))   METABOLIC PANEL, COMPREHENSIVE    Collection Time: 09/26/21  1:29 PM   Result Value Ref Range    Sodium 142 136 - 145 mmol/L    Potassium 4.2 3.5 - 5.5 mmol/L    Chloride 112 (H) 100 - 111 mmol/L    CO2 25 21 - 32 mmol/L    Anion gap 5 3.0 - 18 mmol/L    Glucose 87 74 - 99 mg/dL    BUN 7 7.0 - 18 MG/DL    Creatinine 0.76 0.6 - 1.3 MG/DL    BUN/Creatinine ratio 9 (L) 12 - 20      GFR est AA >60 >60 ml/min/1.73m2    GFR est non-AA >60 >60 ml/min/1.73m2    Calcium 8.9 8.5 - 10.1 MG/DL    Bilirubin, total 0.9 0.2 - 1.0 MG/DL    ALT (SGPT) 23 16 - 61 U/L    AST (SGOT) 42 (H) 10 - 38 U/L    Alk. phosphatase 57 45 - 117 U/L    Protein, total 7.3 6.4 - 8.2 g/dL    Albumin 3.4 3.4 - 5.0 g/dL    Globulin 3.9 2.0 - 4.0 g/dL    A-G Ratio 0.9 0.8 - 1.7     CBC WITH AUTOMATED DIFF    Collection Time: 09/26/21  4:40 PM   Result Value Ref Range    WBC 5.5 4.6 - 13.2 K/uL    RBC 3.58 (L) 4.35 - 5.65 M/uL    HGB 11.5 (L) 13.0 - 16.0 g/dL    HCT 33.4 (L) 36.0 - 48.0 %    MCV 93.3 78.0 - 100.0 FL    MCH 32.1 24.0 - 34.0 PG    MCHC 34.4 31.0 - 37.0 g/dL    RDW 13.0 11.6 - 14.5 %    PLATELET 322 051 - 652 K/uL    MPV 9.0 (L) 9.2 - 11.8 FL    NEUTROPHILS 70 40 - 73 %    LYMPHOCYTES 18 (L) 21 - 52 %    MONOCYTES 10 3 - 10 %    EOSINOPHILS 1 0 - 5 %    BASOPHILS 1 0 - 2 %    ABS. NEUTROPHILS 3.8 1.8 - 8.0 K/UL    ABS. LYMPHOCYTES 1.0 0.9 - 3.6 K/UL    ABS. MONOCYTES 0.6 0.05 - 1.2 K/UL    ABS. EOSINOPHILS 0.1 0.0 - 0.4 K/UL    ABS.  BASOPHILS 0.0 0.0 - 0.1 K/UL    DF AUTOMATED     POC LACTIC ACID    Collection Time: 09/26/21  4:40 PM   Result Value Ref Range    Lactic Acid (POC) 1.42 0.40 - 4.98 mmol/L   METABOLIC PANEL, BASIC    Collection Time: 09/27/21  4:00 AM   Result Value Ref Range    Sodium 143 136 - 145 mmol/L    Potassium 3.6 3.5 - 5.5 mmol/L    Chloride 112 (H) 100 - 111 mmol/L    CO2 24 21 - 32 mmol/L    Anion gap 7 3.0 - 18 mmol/L    Glucose 81 74 - 99 mg/dL    BUN 5 (L) 7.0 - 18 MG/DL    Creatinine 0.68 0.6 - 1.3 MG/DL    BUN/Creatinine ratio 7 (L) 12 - 20      GFR est AA >60 >60 ml/min/1.73m2    GFR est non-AA >60 >60 ml/min/1.73m2    Calcium 8.1 (L) 8.5 - 10.1 MG/DL   CBC WITH AUTOMATED DIFF    Collection Time: 09/27/21  4:00 AM   Result Value Ref Range    WBC 4.1 (L) 4.6 - 13.2 K/uL    RBC 3.40 (L) 4.35 - 5.65 M/uL    HGB 11.1 (L) 13.0 - 16.0 g/dL    HCT 32.3 (L) 36.0 - 48.0 %    MCV 95.0 78.0 - 100.0 FL    MCH 32.6 24.0 - 34.0 PG    MCHC 34.4 31.0 - 37.0 g/dL    RDW 12.9 11.6 - 14.5 %    PLATELET 990 658 - 779 K/uL    MPV 9.4 9.2 - 11.8 FL    NEUTROPHILS 45 40 - 73 %    LYMPHOCYTES 40 21 - 52 %    MONOCYTES 11 (H) 3 - 10 %    EOSINOPHILS 3 0 - 5 %    BASOPHILS 1 0 - 2 %    ABS. NEUTROPHILS 1.9 1.8 - 8.0 K/UL    ABS. LYMPHOCYTES 1.7 0.9 - 3.6 K/UL    ABS. MONOCYTES 0.5 0.05 - 1.2 K/UL    ABS. EOSINOPHILS 0.1 0.0 - 0.4 K/UL    ABS. BASOPHILS 0.0 0.0 - 0.1 K/UL    DF AUTOMATED     MAGNESIUM    Collection Time: 09/27/21  4:00 AM   Result Value Ref Range    Magnesium 1.9 1.6 - 2.6 mg/dL   PROTHROMBIN TIME + INR    Collection Time: 09/27/21  4:00 AM   Result Value Ref Range    Prothrombin time 14.5 11.5 - 15.2 sec    INR 1.2 0.8 - 1.2     COVID-19 RAPID TEST    Collection Time: 09/27/21  8:45 AM   Result Value Ref Range    Specimen source Nasopharyngeal      COVID-19 rapid test Not detected NOTD         Signed By: Nika Chi MD     September 27, 2021      Disclaimer: Sections of this note are dictated using utilizing voice recognition software. Minor typographical errors may be present. If questions arise, please do not hesitate to contact me or call our department.

## 2021-09-27 NOTE — PROGRESS NOTES
Patient  Arrived on unit from PACU at [address]. VSS, alert and oriented X 4. CIWA 14, gave 1 mg of ativan IV. Oriented to unit and room. Patient has a hemovac to left ankle draining scant bloody drainage. Will continue to monitor his CIWA and pain, medicated as needed.

## 2021-09-27 NOTE — OP NOTES
Operative Report    Patient: Jose Cabrera MRN: [id number]  SSN: [id number]    YOB: 1985  Age: 43 y.o. Sex: male       Date of Surgery: 9/27/2021     Preoperative Diagnosis: LEFT A septic ankle    Postoperative Diagnosis: LEFT septic ankle    Surgeon(s) and Role:     Marc Savage MD - Primary    Anesthesia: General     Procedure: Procedure(s):  Irrigation and debridement left ankle  Procedure in detail  Patient was taken to the operating room to some general trach anesthesia with anesthesia staff. Left ankle was prepped with ChloraPrep solution draped free sterile field. Medial arthrotomy of the peggy medial aspect of the ankle was made with a slight curvilinear incision with subcutaneous tissue dissected bluntly to the level of the saphenous vein which was identified and protected. An anteromedial arthrotomy was then performed and upon incising the capsule copious amounts of fluid cloudy fluid was expressed. Using a 15 blade part of the capsule with evidence of synovitis was debrided sharply, encompassing one square centimeter and the area was irrigated profusely with Pulsavac irrigation. Small Hemovac drain was in place and the capsule was closed with a 0 PDS suture and the skin was closed with a 4-0 Monocryl suture. Sterile dressings were applied patient tolerated procedure well was taken to recovery room without problems      Estimated Blood Loss: Minimal    Tourniquet Time: * No tourniquets in log *      Implants: * No implants in log *            Specimens:   ID Type Source Tests Collected by Time Destination   1 : left ankle Body Fluid Wound CULTURE, ANAEROBIC, CULTURE, BODY FLUID, Felix Redd MD 9/27/2021 1514 Microbiology           Drains: None                Complications: None    Counts: Sponge and needle counts were correct times two.     Signed By:  Pastor Ruth MD     September 27, 2021

## 2021-09-27 NOTE — ROUTINE PROCESS
TRANSFER - OUT REPORT:    Verbal report given to MEG Hawley on Amandeep Pineda  being transferred to [facility] for ordered procedure       Report consisted of patients Situation, Background, Assessment and   Recommendations(SBAR). Information from the following report(s) SBAR was reviewed with the receiving nurse. Lines:   Peripheral IV 09/26/21 Right Antecubital (Active)   Site Assessment Clean, dry, & intact 09/26/21 1449   Infiltration Assessment 0 09/26/21 1449   Dressing Status Clean, dry, & intact 09/26/21 1449       Peripheral IV 09/26/21 Left Antecubital (Active)        Opportunity for questions and clarification was provided.       Patient transported with:   [employer]

## 2021-09-28 LAB
ANION GAP SERPL CALC-SCNC: 7 MMOL/L (ref 3–18)
BASOPHILS # BLD: 0 K/UL (ref 0–0.1)
BASOPHILS NFR BLD: 0 % (ref 0–2)
BUN SERPL-MCNC: 5 MG/DL (ref 7–18)
BUN/CREAT SERPL: 8 (ref 12–20)
C TRACH RRNA SPEC QL NAA+PROBE: NEGATIVE
CALCIUM SERPL-MCNC: 8.3 MG/DL (ref 8.5–10.1)
CHLORIDE SERPL-SCNC: 108 MMOL/L (ref 100–111)
CO2 SERPL-SCNC: 23 MMOL/L (ref 21–32)
CREAT SERPL-MCNC: 0.6 MG/DL (ref 0.6–1.3)
DATE LAST DOSE: NORMAL
DIFFERENTIAL METHOD BLD: ABNORMAL
EOSINOPHIL # BLD: 0 K/UL (ref 0–0.4)
EOSINOPHIL NFR BLD: 0 % (ref 0–5)
ERYTHROCYTE [DISTWIDTH] IN BLOOD BY AUTOMATED COUNT: 12.5 % (ref 11.6–14.5)
GLUCOSE SERPL-MCNC: 99 MG/DL (ref 74–99)
HCT VFR BLD AUTO: 33.1 % (ref 36–48)
HGB BLD-MCNC: 11.6 G/DL (ref 13–16)
LYMPHOCYTES # BLD: 0.8 K/UL (ref 0.9–3.6)
LYMPHOCYTES NFR BLD: 12 % (ref 21–52)
MCH RBC QN AUTO: 32.7 PG (ref 24–34)
MCHC RBC AUTO-ENTMCNC: 35 G/DL (ref 31–37)
MCV RBC AUTO: 93.2 FL (ref 78–100)
MONOCYTES # BLD: 0.6 K/UL (ref 0.05–1.2)
MONOCYTES NFR BLD: 9 % (ref 3–10)
N GONORRHOEA RRNA SPEC QL NAA+PROBE: NEGATIVE
NEUTS SEG # BLD: 5.1 K/UL (ref 1.8–8)
NEUTS SEG NFR BLD: 79 % (ref 40–73)
PLATELET # BLD AUTO: 184 K/UL (ref 135–420)
PMV BLD AUTO: 9.4 FL (ref 9.2–11.8)
POTASSIUM SERPL-SCNC: 3.6 MMOL/L (ref 3.5–5.5)
RBC # BLD AUTO: 3.55 M/UL (ref 4.35–5.65)
REPORTED DOSE,DOSE: NORMAL UNITS
REPORTED DOSE/TIME,TMG: 600
SODIUM SERPL-SCNC: 138 MMOL/L (ref 136–145)
SPECIMEN SOURCE: NORMAL
VANCOMYCIN TROUGH SERPL-MCNC: 14.1 UG/ML (ref 10–20)
WBC # BLD AUTO: 6.5 K/UL (ref 4.6–13.2)

## 2021-09-28 PROCEDURE — 74011000250 HC RX REV CODE- 250: Performed by: INTERNAL MEDICINE

## 2021-09-28 PROCEDURE — 80048 BASIC METABOLIC PNL TOTAL CA: CPT

## 2021-09-28 PROCEDURE — 99232 SBSQ HOSP IP/OBS MODERATE 35: CPT | Performed by: HOSPITALIST

## 2021-09-28 PROCEDURE — 74011250637 HC RX REV CODE- 250/637: Performed by: HOSPITALIST

## 2021-09-28 PROCEDURE — 74011250636 HC RX REV CODE- 250/636: Performed by: PHYSICIAN ASSISTANT

## 2021-09-28 PROCEDURE — 74011250637 HC RX REV CODE- 250/637: Performed by: EMERGENCY MEDICINE

## 2021-09-28 PROCEDURE — 36415 COLL VENOUS BLD VENIPUNCTURE: CPT

## 2021-09-28 PROCEDURE — 74011250637 HC RX REV CODE- 250/637: Performed by: STUDENT IN AN ORGANIZED HEALTH CARE EDUCATION/TRAINING PROGRAM

## 2021-09-28 PROCEDURE — 85025 COMPLETE CBC W/AUTO DIFF WBC: CPT

## 2021-09-28 PROCEDURE — 74011250636 HC RX REV CODE- 250/636: Performed by: EMERGENCY MEDICINE

## 2021-09-28 PROCEDURE — 74011000250 HC RX REV CODE- 250: Performed by: EMERGENCY MEDICINE

## 2021-09-28 PROCEDURE — 74011250636 HC RX REV CODE- 250/636: Performed by: HOSPITALIST

## 2021-09-28 PROCEDURE — 2709999900 HC NON-CHARGEABLE SUPPLY

## 2021-09-28 PROCEDURE — 80202 ASSAY OF VANCOMYCIN: CPT

## 2021-09-28 PROCEDURE — 74011250636 HC RX REV CODE- 250/636: Performed by: INTERNAL MEDICINE

## 2021-09-28 PROCEDURE — 65270000029 HC RM PRIVATE

## 2021-09-28 RX ORDER — HYDROMORPHONE HYDROCHLORIDE 1 MG/ML
1 INJECTION, SOLUTION INTRAMUSCULAR; INTRAVENOUS; SUBCUTANEOUS
Status: DISCONTINUED | OUTPATIENT
Start: 2021-09-28 | End: 2021-09-28

## 2021-09-28 RX ORDER — DIPHENHYDRAMINE HCL 25 MG
50 CAPSULE ORAL
Status: DISCONTINUED | OUTPATIENT
Start: 2021-09-28 | End: 2021-09-30 | Stop reason: HOSPADM

## 2021-09-28 RX ORDER — DIPHENHYDRAMINE HCL 25 MG
50 CAPSULE ORAL
Status: DISCONTINUED | OUTPATIENT
Start: 2021-09-28 | End: 2021-09-28

## 2021-09-28 RX ORDER — HYDROMORPHONE HYDROCHLORIDE 1 MG/ML
1 INJECTION, SOLUTION INTRAMUSCULAR; INTRAVENOUS; SUBCUTANEOUS
Status: DISCONTINUED | OUTPATIENT
Start: 2021-09-28 | End: 2021-09-30 | Stop reason: HOSPADM

## 2021-09-28 RX ORDER — LISINOPRIL 20 MG/1
20 TABLET ORAL
Status: COMPLETED | OUTPATIENT
Start: 2021-09-28 | End: 2021-09-28

## 2021-09-28 RX ORDER — HYDROCODONE BITARTRATE AND ACETAMINOPHEN 5; 325 MG/1; MG/1
2 TABLET ORAL
Status: DISCONTINUED | OUTPATIENT
Start: 2021-09-28 | End: 2021-09-30 | Stop reason: HOSPADM

## 2021-09-28 RX ORDER — LISINOPRIL 40 MG/1
40 TABLET ORAL DAILY
Status: DISCONTINUED | OUTPATIENT
Start: 2021-09-29 | End: 2021-09-30 | Stop reason: HOSPADM

## 2021-09-28 RX ADMIN — DIPHENHYDRAMINE HYDROCHLORIDE 50 MG: 25 CAPSULE ORAL at 00:50

## 2021-09-28 RX ADMIN — LORAZEPAM 1 MG: 2 INJECTION INTRAMUSCULAR; INTRAVENOUS at 11:26

## 2021-09-28 RX ADMIN — LORAZEPAM 2 MG: 2 INJECTION INTRAMUSCULAR; INTRAVENOUS at 01:54

## 2021-09-28 RX ADMIN — Medication 10 ML: at 06:23

## 2021-09-28 RX ADMIN — Medication 10 ML: at 06:24

## 2021-09-28 RX ADMIN — LORAZEPAM 2 MG: 2 INJECTION INTRAMUSCULAR; INTRAVENOUS at 23:52

## 2021-09-28 RX ADMIN — ATENOLOL 50 MG: 25 TABLET ORAL at 09:50

## 2021-09-28 RX ADMIN — Medication 10 ML: at 14:00

## 2021-09-28 RX ADMIN — CEFTRIAXONE SODIUM 2 G: 2 INJECTION, POWDER, FOR SOLUTION INTRAMUSCULAR; INTRAVENOUS at 20:43

## 2021-09-28 RX ADMIN — Medication 10 ML: at 22:04

## 2021-09-28 RX ADMIN — FOLIC ACID: 5 INJECTION, SOLUTION INTRAMUSCULAR; INTRAVENOUS; SUBCUTANEOUS at 23:56

## 2021-09-28 RX ADMIN — ESCITALOPRAM OXALATE 20 MG: 20 TABLET ORAL at 09:52

## 2021-09-28 RX ADMIN — ATORVASTATIN CALCIUM 20 MG: 20 TABLET, FILM COATED ORAL at 09:50

## 2021-09-28 RX ADMIN — SODIUM CHLORIDE 75 ML/HR: 900 INJECTION, SOLUTION INTRAVENOUS at 07:47

## 2021-09-28 RX ADMIN — LISINOPRIL 20 MG: 20 TABLET ORAL at 11:14

## 2021-09-28 RX ADMIN — LORAZEPAM 2 MG: 2 INJECTION INTRAMUSCULAR; INTRAVENOUS at 20:30

## 2021-09-28 RX ADMIN — HYDROMORPHONE HYDROCHLORIDE 1 MG: 1 INJECTION, SOLUTION INTRAMUSCULAR; INTRAVENOUS; SUBCUTANEOUS at 07:42

## 2021-09-28 RX ADMIN — HYDROMORPHONE HYDROCHLORIDE 1 MG: 1 INJECTION, SOLUTION INTRAMUSCULAR; INTRAVENOUS; SUBCUTANEOUS at 21:50

## 2021-09-28 RX ADMIN — PANTOPRAZOLE SODIUM 20 MG: 20 TABLET, DELAYED RELEASE ORAL at 07:49

## 2021-09-28 RX ADMIN — HYDROMORPHONE HYDROCHLORIDE 1 MG: 1 INJECTION, SOLUTION INTRAMUSCULAR; INTRAVENOUS; SUBCUTANEOUS at 15:01

## 2021-09-28 RX ADMIN — LORAZEPAM 2 MG: 2 INJECTION INTRAMUSCULAR; INTRAVENOUS at 05:00

## 2021-09-28 RX ADMIN — HYDROMORPHONE HYDROCHLORIDE 1 MG: 1 INJECTION, SOLUTION INTRAMUSCULAR; INTRAVENOUS; SUBCUTANEOUS at 10:57

## 2021-09-28 RX ADMIN — HYDROCODONE BITARTRATE AND ACETAMINOPHEN 2 TABLET: 5; 325 TABLET ORAL at 20:29

## 2021-09-28 RX ADMIN — VANCOMYCIN HYDROCHLORIDE 1500 MG: 10 INJECTION, POWDER, LYOPHILIZED, FOR SOLUTION INTRAVENOUS at 17:04

## 2021-09-28 RX ADMIN — VANCOMYCIN HYDROCHLORIDE 1500 MG: 10 INJECTION, POWDER, LYOPHILIZED, FOR SOLUTION INTRAVENOUS at 06:24

## 2021-09-28 RX ADMIN — Medication 10 ML: at 15:09

## 2021-09-28 RX ADMIN — LISINOPRIL 20 MG: 20 TABLET ORAL at 09:51

## 2021-09-28 RX ADMIN — HYDRALAZINE HYDROCHLORIDE 10 MG: 20 INJECTION INTRAMUSCULAR; INTRAVENOUS at 20:36

## 2021-09-28 RX ADMIN — HYDROMORPHONE HYDROCHLORIDE 1 MG: 1 INJECTION, SOLUTION INTRAMUSCULAR; INTRAVENOUS; SUBCUTANEOUS at 03:45

## 2021-09-28 RX ADMIN — HYDRALAZINE HYDROCHLORIDE 10 MG: 20 INJECTION INTRAMUSCULAR; INTRAVENOUS at 09:12

## 2021-09-28 NOTE — PROGRESS NOTES
[facility]  [facility]    Patient: Del Montes De Oca Age: 43 y.o. : 1985 MR#: [id number] SSN: [id number]  Date/Time: 2021     Subjective:     Patient complaining of L ankle pain POD1 s/p I&D. Fell backwards while raising from bed, caught fall on stand next to bed, and put weight on L ankle, heard a \"pop and crack\". Pain at time rated at 9/10 and has reduced to a 7/10 after pain medication provided. Experiencing alcohol withdrawal symptoms including HA, stuttering words, shakes, and lack of appetite. Assessment/Plan:     1. Septic arthritis of the left ankle - POD1 I&D, blood cultures and anaerobic cultures show NGTD, continue empiric antibiotics, adjust pain management medications, advance diet as tolerated    2. Hypertension - continue IV hydralazine and PO atenolol, increase PO lisinopril to 40mg, monitor BP closely, discontinue IV fluids  3. Alcohol abuse - continue thiamine, folic acid and multivitamin, continue CIWA protocol  4. History of anxiety and depression  5. Leukocytopenia - resolved         MARIA LUZ Killian  19      Case discussed with:  [x]Patient  []Family  [x]Nursing  []Case Management  DVT Prophylaxis:  [x]Lovenox  []Hep SQ  []SCDs  []Coumadin   []On Heparin gtt    Objective:   VS:   Visit Vitals  BP (!) 148/89   Pulse 91   Temp 99 °F (37.2 °C)   Resp 16   Ht 6' 2\" (1.88 m)   Wt 102.1 kg (225 lb)   SpO2 99%   BMI 28.89 kg/m²      Tmax/24hrs: Temp (24hrs), Av.3 °F (36.8 °C), Min:97.1 °F (36.2 °C), Max:99 °F (37.2 °C)  IOBRIEF    Intake/Output Summary (Last 24 hours) at 2021 1122  Last data filed at 2021 3554  Gross per 24 hour   Intake 2760 ml   Output 3750 ml   Net -990 ml       General:  Alert, cooperative, no acute distress    HEENT: PERRLA, anicteric sclerae. Pulmonary:  CTA Bilaterally. No Wheezing/Rhonchi/Rales. Cardiovascular: Regular rate and Rhythm.   GI:  Soft, Non distended, Non tender. + Bowel sounds. Extremities:  L ankle swollen, decreased range of motion, No cyanosis, clubbing. No calf tenderness. Bilateral pedal pulses palpated  Neurologic: Alert and oriented X 3. No acute neuro deficits. Additional:    Medications:   Current Facility-Administered Medications   Medication Dose Route Frequency    diphenhydrAMINE (BENADRYL) capsule 50 mg  50 mg Oral Q6H PRN    VANCOMYCIN TROUGH DUE  1 Each Other ONCE    HYDROmorphone (DILAUDID) injection 1 mg  1 mg IntraVENous Q3H PRN    [START ON 9/29/2021] lisinopriL (PRINIVIL, ZESTRIL) tablet 40 mg  40 mg Oral DAILY    vancomycin (VANCOCIN) 1500 mg in  ml infusion  1,500 mg IntraVENous Q8H    cefTRIAXone (ROCEPHIN) 2 g in sterile water (preservative free) 20 mL IV syringe  2 g IntraVENous Q24H    sodium chloride (NS) flush 5-40 mL  5-40 mL IntraVENous Q8H    sodium chloride (NS) flush 5-40 mL  5-40 mL IntraVENous PRN    hydrALAZINE (APRESOLINE) 20 mg/mL injection 10 mg  10 mg IntraVENous Q6H PRN    LORazepam (ATIVAN) tablet 1 mg  1 mg Oral Q1H PRN    Or    LORazepam (ATIVAN) injection 1 mg  1 mg IntraVENous Q1H PRN    LORazepam (ATIVAN) tablet 2 mg  2 mg Oral Q1H PRN    Or    LORazepam (ATIVAN) injection 2 mg  2 mg IntraVENous Q1H PRN    LORazepam (ATIVAN) injection 3 mg  3 mg IntraVENous Q15MIN PRN    atenoloL (TENORMIN) tablet 50 mg  50 mg Oral DAILY    escitalopram oxalate (LEXAPRO) tablet 20 mg  20 mg Oral DAILY    atorvastatin (LIPITOR) tablet 20 mg  20 mg Oral DAILY    pantoprazole (PROTONIX) tablet 20 mg  20 mg Oral ACB    0.9% sodium chloride 1,000 mL with mvi (adult no. 4 with vit K) 10 mL, thiamine 223 mg, folic acid 1 mg infusion   IntraVENous Q24H    0.9% sodium chloride infusion  75 mL/hr IntraVENous CONTINUOUS    sodium chloride (NS) flush 5-40 mL  5-40 mL IntraVENous Q8H    sodium chloride (NS) flush 5-40 mL  5-40 mL IntraVENous PRN    acetaminophen (TYLENOL) tablet 650 mg  650 mg Oral Q6H PRN    Or    acetaminophen (TYLENOL) suppository 650 mg  650 mg Rectal Q6H PRN    polyethylene glycol (MIRALAX) packet 17 g  17 g Oral DAILY PRN    ondansetron (ZOFRAN ODT) tablet 4 mg  4 mg Oral Q8H PRN    Or    ondansetron (ZOFRAN) injection 4 mg  4 mg IntraVENous Q6H PRN    naloxone (NARCAN) injection 0.4 mg  0.4 mg IntraVENous PRN       Imaging:   XR Results (most recent):  Results from Hospital Encounter encounter on 09/26/21    XR CHEST PORT    Narrative  INDICATION: pre OP eval. Septic arthritis    TECHNIQUE: Portable chest radiograph    COMPARISON: 6 November 2020    FINDINGS: The lungs are adequately inflated. No focal consolidation, effusion or  pneumothorax. Heart size within normal limits. No acute osseous abnormality. Impression  No acute cardiopulmonary findings. CT Results (most recent):  Results from Hospital Encounter encounter on 09/05/20    CT ABD PELV WO CONT    Narrative  INDICATION: Pain    COMPARISON: 5/20 and other priors    TECHNIQUE:  Unenhanced multislice helical CT was performed from the diaphragm to the  symphysis pubis without oral or intravenous contrast administration. All CT  scans at this facility are performed using doze optimization technique as  appropriate to a performed exam, to include automated exposure control,  adjustment of the mA and/or KV according to patient size (including appropriate  matching for site specific examinations), or use of iterative reconstruction  technique. FINDINGS:  The visualized portions of the lung bases are clear. The absence of intravenous contrast material reduces the sensitivity for  evaluation of the solid parenchymal organs of the abdomen. No focal abnormalities are seen within the liver, spleen, pancreas or adrenal  glands or kidneys. No renal or ureteral calculus or obstruction is identified. Cholelithiasis without secondary evidence for acute cholecystitis. .    The aorta tapers without aneurysm.  There is no retroperitoneal adenopathy or  mass. Bladder is distended. The bowel is not obstructed. Surgical clips right lower quadrant likely  representing appendectomy. There is no ascites or free intraperitoneal air. No pelvic mass or adenopathy. No acute osseous abnormality. Impression  IMPRESSION:    No CT evidence for acute process in the abdomen or pelvis. Distended bladder. Labs:    Recent Results (from the past 48 hour(s))   METABOLIC PANEL, COMPREHENSIVE    Collection Time: 09/26/21  1:29 PM   Result Value Ref Range    Sodium 142 136 - 145 mmol/L    Potassium 4.2 3.5 - 5.5 mmol/L    Chloride 112 (H) 100 - 111 mmol/L    CO2 25 21 - 32 mmol/L    Anion gap 5 3.0 - 18 mmol/L    Glucose 87 74 - 99 mg/dL    BUN 7 7.0 - 18 MG/DL    Creatinine 0.76 0.6 - 1.3 MG/DL    BUN/Creatinine ratio 9 (L) 12 - 20      GFR est AA >60 >60 ml/min/1.73m2    GFR est non-AA >60 >60 ml/min/1.73m2    Calcium 8.9 8.5 - 10.1 MG/DL    Bilirubin, total 0.9 0.2 - 1.0 MG/DL    ALT (SGPT) 23 16 - 61 U/L    AST (SGOT) 42 (H) 10 - 38 U/L    Alk. phosphatase 57 45 - 117 U/L    Protein, total 7.3 6.4 - 8.2 g/dL    Albumin 3.4 3.4 - 5.0 g/dL    Globulin 3.9 2.0 - 4.0 g/dL    A-G Ratio 0.9 0.8 - 1.7     CBC WITH AUTOMATED DIFF    Collection Time: 09/26/21  4:40 PM   Result Value Ref Range    WBC 5.5 4.6 - 13.2 K/uL    RBC 3.58 (L) 4.35 - 5.65 M/uL    HGB 11.5 (L) 13.0 - 16.0 g/dL    HCT 33.4 (L) 36.0 - 48.0 %    MCV 93.3 78.0 - 100.0 FL    MCH 32.1 24.0 - 34.0 PG    MCHC 34.4 31.0 - 37.0 g/dL    RDW 13.0 11.6 - 14.5 %    PLATELET 723 738 - 181 K/uL    MPV 9.0 (L) 9.2 - 11.8 FL    NEUTROPHILS 70 40 - 73 %    LYMPHOCYTES 18 (L) 21 - 52 %    MONOCYTES 10 3 - 10 %    EOSINOPHILS 1 0 - 5 %    BASOPHILS 1 0 - 2 %    ABS. NEUTROPHILS 3.8 1.8 - 8.0 K/UL    ABS. LYMPHOCYTES 1.0 0.9 - 3.6 K/UL    ABS. MONOCYTES 0.6 0.05 - 1.2 K/UL    ABS. EOSINOPHILS 0.1 0.0 - 0.4 K/UL    ABS.  BASOPHILS 0.0 0.0 - 0.1 K/UL    DF AUTOMATED     POC LACTIC ACID    Collection Time: 09/26/21 4:40 PM   Result Value Ref Range    Lactic Acid (POC) 1.42 0.40 - 5.40 mmol/L   METABOLIC PANEL, BASIC    Collection Time: 09/27/21  4:00 AM   Result Value Ref Range    Sodium 143 136 - 145 mmol/L    Potassium 3.6 3.5 - 5.5 mmol/L    Chloride 112 (H) 100 - 111 mmol/L    CO2 24 21 - 32 mmol/L    Anion gap 7 3.0 - 18 mmol/L    Glucose 81 74 - 99 mg/dL    BUN 5 (L) 7.0 - 18 MG/DL    Creatinine 0.68 0.6 - 1.3 MG/DL    BUN/Creatinine ratio 7 (L) 12 - 20      GFR est AA >60 >60 ml/min/1.73m2    GFR est non-AA >60 >60 ml/min/1.73m2    Calcium 8.1 (L) 8.5 - 10.1 MG/DL   CBC WITH AUTOMATED DIFF    Collection Time: 09/27/21  4:00 AM   Result Value Ref Range    WBC 4.1 (L) 4.6 - 13.2 K/uL    RBC 3.40 (L) 4.35 - 5.65 M/uL    HGB 11.1 (L) 13.0 - 16.0 g/dL    HCT 32.3 (L) 36.0 - 48.0 %    MCV 95.0 78.0 - 100.0 FL    MCH 32.6 24.0 - 34.0 PG    MCHC 34.4 31.0 - 37.0 g/dL    RDW 12.9 11.6 - 14.5 %    PLATELET 635 098 - 444 K/uL    MPV 9.4 9.2 - 11.8 FL    NEUTROPHILS 45 40 - 73 %    LYMPHOCYTES 40 21 - 52 %    MONOCYTES 11 (H) 3 - 10 %    EOSINOPHILS 3 0 - 5 %    BASOPHILS 1 0 - 2 %    ABS. NEUTROPHILS 1.9 1.8 - 8.0 K/UL    ABS. LYMPHOCYTES 1.7 0.9 - 3.6 K/UL    ABS. MONOCYTES 0.5 0.05 - 1.2 K/UL    ABS. EOSINOPHILS 0.1 0.0 - 0.4 K/UL    ABS.  BASOPHILS 0.0 0.0 - 0.1 K/UL    DF AUTOMATED     MAGNESIUM    Collection Time: 09/27/21  4:00 AM   Result Value Ref Range    Magnesium 1.9 1.6 - 2.6 mg/dL   PROTHROMBIN TIME + INR    Collection Time: 09/27/21  4:00 AM   Result Value Ref Range    Prothrombin time 14.5 11.5 - 15.2 sec    INR 1.2 0.8 - 1.2     COVID-19 RAPID TEST    Collection Time: 09/27/21  8:45 AM   Result Value Ref Range    Specimen source Nasopharyngeal      COVID-19 rapid test Not detected NOTD     EKG, 12 LEAD, INITIAL    Collection Time: 09/27/21 12:57 PM   Result Value Ref Range    Ventricular Rate 69 BPM    Atrial Rate 69 BPM    P-R Interval 126 ms    QRS Duration 90 ms    Q-T Interval 448 ms    QTC Calculation (Bezet) 480 ms    Calculated P Axis 50 degrees    Calculated R Axis -12 degrees    Calculated T Axis 6 degrees    Diagnosis       Normal sinus rhythm  Nonspecific ST and T wave abnormality  Abnormal ECG  When compared with ECG of 06-NOV-2020 15:03,  Nonspecific T wave abnormality now evident in Inferior leads  Nonspecific T wave abnormality, worse in Anterior leads  Confirmed by Siria Neri MD, ----- ([id number] on 9/27/2021 3:55:39 PM     DRUG SCREEN, URINE    Collection Time: 09/27/21  2:22 PM   Result Value Ref Range    BENZODIAZEPINES Negative NEG      BARBITURATES Negative NEG      THC (TH-CANNABINOL) Negative NEG      OPIATES Positive (A) NEG      PCP(PHENCYCLIDINE) Negative NEG      COCAINE Negative NEG      AMPHETAMINES Negative NEG      METHADONE Negative NEG      HDSCOM (NOTE)    CULTURE, BODY FLUID Torito Mckusick STAIN    Collection Time: 09/27/21  3:14 PM    Specimen: Drainage;  Body Fluid   Result Value Ref Range    Special Requests: RIGHT  ANKLE        GRAM STAIN NO ORGANISMS SEEN      GRAM STAIN NO WBC'S SEEN      Culture result: PENDING    METABOLIC PANEL, BASIC    Collection Time: 09/28/21  4:00 AM   Result Value Ref Range    Sodium 138 136 - 145 mmol/L    Potassium 3.6 3.5 - 5.5 mmol/L    Chloride 108 100 - 111 mmol/L    CO2 23 21 - 32 mmol/L    Anion gap 7 3.0 - 18 mmol/L    Glucose 99 74 - 99 mg/dL    BUN 5 (L) 7.0 - 18 MG/DL    Creatinine 0.60 0.6 - 1.3 MG/DL    BUN/Creatinine ratio 8 (L) 12 - 20      GFR est AA >60 >60 ml/min/1.73m2    GFR est non-AA >60 >60 ml/min/1.73m2    Calcium 8.3 (L) 8.5 - 10.1 MG/DL   CBC WITH AUTOMATED DIFF    Collection Time: 09/28/21  4:00 AM   Result Value Ref Range    WBC 6.5 4.6 - 13.2 K/uL    RBC 3.55 (L) 4.35 - 5.65 M/uL    HGB 11.6 (L) 13.0 - 16.0 g/dL    HCT 33.1 (L) 36.0 - 48.0 %    MCV 93.2 78.0 - 100.0 FL    MCH 32.7 24.0 - 34.0 PG    MCHC 35.0 31.0 - 37.0 g/dL    RDW 12.5 11.6 - 14.5 %    PLATELET 901 155 - 374 K/uL    MPV 9.4 9.2 - 11.8 FL    NEUTROPHILS 79 (H) 40 - 73 % LYMPHOCYTES 12 (L) 21 - 52 %    MONOCYTES 9 3 - 10 %    EOSINOPHILS 0 0 - 5 %    BASOPHILS 0 0 - 2 %    ABS. NEUTROPHILS 5.1 1.8 - 8.0 K/UL    ABS. LYMPHOCYTES 0.8 (L) 0.9 - 3.6 K/UL    ABS. MONOCYTES 0.6 0.05 - 1.2 K/UL    ABS. EOSINOPHILS 0.0 0.0 - 0.4 K/UL    ABS. BASOPHILS 0.0 0.0 - 0.1 K/UL    DF AUTOMATED         Signed By: Batool Holden     September 28, 2021      I spent *** minutes with the patient in face-to-face consultation, of which greater than 50% was spent in counseling and coordination of care as described above    Disclaimer: Sections of this note are dictated using utilizing voice recognition software. Minor typographical errors may be present. If questions arise, please do not hesitate to contact me or call our department.

## 2021-09-28 NOTE — PROGRESS NOTES
Problem: Falls - Risk of  Goal: *Absence of Falls  Description: Document Katy Skaggs Fall Risk and appropriate interventions in the flowsheet.   Outcome: Progressing Towards Goal  Note: Fall Risk Interventions:  Mobility Interventions: Bed/chair exit alarm, Patient to call before getting OOB, Utilize walker, cane, or other assistive device         Medication Interventions: Bed/chair exit alarm, Patient to call before getting OOB, Teach patient to arise slowly    Elimination Interventions: Bed/chair exit alarm, Call light in reach, Elevated toilet seat, Patient to call for help with toileting needs, Stay With Me (per policy), Toilet paper/wipes in reach, Toileting schedule/hourly rounds, Urinal in reach              Problem: Patient Education: Go to Patient Education Activity  Goal: Patient/Family Education  Outcome: Progressing Towards Goal     Problem: Pain  Goal: *Control of Pain  Outcome: Progressing Towards Goal     Problem: Patient Education: Go to Patient Education Activity  Goal: Patient/Family Education  Outcome: Progressing Towards Goal     Problem: Alcohol Withdrawal  Goal: *STG: Participates in treatment plan  Outcome: Progressing Towards Goal  Goal: *STG: Remains safe in hospital  Outcome: Progressing Towards Goal  Goal: *STG: Seeks staff when symptoms of withdrawal increase  Outcome: Progressing Towards Goal  Goal: *STG: Complies with medication therapy  Outcome: Progressing Towards Goal  Goal: *STG: Attends activities and groups  Outcome: Progressing Towards Goal  Goal: *STG: Will identify negative impact of chemical dependency including the use of tobacco, alcohol, and other substances  Outcome: Progressing Towards Goal  Goal: *STG: Verbalizes abstinence as an achievable goal  Outcome: Progressing Towards Goal  Goal: *STG: Agrees to participate in outpatient after care program to support ongoing mental health  Outcome: Progressing Towards Goal  Goal: *STG: Able to indentify relapse triggers including interpersonal/social and familial factors  Outcome: Progressing Towards Goal  Goal: *STG: Identify lifestyle changes to support long term sobriety such as vocation, employment, education, and legal issues  Outcome: Progressing Towards Goal  Goal: *STG: Maintains appropriate nutrition and hydration  Outcome: Progressing Towards Goal  Goal: *STG: Vital signs within defined limits  Outcome: Progressing Towards Goal  Goal: *STG/LTG: Relapse prevention plan in place to include housing/aftercare, leisure activities, and spirituality  Outcome: Progressing Towards Goal  Goal: Interventions  Outcome: Progressing Towards Goal     Problem: Patient Education: Go to Patient Education Activity  Goal: Patient/Family Education  Outcome: Progressing Towards Goal     Problem: Patient Education: Go to Patient Education Activity  Goal: Patient/Family Education  Outcome: Progressing Towards Goal     Problem: Sepsis: Day 2  Goal: Off Pathway (Use only if patient is Off Pathway)  Outcome: Progressing Towards Goal  Goal: *Central Venous Pressure maintained at 8-12 mm Hg  Outcome: Progressing Towards Goal  Goal: *Hemodynamically stable  Outcome: Progressing Towards Goal  Goal: *Tolerating diet  Outcome: Progressing Towards Goal  Goal: Activity/Safety  Outcome: Progressing Towards Goal  Goal: Consults, if ordered  Outcome: Progressing Towards Goal  Goal: Diagnostic Test/Procedures  Outcome: Progressing Towards Goal  Goal: Nutrition/Diet  Outcome: Progressing Towards Goal  Goal: Discharge Planning  Outcome: Progressing Towards Goal  Goal: Medications  Outcome: Progressing Towards Goal  Goal: Respiratory  Outcome: Progressing Towards Goal  Goal: Treatments/Interventions/Procedures  Outcome: Progressing Towards Goal  Goal: Psychosocial  Outcome: Progressing Towards Goal     Problem: Sepsis: Day 3  Goal: Off Pathway (Use only if patient is Off Pathway)  Outcome: Progressing Towards Goal  Goal: *Central Venous Pressure maintained at 8-12 mm Hg  Outcome: Progressing Towards Goal  Goal: *Oxygen saturation within defined limits  Outcome: Progressing Towards Goal  Goal: *Vital sign stability  Outcome: Progressing Towards Goal  Goal: *Tolerating diet  Outcome: Progressing Towards Goal  Goal: *Demonstrates progressive activity  Outcome: Progressing Towards Goal  Goal: Activity/Safety  Outcome: Progressing Towards Goal  Goal: Consults, if ordered  Outcome: Progressing Towards Goal  Goal: Diagnostic Test/Procedures  Outcome: Progressing Towards Goal  Goal: Nutrition/Diet  Outcome: Progressing Towards Goal  Goal: Discharge Planning  Outcome: Progressing Towards Goal  Goal: Medications  Outcome: Progressing Towards Goal  Goal: Respiratory  Outcome: Progressing Towards Goal  Goal: Treatments/Interventions/Procedures  Outcome: Progressing Towards Goal  Goal: Psychosocial  Outcome: Progressing Towards Goal     Problem: Sepsis: Day 4  Goal: Off Pathway (Use only if patient is Off Pathway)  Outcome: Progressing Towards Goal  Goal: Activity/Safety  Outcome: Progressing Towards Goal  Goal: Consults, if ordered  Outcome: Progressing Towards Goal  Goal: Diagnostic Test/Procedures  Outcome: Progressing Towards Goal  Goal: Nutrition/Diet  Outcome: Progressing Towards Goal  Goal: Discharge Planning  Outcome: Progressing Towards Goal  Goal: Medications  Outcome: Progressing Towards Goal  Goal: Respiratory  Outcome: Progressing Towards Goal  Goal: Treatments/Interventions/Procedures  Outcome: Progressing Towards Goal  Goal: Psychosocial  Outcome: Progressing Towards Goal  Goal: *Oxygen saturation within defined limits  Outcome: Progressing Towards Goal  Goal: *Hemodynamically stable  Outcome: Progressing Towards Goal  Goal: *Vital signs within defined limits  Outcome: Progressing Towards Goal  Goal: *Tolerating diet  Outcome: Progressing Towards Goal  Goal: *Demonstrates progressive activity  Outcome: Progressing Towards Goal  Goal: *Fluid volume maintenance  Outcome: Progressing Towards Goal     Problem: Sepsis: Day 5  Goal: Off Pathway (Use only if patient is Off Pathway)  Outcome: Progressing Towards Goal  Goal: *Oxygen saturation within defined limits  Outcome: Progressing Towards Goal  Goal: *Vital signs within defined limits  Outcome: Progressing Towards Goal  Goal: *Tolerating diet  Outcome: Progressing Towards Goal  Goal: *Demonstrates progressive activity  Outcome: Progressing Towards Goal  Goal: *Discharge plan identified  Outcome: Progressing Towards Goal  Goal: Activity/Safety  Outcome: Progressing Towards Goal  Goal: Consults, if ordered  Outcome: Progressing Towards Goal  Goal: Diagnostic Test/Procedures  Outcome: Progressing Towards Goal  Goal: Nutrition/Diet  Outcome: Progressing Towards Goal  Goal: Discharge Planning  Outcome: Progressing Towards Goal  Goal: Medications  Outcome: Progressing Towards Goal  Goal: Respiratory  Outcome: Progressing Towards Goal  Goal: Treatments/Interventions/Procedures  Outcome: Progressing Towards Goal  Goal: Psychosocial  Outcome: Progressing Towards Goal     Problem: Sepsis: Day 6  Goal: Off Pathway (Use only if patient is Off Pathway)  Outcome: Progressing Towards Goal  Goal: *Oxygen saturation within defined limits  Outcome: Progressing Towards Goal  Goal: *Vital signs within defined limits  Outcome: Progressing Towards Goal  Goal: *Tolerating diet  Outcome: Progressing Towards Goal  Goal: *Demonstrates progressive activity  Outcome: Progressing Towards Goal  Goal: Influenza immunization  Outcome: Progressing Towards Goal  Goal: *Pneumococcal immunization  Outcome: Progressing Towards Goal  Goal: Activity/Safety  Outcome: Progressing Towards Goal  Goal: Diagnostic Test/Procedures  Outcome: Progressing Towards Goal  Goal: Nutrition/Diet  Outcome: Progressing Towards Goal  Goal: Discharge Planning  Outcome: Progressing Towards Goal  Goal: Medications  Outcome: Progressing Towards Goal  Goal: Respiratory  Outcome: Progressing Towards Goal  Goal: Treatments/Interventions/Procedures  Outcome: Progressing Towards Goal  Goal: Psychosocial  Outcome: Progressing Towards Goal     Problem: Sepsis: Discharge Outcomes  Goal: *Vital signs within defined limits  Outcome: Progressing Towards Goal  Goal: *Tolerating diet  Outcome: Progressing Towards Goal  Goal: *Verbalizes understanding and describes prescribed diet  Outcome: Progressing Towards Goal  Goal: *Demonstrates progressive activity  Outcome: Progressing Towards Goal  Goal: *Describes follow-up/return visits to physicians  Outcome: Progressing Towards Goal  Goal: *Verbalizes name, dosage, time, side effects, and number of days to continue medications  Outcome: Progressing Towards Goal  Goal: *Influenza immunization (Oct-Mar only)  Outcome: Progressing Towards Goal  Goal: *Pneumococcal immunization  Outcome: Progressing Towards Goal  Goal: *Lungs clear or at baseline  Outcome: Progressing Towards Goal  Goal: *Oxygen saturation returns to baseline or 90% or better on room air  Outcome: Progressing Towards Goal  Goal: *Glycemic control  Outcome: Progressing Towards Goal  Goal: *Absence of deep venous thrombosis signs and symptoms(Stroke Metric)  Outcome: Progressing Towards Goal  Goal: *Describes available resources and support systems  Outcome: Progressing Towards Goal  Goal: *Optimal pain control at patient's stated goal  Outcome: Progressing Towards Goal

## 2021-09-28 NOTE — PROGRESS NOTES
[facility]  [facility]    Patient: Lara Yanes Age: 43 y.o. : 1985 MR#: [id number] SSN: [id number]  Date/Time: 2021     Subjective: Patient feels fine, says ankle pain still bad. No chest pain, no shortness of breath. Patient states he got up and lost balance and he was about to fall but he held the table and then sat down. No dizziness or lightheadedness. Patient denies any hallucinations now, states he felt some hallucinations last night. Slightly jittery on and off. Assessment/Plan:   1. Septic arthritis of the left ankle status post I&D 2021  2. Hypertension, BP uncontrolled  3. Alcohol abuse with mild withdrawal signs  4. History of anxiety and depression. Plan  We will continue empiric antibiotics, ID input noted  Continue wound care and drain per Ortho team  Will adjust pain medications, add p.o. pain meds  Continue atenolol, increase lisinopril. Continue thiamine, folic acid and multivitamin. Continue CIWA protocol  We will get PT/OT eval and treatment    Discussed with the patient at the bedside and explained in detail about my above plan care. Advised patient not to get up without assistance. Case discussed with:  [x]Patient  []Family  [x]Nursing  []Case Management  DVT Prophylaxis:  [x]Lovenox  []Hep SQ  []SCDs  []Coumadin   []Eliquis/Xarelto     Objective:   VS:   Visit Vitals  BP (!) 148/89   Pulse 91   Temp 99 °F (37.2 °C)   Resp 16   Ht 6' 2\" (1.88 m)   Wt 102.1 kg (225 lb)   SpO2 99%   BMI 28.89 kg/m²      Tmax/24hrs: Temp (24hrs), Av.3 °F (36.8 °C), Min:97.1 °F (36.2 °C), Max:99 °F (37.2 °C)  IOBRIEF    Intake/Output Summary (Last 24 hours) at 2021 1156  Last data filed at 2021 0659  Gross per 24 hour   Intake 2760 ml   Output 3750 ml   Net -990 ml       General:  Alert, cooperative, no acute distress    HEENT: PERRLA, anicteric sclerae. Pulmonary:  CTA Bilaterally.  No Wheezing/Rales. Cardiovascular: Regular rate and Rhythm. GI:  Soft, Non distended, Non tender. + Bowel sounds. Extremities: Ankle dressing clean, drain in place, no edema. No calf tenderness. Psych: Good insight. Not anxious or agitated. Neurologic: Alert and oriented X 3. Mild tremors, moves all ext. Additional:    Medications:   Current Facility-Administered Medications   Medication Dose Route Frequency    diphenhydrAMINE (BENADRYL) capsule 50 mg  50 mg Oral Q6H PRN    VANCOMYCIN TROUGH DUE  1 Each Other ONCE    [START ON 9/29/2021] lisinopriL (PRINIVIL, ZESTRIL) tablet 40 mg  40 mg Oral DAILY    HYDROmorphone (DILAUDID) injection 1 mg  1 mg IntraVENous Q6H PRN    HYDROcodone-acetaminophen (NORCO) 5-325 mg per tablet 2 Tablet  2 Tablet Oral Q6H PRN    vancomycin (VANCOCIN) 1500 mg in  ml infusion  1,500 mg IntraVENous Q8H    cefTRIAXone (ROCEPHIN) 2 g in sterile water (preservative free) 20 mL IV syringe  2 g IntraVENous Q24H    sodium chloride (NS) flush 5-40 mL  5-40 mL IntraVENous Q8H    sodium chloride (NS) flush 5-40 mL  5-40 mL IntraVENous PRN    hydrALAZINE (APRESOLINE) 20 mg/mL injection 10 mg  10 mg IntraVENous Q6H PRN    LORazepam (ATIVAN) tablet 1 mg  1 mg Oral Q1H PRN    Or    LORazepam (ATIVAN) injection 1 mg  1 mg IntraVENous Q1H PRN    LORazepam (ATIVAN) tablet 2 mg  2 mg Oral Q1H PRN    Or    LORazepam (ATIVAN) injection 2 mg  2 mg IntraVENous Q1H PRN    LORazepam (ATIVAN) injection 3 mg  3 mg IntraVENous Q15MIN PRN    atenoloL (TENORMIN) tablet 50 mg  50 mg Oral DAILY    escitalopram oxalate (LEXAPRO) tablet 20 mg  20 mg Oral DAILY    atorvastatin (LIPITOR) tablet 20 mg  20 mg Oral DAILY    pantoprazole (PROTONIX) tablet 20 mg  20 mg Oral ACB    0.9% sodium chloride 1,000 mL with mvi (adult no. 4 with vit K) 10 mL, thiamine 952 mg, folic acid 1 mg infusion   IntraVENous Q24H    sodium chloride (NS) flush 5-40 mL  5-40 mL IntraVENous Q8H    sodium chloride (NS) flush 5-40 mL  5-40 mL IntraVENous PRN    acetaminophen (TYLENOL) tablet 650 mg  650 mg Oral Q6H PRN    Or    acetaminophen (TYLENOL) suppository 650 mg  650 mg Rectal Q6H PRN    polyethylene glycol (MIRALAX) packet 17 g  17 g Oral DAILY PRN    ondansetron (ZOFRAN ODT) tablet 4 mg  4 mg Oral Q8H PRN    Or    ondansetron (ZOFRAN) injection 4 mg  4 mg IntraVENous Q6H PRN    naloxone (NARCAN) injection 0.4 mg  0.4 mg IntraVENous PRN       Labs:    Recent Results (from the past 24 hour(s))   EKG, 12 LEAD, INITIAL    Collection Time: 09/27/21 12:57 PM   Result Value Ref Range    Ventricular Rate 69 BPM    Atrial Rate 69 BPM    P-R Interval 126 ms    QRS Duration 90 ms    Q-T Interval 448 ms    QTC Calculation (Bezet) 480 ms    Calculated P Axis 50 degrees    Calculated R Axis -12 degrees    Calculated T Axis 6 degrees    Diagnosis       Normal sinus rhythm  Nonspecific ST and T wave abnormality  Abnormal ECG  When compared with ECG of 06-NOV-2020 15:03,  Nonspecific T wave abnormality now evident in Inferior leads  Nonspecific T wave abnormality, worse in Anterior leads  Confirmed by Jose Roper MD, ----- ([id number]) on 9/27/2021 3:55:39 PM     DRUG SCREEN, URINE    Collection Time: 09/27/21  2:22 PM   Result Value Ref Range    BENZODIAZEPINES Negative NEG      BARBITURATES Negative NEG      THC (TH-CANNABINOL) Negative NEG      OPIATES Positive (A) NEG      PCP(PHENCYCLIDINE) Negative NEG      COCAINE Negative NEG      AMPHETAMINES Negative NEG      METHADONE Negative NEG      HDSCOM (NOTE)    CULTURE, BODY FLUID W GRAM STAIN    Collection Time: 09/27/21  3:14 PM    Specimen: Drainage;  Body Fluid   Result Value Ref Range    Special Requests: RIGHT  ANKLE        GRAM STAIN NO ORGANISMS SEEN      GRAM STAIN NO WBC'S SEEN      Culture result: PENDING    METABOLIC PANEL, BASIC    Collection Time: 09/28/21  4:00 AM   Result Value Ref Range    Sodium 138 136 - 145 mmol/L    Potassium 3.6 3.5 - 5.5 mmol/L    Chloride 108 100 - 111 mmol/L    CO2 23 21 - 32 mmol/L    Anion gap 7 3.0 - 18 mmol/L    Glucose 99 74 - 99 mg/dL    BUN 5 (L) 7.0 - 18 MG/DL    Creatinine 0.60 0.6 - 1.3 MG/DL    BUN/Creatinine ratio 8 (L) 12 - 20      GFR est AA >60 >60 ml/min/1.73m2    GFR est non-AA >60 >60 ml/min/1.73m2    Calcium 8.3 (L) 8.5 - 10.1 MG/DL   CBC WITH AUTOMATED DIFF    Collection Time: 09/28/21  4:00 AM   Result Value Ref Range    WBC 6.5 4.6 - 13.2 K/uL    RBC 3.55 (L) 4.35 - 5.65 M/uL    HGB 11.6 (L) 13.0 - 16.0 g/dL    HCT 33.1 (L) 36.0 - 48.0 %    MCV 93.2 78.0 - 100.0 FL    MCH 32.7 24.0 - 34.0 PG    MCHC 35.0 31.0 - 37.0 g/dL    RDW 12.5 11.6 - 14.5 %    PLATELET 029 742 - 928 K/uL    MPV 9.4 9.2 - 11.8 FL    NEUTROPHILS 79 (H) 40 - 73 %    LYMPHOCYTES 12 (L) 21 - 52 %    MONOCYTES 9 3 - 10 %    EOSINOPHILS 0 0 - 5 %    BASOPHILS 0 0 - 2 %    ABS. NEUTROPHILS 5.1 1.8 - 8.0 K/UL    ABS. LYMPHOCYTES 0.8 (L) 0.9 - 3.6 K/UL    ABS. MONOCYTES 0.6 0.05 - 1.2 K/UL    ABS. EOSINOPHILS 0.0 0.0 - 0.4 K/UL    ABS. BASOPHILS 0.0 0.0 - 0.1 K/UL    DF AUTOMATED         Signed By: Vera Paul MD     September 28, 2021      Disclaimer: Sections of this note are dictated using utilizing voice recognition software. Minor typographical errors may be present. If questions arise, please do not hesitate to contact me or call our department.

## 2021-09-28 NOTE — PROGRESS NOTES
Infectious Disease progress Note        Reason: Left ankle septic arthritis, penicillin allergy    Current abx Prior abx   Cefepime, vancomycin since 9/26/2021  cefepime, vancomycinx1 on 9/25     Lines:       Assessment :    41-year-old male with past medical history significant for depression, anxiety, substance abuse admitted to [facility] on 9/26/2021 with increasing left ankle pain, concerns for infection. Clinical presentation consistent with left ankle septic arthritis  Status post arthrocentesis 9/25/2021. Cultures 9/25-no growth till date  Joint fluid analysis 9/25-grossly bloody specimen. , 111.  97% neutrophils. Negative for crystals    Improved erythema/pain left ankle on today's exam.    Exact microbial etiology of infection not entirely clear at this time. Recommend to rule out gonorrhea/chlamydia infection    Status post surgical I&D left ankle on 9/27/2021. Intra-Op findings noted. Cloudy fluid noted intraoperatively. Intra-Op cultures 9/27 no growth till date    Antibiotic management complicated due to documented history of penicillin allergy. Patient states that he had severe allergic reaction including hives/swelling of the throat about 5 years ago. Is currently tolerating cefepime without difficulties    Recommendations:    1. Continue ceftriaxone. Continue vancomycin for now  2. Follow-up GC/chlamydia PCR in the urine  3. Follow-up Ortho surgery recommendations  4. Follow-up joint fluid cultures 9/25/2021, 9/27/2021  5. Further recommendations based on the above test results, clinical course      Above plan was discussed in details with patient, and dr Francse Jon. Please call me if any further questions or concerns. Will continue to participate in the care of this patient.   HPI:  Complains of pain recent surgical site left ankle    Current Discharge Medication List      CONTINUE these medications which have NOT CHANGED    Details   !! cephALEXin (Keflex) 500 mg capsule Take 1 Capsule by mouth four (4) times daily for 10 days. Qty: 40 Capsule, Refills: 0      !! cephALEXin (Keflex) 500 mg capsule Take 1 Capsule by mouth four (4) times daily for 10 days. Qty: 40 Capsule, Refills: 0      gabapentin (NEURONTIN) 400 mg capsule Take 1 Capsule by mouth three (3) times daily. Max Daily Amount: 1,200 mg. Qty: 90 Capsule, Refills: 0    Associated Diagnoses: Chronic right-sided low back pain with right-sided sciatica      lisinopriL (PRINIVIL, ZESTRIL) 20 mg tablet Take 1 Tablet by mouth daily. Qty: 90 Tablet, Refills: 0    Associated Diagnoses: Essential hypertension      atenoloL (TENORMIN) 50 mg tablet Take 2 Tablets by mouth daily. Qty: 180 Tablet, Refills: 0    Associated Diagnoses: Essential hypertension; MICHELLE (generalized anxiety disorder)      escitalopram oxalate (LEXAPRO) 20 mg tablet Take 1 Tablet by mouth daily. Qty: 30 Tablet, Refills: 1      atorvastatin (LIPITOR) 20 mg tablet Take 1 Tablet by mouth daily. Qty: 30 Tablet, Refills: 1      clonazePAM (KlonoPIN) 1 mg tablet Take 1 Tablet by mouth three (3) times daily as needed for Anxiety. Max Daily Amount: 3 mg. Qty: 15 Tablet, Refills: 0    Associated Diagnoses: Panic attacks      omeprazole (PRILOSEC) 20 mg capsule Take 1 Cap by mouth Daily (before breakfast). Qty: 30 Cap, Refills: 1      ondansetron (Zofran ODT) 4 mg disintegrating tablet Take 1 Tab by mouth every eight (8) hours as needed for Nausea or Nausea or Vomiting. Qty: 10 Tab, Refills: 0      tiZANidine (ZANAFLEX) 4 mg tablet Take 1 Tab by mouth three (3) times daily as needed for Muscle Spasm(s). Qty: 90 Tab, Refills: 2    Associated Diagnoses: Spasticity      cetirizine (ZYRTEC) 10 mg tablet Take 1 Tab by mouth nightly. Qty: 30 Tab, Refills: 1      sildenafil citrate (Viagra) 100 mg tablet Take 1 Tab by mouth as needed for Erectile Dysfunction.   Qty: 12 Tab, Refills: 5    Associated Diagnoses: Drug-induced erectile dysfunction      mupirocin (BACTROBAN) 2 % ointment Apply  to affected area daily. Qty: 22 g, Refills: 0    Associated Diagnoses: Nasal sore      fluticasone propionate (FLONASE) 50 mcg/actuation nasal spray 2 Sprays by Both Nostrils route daily. Qty: 1 Bottle, Refills: 0      guaiFENesin ER (MUCINEX) 600 mg ER tablet Take 1 Tab by mouth two (2) times a day. Qty: 20 Tab, Refills: 0      Camphor-Eucalyptus Oil-Menthol (VICKS VAPORUB) 4.8-1.2-2.6 % oint 1 Actuation(s) by Apply Externally route three (3) times daily as needed for Cough or Other (congestion). Qty: 50 g, Refills: 0      Vaporizers (VICKS WARM STEAM VAPORIZER) misc 1 Actuation(s) by Does Not Apply route three (3) times daily as needed for Cough or Other (congestion). Qty: 1 Each, Refills: 0       !! - Potential duplicate medications found. Please discuss with provider.           Current Facility-Administered Medications   Medication Dose Route Frequency    diphenhydrAMINE (BENADRYL) capsule 50 mg  50 mg Oral Q6H PRN    VANCOMYCIN TROUGH DUE  1 Each Other ONCE    HYDROmorphone (DILAUDID) injection 1 mg  1 mg IntraVENous Q3H PRN    vancomycin (VANCOCIN) 1500 mg in  ml infusion  1,500 mg IntraVENous Q8H    cefTRIAXone (ROCEPHIN) 2 g in sterile water (preservative free) 20 mL IV syringe  2 g IntraVENous Q24H    sodium chloride (NS) flush 5-40 mL  5-40 mL IntraVENous Q8H    sodium chloride (NS) flush 5-40 mL  5-40 mL IntraVENous PRN    hydrALAZINE (APRESOLINE) 20 mg/mL injection 10 mg  10 mg IntraVENous Q6H PRN    LORazepam (ATIVAN) tablet 1 mg  1 mg Oral Q1H PRN    Or    LORazepam (ATIVAN) injection 1 mg  1 mg IntraVENous Q1H PRN    LORazepam (ATIVAN) tablet 2 mg  2 mg Oral Q1H PRN    Or    LORazepam (ATIVAN) injection 2 mg  2 mg IntraVENous Q1H PRN    LORazepam (ATIVAN) injection 3 mg  3 mg IntraVENous Q15MIN PRN    atenoloL (TENORMIN) tablet 50 mg  50 mg Oral DAILY    escitalopram oxalate (LEXAPRO) tablet 20 mg  20 mg Oral DAILY    lisinopriL (PRINIVIL, ZESTRIL) tablet 20 mg  20 mg Oral DAILY    atorvastatin (LIPITOR) tablet 20 mg  20 mg Oral DAILY    pantoprazole (PROTONIX) tablet 20 mg  20 mg Oral ACB    0.9% sodium chloride 1,000 mL with mvi (adult no. 4 with vit K) 10 mL, thiamine 992 mg, folic acid 1 mg infusion   IntraVENous Q24H    0.9% sodium chloride infusion  75 mL/hr IntraVENous CONTINUOUS    sodium chloride (NS) flush 5-40 mL  5-40 mL IntraVENous Q8H    sodium chloride (NS) flush 5-40 mL  5-40 mL IntraVENous PRN    acetaminophen (TYLENOL) tablet 650 mg  650 mg Oral Q6H PRN    Or    acetaminophen (TYLENOL) suppository 650 mg  650 mg Rectal Q6H PRN    polyethylene glycol (MIRALAX) packet 17 g  17 g Oral DAILY PRN    ondansetron (ZOFRAN ODT) tablet 4 mg  4 mg Oral Q8H PRN    Or    ondansetron (ZOFRAN) injection 4 mg  4 mg IntraVENous Q6H PRN    naloxone (NARCAN) injection 0.4 mg  0.4 mg IntraVENous PRN       Allergies: Amoxicillin, Contrast dye [iodine], Iodinated contrast media, Penicillins, and Wellbutrin [bupropion]    Family History   Problem Relation Age of Onset    Elevated Lipids Mother     Thyroid Disease Mother     Hypertension Father     Alcohol abuse Maternal Grandmother     MS Paternal Grandmother      Social History     Socioeconomic History    Marital status: SINGLE     Spouse name: Not on file    Number of children: Not on file    Years of education: Not on file    Highest education level: Not on file   Occupational History    Not on file   Tobacco Use    Smoking status: Former Smoker     Quit date: 2012     Years since quittin.0    Smokeless tobacco: Never Used    Tobacco comment: 2018    Substance and Sexual Activity    Alcohol use: Not Currently     Alcohol/week: 13.3 standard drinks     Types: 8 Cans of beer, 8 Standard drinks or equivalent per week    Drug use: No    Sexual activity: Not on file   Other Topics Concern    Not on file   Social History Narrative    Not on file     Social Determinants of Health Financial Resource Strain:     Difficulty of Paying Living Expenses:    Food Insecurity:     Worried About Running Out of Food in the Last Year:     [address] in the Last Year:    Transportation Needs:     Lack of Transportation (Medical):  Lack of Transportation (Non-Medical):    Physical Activity:     Days of Exercise per Week:     Minutes of Exercise per Session:    Stress:     Feeling of Stress :    Social Connections:     Frequency of Communication with Friends and Family:     Frequency of Social Gatherings with Friends and Family:     Attends Sikhism Services:     Active Member of Clubs or Organizations:     Attends Club or Organization Meetings:     Marital Status:    Intimate Partner Violence:     Fear of Current or Ex-Partner:     Emotionally Abused:     Physically Abused:     Sexually Abused:      Social History     Tobacco Use   Smoking Status Former Smoker    Quit date: 2012    Years since quittin.0   Smokeless Tobacco Never Used   Tobacco Comment    2018         Temp (24hrs), Av.2 °F (36.8 °C), Min:97.1 °F (36.2 °C), Max:98.7 °F (37.1 °C)    Visit Vitals  BP (!) 170/105 (BP 1 Location: Left upper arm, BP Patient Position: Semi fowlers)   Pulse 65   Temp 98.3 °F (36.8 °C)   Resp 16   Ht 6' 2\" (1.88 m)   Wt 102.1 kg (225 lb)   SpO2 99%   BMI 28.89 kg/m²       ROS: 12 point ROS obtained in details. Pertinent positives as mentioned in HPI,   otherwise negative    Physical Exam:    General: Well developed, well nourished male laying on the bed AAOx3 in no acute distress. General:   awake alert and oriented   HEENT:  Normocephalic, atraumatic, EOMI, no scleral icterus or pallor; no conjunctival hemmohage;  nasal and oral mucous are moist and without evidence of lesions. Neck supple, no bruits.    Lymph Nodes:   not examined   Lungs:   non-labored, bilaterally clear to auscultation- no crackles wheezes rales or rhonchi   Heart:  RRR, s1 and s2; no rubs or gallops, no edema   Abdomen:  soft, non-distended, active bowel sounds, no hepatomegaly, no splenomegaly. Non-tender   Genitourinary:  deferred   Extremities:   no clubbing, cyanosis; left ankle surgical dressing not removed. No erythema/swelling of any other joints. No new rash   Neurologic:  No gross focal sensory abnormalities; 5/5 muscle strength to upper and lower extremities. Speech appropriate. Cranial nerves intact                        Skin:  No rash or ulcers noted   Back:  no spinal or paraspinal muscle tenderness or rigidity, no CVA tenderness     Psychiatric:  No suicidal or homicidal ideations, appropriate mood and affect         Labs: Results:   Chemistry Recent Labs     09/28/21  0400 09/27/21  0400 09/26/21  1329 09/25/21  1700 09/25/21  1700   GLU 99 81 87   < > 84    143 142   < > 139   K 3.6 3.6 4.2   < > 3.4*    112* 112*   < > 105   CO2 23 24 25   < > 26   BUN 5* 5* 7   < > 7   CREA 0.60 0.68 0.76   < > 0.86   CA 8.3* 8.1* 8.9   < > 8.3*   AGAP 7 7 5   < > 8   BUCR 8* 7* 9*   < > 8*   AP  --   --  57  --  61   TP  --   --  7.3  --  6.9   ALB  --   --  3.4  --  3.6   GLOB  --   --  3.9  --  3.3   AGRAT  --   --  0.9  --  1.1    < > = values in this interval not displayed. CBC w/Diff Recent Labs     09/28/21 0400 09/27/21  0400 09/26/21  1640   WBC 6.5 4.1* 5.5   RBC 3.55* 3.40* 3.58*   HGB 11.6* 11.1* 11.5*   HCT 33.1* 32.3* 33.4*    159 164   GRANS 79* 45 70   LYMPH 12* 40 18*   EOS 0 3 1      Microbiology Recent Labs     09/27/21  1514 09/25/21  1822 09/25/21  1733 09/25/21  1719   CULT PENDING NO GROWTH 3 DAYS Culture performed on Unspun Fluid NO GROWTH THUS FAR NO GROWTH 3 DAYS          RADIOLOGY:    All available imaging studies/reports in [facility] for this admission were reviewed      Disclaimer: Sections of this note are dictated utilizing voice recognition software, which may have resulted in some phonetic based errors in grammar and contents. Even though attempts were made to correct all the mistakes, some may have been missed, and remained in the body of the document. If questions arise, please contact our department.     Dr. Sherry Butler, Infectious Disease Specialist  [phone]  September 28, 2021  2:31 PM

## 2021-09-28 NOTE — PROGRESS NOTES
Pharmacy Dosing Services: Vancomycin    Indication: Bone/Joint Infection    Day of therapy: 3    Other Antimicrobials (Include dose, start day & day of therapy):  Ceftriaxone    Loading dose (date given): 2000 mg  Current Maintenance dose: 1500 mg IV every 8 hours    Goal Vancomycin Level: Vancomycin Trough: 15 - 20 mcg/mL (most infections); -600 mg/L.hr    Vancomycin Level (if drawn):   Recent Labs     09/28/21  1434   VANCT 14.1       Significant Cultures:   Results       Procedure Component Value Units Date/Time    CULTURE, ANAEROBIC [[id number]] Collected: 09/27/21 1515    Order Status: Completed Specimen: Abscess Updated: 09/28/21 1416     Special Requests: --        RIGHT  ANKLE       Culture result: NO GROWTH THUS FAR       CULTURE, BODY FLUID Sebastián Ramirez [[id number]] Collected: 09/27/21 1514    Order Status: Completed Specimen: Body Fluid from Drainage Updated: 09/28/21 1415     Special Requests: --        RIGHT  ANKLE       GRAM STAIN NO ORGANISMS SEEN         NO WBC'S SEEN        Culture result: NO GROWTH THUS FAR       COVID-19 RAPID TEST [[id number]] Collected: 09/27/21 0845    Order Status: Completed Specimen: Nasopharyngeal Updated: 09/27/21 0921     Specimen source Nasopharyngeal        COVID-19 rapid test Not detected        Comment: Rapid Abbott ID Now       Rapid NAAT:  The specimen is NEGATIVE for SARS-CoV-2, the novel coronavirus associated with COVID-19. Negative results should be treated as presumptive and, if inconsistent with clinical signs and symptoms or necessary for patient management, should be tested with an alternative molecular assay. Negative results do not preclude SARS-CoV-2 infection and should not be used as the sole basis for patient management decisions. This test has been authorized by the FDA under an Emergency Use Authorization (EUA) for use by authorized laboratories.    Fact sheet for Healthcare Providers: ConventionUpdate.co.nz  Fact sheet for Patients: ConventionUpdate.co.nz       Methodology: Isothermal Nucleic Acid Amplification         CULTURE, BLOOD [[id number]] Collected: 21    Order Status: Completed Specimen: Blood Updated: 21     Special Requests: NO SPECIAL REQUESTS        Culture result: NO GROWTH 3 DAYS       CULTURE, BODY FLUID MIGUEL Cifuentes [[id number]] Collected: 21 1733    Order Status: Completed Specimen: Body Fluid from Joint Fluid Updated: 21     Special Requests: NO SPECIAL REQUESTS        GRAM STAIN MANY WBCS SEEN         NO ORGANISMS SEEN        Culture result:       Culture performed on Unspun Fluid NO GROWTH THUS FAR          CULTURE, BLOOD [[id number]] Collected: 21    Order Status: Completed Specimen: Blood Updated: 21     Special Requests: NO SPECIAL REQUESTS        Culture result: NO GROWTH 3 DAYS       CULTURE, BLOOD [[id number]] Collected: 21    Order Status: Canceled Specimen: Blood             Weight 102.1 kg (225 lb)  Recent Labs     21  0400 21  0400 21  1640 21  1329 21  1700   CREA 0.60 0.68  --  0.76  --    BUN 5* 5*  --  7  --    WBC 6.5 4.1* 5.5  --    < >    < > = values in this interval not displayed. Temp (72hrs), Av.4 °F (36.9 °C), Min:97.1 °F (36.2 °C), Max:99 °F (37.2 °C)    Estimated Creatinine Clearance Estimated Creatinine Clearance: 182.5 mL/min (by C-G formula based on SCr of 0.6 mg/dL). Estimated Creatinine Clearance (using IBW): 166.4 mL/min       CAPD, Hemodialysis or Renal Replacement Therapy: N/A    Renal function stable? (unstable defined as SCr increase of 0.5 mg/dL or > 50% increase from baseline, whichever is greater) (Y/N): Y       Regimen assessment:   - vancomycin trough returned at 14.1 ~ 8 hours post dose. Only 1 of the last 4 doses have been given on time and at the current frequency. Patient with septic arthritis (L ankle).  For now will keep at current dosing schedule. Repeat trough tomorrow before 4th MD.   - NGTD ankle culture. ID following  Maintenance dose: 1500 mg IV every 8 hours  Next scheduled level: 09/29/21 @ 1330       Pharmacy will follow daily and adjust medications as appropriate for renal function and/or serum levels.     Thank you,  Jairo Campa, PHARMD

## 2021-09-28 NOTE — PROGRESS NOTES
Ortho    Pt seen and evaluated  Doing well  Pain well controlled  No cp, sob, abd pain    Visit Vitals  BP (!) 142/89 (BP 1 Location: Left upper arm, BP Patient Position: At rest;Lying)   Pulse 69   Temp 98.4 °F (36.9 °C)   Resp 16   Ht 6' 2\" (1.88 m)   Wt 225 lb (102.1 kg)   SpO2 99%   BMI 28.89 kg/m²     Left LE  Dressing cdi  Drain in place  Compartments soft  nv intact  2+ DP    Labs  Micro-   Ref Range & Units 9/27/21 1514   Special Requests:   RIGHT   ANKLE  P      GRAM STAIN   NO ORGANISMS SEEN P    GRAM STAIN   NO WBC'S SEEN P    Culture result:  PENDING        A: sp left ankle I&D    P:  Continue with current treatment, pain control, abx, plan for drain removal tomorrow.

## 2021-09-28 NOTE — ROUTINE PROCESS
Bedside and Verbal shift change report given to Tony Dalton RN (oncoming nurse) by Reena Tompkins RN (offgoing nurse). Report included the following information SBAR, Kardex, MAR and Recent Results. SITUATION:  Code Status: Full Code  Reason for Admission: Septic arthritis [facility]) [M00.9]  Hospital day: 2  Problem List:       Hospital Problems  Date Reviewed: 4/21/2021        Codes Class Noted POA    Septic arthritis ([facility] [address] ICD-10-CM: M00.9  ICD-9-CM: 711.00  9/26/2021 Unknown              BACKGROUND:   Past Medical History:   Past Medical History:   Diagnosis Date    Anxiety     Cholelithiasis 08/18/2013    Note on U/S    Chronic gastritis 10/17/2014    Dr. Melo Story (EGD Result) H. Pylori Neg    Depression     Dog bite of left hand including fingers with infection 11/16/2014    Adm 11/18/14 for IV antibiotics.  ETOH abuse     GERD (gastroesophageal reflux disease)     Hepatic steatosis 02/06/2014    Noted on U/S.     History of plasmapheresis 02/2014    Hypertriglyceridemia, Severe Pancreatitis    Hypertension     Hypertriglyceridemia     Hypomagnesemia 07/24/2016    1.6 mg/dL    Non compliance with medical treatment     Osteomyelitis of left hand ([facility] [address] 11/22/2014    MRI Finding @ LUE 2nd MCP joint.  Pancreatitis 08/17/2013    Highest on Record (2/6/14) 2723. Dr. Melo Story    PICC (peripherally inserted central catheter) in place 95/77/0030    R Basilic Vein (IV Antibiotics for LUE Hand Osteomyelitis).  Rectal bleeding     Substance abuse (HCC)     Several UDS + Opiates.      Vitamin D deficiency 02/17/2014    11.6ng/mL ( ng/mL)      Patient taking anticoagulants no    Patient has a defibrillator: no    History of shots YES for example, flu, pneumonia, tetanus   Isolation History NO for example, MRSA, CDiff    ASSESSMENT:  Changes in Assessment Throughout Shift: NONE  Significant Changes in 24 hours (for example, RR/code, fall)  Patient has Central Line: no   Patient has Gore Cath: no   Mobility Issues  PT  IV Patency  OR Checklist  Pending Tests    Last Vitals:  Vitals w/ MEWS Score (last day)     Date/Time MEWS Score Pulse Resp Temp BP Level of Consciousness SpO2    09/28/21 0800  1  65  16  98.3 °F (36.8 °C)  (!) 170/105  Alert (0)  99 %    09/28/21 0345  1  69  16  98.4 °F (36.9 °C)  (!) 142/89  Alert (0)  99 %    09/27/21 2327  1  80  18  98.2 °F (36.8 °C)  (!) 139/92  Alert (0)  98 %    09/27/21 1846    79    98.4 °F (36.9 °C)  135/84  Alert (0)  98 %    09/27/21 1757    75  17    (!) 148/89        09/27/21 1746    64  17    (!) 156/93    98 %    09/27/21 1736    67  15    (!) 158/96    100 %    09/27/21 1717    70  9    (!) 160/99    100 %    09/27/21 1657    79  17    (!) 145/91    98 %    09/27/21 1646    70  9    (!) 149/92    99 %    09/27/21 1637    74  13    (!) 148/92    98 %    09/27/21 1635    73  9    (!) 145/95    99 %    09/27/21 1616    82  12    (!) 156/98    98 %    09/27/21 1606    86  18    134/81    99 %    09/27/21 1603    97  18  97.1 °F (36.2 °C)  126/73    100 %    09/27/21 1556          126/73    98 %    09/27/21 1430  1  60  18  98.7 °F (37.1 °C)  (S) (!) 160/90  Alert (0)  100 %    09/27/21 1315    68  19        99 %    09/27/21 1300    68  18    (!) 141/90    94 %    09/27/21 1245    70  15        100 %    09/27/21 1230    67  24        100 %    09/27/21 1200    66  16        100 %    09/27/21 1145    69  13        100 %    09/27/21 1130    72  19        96 %    09/27/21 1115    67  18        98 %    09/27/21 1100    75  12    (!) 141/97    100 %    09/27/21 1045    72  18        96 %    09/27/21 1030    66  18        95 %    09/27/21 1015    66  19        96 %    09/27/21 1000    67  17    (!) 145/69    97 %    09/27/21 0945    69  15        95 %    09/27/21 0930    71  16        97 %    09/27/21 0915    74  18        100 %    09/27/21 0900   75  22    (!) 149/102    97 %    09/27/21 0845    66  17        96 %    09/27/21 0830    67  17        100 %    09/27/21 0815    65  19        99 %    09/27/21 0800    66  17        95 %    09/27/21 0745    64  16        99 %    09/27/21 0730    71  19        94 %    09/27/21 0715    74  22        95 %    09/27/21 0700    70  17    (!) 136/99    94 %            PAIN    Pain Assessment    Pain Intensity 1: 10 (09/28/21 0742)    Pain Location 1: Ankle    Pain Intervention(s) 1: Medication (see MAR)    Patient Stated Pain Goal: 0  Intervention effective: yes  Time of last intervention: 0742 Reassessment Completed: yes   Other actions taken for pain: Distraction    Last 3 Weights:  Last 3 Recorded Weights in this Encounter    09/26/21 1534 09/27/21 1430   Weight: 102.1 kg (225 lb) 102.1 kg (225 lb)   Weight change: 0 kg (0 lb)    INTAKE/OUPUT    Current Shift: No intake/output data recorded. Last three shifts: 09/26 1901 - 09/28 0700  In: 4280 [P.O.:240; I.V.:4040]  Out: 3750 [Urine:3750]    RECOMMENDATIONS AND DISCHARGE PLANNING  Patient needs and requests: Pain Management    Pending tests/procedures: Labs     Discharge plan for patient: Home    Discharge planning Needs or Barriers: None    Estimated Discharge Date: 9/30/2021 Posted on Whiteboard in Patients Room: yes       \"HEALS\" SAFETY CHECK  A safety check occurred in the patient's room between off going nurse and oncoming nurse listed above. The safety check included the below items:    H  High Alert Medications Verify all high alert medication drips (heparin, PCA, etc.)  E  Equipment Suction is set up for ALL patients (with yanker)  Red plugs utilized for all equipment (IV pumps, etc.)  WOWs wiped down at end of shift.   Room stocked with oxygen, suction, and other unit-specific supplies  A  Alarms Bed alarm is set for fall risk patients  Ensure chair alarm is in place and activated if patient is up in a chair  L  Lines Check IV for any infiltration  Gore bag is empty if patient has a Gore   Tubing and IV bags are labeled  S  Safety  Room is clean, patient is clean, and equipment is clean. Hallways are clear from equipment besides carts. Fall bracelet on for fall risk patients  Ensure room is clear and free of clutter  Suction is set up for ALL patients (with re)  Hallways are clear from equipment besides carts.    Isolation precautions followed, supplies available outside room, sign posted    Gaston Zamora RN

## 2021-09-28 NOTE — PROGRESS NOTES
Patient is complaining that he cannot fall asleep even after administration of Ativan and Dilaudid. He is requesting for Benadryl. Dr. Hannah Odonnell is informed and new order of Benadryl 50 mg PO q 6 hours PRN is received.

## 2021-09-29 ENCOUNTER — TELEPHONE (OUTPATIENT)
Dept: ORTHOPEDIC SURGERY | Age: 36
End: 2021-09-29

## 2021-09-29 LAB
ANION GAP SERPL CALC-SCNC: 8 MMOL/L (ref 3–18)
BUN SERPL-MCNC: 5 MG/DL (ref 7–18)
BUN/CREAT SERPL: 9 (ref 12–20)
CALCIUM SERPL-MCNC: 7.9 MG/DL (ref 8.5–10.1)
CHLORIDE SERPL-SCNC: 112 MMOL/L (ref 100–111)
CO2 SERPL-SCNC: 22 MMOL/L (ref 21–32)
CREAT SERPL-MCNC: 0.57 MG/DL (ref 0.6–1.3)
DATE LAST DOSE: ABNORMAL
GLUCOSE SERPL-MCNC: 87 MG/DL (ref 74–99)
POTASSIUM SERPL-SCNC: 3.3 MMOL/L (ref 3.5–5.5)
REPORTED DOSE,DOSE: 1500 UNITS
REPORTED DOSE/TIME,TMG: 600
SODIUM SERPL-SCNC: 142 MMOL/L (ref 136–145)
VANCOMYCIN TROUGH SERPL-MCNC: 22.7 UG/ML (ref 10–20)

## 2021-09-29 PROCEDURE — 74011250637 HC RX REV CODE- 250/637: Performed by: STUDENT IN AN ORGANIZED HEALTH CARE EDUCATION/TRAINING PROGRAM

## 2021-09-29 PROCEDURE — 36415 COLL VENOUS BLD VENIPUNCTURE: CPT

## 2021-09-29 PROCEDURE — 99232 SBSQ HOSP IP/OBS MODERATE 35: CPT | Performed by: HOSPITALIST

## 2021-09-29 PROCEDURE — 65270000029 HC RM PRIVATE

## 2021-09-29 PROCEDURE — 74011250636 HC RX REV CODE- 250/636: Performed by: EMERGENCY MEDICINE

## 2021-09-29 PROCEDURE — 74011250637 HC RX REV CODE- 250/637: Performed by: PHYSICIAN ASSISTANT

## 2021-09-29 PROCEDURE — 74011000250 HC RX REV CODE- 250: Performed by: INTERNAL MEDICINE

## 2021-09-29 PROCEDURE — 74011250637 HC RX REV CODE- 250/637: Performed by: HOSPITALIST

## 2021-09-29 PROCEDURE — 74011250637 HC RX REV CODE- 250/637: Performed by: EMERGENCY MEDICINE

## 2021-09-29 PROCEDURE — 80048 BASIC METABOLIC PNL TOTAL CA: CPT

## 2021-09-29 PROCEDURE — 74011250636 HC RX REV CODE- 250/636: Performed by: HOSPITALIST

## 2021-09-29 PROCEDURE — 74011250636 HC RX REV CODE- 250/636: Performed by: PHYSICIAN ASSISTANT

## 2021-09-29 PROCEDURE — 74011000250 HC RX REV CODE- 250: Performed by: EMERGENCY MEDICINE

## 2021-09-29 PROCEDURE — 97116 GAIT TRAINING THERAPY: CPT

## 2021-09-29 PROCEDURE — 74011250636 HC RX REV CODE- 250/636: Performed by: INTERNAL MEDICINE

## 2021-09-29 PROCEDURE — 80202 ASSAY OF VANCOMYCIN: CPT

## 2021-09-29 PROCEDURE — 97530 THERAPEUTIC ACTIVITIES: CPT

## 2021-09-29 PROCEDURE — 97161 PT EVAL LOW COMPLEX 20 MIN: CPT

## 2021-09-29 RX ORDER — POTASSIUM CHLORIDE 20 MEQ/1
20 TABLET, EXTENDED RELEASE ORAL
Status: COMPLETED | OUTPATIENT
Start: 2021-09-29 | End: 2021-09-29

## 2021-09-29 RX ORDER — ATENOLOL 50 MG/1
100 TABLET ORAL DAILY
Status: DISCONTINUED | OUTPATIENT
Start: 2021-09-30 | End: 2021-09-30 | Stop reason: HOSPADM

## 2021-09-29 RX ORDER — ATENOLOL 50 MG/1
50 TABLET ORAL
Status: COMPLETED | OUTPATIENT
Start: 2021-09-29 | End: 2021-09-29

## 2021-09-29 RX ADMIN — LORAZEPAM 1 MG: 2 INJECTION INTRAMUSCULAR; INTRAVENOUS at 08:41

## 2021-09-29 RX ADMIN — Medication 10 ML: at 14:30

## 2021-09-29 RX ADMIN — FOLIC ACID: 5 INJECTION, SOLUTION INTRAMUSCULAR; INTRAVENOUS; SUBCUTANEOUS at 18:02

## 2021-09-29 RX ADMIN — HYDROMORPHONE HYDROCHLORIDE 1 MG: 1 INJECTION, SOLUTION INTRAMUSCULAR; INTRAVENOUS; SUBCUTANEOUS at 09:35

## 2021-09-29 RX ADMIN — LORAZEPAM 1 MG: 1 TABLET ORAL at 21:37

## 2021-09-29 RX ADMIN — Medication 10 ML: at 22:12

## 2021-09-29 RX ADMIN — HYDROCODONE BITARTRATE AND ACETAMINOPHEN 2 TABLET: 5; 325 TABLET ORAL at 14:29

## 2021-09-29 RX ADMIN — LISINOPRIL 40 MG: 40 TABLET ORAL at 08:41

## 2021-09-29 RX ADMIN — HYDROCODONE BITARTRATE AND ACETAMINOPHEN 2 TABLET: 5; 325 TABLET ORAL at 02:29

## 2021-09-29 RX ADMIN — ATENOLOL 50 MG: 25 TABLET ORAL at 08:40

## 2021-09-29 RX ADMIN — DIPHENHYDRAMINE HYDROCHLORIDE 50 MG: 25 CAPSULE ORAL at 14:29

## 2021-09-29 RX ADMIN — POTASSIUM CHLORIDE 20 MEQ: 1500 TABLET, EXTENDED RELEASE ORAL at 09:35

## 2021-09-29 RX ADMIN — HYDROCODONE BITARTRATE AND ACETAMINOPHEN 2 TABLET: 5; 325 TABLET ORAL at 08:40

## 2021-09-29 RX ADMIN — HYDROMORPHONE HYDROCHLORIDE 1 MG: 1 INJECTION, SOLUTION INTRAMUSCULAR; INTRAVENOUS; SUBCUTANEOUS at 15:45

## 2021-09-29 RX ADMIN — HYDROCODONE BITARTRATE AND ACETAMINOPHEN 2 TABLET: 5; 325 TABLET ORAL at 21:24

## 2021-09-29 RX ADMIN — VANCOMYCIN HYDROCHLORIDE 1500 MG: 10 INJECTION, POWDER, LYOPHILIZED, FOR SOLUTION INTRAVENOUS at 16:22

## 2021-09-29 RX ADMIN — VANCOMYCIN HYDROCHLORIDE 1500 MG: 10 INJECTION, POWDER, LYOPHILIZED, FOR SOLUTION INTRAVENOUS at 09:44

## 2021-09-29 RX ADMIN — CEFTRIAXONE SODIUM 2 G: 2 INJECTION, POWDER, FOR SOLUTION INTRAMUSCULAR; INTRAVENOUS at 20:00

## 2021-09-29 RX ADMIN — HYDROMORPHONE HYDROCHLORIDE 1 MG: 1 INJECTION, SOLUTION INTRAMUSCULAR; INTRAVENOUS; SUBCUTANEOUS at 03:46

## 2021-09-29 RX ADMIN — ATENOLOL 50 MG: 50 TABLET ORAL at 16:00

## 2021-09-29 RX ADMIN — PANTOPRAZOLE SODIUM 20 MG: 20 TABLET, DELAYED RELEASE ORAL at 07:15

## 2021-09-29 RX ADMIN — POLYETHYLENE GLYCOL 3350 17 G: 17 POWDER, FOR SOLUTION ORAL at 08:40

## 2021-09-29 RX ADMIN — Medication 10 ML: at 06:36

## 2021-09-29 RX ADMIN — ATORVASTATIN CALCIUM 20 MG: 20 TABLET, FILM COATED ORAL at 08:41

## 2021-09-29 RX ADMIN — VANCOMYCIN HYDROCHLORIDE 1500 MG: 10 INJECTION, POWDER, LYOPHILIZED, FOR SOLUTION INTRAVENOUS at 01:22

## 2021-09-29 RX ADMIN — ESCITALOPRAM OXALATE 20 MG: 20 TABLET ORAL at 08:40

## 2021-09-29 NOTE — ROUTINE PROCESS
Patient:   LES FAIRCHILD            MRN: [id number]            FIN: [id number]              Age:   62 years     Sex:  MALE     :  54   Associated Diagnoses:   None   Author:   MORE WEISS     Hem/Onc Note     Subjective   up in chair  on nc  c/o difficulty breathing     Health Status   Allergies:    Allergies (3) Active Reaction  Bee Stings None Documented  Aspartame None Documented  NKA, None Documented    Current medications: Medications (23) Active  Scheduled: (13)  *Do NOT give IM Injections  1 each, N/A, Daily  *Zero Hour (Oncology)  1 each, Misc, ONCOLOGY Once  Albuterol-ipratropium 2.5-0.5 mg/3 mL nebulizer soln  3 mL, Nebulizer, Q6H  Budesonide-formoterol 160-4.5 mcg oral MDI 6 gm  2 puff, Inhaled, BID  Enoxaparin 80 mg/0.8 mL syringe  70 mg 0.7 mL, Subcutaneous, Q12H  Furosemide 40 mg/4 mL inj SDV  20 mg 2 mL, Slow IV Push, BID  Insulin human lispro 1 unit/0.01 mL inj  1-6 units, Subcutaneous, QID  Lidocaine 4% (40 mg/gm) patch  1 patch, TransDermal, Daily  lidocaine topical PATCH REMOVAL`  Remove Patch:, TransDermal, Daily  Melatonin 3 mg tab  3 mg 1 tab, Oral, Q Bedtime  MethylPREDNISolone Na succ PF 40 mg/1 mL inj SDV  40 mg 1 mL, Slow IV Push, Q12H  Mirtazapine 15 mg tab  15 mg 1 tab, Oral, Q Bedtime  Sodium chloride PF 0.9% flush inj 10 mL  10 mL, Flush, Q12H  Continuous: (0)  PRN: (10)  Albuterol-ipratropium 2.5-0.5 mg/3 mL nebulizer soln  3 mL, Nebulizer, Q6H  Bisacodyl 10 mg suppos  10 mg 1 suppository, Rectal, Daily  Dextrose (glucose) 40% 15 gm/37.5 gm oral gel UD  15 gm, Oral, As Directed PRN  Dextrose (glucose) 50% 25 gm/50 mL syringe  12.5 gm 25 mL, IV Push, As Directed PRN  Glucagon 1 mg/1 mL emergency kit SDV  1 mg 1 mL, IM, As Directed PRN  Morphine PF 4 mg/1 mL inj SDV  4 mg 1 mL, IV Push, Q4H  Polyethylene glycol 3350 oral recon powder 17 gm packet UD  17 gm 1 packet, Oral, Daily  Senna-docusate sodium 8.6-50 mg tab  1 tab, Oral, Daily  Sodium chloride PF 0.9% flush inj  Bedside and Verbal shift change report given to Mini Whitten, RN (oncoming nurse) by Rosanna Diez RN (offgoing nurse). Report included the following information SBAR, Kardex, MAR and Recent Results. SITUATION:  Code Status: Full Code  Reason for Admission: Septic arthritis [facility]) [M00.9]  Hospital day: 3  Problem List:       Hospital Problems  Date Reviewed: 4/21/2021        Codes Class Noted POA    Septic arthritis ([facility] [address] ICD-10-CM: M00.9  ICD-9-CM: 711.00  9/26/2021 Unknown              BACKGROUND:   Past Medical History:   Past Medical History:   Diagnosis Date    Anxiety     Cholelithiasis 08/18/2013    Note on U/S    Chronic gastritis 10/17/2014    Dr. Doyle Gupta (EGD Result) H. Pylori Neg    Depression     Dog bite of left hand including fingers with infection 11/16/2014    Adm 11/18/14 for IV antibiotics.  ETOH abuse     GERD (gastroesophageal reflux disease)     Hepatic steatosis 02/06/2014    Noted on U/S.     History of plasmapheresis 02/2014    Hypertriglyceridemia, Severe Pancreatitis    Hypertension     Hypertriglyceridemia     Hypomagnesemia 07/24/2016    1.6 mg/dL    Non compliance with medical treatment     Osteomyelitis of left hand ([facility] [address] 11/22/2014    MRI Finding @ LUE 2nd MCP joint.  Pancreatitis 08/17/2013    Highest on Record (2/6/14) 2723. Dr. Doyle Gupta    PICC (peripherally inserted central catheter) in place 73/60/4072    R Basilic Vein (IV Antibiotics for LUE Hand Osteomyelitis).  Rectal bleeding     Substance abuse (HCC)     Several UDS + Opiates.      Vitamin D deficiency 02/17/2014    11.6ng/mL ( ng/mL)      Patient taking anticoagulants no    Patient has a defibrillator: no    History of shots YES for example, flu, pneumonia, tetanus   Isolation History NO for example, MRSA, CDiff    ASSESSMENT:  Changes in Assessment Throughout Shift: NONE  Significant Changes in 24 hours (for example, RR/code, fall)  Patient has Central Line: no   Patient has 10 mL  10 mL, Flush, As Directed PRN  Sodium chloride PF 0.9% inj 20 mL SDV  20 mL, Flush, As Directed PRN       Objective   Intake and Output   I & O between:  15-SEP-2020 13:45 TO 16-SEP-2020 13:45  Med Dosing Weight:  69  kg   08-SEP-2020  24 Hour Intake:   50.20  ( 0.73 mL/kg )  24 Hour Output:   2425.00           24 Hour Urine/Stool Output:   0.0  24 Hour Balance:   -2374.80           24 Hour Urine Output:   1905.00  ( 1.15 mL/kg/hr )                   Urine Count:  4.00         VS/Measurements     Vitals between:   15-SEP-2020 13:45:01   TO   16-SEP-2020 13:45:01                   LAST RESULT MINIMUM MAXIMUM  Temperature 36.5 36.5 37.0  Heart Rate 90 83 91  Respiratory Rate 18 14 35  NISBP           104 104 123  NIDBP           85 64 85  NIMBP           91 77 95  SpO2                    100 96 100    Lines and Tubes:    LINES  Central Catheter Nontunneled Dual Lumen solo power Left   Charted: 09/16/20 08:00  Inserted: 09/01/20   Days Since Insertion: 15 days  Indication of Use: Chemotherapeutic Agents  Peripheral Intravenous Hand Right   Gauge: 20   Charted: 09/16/20 08:00  Inserted: 09/08/20   Days Since Insertion: 8 days  Indication of Use: Saline Lock  DRAINS AND TUBES  Chest Tubes Right Upper   Charted: 09/16/20 08:00  Inserted: 09/11/20   Days Since Insertion: 5 days   .    General:  Alert and oriented, No acute distress.    Eye:  Pupils are equal, round and reactive to light.    Neck:  No carotid bruit, No jugular venous distention.    Respiratory:  Lungs are clear to auscultation, Respirations are non-labored, Breath sounds are equal.   Cardiovascular:  Normal rate, Regular rhythm, Good pulses equal in all extremities.   Gastrointestinal:  Soft, Non-tender, Normal bowel sounds.    Integumentary:  Warm, Dry.    Neurologic:   .      Results Review   General results   Interpretation:   Result title:  XR CHEST 1V  Result status:  Final  Verified by:  CECY GRIFFITH on 09/16/2020 7:38  IMPRESSION:There  Gore Cath: no   Mobility Issues  PT  IV Patency  OR Checklist  Pending Tests    Last Vitals:  Vitals w/ MEWS Score (last day)     Date/Time MEWS Score Pulse Resp Temp BP Level of Consciousness SpO2    09/29/21 0833  1  71  20  98.2 °F (36.8 °C)  (!) 183/113  Alert (0)  99 %    09/29/21 0341  1  79  16  98.2 °F (36.8 °C)  (!) 159/82  Alert (0)  99 %    09/28/21 2348  1  76  18  98.7 °F (37.1 °C)  (!) 150/91  Alert (0)  99 %    09/28/21 2020    80    99.2 °F (37.3 °C)  (!) 166/98  Alert (0)  100 %    09/28/21 1524    83    98.2 °F (36.8 °C)  (!) 140/93  Alert (0)  100 %    09/28/21 1116    91    99 °F (37.2 °C)  (!) 148/89  Alert (0)  99 %    09/28/21 0800  1  65  16  98.3 °F (36.8 °C)  (!) 170/105  Alert (0)  99 %    09/28/21 0345  1  69  16  98.4 °F (36.9 °C)  (!) 142/89  Alert (0)  99 %            PAIN    Pain Assessment    Pain Intensity 1: 8 (09/29/21 0833)    Pain Location 1: Ankle    Pain Intervention(s) 1: Medication (see MAR)    Patient Stated Pain Goal: 0  Intervention effective: yes  Time of last intervention: 0346 Reassessment Completed: yes   Other actions taken for pain: Distraction    Last 3 Weights:  Last 3 Recorded Weights in this Encounter    09/26/21 1534 09/27/21 1430   Weight: 102.1 kg (225 lb) 102.1 kg (225 lb)   Weight change:     INTAKE/OUPUT    Current Shift: 09/29 0701 - 09/29 1900  In: -   Out: 1000 [Urine:1000]    Last three shifts: 09/27 1901 - 09/29 0700  In: 5260 [P.O.:720; I.V.:4540]  Out: 4600 [Urine:4600]    RECOMMENDATIONS AND DISCHARGE PLANNING  Patient needs and requests: Pain Management    Pending tests/procedures: Labs     Discharge plan for patient: Home    Discharge planning Needs or Barriers: None    Estimated Discharge Date: 9/30/2021 Posted on Whiteboard in Patients Room: yes       \"HEALS\" SAFETY CHECK  A safety check occurred in the patient's room between off going nurse and oncoming nurse listed above.     The safety check included the below items:    H  High Alert Medications Verify all high alert medication drips (heparin, PCA, etc.)  E  Equipment Suction is set up for ALL patients (with re)  Red plugs utilized for all equipment (IV pumps, etc.)  WOWs wiped down at end of shift. Room stocked with oxygen, suction, and other unit-specific supplies  A  Alarms Bed alarm is set for fall risk patients  Ensure chair alarm is in place and activated if patient is up in a chair  L  Lines Check IV for any infiltration  Gore bag is empty if patient has a Gore   Tubing and IV bags are labeled  S  Safety  Room is clean, patient is clean, and equipment is clean. Hallways are clear from equipment besides carts. Fall bracelet on for fall risk patients  Ensure room is clear and free of clutter  Suction is set up for ALL patients (with re)  Hallways are clear from equipment besides carts.    Isolation precautions followed, supplies available outside room, sign posted    Digna Vela RN is a residual moderate size (15-20%) right apical non-tension pneumothorax, which appears similar to the findings observed on the examination of the previous day.There is an apparent residual right apical pleural-based \"mass\", which appears unchanged.Also again noted is residual accentuation of the bilateral interstitial markings.There is a peripheral left-sided central venous catheter.  There is a right basilar  pleural catheter.  There is a metallic left subclavian/brachiocephalic and superior vena caval vascular stent.  There are degenerative changes of the thoracic spine.  The heart size is normal.  There is tortuosity of the thoracic aortaThere is a suggestion of slight bilateral hyperaeration.       Labs between:  15-SEP-2020 13:45 to 16-SEP-2020 13:45  CBC:                 WBC  HgB  Hct  Plt  MCV  RDW   16-SEP-2020 (H) 14.0  (L) 9.6  (L) 32.5  196  86.4  (H) 18.2   DIFF:                 Seg  Neutroph//ABS  Lymph//ABS  Mono//ABS  EOS/ABS  16-SEP-2020 NOT APPLICABLE  96 // (H) 13.4  1 // (L) 0.1  2 // 0.3 0 // (L) 0.0  BMP:                 Na  Cl  BUN  Glu   16-SEP-2020 136  (L) 96  13  (H) 144                              K  CO2  Cr  Ca                              3.4  (H) 33  (L) 0.53  (L) 6.2   POC GLU:                 Latest Result  Latest Date  Minimum  Min Date  Maximum  Max Date                             (H) 143  16-SEP-2020 (H) 143  16-SEP-2020 (H) 168  15-SEP-2020                       Impression and Plan    1.  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 Normochromic normocytic anemia with mild hypochromia.  We will check his  iron studies and ferritin.  The patient seems to have stable hemoglobin between  yesterday and today and we will observe.  No need for transfusion  at this time.  His current H&H is 7.9 and 26.7 continue observe and transfuse if  less than 7 and 21.  3. Hypercalcemia with elevated alkaline phosphatase, possible nonmetastatic  effect of malignancy since patient is squamous cell.  We will check PTH and PTH  related peptide and we will give him 4 mg of Zometa.  Zometa was given yesterday his calcium today is 9.1still mildly elevated but to be expected to continue to go down.  Follow-up BMP tomorrow as well as CBC

## 2021-09-29 NOTE — PROGRESS NOTES
Problem: Falls - Risk of  Goal: *Absence of Falls  Description: Document Celina Howell Fall Risk and appropriate interventions in the flowsheet.   Outcome: Progressing Towards Goal  Note: Fall Risk Interventions:  Mobility Interventions: Patient to call before getting OOB         Medication Interventions: Bed/chair exit alarm, Patient to call before getting OOB    Elimination Interventions: Bed/chair exit alarm, Call light in reach              Problem: Patient Education: Go to Patient Education Activity  Goal: Patient/Family Education  Outcome: Progressing Towards Goal     Problem: Pain  Goal: *Control of Pain  Outcome: Progressing Towards Goal     Problem: Patient Education: Go to Patient Education Activity  Goal: Patient/Family Education  Outcome: Progressing Towards Goal     Problem: Alcohol Withdrawal  Goal: *STG: Participates in treatment plan  Outcome: Progressing Towards Goal  Goal: *STG: Remains safe in hospital  Outcome: Progressing Towards Goal  Goal: *STG: Seeks staff when symptoms of withdrawal increase  Outcome: Progressing Towards Goal  Goal: *STG: Complies with medication therapy  Outcome: Progressing Towards Goal  Goal: *STG: Attends activities and groups  Outcome: Progressing Towards Goal  Goal: *STG: Will identify negative impact of chemical dependency including the use of tobacco, alcohol, and other substances  Outcome: Progressing Towards Goal  Goal: *STG: Verbalizes abstinence as an achievable goal  Outcome: Progressing Towards Goal  Goal: *STG: Agrees to participate in outpatient after care program to support ongoing mental health  Outcome: Progressing Towards Goal  Goal: *STG: Able to indentify relapse triggers including interpersonal/social and familial factors  Outcome: Progressing Towards Goal  Goal: *STG: Identify lifestyle changes to support long term sobriety such as vocation, employment, education, and legal issues  Outcome: Progressing Towards Goal  Goal: *STG: Maintains appropriate nutrition and hydration  Outcome: Progressing Towards Goal  Goal: *STG: Vital signs within defined limits  Outcome: Progressing Towards Goal  Goal: *STG/LTG: Relapse prevention plan in place to include housing/aftercare, leisure activities, and spirituality  Outcome: Progressing Towards Goal  Goal: Interventions  Outcome: Progressing Towards Goal     Problem: Patient Education: Go to Patient Education Activity  Goal: Patient/Family Education  Outcome: Progressing Towards Goal     Problem: Patient Education: Go to Patient Education Activity  Goal: Patient/Family Education  Outcome: Progressing Towards Goal     Problem: Sepsis: Day 3  Goal: Off Pathway (Use only if patient is Off Pathway)  Outcome: Progressing Towards Goal  Goal: *Central Venous Pressure maintained at 8-12 mm Hg  Outcome: Progressing Towards Goal  Goal: *Oxygen saturation within defined limits  Outcome: Progressing Towards Goal  Goal: *Vital sign stability  Outcome: Progressing Towards Goal  Goal: *Tolerating diet  Outcome: Progressing Towards Goal  Goal: *Demonstrates progressive activity  Outcome: Progressing Towards Goal  Goal: Activity/Safety  Outcome: Progressing Towards Goal  Goal: Consults, if ordered  Outcome: Progressing Towards Goal  Goal: Diagnostic Test/Procedures  Outcome: Progressing Towards Goal  Goal: Nutrition/Diet  Outcome: Progressing Towards Goal  Goal: Discharge Planning  Outcome: Progressing Towards Goal  Goal: Medications  Outcome: Progressing Towards Goal  Goal: Respiratory  Outcome: Progressing Towards Goal  Goal: Treatments/Interventions/Procedures  Outcome: Progressing Towards Goal  Goal: Psychosocial  Outcome: Progressing Towards Goal     Problem: Sepsis: Day 4  Goal: Off Pathway (Use only if patient is Off Pathway)  Outcome: Progressing Towards Goal  Goal: Activity/Safety  Outcome: Progressing Towards Goal  Goal: Consults, if ordered  Outcome: Progressing Towards Goal  Goal: Diagnostic Test/Procedures  Outcome: Progressing Towards Goal  Goal: Nutrition/Diet  Outcome: Progressing Towards Goal  Goal: Discharge Planning  Outcome: Progressing Towards Goal  Goal: Medications  Outcome: Progressing Towards Goal  Goal: Respiratory  Outcome: Progressing Towards Goal  Goal: Treatments/Interventions/Procedures  Outcome: Progressing Towards Goal  Goal: Psychosocial  Outcome: Progressing Towards Goal  Goal: *Oxygen saturation within defined limits  Outcome: Progressing Towards Goal  Goal: *Hemodynamically stable  Outcome: Progressing Towards Goal  Goal: *Vital signs within defined limits  Outcome: Progressing Towards Goal  Goal: *Tolerating diet  Outcome: Progressing Towards Goal  Goal: *Demonstrates progressive activity  Outcome: Progressing Towards Goal  Goal: *Fluid volume maintenance  Outcome: Progressing Towards Goal     Problem: Sepsis: Day 5  Goal: Off Pathway (Use only if patient is Off Pathway)  Outcome: Progressing Towards Goal  Goal: *Oxygen saturation within defined limits  Outcome: Progressing Towards Goal  Goal: *Vital signs within defined limits  Outcome: Progressing Towards Goal  Goal: *Tolerating diet  Outcome: Progressing Towards Goal  Goal: *Demonstrates progressive activity  Outcome: Progressing Towards Goal  Goal: *Discharge plan identified  Outcome: Progressing Towards Goal  Goal: Activity/Safety  Outcome: Progressing Towards Goal  Goal: Consults, if ordered  Outcome: Progressing Towards Goal  Goal: Diagnostic Test/Procedures  Outcome: Progressing Towards Goal  Goal: Nutrition/Diet  Outcome: Progressing Towards Goal  Goal: Discharge Planning  Outcome: Progressing Towards Goal  Goal: Medications  Outcome: Progressing Towards Goal  Goal: Respiratory  Outcome: Progressing Towards Goal  Goal: Treatments/Interventions/Procedures  Outcome: Progressing Towards Goal  Goal: Psychosocial  Outcome: Progressing Towards Goal     Problem: Sepsis: Day 6  Goal: Off Pathway (Use only if patient is Off Pathway)  Outcome: Progressing Towards Goal  Goal: *Oxygen saturation within defined limits  Outcome: Progressing Towards Goal  Goal: *Vital signs within defined limits  Outcome: Progressing Towards Goal  Goal: *Tolerating diet  Outcome: Progressing Towards Goal  Goal: *Demonstrates progressive activity  Outcome: Progressing Towards Goal  Goal: Influenza immunization  Outcome: Progressing Towards Goal  Goal: *Pneumococcal immunization  Outcome: Progressing Towards Goal  Goal: Activity/Safety  Outcome: Progressing Towards Goal  Goal: Diagnostic Test/Procedures  Outcome: Progressing Towards Goal  Goal: Nutrition/Diet  Outcome: Progressing Towards Goal  Goal: Discharge Planning  Outcome: Progressing Towards Goal  Goal: Medications  Outcome: Progressing Towards Goal  Goal: Respiratory  Outcome: Progressing Towards Goal  Goal: Treatments/Interventions/Procedures  Outcome: Progressing Towards Goal  Goal: Psychosocial  Outcome: Progressing Towards Goal     Problem: Sepsis: Discharge Outcomes  Goal: *Vital signs within defined limits  Outcome: Progressing Towards Goal  Goal: *Tolerating diet  Outcome: Progressing Towards Goal  Goal: *Verbalizes understanding and describes prescribed diet  Outcome: Progressing Towards Goal  Goal: *Demonstrates progressive activity  Outcome: Progressing Towards Goal  Goal: *Describes follow-up/return visits to physicians  Outcome: Progressing Towards Goal  Goal: *Verbalizes name, dosage, time, side effects, and number of days to continue medications  Outcome: Progressing Towards Goal  Goal: *Influenza immunization (Oct-Mar only)  Outcome: Progressing Towards Goal  Goal: *Pneumococcal immunization  Outcome: Progressing Towards Goal  Goal: *Lungs clear or at baseline  Outcome: Progressing Towards Goal  Goal: *Oxygen saturation returns to baseline or 90% or better on room air  Outcome: Progressing Towards Goal  Goal: *Glycemic control  Outcome: Progressing Towards Goal  Goal: *Absence of deep venous thrombosis signs and symptoms(Stroke Metric)  Outcome: Progressing Towards Goal  Goal: *Describes available resources and support systems  Outcome: Progressing Towards Goal  Goal: *Optimal pain control at patient's stated goal  Outcome: Progressing Towards Goal

## 2021-09-29 NOTE — PROGRESS NOTES
Pharmacy Dosing Services: Vancomycin    Indication: Bone/Joint Infection    Day of therapy: 4    Other Antimicrobials (Include dose, start day & day of therapy):  Ceftriaxone    Loading dose (date given): 2000 mg  Current Maintenance dose: 1500 mg IV every 8 hours    Goal Vancomycin Level: Vancomycin Trough: 15 - 20 mcg/mL (most infections); -600 mg/L.hr    Vancomycin Level (if drawn):   Recent Labs     09/29/21  1337 09/28/21  1434   VANCT 22.7* 14.1       Significant Cultures:   Results       Procedure Component Value Units Date/Time    CULTURE, ANAEROBIC [[id number]] Collected: 09/27/21 1515    Order Status: Completed Specimen: Abscess Updated: 09/28/21 1416     Special Requests: --        RIGHT  ANKLE       Culture result: NO GROWTH THUS FAR       CULTURE, BODY FLUID Mayra Ply [[id number]] Collected: 09/27/21 1514    Order Status: Completed Specimen: Body Fluid from Drainage Updated: 09/28/21 1415     Special Requests: --        RIGHT  ANKLE       GRAM STAIN NO ORGANISMS SEEN         NO WBC'S SEEN        Culture result: NO GROWTH THUS FAR       COVID-19 RAPID TEST [[id number]] Collected: 09/27/21 0845    Order Status: Completed Specimen: Nasopharyngeal Updated: 09/27/21 0921     Specimen source Nasopharyngeal        COVID-19 rapid test Not detected        Comment: Rapid Abbott ID Now       Rapid NAAT:  The specimen is NEGATIVE for SARS-CoV-2, the novel coronavirus associated with COVID-19. Negative results should be treated as presumptive and, if inconsistent with clinical signs and symptoms or necessary for patient management, should be tested with an alternative molecular assay. Negative results do not preclude SARS-CoV-2 infection and should not be used as the sole basis for patient management decisions. This test has been authorized by the FDA under an Emergency Use Authorization (EUA) for use by authorized laboratories.    Fact sheet for Healthcare Providers: ConventionUpdate.co.nz  Fact sheet for Patients: ConventionUpdate.co.nz       Methodology: Isothermal Nucleic Acid Amplification         CULTURE, BLOOD [[id number]] Collected: 21    Order Status: Completed Specimen: Blood Updated: 21     Special Requests: NO SPECIAL REQUESTS        Culture result: NO GROWTH 4 DAYS       CULTURE, BODY FLUID W Nader Ernandez [[id number]] Collected: 21 1733    Order Status: Completed Specimen: Body Fluid from Joint Fluid Updated: 21     Special Requests: NO SPECIAL REQUESTS        GRAM STAIN MANY WBCS SEEN         NO ORGANISMS SEEN        Culture result:       Culture performed on Unspun Fluid NO GROWTH THUS FAR          CULTURE, BLOOD [[id number]] Collected: 21    Order Status: Completed Specimen: Blood Updated: 21     Special Requests: NO SPECIAL REQUESTS        Culture result: NO GROWTH 4 DAYS       CULTURE, BLOOD [[id number]] Collected: 21    Order Status: Canceled Specimen: Blood             Weight 102.1 kg (225 lb)  Recent Labs     21  0320 21  0400 21  0400 21  1640   CREA 0.57* 0.60 0.68  --    BUN 5* 5* 5*  --    WBC  --  6.5 4.1* 5.5     Temp (72hrs), Av.4 °F (36.9 °C), Min:97.1 °F (36.2 °C), Max:99.2 °F (37.3 °C)    Estimated Creatinine Clearance Estimated Creatinine Clearance: 182.5 mL/min (A) (by C-G formula based on SCr of 0.57 mg/dL (L)). Estimated Creatinine Clearance (using IBW): 166.4 mL/min       CAPD, Hemodialysis or Renal Replacement Therapy: N/A    Renal function stable? (unstable defined as SCr increase of 0.5 mg/dL or > 50% increase from baseline, whichever is greater) (Y/N): Y     Regimen assessment:   - patient on vancomycin 1500 mg IV q8h. Vancomycin level returned at 22.4, drawn 4 hours after last dose. True trough around 17. Continue current dose and frequency. - renal function stable.    Maintenance dose: 1500 mg IV every 8 hours  Next scheduled level: TBD       Pharmacy will follow daily and adjust medications as appropriate for renal function and/or serum levels.     Thank you,  Gabriel Zaragoza, PHARMD

## 2021-09-29 NOTE — PROGRESS NOTES
Problem: Falls - Risk of  Goal: *Absence of Falls  Description: Document Pat Blood Fall Risk and appropriate interventions in the flowsheet.   Outcome: Progressing Towards Goal  Note: Fall Risk Interventions:  Mobility Interventions: Patient to call before getting OOB         Medication Interventions: Bed/chair exit alarm, Patient to call before getting OOB    Elimination Interventions: Bed/chair exit alarm, Call light in reach              Problem: Pain  Goal: *Control of Pain  Outcome: Progressing Towards Goal     Problem: Sepsis: Day 3  Goal: Off Pathway (Use only if patient is Off Pathway)  Outcome: Progressing Towards Goal  Goal: *Central Venous Pressure maintained at 8-12 mm Hg  Outcome: Progressing Towards Goal  Goal: *Oxygen saturation within defined limits  Outcome: Progressing Towards Goal  Goal: *Vital sign stability  Outcome: Progressing Towards Goal  Goal: *Tolerating diet  Outcome: Progressing Towards Goal  Goal: *Demonstrates progressive activity  Outcome: Progressing Towards Goal  Goal: Activity/Safety  Outcome: Progressing Towards Goal  Goal: Consults, if ordered  Outcome: Progressing Towards Goal  Goal: Diagnostic Test/Procedures  Outcome: Progressing Towards Goal  Goal: Nutrition/Diet  Outcome: Progressing Towards Goal  Goal: Discharge Planning  Outcome: Progressing Towards Goal  Goal: Medications  Outcome: Progressing Towards Goal  Goal: Respiratory  Outcome: Progressing Towards Goal  Goal: Treatments/Interventions/Procedures  Outcome: Progressing Towards Goal  Goal: Psychosocial  Outcome: Progressing Towards Goal     Problem: Sepsis: Day 4  Goal: Off Pathway (Use only if patient is Off Pathway)  Outcome: Progressing Towards Goal  Goal: Activity/Safety  Outcome: Progressing Towards Goal  Goal: Consults, if ordered  Outcome: Progressing Towards Goal  Goal: Diagnostic Test/Procedures  Outcome: Progressing Towards Goal  Goal: Nutrition/Diet  Outcome: Progressing Towards Goal  Goal: Discharge Planning  Outcome: Progressing Towards Goal  Goal: Medications  Outcome: Progressing Towards Goal  Goal: Respiratory  Outcome: Progressing Towards Goal  Goal: Treatments/Interventions/Procedures  Outcome: Progressing Towards Goal  Goal: Psychosocial  Outcome: Progressing Towards Goal  Goal: *Oxygen saturation within defined limits  Outcome: Progressing Towards Goal  Goal: *Hemodynamically stable  Outcome: Progressing Towards Goal  Goal: *Vital signs within defined limits  Outcome: Progressing Towards Goal  Goal: *Tolerating diet  Outcome: Progressing Towards Goal  Goal: *Demonstrates progressive activity  Outcome: Progressing Towards Goal  Goal: *Fluid volume maintenance  Outcome: Progressing Towards Goal

## 2021-09-29 NOTE — TELEPHONE ENCOUNTER
Patient called stating due to an emergency at home, he may have to go home earlier from the hospital. He wanted to know if he could do at home IV treatments. Patient had septic ankle surgery on 9/27. Please advise patient at [phone].

## 2021-09-29 NOTE — TELEPHONE ENCOUNTER
Patient returned call, patient is requesting a call back, please advise patient of previous message encounter.     Patient's contact is [phone]

## 2021-09-29 NOTE — PROGRESS NOTES
[facility]  Progress Note    Patient: Denzel Wellington Age: 43 y.o. : 1985 MR#: [id number] SSN: [id number]  Date/Time: 2021     Subjective: Patient feels fine, says ankle pain still bad. No chest pain, no shortness of breath. Assessment/Plan:   1. Septic arthritis of the left ankle status post I&D 2021  2. Hypertension, BP uncontrolled? Suspect due to pain  3. Alcohol abuse with mild withdrawal signs  4. History of anxiety and depression. Plan  We will continue empiric antibiotics, cultures negative. Discussed with ID, recommend patient will need IV antibiotics at discharge. PICC line ordered. Continue wound care and drain per Ortho team  Will adjust pain medications, add p.o. pain meds  Increase atenolol, continue lisinopril. Continue thiamine, folic acid and multivitamin. Continue CIWA protocol  Continue PT/OT eval and treatment    Discussed with the patient at the bedside and explained in detail about my above plan care. Offered to talk to family, patient states he will update the family. Case discussed with:  [x]Patient  []Family  [x]Nursing  []Case Management  DVT Prophylaxis:  [x]Lovenox  []Hep SQ  []SCDs  []Coumadin   []Eliquis/Xarelto     Objective:   VS:   Visit Vitals  BP (!) 170/106   Pulse 71   Temp 98.3 °F (36.8 °C)   Resp 20   Ht 6' 2\" (1.88 m)   Wt 102.1 kg (225 lb)   SpO2 96%   BMI 28.89 kg/m²      Tmax/24hrs: Temp (24hrs), Av.5 °F (36.9 °C), Min:98.1 °F (36.7 °C), Max:99.2 °F (37.3 °C)  IOBRIEF    Intake/Output Summary (Last 24 hours) at 2021 1554  Last data filed at 2021 5336  Gross per 24 hour   Intake 2500 ml   Output 2850 ml   Net -350 ml       General:  Alert, cooperative, no acute distress    HEENT: PERRLA, anicteric sclerae. Pulmonary:  CTA Bilaterally. No Wheezing/Rales. Cardiovascular: Regular rate and Rhythm. GI:  Soft, Non distended, Non tender. + Bowel sounds. Extremities:  Ankle dressing clean, drain in place, no edema. No calf tenderness. Psych: Good insight. Not anxious or agitated. Neurologic: Alert and oriented X 3. Mild tremors, moves all ext. Additional:    Medications:   Current Facility-Administered Medications   Medication Dose Route Frequency    [START ON 9/30/2021] atenoloL (TENORMIN) tablet 100 mg  100 mg Oral DAILY    atenoloL (TENORMIN) tablet 50 mg  50 mg Oral NOW    diphenhydrAMINE (BENADRYL) capsule 50 mg  50 mg Oral Q6H PRN    lisinopriL (PRINIVIL, ZESTRIL) tablet 40 mg  40 mg Oral DAILY    HYDROmorphone (DILAUDID) injection 1 mg  1 mg IntraVENous Q6H PRN    HYDROcodone-acetaminophen (NORCO) 5-325 mg per tablet 2 Tablet  2 Tablet Oral Q6H PRN    vancomycin (VANCOCIN) 1500 mg in  ml infusion  1,500 mg IntraVENous Q8H    cefTRIAXone (ROCEPHIN) 2 g in sterile water (preservative free) 20 mL IV syringe  2 g IntraVENous Q24H    sodium chloride (NS) flush 5-40 mL  5-40 mL IntraVENous Q8H    sodium chloride (NS) flush 5-40 mL  5-40 mL IntraVENous PRN    hydrALAZINE (APRESOLINE) 20 mg/mL injection 10 mg  10 mg IntraVENous Q6H PRN    LORazepam (ATIVAN) tablet 1 mg  1 mg Oral Q1H PRN    Or    LORazepam (ATIVAN) injection 1 mg  1 mg IntraVENous Q1H PRN    LORazepam (ATIVAN) tablet 2 mg  2 mg Oral Q1H PRN    Or    LORazepam (ATIVAN) injection 2 mg  2 mg IntraVENous Q1H PRN    LORazepam (ATIVAN) injection 3 mg  3 mg IntraVENous Q15MIN PRN    escitalopram oxalate (LEXAPRO) tablet 20 mg  20 mg Oral DAILY    atorvastatin (LIPITOR) tablet 20 mg  20 mg Oral DAILY    pantoprazole (PROTONIX) tablet 20 mg  20 mg Oral ACB    0.9% sodium chloride 1,000 mL with mvi (adult no. 4 with vit K) 10 mL, thiamine 297 mg, folic acid 1 mg infusion   IntraVENous Q24H    acetaminophen (TYLENOL) tablet 650 mg  650 mg Oral Q6H PRN    Or    acetaminophen (TYLENOL) suppository 650 mg  650 mg Rectal Q6H PRN    polyethylene glycol (MIRALAX) packet 17 g  17 g Oral DAILY PRN    ondansetron (ZOFRAN ODT) tablet 4 mg  4 mg Oral Q8H PRN    Or    ondansetron (ZOFRAN) injection 4 mg  4 mg IntraVENous Q6H PRN    naloxone (NARCAN) injection 0.4 mg  0.4 mg IntraVENous PRN       Labs:    Recent Results (from the past 24 hour(s))   METABOLIC PANEL, BASIC    Collection Time: 09/29/21  3:20 AM   Result Value Ref Range    Sodium 142 136 - 145 mmol/L    Potassium 3.3 (L) 3.5 - 5.5 mmol/L    Chloride 112 (H) 100 - 111 mmol/L    CO2 22 21 - 32 mmol/L    Anion gap 8 3.0 - 18 mmol/L    Glucose 87 74 - 99 mg/dL    BUN 5 (L) 7.0 - 18 MG/DL    Creatinine 0.57 (L) 0.6 - 1.3 MG/DL    BUN/Creatinine ratio 9 (L) 12 - 20      GFR est AA >60 >60 ml/min/1.73m2    GFR est non-AA >60 >60 ml/min/1.73m2    Calcium 7.9 (L) 8.5 - 10.1 MG/DL   VANCOMYCIN, TROUGH    Collection Time: 09/29/21  1:37 PM   Result Value Ref Range    Vancomycin,trough 22.7 (HH) 10.0 - 20.0 ug/mL    Reported dose date [id number]      Reported dose time: 0600      Reported dose: 1,500 UNITS       Signed By: Hang Zhou MD     September 29, 2021      Disclaimer: Sections of this note are dictated using utilizing voice recognition software. Minor typographical errors may be present. If questions arise, please do not hesitate to contact me or call our department.

## 2021-09-29 NOTE — PROGRESS NOTES
Infectious Disease progress Note        Reason: Left ankle septic arthritis, penicillin allergy    Current abx Prior abx   vancomycin since 9/26/2021  Ceftriaxone   cefepime, vancomycinx1 on 9/25  Cefepime restarted 9/26-9/27     Lines:       Assessment :    33-year-old male with past medical history significant for depression, anxiety, substance abuse admitted to [facility] on 9/26/2021 with increasing left ankle pain, concerns for infection. Clinical presentation consistent with left ankle septic arthritis  Status post arthrocentesis 9/25/2021. Cultures 9/25-no growth till date  Joint fluid analysis 9/25-grossly bloody specimen. , 111.  97% neutrophils. Negative for crystals    Improved erythema/pain left ankle on today's exam.    Exact microbial etiology of infection not entirely clear at this time. Negative GC/chlamydia urine PCR argues against gonorrhea/chlamydia infection    Status post surgical I&D left ankle on 9/27/2021. Intra-Op findings noted. Cloudy fluid noted intraoperatively. Intra-Op cultures 9/27 no growth till date    Antibiotic management complicated due to documented history of penicillin allergy. Patient states that he had severe allergic reaction including hives/swelling of the throat about 5 years ago. Is currently tolerating cefepime without difficulties    Recommendations:    1. Continue ceftriaxone. Continue vancomycin for now  2. Place PICC line for outpatient IV antibiotics  3. Follow-up Ortho surgery recommendations  4. Follow-up joint fluid cultures 9/25/2021, 9/27/2021  5. We will start making arrangements for outpatient IV antibiotics    Home health orders on chart (click on \"chart review, other orders, IP home health)      Above plan was discussed in details with patient, and dr Firda Mcfarland. Discussed with  please call me if any further questions or concerns. Will continue to participate in the care of this patient.   HPI:  Complains of pain recent surgical site left ankle    Current Discharge Medication List      CONTINUE these medications which have NOT CHANGED    Details   !! cephALEXin (Keflex) 500 mg capsule Take 1 Capsule by mouth four (4) times daily for 10 days. Qty: 40 Capsule, Refills: 0      !! cephALEXin (Keflex) 500 mg capsule Take 1 Capsule by mouth four (4) times daily for 10 days. Qty: 40 Capsule, Refills: 0      gabapentin (NEURONTIN) 400 mg capsule Take 1 Capsule by mouth three (3) times daily. Max Daily Amount: 1,200 mg. Qty: 90 Capsule, Refills: 0    Associated Diagnoses: Chronic right-sided low back pain with right-sided sciatica      lisinopriL (PRINIVIL, ZESTRIL) 20 mg tablet Take 1 Tablet by mouth daily. Qty: 90 Tablet, Refills: 0    Associated Diagnoses: Essential hypertension      atenoloL (TENORMIN) 50 mg tablet Take 2 Tablets by mouth daily. Qty: 180 Tablet, Refills: 0    Associated Diagnoses: Essential hypertension; MICHELLE (generalized anxiety disorder)      escitalopram oxalate (LEXAPRO) 20 mg tablet Take 1 Tablet by mouth daily. Qty: 30 Tablet, Refills: 1      atorvastatin (LIPITOR) 20 mg tablet Take 1 Tablet by mouth daily. Qty: 30 Tablet, Refills: 1      clonazePAM (KlonoPIN) 1 mg tablet Take 1 Tablet by mouth three (3) times daily as needed for Anxiety. Max Daily Amount: 3 mg. Qty: 15 Tablet, Refills: 0    Associated Diagnoses: Panic attacks      omeprazole (PRILOSEC) 20 mg capsule Take 1 Cap by mouth Daily (before breakfast). Qty: 30 Cap, Refills: 1      ondansetron (Zofran ODT) 4 mg disintegrating tablet Take 1 Tab by mouth every eight (8) hours as needed for Nausea or Nausea or Vomiting. Qty: 10 Tab, Refills: 0      tiZANidine (ZANAFLEX) 4 mg tablet Take 1 Tab by mouth three (3) times daily as needed for Muscle Spasm(s). Qty: 90 Tab, Refills: 2    Associated Diagnoses: Spasticity      cetirizine (ZYRTEC) 10 mg tablet Take 1 Tab by mouth nightly.   Qty: 30 Tab, Refills: 1      sildenafil citrate (Viagra) 100 mg tablet Take 1 Tab by mouth as needed for Erectile Dysfunction. Qty: 12 Tab, Refills: 5    Associated Diagnoses: Drug-induced erectile dysfunction      mupirocin (BACTROBAN) 2 % ointment Apply  to affected area daily. Qty: 22 g, Refills: 0    Associated Diagnoses: Nasal sore      fluticasone propionate (FLONASE) 50 mcg/actuation nasal spray 2 Sprays by Both Nostrils route daily. Qty: 1 Bottle, Refills: 0      guaiFENesin ER (MUCINEX) 600 mg ER tablet Take 1 Tab by mouth two (2) times a day. Qty: 20 Tab, Refills: 0      Camphor-Eucalyptus Oil-Menthol (VICKS VAPORUB) 4.8-1.2-2.6 % oint 1 Actuation(s) by Apply Externally route three (3) times daily as needed for Cough or Other (congestion). Qty: 50 g, Refills: 0      Vaporizers (VICKS WARM STEAM VAPORIZER) misc 1 Actuation(s) by Does Not Apply route three (3) times daily as needed for Cough or Other (congestion). Qty: 1 Each, Refills: 0       !! - Potential duplicate medications found. Please discuss with provider.           Current Facility-Administered Medications   Medication Dose Route Frequency    diphenhydrAMINE (BENADRYL) capsule 50 mg  50 mg Oral Q6H PRN    lisinopriL (PRINIVIL, ZESTRIL) tablet 40 mg  40 mg Oral DAILY    HYDROmorphone (DILAUDID) injection 1 mg  1 mg IntraVENous Q6H PRN    HYDROcodone-acetaminophen (NORCO) 5-325 mg per tablet 2 Tablet  2 Tablet Oral Q6H PRN    Vancomycin trough due 09/29/21 @ 1330 (before 1400 dose)   Other ONCE    vancomycin (VANCOCIN) 1500 mg in  ml infusion  1,500 mg IntraVENous Q8H    cefTRIAXone (ROCEPHIN) 2 g in sterile water (preservative free) 20 mL IV syringe  2 g IntraVENous Q24H    sodium chloride (NS) flush 5-40 mL  5-40 mL IntraVENous Q8H    sodium chloride (NS) flush 5-40 mL  5-40 mL IntraVENous PRN    hydrALAZINE (APRESOLINE) 20 mg/mL injection 10 mg  10 mg IntraVENous Q6H PRN    LORazepam (ATIVAN) tablet 1 mg  1 mg Oral Q1H PRN    Or    LORazepam (ATIVAN) injection 1 mg  1 mg IntraVENous Q1H PRN    LORazepam (ATIVAN) tablet 2 mg  2 mg Oral Q1H PRN    Or    LORazepam (ATIVAN) injection 2 mg  2 mg IntraVENous Q1H PRN    LORazepam (ATIVAN) injection 3 mg  3 mg IntraVENous Q15MIN PRN    atenoloL (TENORMIN) tablet 50 mg  50 mg Oral DAILY    escitalopram oxalate (LEXAPRO) tablet 20 mg  20 mg Oral DAILY    atorvastatin (LIPITOR) tablet 20 mg  20 mg Oral DAILY    pantoprazole (PROTONIX) tablet 20 mg  20 mg Oral ACB    0.9% sodium chloride 1,000 mL with mvi (adult no. 4 with vit K) 10 mL, thiamine 312 mg, folic acid 1 mg infusion   IntraVENous Q24H    acetaminophen (TYLENOL) tablet 650 mg  650 mg Oral Q6H PRN    Or    acetaminophen (TYLENOL) suppository 650 mg  650 mg Rectal Q6H PRN    polyethylene glycol (MIRALAX) packet 17 g  17 g Oral DAILY PRN    ondansetron (ZOFRAN ODT) tablet 4 mg  4 mg Oral Q8H PRN    Or    ondansetron (ZOFRAN) injection 4 mg  4 mg IntraVENous Q6H PRN    naloxone (NARCAN) injection 0.4 mg  0.4 mg IntraVENous PRN       Allergies: Amoxicillin, Contrast dye [iodine], Iodinated contrast media, Penicillins, and Wellbutrin [bupropion]    Family History   Problem Relation Age of Onset    Elevated Lipids Mother     Thyroid Disease Mother     Hypertension Father     Alcohol abuse Maternal Grandmother     MS Paternal Grandmother      Social History     Socioeconomic History    Marital status: SINGLE     Spouse name: Not on file    Number of children: Not on file    Years of education: Not on file    Highest education level: Not on file   Occupational History    Not on file   Tobacco Use    Smoking status: Former Smoker     Quit date: 2012     Years since quittin.0    Smokeless tobacco: Never Used    Tobacco comment: 2018    Substance and Sexual Activity    Alcohol use: Not Currently     Alcohol/week: 13.3 standard drinks     Types: 8 Cans of beer, 8 Standard drinks or equivalent per week    Drug use: No    Sexual activity: Not on file   Other Topics Concern  Not on file   Social History Narrative    Not on file     Social Determinants of Health     Financial Resource Strain:     Difficulty of Paying Living Expenses:    Food Insecurity:     Worried About Running Out of Food in the Last Year:     [address] in the Last Year:    Transportation Needs:     Lack of Transportation (Medical):  Lack of Transportation (Non-Medical):    Physical Activity:     Days of Exercise per Week:     Minutes of Exercise per Session:    Stress:     Feeling of Stress :    Social Connections:     Frequency of Communication with Friends and Family:     Frequency of Social Gatherings with Friends and Family:     Attends Shinto Services:     Active Member of Clubs or Organizations:     Attends Club or Organization Meetings:     Marital Status:    Intimate Partner Violence:     Fear of Current or Ex-Partner:     Emotionally Abused:     Physically Abused:     Sexually Abused:      Social History     Tobacco Use   Smoking Status Former Smoker    Quit date: 2012    Years since quittin.0   Smokeless Tobacco Never Used   Tobacco Comment    2018         Temp (24hrs), Av.6 °F (37 °C), Min:98.2 °F (36.8 °C), Max:99.2 °F (37.3 °C)    Visit Vitals  BP (!) 159/82 (BP 1 Location: Left upper arm, BP Patient Position: At rest;Lying)   Pulse 79   Temp 98.2 °F (36.8 °C)   Resp 16   Ht 6' 2\" (1.88 m)   Wt 102.1 kg (225 lb)   SpO2 99%   BMI 28.89 kg/m²       ROS: 12 point ROS obtained in details. Pertinent positives as mentioned in HPI,   otherwise negative    Physical Exam:    General: Well developed, well nourished male laying on the bed AAOx3 in no acute distress. General:   awake alert and oriented   HEENT:  Normocephalic, atraumatic, EOMI, no scleral icterus or pallor; no conjunctival hemmohage;  nasal and oral mucous are moist and without evidence of lesions. Neck supple, no bruits.    Lymph Nodes:   not examined   Lungs:   non-labored, bilaterally clear to auscultation- no crackles wheezes rales or rhonchi   Heart:  RRR, s1 and s2; no  rubs or gallops, no edema   Abdomen:  soft, non-distended, active bowel sounds, no hepatomegaly, no splenomegaly. Non-tender   Genitourinary:  deferred   Extremities:   no clubbing, cyanosis; left ankle surgical dressing not removed. No erythema/swelling of any other joints. No new rash   Neurologic:  No gross focal sensory abnormalities; 5/5 muscle strength to upper and lower extremities. Speech appropriate. Cranial nerves intact                        Skin:  No rash or ulcers noted   Back:  no spinal or paraspinal muscle tenderness or rigidity, no CVA tenderness     Psychiatric:  No suicidal or homicidal ideations, appropriate mood and affect         Labs: Results:   Chemistry Recent Labs     09/29/21  0320 09/28/21  0400 09/27/21  0400 09/26/21  1329 09/26/21  1329   GLU 87 99 81   < > 87    138 143   < > 142   K 3.3* 3.6 3.6   < > 4.2   * 108 112*   < > 112*   CO2 22 23 24   < > 25   BUN 5* 5* 5*   < > 7   CREA 0.57* 0.60 0.68   < > 0.76   CA 7.9* 8.3* 8.1*   < > 8.9   AGAP 8 7 7   < > 5   BUCR 9* 8* 7*   < > 9*   AP  --   --   --   --  57   TP  --   --   --   --  7.3   ALB  --   --   --   --  3.4   GLOB  --   --   --   --  3.9   AGRAT  --   --   --   --  0.9    < > = values in this interval not displayed.       CBC w/Diff Recent Labs     09/28/21 0400 09/27/21 0400 09/26/21  1640   WBC 6.5 4.1* 5.5   RBC 3.55* 3.40* 3.58*   HGB 11.6* 11.1* 11.5*   HCT 33.1* 32.3* 33.4*    159 164   GRANS 79* 45 70   LYMPH 12* 40 18*   EOS 0 3 1      Microbiology Recent Labs     09/27/21  1515 09/27/21  1514   CULT NO GROWTH THUS FAR NO GROWTH THUS FAR          RADIOLOGY:    All available imaging studies/reports in [facility] for this admission were reviewed      Disclaimer: Sections of this note are dictated utilizing voice recognition software, which may have resulted in some phonetic based errors in grammar and contents. Even though attempts were made to correct all the mistakes, some may have been missed, and remained in the body of the document. If questions arise, please contact our department.     Dr. Og Butler, Infectious Disease Specialist  [phone]  September 29, 2021  2:31 PM

## 2021-09-29 NOTE — PROGRESS NOTES
OT orders received and medical chart review completed. Pt reports performing toilet transfers Mod I with crutches, performing ADLs Mod I seated and in stance at sink w/ crutches. Patient reports house mate to assist as needed for safe d/c home e.g. grocery shopping, home making tasks and meal prep. Formal OT evaluation not indicated. OT to sign off.      Thank you for this referral,  Daniel Pineda MS OTR/L

## 2021-09-29 NOTE — PROGRESS NOTES
Problem: Mobility Impaired (Adult and Pediatric)  Goal: *Acute Goals and Plan of Care (Insert Text)  Description: Physical Therapy Goals  Initiated 9/29/2021 and to be accomplished within 7 day(s)  1. Patient will move from supine to sit and sit to supine  in bed with modified independence. 2.  Patient will transfer from bed to chair and chair to bed with modified independence using the least restrictive device. 3.  Patient will perform sit to stand with supervision. 4.  Patient will ambulate with supervision for 150 feet with the least restrictive device. 5.  Patient will ascend/descend 10 stairs with handrail(s) with supervision/set. PLOF: Patient was independent with functional mobility. He lives with friend/roommate in 2 story home with 3-4 ERMA      9/29/2021 1309 by Galdino Gonzalez PT  Outcome: Resolved/Met    PHYSICAL THERAPY TREATMENT AND DISCHARGE    Patient: Jenny Clayton (47 y.o. male)  Date: 9/29/2021  Diagnosis: Septic arthritis ([facility] [address] [M00.9] <principal problem not specified>  Procedure(s) (LRB):  INCISION AND DRAINAGE LEFT ANKLE (Left) 2 Days Post-Op  Precautions: WBAT      ASSESSMENT:  Updated left leg WB according to verbal order from MD [address]. Educated pt on transfers, gait using crutches, and safety with stairs negotiation; he verbalizes understanding. He ambulates with minimal WB through left toes limited by pain and presents with good balance. Patient negotiates 11 steps using B crutches with increased weight shift onto left LE for support. Patient is safe from mobility stand point for return to home with crutches to increase safety with mobility. PLAN:  Maximum therapeutic gains met at current level of care and patient will be discharged from physical therapy at this time.   Rationale for discharge:  [x]     Goals Achieved  []     [address]  []     Patient not participating in therapy  []     Other:  Discharge Recommendations:  None  Further Equipment Recommendations for Discharge:  crutches     SUBJECTIVE:   Patient stated Sylvia Brannon may have to go home because something came up.     OBJECTIVE DATA SUMMARY:   Critical Behavior:  Neurologic State: Alert  Orientation Level: Oriented X4  Cognition: Follows commands  Safety/Judgement: Fall prevention, Awareness of environment  Functional Mobility Training:  Bed Mobility:     Supine to Sit: Modified independent  Sit to Supine: Modified independent            Transfers:  Sit to Stand: Supervision  Stand to Sit: Supervision                Balance:  Sitting: Intact  Standing: Intact; With support (RW)  Standing - Static: Good  Standing - Dynamic : Good          Ambulation/Gait Training:  Distance (ft): 30 Feet (ft)  Assistive Device: Crutches  Ambulation - Level of Assistance: Supervision  Gait Abnormalities: Antalgic; Step to gait  Stance: Right increased          Stairs:  Number of Stairs Trained: 11  Stairs - Level of Assistance: Stand-by assistance  Rail Use:  (crutches)    Therapeutic Exercises:   10x ankle pumps      Pain:  Pain level pre-treatment: 7/10 left leg  Pain level post-treatment: 7/10   Pain Intervention(s): Medication (see MAR); Rest, Ice, Repositioning   Response to intervention: Nurse notified, See doc flow    Activity Tolerance:   Good  Please refer to the flowsheet for vital signs taken during this treatment. After treatment:   [] Patient left in no apparent distress sitting up in chair  [x] Patient left in no apparent distress in bed  [x] Call bell left within reach  [x] Nursing notified  [] Caregiver present  [] Bed alarm activated  [] SCDs applied      COMMUNICATION/EDUCATION:   []         Role of Physical Therapy in the acute care setting. []         Fall prevention education was provided and the patient/caregiver indicated understanding. [x]         Patient/family have participated as able and agree with findings and recommendations.   []         Patient is unable to participate in plan of care at this time.   []         Other:        Naomy Ingram, PT   Time Calculation: 28 mins

## 2021-09-29 NOTE — TELEPHONE ENCOUNTER
at  is working on home health for IV ABX tx for patient. Marie Johnson [facility], CARL, PA-C  9/29/2021  3:24 PM

## 2021-09-29 NOTE — PROGRESS NOTES
Home health order for IV abx sent to [facility] in [place]. Notified [facility] of [facility] intake. She stated order has not come through to them yet. She will call CM back when they receive order. 1419: [facility] called back stating pt's IV abx is covered 100%. She stated she is not sure if they can provide home health but she will call CM back    1445: [facility] called back stating they can see pt on Friday if pt is discharged home tomorrow.   She is requesting for pt to receive all doses of ceftriaxone before leaving the hospital.      MOOSE SawyerN RN  Care Management  Pager: [phone]

## 2021-09-29 NOTE — PROGRESS NOTES
Problem: Mobility Impaired (Adult and Pediatric)  Goal: *Acute Goals and Plan of Care (Insert Text)  Description: Physical Therapy Goals  Initiated 9/29/2021 and to be accomplished within 7 day(s)  1. Patient will move from supine to sit and sit to supine  in bed with modified independence. 2.  Patient will transfer from bed to chair and chair to bed with modified independence using the least restrictive device. 3.  Patient will perform sit to stand with supervision. 4.  Patient will ambulate with supervision for 150 feet with the least restrictive device. 5.  Patient will ascend/descend 10 stairs with handrail(s) with supervision/set. PLOF: Patient was independent with functional mobility. He lives with friend/roommate in 2 story home with 3-4 ERMA      Outcome: Progressing Towards Goal   PHYSICAL THERAPY EVALUATION    Patient: Doe Hansen (22 y.o. male)  Date: 9/29/2021  Primary Diagnosis: Septic arthritis ([facility] [address] [M00.9]  Procedure(s) (LRB):  INCISION AND DRAINAGE LEFT ANKLE (Left) 2 Days Post-Op   Precautions: standard         ASSESSMENT :  Based on the objective data described below, the patient presents with decreased strength, activity tolerance, and gait deficits. Patient received supine in bed, alert, and agreeable to PT evaluation. He reports 8/10 pain in left ankle with limited AROM. Awaiting to hear back from orthopedic MD Federico Thomas for weight bearing precautions. Educated pt on NWB precautions to be most conservative. Patient is able to ambulate with supervision using RW inside the room with hop to gait and maintains precautions. He declines further practice of mobility 2/2 pain. Will follow for 1-2 more treatments to practice mobility ambulating further distances and stair negotiation to ensure safe negotiation around the home. Patient will benefit from skilled intervention to address the above impairments.   Patient's rehabilitation potential is considered to be Good  Factors which may influence rehabilitation potential include:   []         None noted  []         Mental ability/status  []         Medical condition  []         Home/family situation and support systems  []         Safety awareness  [x]         Pain tolerance/management  []         Other:      PLAN :  Recommendations and Planned Interventions:   [x]           Bed Mobility Training             [x]    Neuromuscular Re-Education  [x]           Transfer Training                   []    Orthotic/Prosthetic Training  [x]           Gait Training                          []    Modalities  [x]           Therapeutic Exercises           []    Edema Management/Control  [x]           Therapeutic Activities            [x]    Family Training/Education  [x]           Patient Education  []           Other (comment):    Frequency/Duration: Patient will be followed by physical therapy 1-2 times per day/4-7 days per week to address goals. Discharge Recommendations: None  Further Equipment Recommendations for Discharge: crutches     SUBJECTIVE:   Patient stated I've used crutches before.     OBJECTIVE DATA SUMMARY:     Past Medical History:   Diagnosis Date    Anxiety     Cholelithiasis 08/18/2013    Note on U/S    Chronic gastritis 10/17/2014    Dr. Melo Story (EGD Result) H. Pylori Neg    Depression     Dog bite of left hand including fingers with infection 11/16/2014    Adm 11/18/14 for IV antibiotics.  ETOH abuse     GERD (gastroesophageal reflux disease)     Hepatic steatosis 02/06/2014    Noted on U/S.     History of plasmapheresis 02/2014    Hypertriglyceridemia, Severe Pancreatitis    Hypertension     Hypertriglyceridemia     Hypomagnesemia 07/24/2016    1.6 mg/dL    Non compliance with medical treatment     Osteomyelitis of left hand ([address] 11/22/2014    MRI Finding @ LUE 2nd MCP joint.  Pancreatitis 08/17/2013    Highest on Record (2/6/14) 4358.  Dr. Melo Story    PICC (peripherally inserted central catheter) in place 17/74/5982    R Basilic Vein (IV Antibiotics for LUE Hand Osteomyelitis).  Rectal bleeding     Substance abuse (HCC)     Several UDS + Opiates.  Vitamin D deficiency 02/17/2014    11.6ng/mL ( ng/mL)     Past Surgical History:   Procedure Laterality Date    HX CYST INCISION AND DRAINAGE Left 12/01/2014    LUE 2nd digit abscess I&D.  HX ENDOSCOPY  10/17/2014    Dr. Hector Guzman: EGD/Colonoscopy    HX HEENT      wisdom tooth extraction    HX KNEE ARTHROSCOPY Right      Barriers to Learning/Limitations: None  Compensate with: N/A  Home Situation:  Home Situation  Support Systems: Parent(s), Friend/Neighbor  Critical Behavior:  Neurologic State: Alert  Orientation Level: Oriented X4  Cognition: Follows commands  Safety/Judgement: Fall prevention; Awareness of environment  Psychosocial  Patient Behaviors: Calm; Cooperative  Purposeful Interaction: Yes  Pt Identified Daily Priority: Clinical issues (comment)  Caritas Process: Nurture loving kindness;Establish trust;Teaching/learning; Attend basic human needs;Create healing environment  Caring Interventions: Reassure; Therapeutic modalities  Reassure: Informing;Caring rounds  Therapeutic Modalities: Intentional therapeutic touch;Music/Imagery channel ([facility] [place] only)  Skin Condition/Temp: Cool     Skin Integrity: Incision (comment)  Skin Integumentary  Skin Color: Appropriate for ethnicity  Skin Condition/Temp: Cool  Skin Integrity: Incision (comment)     Strength:    Strength: Generally decreased, functional             Tone & Sensation:   Tone: Normal  Sensation: Intact           Range Of Motion:  AROM: Generally decreased, functional (left ankle)                 Functional Mobility:  Bed Mobility:     Supine to Sit: Modified independent  Sit to Supine: Modified independent     Transfers:  Sit to Stand: Supervision  Stand to Sit: Supervision          Balance:   Sitting: Intact  Standing: Intact; With support (RW)  Standing - Static: Good  Standing - Dynamic : Good       Ambulation/Gait Training:  Distance (ft): 30 Feet (ft)  Assistive Device: Walker, rolling  Ambulation - Level of Assistance: Supervision  Stance: Right increased        Therapeutic Exercises:   Educated on gentle ankle pumps to improve ROM    Pain:  Pain level pre-treatment: 8/10   Pain level post-treatment: 8/10   Pain Intervention(s) : Medication (see MAR); Rest, Ice, Repositioning  Response to intervention: Nurse notified, See doc flow    Activity Tolerance:   FAir  Please refer to the flowsheet for vital signs taken during this treatment. After treatment:   []         Patient left in no apparent distress sitting up in chair  [x]         Patient left in no apparent distress in bed  [x]         Call bell left within reach  [x]         Nursing notified  []         Caregiver present  []         Bed alarm activated  []         SCDs applied    COMMUNICATION/EDUCATION:   [x]         Role of Physical Therapy in the acute care setting. [x]         Fall prevention education was provided and the patient/caregiver indicated understanding. [x]         Patient/family have participated as able in goal setting and plan of care. [x]         Patient/family agree to work toward stated goals and plan of care. []         Patient understands intent and goals of therapy, but is neutral about his/her participation. []         Patient is unable to participate in goal setting/plan of care: ongoing with therapy staff.  []         Other:     Thank you for this referral.  Aron Flores, PT   Time Calculation: 17 mins      Eval Complexity: History: LOW Complexity : Zero comorbidities / personal factors that will impact the outcome / POCExam:LOW Complexity : 1-2 Standardized tests and measures addressing body structure, function, activity limitation and / or participation in recreation  Presentation: LOW Complexity : Stable, uncomplicated  Clinical Decision Making:Low Complexity    Overall Complexity:LOW

## 2021-09-29 NOTE — PROGRESS NOTES
Problem: Falls - Risk of  Goal: *Absence of Falls  Description: Document [place] Fall Risk and appropriate interventions in the flowsheet.   9/29/2021 1554 by Rubio Cartwright RN  Outcome: Progressing Towards Goal  Note: Fall Risk Interventions:  Mobility Interventions: Patient to call before getting OOB         Medication Interventions: Bed/chair exit alarm, Patient to call before getting OOB    Elimination Interventions: Bed/chair exit alarm, Call light in reach           9/29/2021 1305 by Rubio Cartwright RN  Outcome: Progressing Towards Goal  Note: Fall Risk Interventions:  Mobility Interventions: Patient to call before getting OOB         Medication Interventions: Bed/chair exit alarm, Patient to call before getting OOB    Elimination Interventions: Bed/chair exit alarm, Call light in reach              Problem: Pain  Goal: *Control of Pain  9/29/2021 1554 by Rubio Cartwright RN  Outcome: Progressing Towards Goal  9/29/2021 1305 by Rubio Cartwright RN  Outcome: Progressing Towards Goal     Problem: Alcohol Withdrawal  Goal: *STG: Participates in treatment plan  9/29/2021 1554 by Rubio Cartwright RN  Outcome: Progressing Towards Goal  9/29/2021 1305 by Rubio Cartwright RN  Outcome: Progressing Towards Goal  Goal: *STG: Remains safe in hospital  9/29/2021 1554 by Rubio Cartwright RN  Outcome: Progressing Towards Goal  9/29/2021 1305 by Rubio Cartwright RN  Outcome: Progressing Towards Goal  Goal: *STG: Seeks staff when symptoms of withdrawal increase  9/29/2021 1554 by Rubio Cartwright RN  Outcome: Progressing Towards Goal  9/29/2021 1305 by Rubio Cartwright RN  Outcome: Progressing Towards Goal  Goal: *STG: Complies with medication therapy  9/29/2021 1554 by Rubio Cartwright RN  Outcome: Progressing Towards Goal  9/29/2021 1305 by Rubio Cartwright RN  Outcome: Progressing Towards Goal  Goal: *STG: Attends activities and groups  9/29/2021 1554 by Rubio Cartwright RN  Outcome: Progressing Towards Goal  9/29/2021 1305 by Tawny Sky RN  Outcome: Progressing Towards Goal  Goal: *STG: Will identify negative impact of chemical dependency including the use of tobacco, alcohol, and other substances  9/29/2021 1554 by Tawny Sky RN  Outcome: Progressing Towards Goal  9/29/2021 1305 by Tawny Sky RN  Outcome: Progressing Towards Goal  Goal: *STG: Verbalizes abstinence as an achievable goal  9/29/2021 1554 by Tawny Sky RN  Outcome: Progressing Towards Goal  9/29/2021 1305 by Tawny Sky RN  Outcome: Progressing Towards Goal  Goal: *STG: Agrees to participate in outpatient after care program to support ongoing mental health  9/29/2021 1554 by Tawny Sky RN  Outcome: Progressing Towards Goal  9/29/2021 1305 by Tawny Sky RN  Outcome: Progressing Towards Goal  Goal: *STG: Able to indentify relapse triggers including interpersonal/social and familial factors  9/29/2021 1554 by Tawny Sky RN  Outcome: Progressing Towards Goal  9/29/2021 1305 by Tawny Sky RN  Outcome: Progressing Towards Goal  Goal: *STG: Identify lifestyle changes to support long term sobriety such as vocation, employment, education, and legal issues  9/29/2021 [address] [place] [address] by Tawny Sky RN  Outcome: Progressing Towards Goal  9/29/2021 1305 by Tawny Sky RN  Outcome: Progressing Towards Goal  Goal: *STG: Maintains appropriate nutrition and hydration  9/29/2021 1554 by Tawny Sky RN  Outcome: Progressing Towards Goal  9/29/2021 1305 by Tawny Sky RN  Outcome: Progressing Towards Goal  Goal: *STG: Vital signs within defined limits  9/29/2021 1554 by Tawny Sky RN  Outcome: Progressing Towards Goal  9/29/2021 1305 by Tawny Sky RN  Outcome: Progressing Towards Goal  Goal: *STG/LTG: Relapse prevention plan in place to include housing/aftercare, leisure activities, and spirituality  9/29/2021 1554 by Tawny Sky RN  Outcome: Progressing Towards Goal  9/29/2021 1305 by Tawny Sky RN  Outcome: Progressing Towards Goal  Goal: Interventions  9/29/2021 1554 by Floyd Matthews RN  Outcome: Progressing Towards Goal  9/29/2021 1305 by lFoyd Matthews RN  Outcome: Progressing Towards Goal

## 2021-09-29 NOTE — PROGRESS NOTES
Pt has pt/ot consult. CM is following for discharge needs.           Natalia Alpers, BSN RN  Care Management  Pager: [phone]

## 2021-09-29 NOTE — TELEPHONE ENCOUNTER
Attempted to call the patient. Unable to leave message due to the voice mail being full. If the patient calls back please let him know that the  at [facility] is working on getting home health for the IV antibiotics per CARLITA Brooks.

## 2021-09-30 ENCOUNTER — HOSPITAL ENCOUNTER (INPATIENT)
Dept: INTERVENTIONAL RADIOLOGY/VASCULAR | Age: 36
Discharge: HOME | DRG: 313 | End: 2021-09-30
Attending: INTERNAL MEDICINE
Payer: MEDICAID

## 2021-09-30 VITALS
HEIGHT: 74 IN | DIASTOLIC BLOOD PRESSURE: 105 MMHG | OXYGEN SATURATION: 97 % | TEMPERATURE: 98.7 F | HEART RATE: 71 BPM | BODY MASS INDEX: 28.88 KG/M2 | SYSTOLIC BLOOD PRESSURE: 167 MMHG | WEIGHT: 225 LBS | RESPIRATION RATE: 20 BRPM

## 2021-09-30 LAB
ANION GAP SERPL CALC-SCNC: 6 MMOL/L (ref 3–18)
BACTERIA SPEC CULT: NORMAL
BUN SERPL-MCNC: 5 MG/DL (ref 7–18)
BUN/CREAT SERPL: 7 (ref 12–20)
CALCIUM SERPL-MCNC: 8.6 MG/DL (ref 8.5–10.1)
CHLORIDE SERPL-SCNC: 109 MMOL/L (ref 100–111)
CO2 SERPL-SCNC: 26 MMOL/L (ref 21–32)
CREAT SERPL-MCNC: 0.69 MG/DL (ref 0.6–1.3)
GLUCOSE SERPL-MCNC: 80 MG/DL (ref 74–99)
GRAM STN SPEC: NORMAL
GRAM STN SPEC: NORMAL
POTASSIUM SERPL-SCNC: 3.8 MMOL/L (ref 3.5–5.5)
SERVICE CMNT-IMP: NORMAL
SODIUM SERPL-SCNC: 141 MMOL/L (ref 136–145)

## 2021-09-30 PROCEDURE — 27610 EXPLORE/TREAT ANKLE JOINT: CPT | Performed by: ORTHOPAEDIC SURGERY

## 2021-09-30 PROCEDURE — 99239 HOSP IP/OBS DSCHRG MGMT >30: CPT | Performed by: HOSPITALIST

## 2021-09-30 PROCEDURE — 77001 FLUOROGUIDE FOR VEIN DEVICE: CPT

## 2021-09-30 PROCEDURE — 74011250636 HC RX REV CODE- 250/636: Performed by: PHYSICIAN ASSISTANT

## 2021-09-30 PROCEDURE — 74011250636 HC RX REV CODE- 250/636: Performed by: INTERNAL MEDICINE

## 2021-09-30 PROCEDURE — 74011250636 HC RX REV CODE- 250/636: Performed by: EMERGENCY MEDICINE

## 2021-09-30 PROCEDURE — 74011000250 HC RX REV CODE- 250: Performed by: INTERNAL MEDICINE

## 2021-09-30 PROCEDURE — 02HV33Z INSERTION OF INFUSION DEVICE INTO SUPERIOR VENA CAVA, PERCUTANEOUS APPROACH: ICD-10-PCS | Performed by: PHYSICIAN ASSISTANT

## 2021-09-30 PROCEDURE — 74011250637 HC RX REV CODE- 250/637: Performed by: EMERGENCY MEDICINE

## 2021-09-30 PROCEDURE — 74011250637 HC RX REV CODE- 250/637: Performed by: HOSPITALIST

## 2021-09-30 PROCEDURE — 74011250636 HC RX REV CODE- 250/636: Performed by: HOSPITALIST

## 2021-09-30 PROCEDURE — 74011250637 HC RX REV CODE- 250/637: Performed by: PHYSICIAN ASSISTANT

## 2021-09-30 PROCEDURE — 80048 BASIC METABOLIC PNL TOTAL CA: CPT

## 2021-09-30 PROCEDURE — 36415 COLL VENOUS BLD VENIPUNCTURE: CPT

## 2021-09-30 RX ORDER — FOLIC ACID 1 MG/1
1 TABLET ORAL DAILY
Qty: 30 TABLET | Refills: 0 | Status: SHIPPED | OUTPATIENT
Start: 2021-10-01 | End: 2021-10-31

## 2021-09-30 RX ORDER — ATENOLOL 100 MG/1
100 TABLET ORAL DAILY
Qty: 30 TABLET | Refills: 0 | Status: SHIPPED | OUTPATIENT
Start: 2021-10-01 | End: 2021-10-26 | Stop reason: SDUPTHER

## 2021-09-30 RX ORDER — LANOLIN ALCOHOL/MO/W.PET/CERES
100 CREAM (GRAM) TOPICAL DAILY
Status: DISCONTINUED | OUTPATIENT
Start: 2021-09-30 | End: 2021-09-30 | Stop reason: HOSPADM

## 2021-09-30 RX ORDER — THERA TABS 400 MCG
1 TAB ORAL DAILY
Status: DISCONTINUED | OUTPATIENT
Start: 2021-09-30 | End: 2021-09-30 | Stop reason: HOSPADM

## 2021-09-30 RX ORDER — OXYCODONE AND ACETAMINOPHEN 5; 325 MG/1; MG/1
1 TABLET ORAL
Qty: 12 TABLET | Refills: 0 | Status: SHIPPED | OUTPATIENT
Start: 2021-09-30 | End: 2021-10-03

## 2021-09-30 RX ORDER — THERA TABS 400 MCG
1 TAB ORAL DAILY
Qty: 30 TABLET | Refills: 0 | Status: SHIPPED | OUTPATIENT
Start: 2021-10-01 | End: 2021-10-26 | Stop reason: SDUPTHER

## 2021-09-30 RX ORDER — FOLIC ACID 1 MG/1
1 TABLET ORAL DAILY
Status: DISCONTINUED | OUTPATIENT
Start: 2021-09-30 | End: 2021-09-30 | Stop reason: HOSPADM

## 2021-09-30 RX ORDER — LANOLIN ALCOHOL/MO/W.PET/CERES
100 CREAM (GRAM) TOPICAL DAILY
Qty: 30 TABLET | Refills: 0 | Status: SHIPPED | OUTPATIENT
Start: 2021-10-01 | End: 2021-10-31

## 2021-09-30 RX ORDER — LISINOPRIL 40 MG/1
40 TABLET ORAL DAILY
Qty: 30 TABLET | Refills: 0 | Status: SHIPPED | OUTPATIENT
Start: 2021-10-01 | End: 2021-10-31

## 2021-09-30 RX ADMIN — ATENOLOL 100 MG: 50 TABLET ORAL at 09:59

## 2021-09-30 RX ADMIN — VANCOMYCIN HYDROCHLORIDE 1500 MG: 10 INJECTION, POWDER, LYOPHILIZED, FOR SOLUTION INTRAVENOUS at 13:36

## 2021-09-30 RX ADMIN — Medication 10 ML: at 06:50

## 2021-09-30 RX ADMIN — LORAZEPAM 1 MG: 1 TABLET ORAL at 10:00

## 2021-09-30 RX ADMIN — HYDROMORPHONE HYDROCHLORIDE 1 MG: 1 INJECTION, SOLUTION INTRAMUSCULAR; INTRAVENOUS; SUBCUTANEOUS at 00:21

## 2021-09-30 RX ADMIN — HYDROMORPHONE HYDROCHLORIDE 1 MG: 1 INJECTION, SOLUTION INTRAMUSCULAR; INTRAVENOUS; SUBCUTANEOUS at 06:36

## 2021-09-30 RX ADMIN — FOLIC ACID 1 MG: 1 TABLET ORAL at 10:12

## 2021-09-30 RX ADMIN — Medication 100 MG: at 10:12

## 2021-09-30 RX ADMIN — HYDRALAZINE HYDROCHLORIDE 10 MG: 20 INJECTION INTRAMUSCULAR; INTRAVENOUS at 04:30

## 2021-09-30 RX ADMIN — LORAZEPAM 1 MG: 1 TABLET ORAL at 02:04

## 2021-09-30 RX ADMIN — LISINOPRIL 40 MG: 40 TABLET ORAL at 09:59

## 2021-09-30 RX ADMIN — HYDROCODONE BITARTRATE AND ACETAMINOPHEN 2 TABLET: 5; 325 TABLET ORAL at 09:59

## 2021-09-30 RX ADMIN — HYDROMORPHONE HYDROCHLORIDE 1 MG: 1 INJECTION, SOLUTION INTRAMUSCULAR; INTRAVENOUS; SUBCUTANEOUS at 12:56

## 2021-09-30 RX ADMIN — PANTOPRAZOLE SODIUM 20 MG: 20 TABLET, DELAYED RELEASE ORAL at 09:59

## 2021-09-30 RX ADMIN — HYDROCODONE BITARTRATE AND ACETAMINOPHEN 2 TABLET: 5; 325 TABLET ORAL at 03:15

## 2021-09-30 RX ADMIN — ESCITALOPRAM OXALATE 20 MG: 20 TABLET ORAL at 10:00

## 2021-09-30 RX ADMIN — VANCOMYCIN HYDROCHLORIDE 1500 MG: 10 INJECTION, POWDER, LYOPHILIZED, FOR SOLUTION INTRAVENOUS at 04:05

## 2021-09-30 RX ADMIN — ATORVASTATIN CALCIUM 20 MG: 20 TABLET, FILM COATED ORAL at 10:00

## 2021-09-30 RX ADMIN — THERA TABS 1 TABLET: TAB at 10:12

## 2021-09-30 RX ADMIN — LORAZEPAM 2 MG: 2 INJECTION INTRAMUSCULAR; INTRAVENOUS at 04:29

## 2021-09-30 RX ADMIN — LORAZEPAM 1 MG: 1 TABLET ORAL at 13:47

## 2021-09-30 RX ADMIN — CEFTRIAXONE SODIUM 2 G: 2 INJECTION, POWDER, FOR SOLUTION INTRAMUSCULAR; INTRAVENOUS at 17:07

## 2021-09-30 RX ADMIN — HYDROCODONE BITARTRATE AND ACETAMINOPHEN 2 TABLET: 5; 325 TABLET ORAL at 17:08

## 2021-09-30 NOTE — DISCHARGE SUMMARY
Discharge Summary    Patient: Maged Fleming MRN: [id number]  CSN: [id number]    YOB: 1985  Age: 43 y.o. Sex: male    DOA: 9/26/2021 LOS:  LOS: 4 days        Disposition: Home with [facility]    Discharge Date: 9/30/2021    Admission Diagnosis: Septic arthritis ([address] [M00.9]    Discharge Diagnosis:    1. Septic arthritis of the left ankle status post irrigation and debridement 9/27/2021  2. Hypertension. 3. Alcohol abuse with mild withdrawal signs  4. History of anxiety and depression. Discharge Condition: Stable      PHYSICAL EXAM  Visit Vitals  BP (!) 155/103 (BP 1 Location: Left upper arm, BP Patient Position: At rest)   Pulse 74   Temp 98.7 °F (37.1 °C)   Resp 18   Ht 6' 2\" (1.88 m)   Wt 102.1 kg (225 lb)   SpO2 98%   BMI 28.89 kg/m²       General: Alert, cooperative, no acute distress    HEENT: PERRLA, EOMI. Anicteric sclerae. Lungs:  CTA Bilaterally. No Wheezing/Rales. Heart:             Regular rate and Rhythm. Abdomen: Soft, Non distended, Non tender. + Bowel sounds. Extremities: No edema. Ankle dressing clean. Psych:   Good insight. Not anxious or agitated. Neurologic:  AA, oriented X 3. Moves all ext                                 Hospital Course: This is a 23-year-old male who presented to the ED today with pain and swelling of the left ankle. Patient had actually presented to [facility] ED yesterday and underwent tap of the left ankle but subsequently left AGAINST MEDICAL ADVICE. The fluid from the ankle did show septic arthritis. Patient was called from the ED, patient eventually returned back to ED. Patient was admitted hospital was started on empiric antibiotics and Ortho was consulted. Ortho saw the patient recommended OR for I&D. Patient underwent irrigation and debridement by Dr. Atul Grayson on 9/27/2021. Patient tolerated procedure well. Patient was continued on empiric antibiotics and infectious disease was consulted. Cultures were sent.   Infectious disease saw the patient and recommended continue empiric antibiotics. Patient cultures remain negative. ID recommended that patient should need IV antibiotics. ID recommended ceftriaxone and daptomycin till November 11. Patient also had some mild alcohol withdrawal symptoms in the hospital he was treated with CIWA protocol along with thiamine multivitamin folic acid. Patient also noted to have uncontrolled hypertension, his hypertensive medications were adjusted in the hospital.  I also feel partially is blood pressure was elevated due to pain control. Ortho follow-up on the patient today, drain was removed and cleared the patient for discharge. ID followed up on the patient today, since all cultures are negative ID cleared the patient for discharge. ID wrote home health care IV antibiotics orders. Patient is currently stable for discharge home with IV antibiotics at home. Discussed with the patient about discharge plan, follow-up appointments, IV antibiotics and side effects of medications. Patient understood and agreed with the plan. I offered to talk to family but patient states he will update his family. Procedures:   Irrigation and debridement of left ankle by Dr. Radha Tinajero    Consults:   Dr. Ellyn Feldman, infectious disease  Dr. Radha Tinajero, orthopedic surgery    Imaging studies:   MRI Results (most recent):  Results from Hospital Encounter encounter on 09/26/21    MRI ANKLE LT W WO CONT    Narrative  MRI left ankle, with and without contrast    History:  Septic arthritis  Comparison:  Radiographs 9/25/2021    Technique:  T1 and T2 FSE with Fat Sat images were obtained in the coronal,  axial and sagittal planes. Patient received 20 mL MultiHance intravenously and  postcontrast T1 images were obtained. Findings:    Osseous structures and joints: No fracture, contusion or AVN. Extra-articular  small intraosseous cysts in the calcaneus at the floor of the sinus Tarsi with  associated peripheral enhancement.  There is small to moderate subtalar joint  effusion with associated mild synovial thickening and enhancement. There is  small to moderate tibiotalar joint effusion with associated mild synovial  thickening and enhancement. No appreciable joint effusions otherwise. No  osteochondral lesion. No chondral defects. No other bone marrow signal  abnormality. Achillis tendon:  Within normal limits  Plantar Fascia:  Within normal limits  Sinus Tarsi: Preserved fat signal but mild edema and inflammation near the  subtalar joint    Posterior Tibial Tendon: Intact tendon. Minimal inframalleolar tenosynovitis. Flexor Digitorum Longus Tendon:  Within normal limits  Flexor Hallucis Longus Tendon: Mild plantar tenosynovitis with septations at the  knot of Jorge  Peroneus Brevis Tendon: Intact tendon. Minimal inframalleolar tenosynovitis  Peroneus Longus Tendon: Intact tendon. Minimal inframalleolar tenosynovitis. Extensor Tendons: Within normal limits    Ligaments:  Medial:  Deltoid Ligament:  Intact  Spring Ligament:  Intact    Lateral:  Anterior Tibiofibular Ligament: Intact  Posterior Tibiofibular Ligament: Intact  Anterior Talofibular Ligament: Intact  Posterior Talofibular Ligament: Intact  Calcaneofibular Ligament: Intact    Others: Nonenhancing superficial circumferential soft tissue edema in the ankle. No muscular edema or atrophy. Impression  1. Small to moderate tibiotalar and subtalar joint effusions with mild diffuse  synovial thickening and enhancement compatible with synovitis which could be  inflammatory or infectious, this cannot be differentiated by imaging. No  chondral abnormalities or subchondral bone marrow edema. Small extra-articular  subcortical loss cystic changes in the calcaneus. 2. Multiple low-grade tenosynovitis as discussed.         Discharge Medications:     Current Discharge Medication List      START taking these medications    Details   thiamine HCL (B-1) 100 mg tablet Take 1 Tablet by mouth daily for 30 days. Qty: 30 Tablet, Refills: 0      therapeutic multivitamin (THERAGRAN) tablet Take 1 Tablet by mouth daily for 30 days. Qty: 30 Tablet, Refills: 0      folic acid (FOLVITE) 1 mg tablet Take 1 Tablet by mouth daily for 30 days. Qty: 30 Tablet, Refills: 0      oxyCODONE-acetaminophen (Percocet) 5-325 mg per tablet Take 1 Tablet by mouth every six (6) hours as needed for Pain for up to 3 days. Max Daily Amount: 4 Tablets. Qty: 12 Tablet, Refills: 0    Associated Diagnoses: Septic arthritis of left ankle, due to unspecified organism ([facility] [address]      OTHER IV antibiotics with home health care  Ceftriaxone 2 g IV q 24 hour  Daptomycin 6mg/kg IV q 24 hour  Tentative Stop date: till 11/11/21  Qty: 1 Each, Refills: 0         CONTINUE these medications which have CHANGED    Details   atenoloL (TENORMIN) 100 mg tablet Take 1 Tablet by mouth daily for 30 days. Qty: 30 Tablet, Refills: 0      lisinopriL (PRINIVIL, ZESTRIL) 40 mg tablet Take 1 Tablet by mouth daily for 30 days. Qty: 30 Tablet, Refills: 0         CONTINUE these medications which have NOT CHANGED    Details   gabapentin (NEURONTIN) 400 mg capsule Take 1 Capsule by mouth three (3) times daily. Max Daily Amount: 1,200 mg. Qty: 90 Capsule, Refills: 0    Associated Diagnoses: Chronic right-sided low back pain with right-sided sciatica      escitalopram oxalate (LEXAPRO) 20 mg tablet Take 1 Tablet by mouth daily. Qty: 30 Tablet, Refills: 1      atorvastatin (LIPITOR) 20 mg tablet Take 1 Tablet by mouth daily. Qty: 30 Tablet, Refills: 1      omeprazole (PRILOSEC) 20 mg capsule Take 1 Cap by mouth Daily (before breakfast). Qty: 30 Cap, Refills: 1      ondansetron (Zofran ODT) 4 mg disintegrating tablet Take 1 Tab by mouth every eight (8) hours as needed for Nausea or Nausea or Vomiting. Qty: 10 Tab, Refills: 0      cetirizine (ZYRTEC) 10 mg tablet Take 1 Tab by mouth nightly.   Qty: 30 Tab, Refills: 1      sildenafil citrate (Viagra) 100 mg tablet Take 1 Tab by mouth as needed for Erectile Dysfunction. Qty: 12 Tab, Refills: 5    Associated Diagnoses: Drug-induced erectile dysfunction      fluticasone propionate (FLONASE) 50 mcg/actuation nasal spray 2 Sprays by Both Nostrils route daily. Qty: 1 Bottle, Refills: 0      Camphor-Eucalyptus Oil-Menthol (VICKS VAPORUB) 4.8-1.2-2.6 % oint 1 Actuation(s) by Apply Externally route three (3) times daily as needed for Cough or Other (congestion). Qty: 50 g, Refills: 0         STOP taking these medications       cephALEXin (Keflex) 500 mg capsule Comments:   Reason for Stopping:         cephALEXin (Keflex) 500 mg capsule Comments:   Reason for Stopping:         clonazePAM (KlonoPIN) 1 mg tablet Comments:   Reason for Stopping:         tiZANidine (ZANAFLEX) 4 mg tablet Comments:   Reason for Stopping:         mupirocin (BACTROBAN) 2 % ointment Comments:   Reason for Stopping:         guaiFENesin ER (MUCINEX) 600 mg ER tablet Comments:   Reason for Stopping:         Vaporizers (VICKS WARM STEAM VAPORIZER) misc Comments:   Reason for Stopping:             · It is important that you take the medication exactly as they are prescribed. · Keep your medication in the bottles provided by the pharmacist and keep a list of the medication names, dosages, and times to be taken in your wallet. · Do not take other medications without consulting your doctor. DIET:  Cardiac Diet    ACTIVITY: Activity as tolerated  Home health care for Skilled care for IV antibiotics, PICC line care, hypertension and medication management. ADDITIONAL INFORMATION: If you experience any of the following symptoms but not limited to Fever, chills, nausea, vomiting, diarrhea, change in mentation, falling, bleeding, shortness of breath, chest pain, please call your primary care physician or return to the emergency room if you cannot get hold of your doctor:     FOLLOW UP CARE:  Dr. Marcie Barker NP in 5-7 days.  Please call and set up an appointment. Dr. Daniele Pike in 1 week    Minutes spent on discharge: 40 minutes spent coordinating this discharge (review instructions/follow-up, prescriptions, preparing report for sign off)    Brenda Benton MD  9/30/2021 1:38 PM    Disclaimer: Sections of this note are dictated using utilizing voice recognition software. Minor typographical errors may be present. If questions arise, please do not hesitate to contact me or call our department.

## 2021-09-30 NOTE — PROGRESS NOTES
Ortho    Pt seen and evaluated  Improving  Pain well controlled  No cp, sob, abd pain    Visit Vitals  BP (!) 155/103 (BP 1 Location: Left upper arm, BP Patient Position: At rest)   Pulse 74   Temp 98.7 °F (37.1 °C)   Resp 18   Ht 6' 2\" (1.88 m)   Wt 225 lb (102.1 kg)   SpO2 98%   BMI 28.89 kg/m²     Left ankle  Dressing cdi  nv intact  No cce    Micro-   0 Result Notes   Ref Range & Units 9/27/21 1514 Resulting Agency   Special Requests:   RIGHT   ANKLE  P    [address] [place] [address] Dr   GRAM STAIN   NO ORGANISMS SEEN [address]   GRAM STAIN   NO WBC'S SEEN [address]   Culture result:   NO GROWTH THUS FAR P  ST. [address]           A: sp left ankle I&D    P:  Drain removed without complications. Band aid placed on drain site.   Plan for PICC line today and home with [facility]  F/u with Dr. Deloris Marquez in 10d

## 2021-09-30 NOTE — PROGRESS NOTES
Infectious Disease progress Note        Reason: Left ankle septic arthritis, penicillin allergy    Current abx Prior abx   vancomycin since 9/26/2021  Ceftriaxone   cefepime, vancomycinx1 on 9/25  Cefepime restarted 9/26-9/27     Lines:       Assessment :    33-year-old male with past medical history significant for depression, anxiety, substance abuse admitted to [facility] on 9/26/2021 with increasing left ankle pain, concerns for infection. Clinical presentation consistent with left ankle septic arthritis  Status post arthrocentesis 9/25/2021. Cultures 9/25-no growth till date  Joint fluid analysis 9/25-grossly bloody specimen. , 111.  97% neutrophils. Negative for crystals    Improved erythema/pain left ankle on today's exam.    Exact microbial etiology of infection not entirely clear at this time. Negative GC/chlamydia urine PCR argues against gonorrhea/chlamydia infection    Status post surgical I&D left ankle on 9/27/2021. Intra-Op findings noted. Cloudy fluid noted intraoperatively. Intra-Op cultures 9/27 no growth till date    Antibiotic management complicated due to documented history of penicillin allergy. Patient states that he had severe allergic reaction including hives/swelling of the throat about 5 years ago. Is currently tolerating cefepime without difficulties    Recommendations:    1. Continue ceftriaxone. Continue vancomycin while inpatient  2. Place PICC line for outpatient IV antibiotics  3. Follow-up Ortho surgery recommendations  4. Follow-up joint fluid cultures 9/25/2021, 9/27/2021  5. Recommend to use ceftriaxone, daptomycin till 11/11/2021 as outpatient. I requested lab to hold cultures for 14 days. Will follow up culture results as outpatient and modify antibiotics if needed  6.   Drain removal left ankle per Ortho surgery    Home health orders on chart (click on \"chart review, other orders, IP home health)      Above plan was discussed in details with patient, and  Yoon. Discussed with  please call me if any further questions or concerns. Will continue to participate in the care of this patient. HPI:  Improved pain recent surgical site left ankle    Current Discharge Medication List        CONTINUE these medications which have NOT CHANGED    Details   !! cephALEXin (Keflex) 500 mg capsule Take 1 Capsule by mouth four (4) times daily for 10 days. Qty: 40 Capsule, Refills: 0      !! cephALEXin (Keflex) 500 mg capsule Take 1 Capsule by mouth four (4) times daily for 10 days. Qty: 40 Capsule, Refills: 0      gabapentin (NEURONTIN) 400 mg capsule Take 1 Capsule by mouth three (3) times daily. Max Daily Amount: 1,200 mg. Qty: 90 Capsule, Refills: 0    Associated Diagnoses: Chronic right-sided low back pain with right-sided sciatica      lisinopriL (PRINIVIL, ZESTRIL) 20 mg tablet Take 1 Tablet by mouth daily. Qty: 90 Tablet, Refills: 0    Associated Diagnoses: Essential hypertension      atenoloL (TENORMIN) 50 mg tablet Take 2 Tablets by mouth daily. Qty: 180 Tablet, Refills: 0    Associated Diagnoses: Essential hypertension; MICHELLE (generalized anxiety disorder)      escitalopram oxalate (LEXAPRO) 20 mg tablet Take 1 Tablet by mouth daily. Qty: 30 Tablet, Refills: 1      atorvastatin (LIPITOR) 20 mg tablet Take 1 Tablet by mouth daily. Qty: 30 Tablet, Refills: 1      clonazePAM (KlonoPIN) 1 mg tablet Take 1 Tablet by mouth three (3) times daily as needed for Anxiety. Max Daily Amount: 3 mg. Qty: 15 Tablet, Refills: 0    Associated Diagnoses: Panic attacks      omeprazole (PRILOSEC) 20 mg capsule Take 1 Cap by mouth Daily (before breakfast). Qty: 30 Cap, Refills: 1      ondansetron (Zofran ODT) 4 mg disintegrating tablet Take 1 Tab by mouth every eight (8) hours as needed for Nausea or Nausea or Vomiting. Qty: 10 Tab, Refills: 0      tiZANidine (ZANAFLEX) 4 mg tablet Take 1 Tab by mouth three (3) times daily as needed for Muscle Spasm(s).   Qty: 90 Tab, Refills: 2    Associated Diagnoses: Spasticity      cetirizine (ZYRTEC) 10 mg tablet Take 1 Tab by mouth nightly. Qty: 30 Tab, Refills: 1      sildenafil citrate (Viagra) 100 mg tablet Take 1 Tab by mouth as needed for Erectile Dysfunction. Qty: 12 Tab, Refills: 5    Associated Diagnoses: Drug-induced erectile dysfunction      mupirocin (BACTROBAN) 2 % ointment Apply  to affected area daily. Qty: 22 g, Refills: 0    Associated Diagnoses: Nasal sore      fluticasone propionate (FLONASE) 50 mcg/actuation nasal spray 2 Sprays by Both Nostrils route daily. Qty: 1 Bottle, Refills: 0      guaiFENesin ER (MUCINEX) 600 mg ER tablet Take 1 Tab by mouth two (2) times a day. Qty: 20 Tab, Refills: 0      Camphor-Eucalyptus Oil-Menthol (VICKS VAPORUB) 4.8-1.2-2.6 % oint 1 Actuation(s) by Apply Externally route three (3) times daily as needed for Cough or Other (congestion). Qty: 50 g, Refills: 0      Vaporizers (VICKS WARM STEAM VAPORIZER) misc 1 Actuation(s) by Does Not Apply route three (3) times daily as needed for Cough or Other (congestion). Qty: 1 Each, Refills: 0       !! - Potential duplicate medications found. Please discuss with provider.           Current Facility-Administered Medications   Medication Dose Route Frequency    atenoloL (TENORMIN) tablet 100 mg  100 mg Oral DAILY    diphenhydrAMINE (BENADRYL) capsule 50 mg  50 mg Oral Q6H PRN    lisinopriL (PRINIVIL, ZESTRIL) tablet 40 mg  40 mg Oral DAILY    HYDROmorphone (DILAUDID) injection 1 mg  1 mg IntraVENous Q6H PRN    HYDROcodone-acetaminophen (NORCO) 5-325 mg per tablet 2 Tablet  2 Tablet Oral Q6H PRN    vancomycin (VANCOCIN) 1500 mg in  ml infusion  1,500 mg IntraVENous Q8H    cefTRIAXone (ROCEPHIN) 2 g in sterile water (preservative free) 20 mL IV syringe  2 g IntraVENous Q24H    sodium chloride (NS) flush 5-40 mL  5-40 mL IntraVENous Q8H    sodium chloride (NS) flush 5-40 mL  5-40 mL IntraVENous PRN    hydrALAZINE (APRESOLINE) 20 mg/mL injection 10 mg  10 mg IntraVENous Q6H PRN    LORazepam (ATIVAN) tablet 1 mg  1 mg Oral Q1H PRN    Or    LORazepam (ATIVAN) injection 1 mg  1 mg IntraVENous Q1H PRN    LORazepam (ATIVAN) tablet 2 mg  2 mg Oral Q1H PRN    Or    LORazepam (ATIVAN) injection 2 mg  2 mg IntraVENous Q1H PRN    LORazepam (ATIVAN) injection 3 mg  3 mg IntraVENous Q15MIN PRN    escitalopram oxalate (LEXAPRO) tablet 20 mg  20 mg Oral DAILY    atorvastatin (LIPITOR) tablet 20 mg  20 mg Oral DAILY    pantoprazole (PROTONIX) tablet 20 mg  20 mg Oral ACB    0.9% sodium chloride 1,000 mL with mvi (adult no. 4 with vit K) 10 mL, thiamine 926 mg, folic acid 1 mg infusion   IntraVENous Q24H    acetaminophen (TYLENOL) tablet 650 mg  650 mg Oral Q6H PRN    Or    acetaminophen (TYLENOL) suppository 650 mg  650 mg Rectal Q6H PRN    polyethylene glycol (MIRALAX) packet 17 g  17 g Oral DAILY PRN    ondansetron (ZOFRAN ODT) tablet 4 mg  4 mg Oral Q8H PRN    Or    ondansetron (ZOFRAN) injection 4 mg  4 mg IntraVENous Q6H PRN    naloxone (NARCAN) injection 0.4 mg  0.4 mg IntraVENous PRN       Allergies: Amoxicillin, Contrast dye [iodine], Iodinated contrast media, Penicillins, and Wellbutrin [bupropion]    Family History   Problem Relation Age of Onset    Elevated Lipids Mother     Thyroid Disease Mother     Hypertension Father     Alcohol abuse Maternal Grandmother     MS Paternal Grandmother      Social History     Socioeconomic History    Marital status: SINGLE     Spouse name: Not on file    Number of children: Not on file    Years of education: Not on file    Highest education level: Not on file   Occupational History    Not on file   Tobacco Use    Smoking status: Former Smoker     Quit date: 2012     Years since quittin.0    Smokeless tobacco: Never Used    Tobacco comment: 2018    Substance and Sexual Activity    Alcohol use: Not Currently     Alcohol/week: 13.3 standard drinks     Types: 8 Cans of beer, 8 Standard drinks or equivalent per week    Drug use: No    Sexual activity: Not on file   Other Topics Concern    Not on file   Social History Narrative    Not on file     Social Determinants of Health     Financial Resource Strain:     Difficulty of Paying Living Expenses:    Food Insecurity:     Worried About [address] in the Last Year:     Ran Out of Food in the Last Year:    Transportation Needs:     Lack of Transportation (Medical):     Lack of Transportation (Non-Medical):    Physical Activity:     Days of Exercise per Week:     Minutes of Exercise per Session:    Stress:     Feeling of Stress :    Social Connections:     Frequency of Communication with Friends and Family:     Frequency of Social Gatherings with Friends and Family:     Attends Yazidi Services: Active Member of Clubs or Organizations:     Attends Club or Organization Meetings:     Marital Status:    Intimate Partner Violence:     Fear of Current or Ex-Partner:     Emotionally Abused:     Physically Abused:     Sexually Abused:      Social History     Tobacco Use   Smoking Status Former Smoker    Quit date: 2012    Years since quittin.0   Smokeless Tobacco Never Used   Tobacco Comment    2018         Temp (24hrs), Av.6 °F (37 °C), Min:98.1 °F (36.7 °C), Max:99.1 °F (37.3 °C)    Visit Vitals  BP (!) 166/100 (BP 1 Location: Left upper arm, BP Patient Position: At rest)   Pulse 72   Temp 98.8 °F (37.1 °C)   Resp 20   Ht 6' 2\" (1.88 m)   Wt 102.1 kg (225 lb)   SpO2 96%   BMI 28.89 kg/m²       ROS: 12 point ROS obtained in details. Pertinent positives as mentioned in HPI,   otherwise negative    Physical Exam:    General: Well developed, well nourished male laying on the bed AAOx3 in no acute distress. General:   awake alert and oriented   HEENT:  Normocephalic, atraumatic, EOMI, no scleral icterus or pallor; no conjunctival hemmohage;  nasal and oral mucous are moist and without evidence of lesions. Neck supple, no bruits.    Lymph Nodes:   not examined Lungs:   non-labored, bilaterally clear to auscultation- no crackles wheezes rales or rhonchi   Heart:  RRR, s1 and s2; no  rubs or gallops, no edema   Abdomen:  soft, non-distended, active bowel sounds, no hepatomegaly, no splenomegaly. Non-tender   Genitourinary:  deferred   Extremities:   no clubbing, cyanosis; left ankle surgical dressing not removed. No erythema/swelling of any other joints. No new rash   Neurologic:  No gross focal sensory abnormalities; 5/5 muscle strength to upper and lower extremities. Speech appropriate. Cranial nerves intact                        Skin:  No rash or ulcers noted   Back:  no spinal or paraspinal muscle tenderness or rigidity, no CVA tenderness     Psychiatric:  No suicidal or homicidal ideations, appropriate mood and affect         Labs: Results:   Chemistry Recent Labs     09/30/21  0342 09/29/21  0320 09/28/21  0400   GLU 80 87 99    142 138   K 3.8 3.3* 3.6    112* 108   CO2 26 22 23   BUN 5* 5* 5*   CREA 0.69 0.57* 0.60   CA 8.6 7.9* 8.3*   AGAP 6 8 7   BUCR 7* 9* 8*      CBC w/Diff Recent Labs     09/28/21  0400   WBC 6.5   RBC 3.55*   HGB 11.6*   HCT 33.1*      GRANS 79*   LYMPH 12*   EOS 0      Microbiology Recent Labs     09/27/21  1515 09/27/21  1514   CULT NO GROWTH THUS FAR NO GROWTH THUS FAR          RADIOLOGY:    All available imaging studies/reports in [facility] for this admission were reviewed      Disclaimer: Sections of this note are dictated utilizing voice recognition software, which may have resulted in some phonetic based errors in grammar and contents. Even though attempts were made to correct all the mistakes, some may have been missed, and remained in the body of the document. If questions arise, please contact our department.     Dr. Jos Ibarra, Infectious Disease Specialist  [phone]  September 30, 2021  2:31 PM

## 2021-09-30 NOTE — PROGRESS NOTES
Problem: Falls - Risk of  Goal: *Absence of Falls  Description: Document Katy Skaggs Fall Risk and appropriate interventions in the flowsheet.   Outcome: Progressing Towards Goal  Note: Fall Risk Interventions:  Mobility Interventions: Patient to call before getting OOB         Medication Interventions: Bed/chair exit alarm, Patient to call before getting OOB    Elimination Interventions: Bed/chair exit alarm              Problem: Patient Education: Go to Patient Education Activity  Goal: Patient/Family Education  Outcome: Progressing Towards Goal     Problem: Pain  Goal: *Control of Pain  Outcome: Progressing Towards Goal     Problem: Patient Education: Go to Patient Education Activity  Goal: Patient/Family Education  Outcome: Progressing Towards Goal     Problem: Alcohol Withdrawal  Goal: *STG: Participates in treatment plan  Outcome: Progressing Towards Goal  Goal: *STG: Remains safe in hospital  Outcome: Progressing Towards Goal  Goal: *STG: Seeks staff when symptoms of withdrawal increase  Outcome: Progressing Towards Goal  Goal: *STG: Complies with medication therapy  Outcome: Progressing Towards Goal  Goal: *STG: Attends activities and groups  Outcome: Progressing Towards Goal  Goal: *STG: Will identify negative impact of chemical dependency including the use of tobacco, alcohol, and other substances  Outcome: Progressing Towards Goal  Goal: *STG: Verbalizes abstinence as an achievable goal  Outcome: Progressing Towards Goal  Goal: *STG: Agrees to participate in outpatient after care program to support ongoing mental health  Outcome: Progressing Towards Goal  Goal: *STG: Able to indentify relapse triggers including interpersonal/social and familial factors  Outcome: Progressing Towards Goal  Goal: *STG: Identify lifestyle changes to support long term sobriety such as vocation, employment, education, and legal issues  Outcome: Progressing Towards Goal  Goal: *STG: Maintains appropriate nutrition and hydration  Outcome: Progressing Towards Goal  Goal: *STG: Vital signs within defined limits  Outcome: Progressing Towards Goal  Goal: *STG/LTG: Relapse prevention plan in place to include housing/aftercare, leisure activities, and spirituality  Outcome: Progressing Towards Goal  Goal: Interventions  Outcome: Progressing Towards Goal     Problem: Patient Education: Go to Patient Education Activity  Goal: Patient/Family Education  Outcome: Progressing Towards Goal     Problem: Patient Education: Go to Patient Education Activity  Goal: Patient/Family Education  Outcome: Progressing Towards Goal     Problem: Sepsis: Day 3  Goal: Off Pathway (Use only if patient is Off Pathway)  Outcome: Progressing Towards Goal  Goal: *Central Venous Pressure maintained at 8-12 mm Hg  Outcome: Progressing Towards Goal  Goal: *Oxygen saturation within defined limits  Outcome: Progressing Towards Goal  Goal: *Vital sign stability  Outcome: Progressing Towards Goal  Goal: *Tolerating diet  Outcome: Progressing Towards Goal  Goal: *Demonstrates progressive activity  Outcome: Progressing Towards Goal  Goal: Activity/Safety  Outcome: Progressing Towards Goal  Goal: Consults, if ordered  Outcome: Progressing Towards Goal  Goal: Diagnostic Test/Procedures  Outcome: Progressing Towards Goal  Goal: Nutrition/Diet  Outcome: Progressing Towards Goal  Goal: Discharge Planning  Outcome: Progressing Towards Goal  Goal: Medications  Outcome: Progressing Towards Goal  Goal: Respiratory  Outcome: Progressing Towards Goal  Goal: Treatments/Interventions/Procedures  Outcome: Progressing Towards Goal  Goal: Psychosocial  Outcome: Progressing Towards Goal     Problem: Sepsis: Day 4  Goal: Off Pathway (Use only if patient is Off Pathway)  Outcome: Progressing Towards Goal  Goal: Activity/Safety  Outcome: Progressing Towards Goal  Goal: Consults, if ordered  Outcome: Progressing Towards Goal  Goal: Diagnostic Test/Procedures  Outcome: Progressing Towards Goal  Goal: Nutrition/Diet  Outcome: Progressing Towards Goal  Goal: Discharge Planning  Outcome: Progressing Towards Goal  Goal: Medications  Outcome: Progressing Towards Goal  Goal: Respiratory  Outcome: Progressing Towards Goal  Goal: Treatments/Interventions/Procedures  Outcome: Progressing Towards Goal  Goal: Psychosocial  Outcome: Progressing Towards Goal  Goal: *Oxygen saturation within defined limits  Outcome: Progressing Towards Goal  Goal: *Hemodynamically stable  Outcome: Progressing Towards Goal  Goal: *Vital signs within defined limits  Outcome: Progressing Towards Goal  Goal: *Tolerating diet  Outcome: Progressing Towards Goal  Goal: *Demonstrates progressive activity  Outcome: Progressing Towards Goal  Goal: *Fluid volume maintenance  Outcome: Progressing Towards Goal     Problem: Sepsis: Day 5  Goal: Off Pathway (Use only if patient is Off Pathway)  Outcome: Progressing Towards Goal  Goal: *Oxygen saturation within defined limits  Outcome: Progressing Towards Goal  Goal: *Vital signs within defined limits  Outcome: Progressing Towards Goal  Goal: *Tolerating diet  Outcome: Progressing Towards Goal  Goal: *Demonstrates progressive activity  Outcome: Progressing Towards Goal  Goal: *Discharge plan identified  Outcome: Progressing Towards Goal  Goal: Activity/Safety  Outcome: Progressing Towards Goal  Goal: Consults, if ordered  Outcome: Progressing Towards Goal  Goal: Diagnostic Test/Procedures  Outcome: Progressing Towards Goal  Goal: Nutrition/Diet  Outcome: Progressing Towards Goal  Goal: Discharge Planning  Outcome: Progressing Towards Goal  Goal: Medications  Outcome: Progressing Towards Goal  Goal: Respiratory  Outcome: Progressing Towards Goal  Goal: Treatments/Interventions/Procedures  Outcome: Progressing Towards Goal  Goal: Psychosocial  Outcome: Progressing Towards Goal     Problem: Sepsis: Day 6  Goal: Off Pathway (Use only if patient is Off Pathway)  Outcome: Progressing Towards Goal  Goal: *Oxygen saturation within defined limits  Outcome: Progressing Towards Goal  Goal: *Vital signs within defined limits  Outcome: Progressing Towards Goal  Goal: *Tolerating diet  Outcome: Progressing Towards Goal  Goal: *Demonstrates progressive activity  Outcome: Progressing Towards Goal  Goal: Influenza immunization  Outcome: Progressing Towards Goal  Goal: *Pneumococcal immunization  Outcome: Progressing Towards Goal  Goal: Activity/Safety  Outcome: Progressing Towards Goal  Goal: Diagnostic Test/Procedures  Outcome: Progressing Towards Goal  Goal: Nutrition/Diet  Outcome: Progressing Towards Goal  Goal: Discharge Planning  Outcome: Progressing Towards Goal  Goal: Medications  Outcome: Progressing Towards Goal  Goal: Respiratory  Outcome: Progressing Towards Goal  Goal: Treatments/Interventions/Procedures  Outcome: Progressing Towards Goal  Goal: Psychosocial  Outcome: Progressing Towards Goal     Problem: Sepsis: Discharge Outcomes  Goal: *Vital signs within defined limits  Outcome: Progressing Towards Goal  Goal: *Tolerating diet  Outcome: Progressing Towards Goal  Goal: *Verbalizes understanding and describes prescribed diet  Outcome: Progressing Towards Goal  Goal: *Demonstrates progressive activity  Outcome: Progressing Towards Goal  Goal: *Describes follow-up/return visits to physicians  Outcome: Progressing Towards Goal  Goal: *Verbalizes name, dosage, time, side effects, and number of days to continue medications  Outcome: Progressing Towards Goal  Goal: *Influenza immunization (Oct-Mar only)  Outcome: Progressing Towards Goal  Goal: *Pneumococcal immunization  Outcome: Progressing Towards Goal  Goal: *Lungs clear or at baseline  Outcome: Progressing Towards Goal  Goal: *Oxygen saturation returns to baseline or 90% or better on room air  Outcome: Progressing Towards Goal  Goal: *Glycemic control  Outcome: Progressing Towards Goal  Goal: *Absence of deep venous thrombosis signs and symptoms(Stroke Metric)  Outcome: Progressing Towards Goal  Goal: *Describes available resources and support systems  Outcome: Progressing Towards Goal  Goal: *Optimal pain control at patient's stated goal  Outcome: Progressing Towards Goal

## 2021-09-30 NOTE — DISCHARGE INSTRUCTIONS
Discharge Instructions    Patient: Monica Calabrese MRN: [id number]  CSN: [id number]    YOB: 1985  Age: 43 y.o. Sex: male    DOA: 9/26/2021 LOS:  LOS: 4 days   Discharge Date:      DIET:  Cardiac Diet    ACTIVITY: Activity as tolerated  Home health care for Skilled care for IV antibiotics, PICC line care, hypertension and medication management. ADDITIONAL INFORMATION: If you experience any of the following symptoms but not limited to Fever, chills, nausea, vomiting, diarrhea, change in mentation, falling, bleeding, shortness of breath, chest pain, please call your primary care physician or return to the emergency room if you cannot get hold of your doctor:     FOLLOW UP CARE:  Dr. Marie Talavera NP in 5-7 days. Please call and set up an appointment. Dr. Judy Rodriguez in 1 week      Mamie Ramos MD  9/30/2021 1:37 PM          Patient armband removed and shredded. Patient Education        Septic Arthritis: Care Instructions  Your Care Instructions     Septic arthritis is a bacterial infection in a joint. This occurs when an infection from another part of the body, such as pneumonia or a skin or kidney infection, travels through the bloodstream to the joint. It may also spread to the joint from an infection in nearby soft tissue, or it can follow a surgery or injury. The joint is often warm, swollen, and tender. Early treatment can prevent permanent damage to the joint. Treatment includes antibiotics and draining the joint to remove the infection. Depending on which part of your body is infected, your doctor may drain the joint with a needle or you may need surgery to drain the joint. Follow-up care is a key part of your treatment and safety. Be sure to make and go to all appointments, and call your doctor if you are having problems. It's also a good idea to know your test results and keep a list of the medicines you take.   How can you care for yourself at home?  · You will receive antibiotics through a vein (IV) at first. After this, you may take antibiotics by mouth. · Take your antibiotics as directed. Do not stop taking them just because you feel better. You need to take the full course of antibiotics. · Rest the joint as much as you can. · If possible, prop up the injured joint on pillows as much as possible for the next 3 days. Try to keep it at or above the level of your heart. This can help reduce pain and swelling. · Follow your doctor's instructions on exercises for the affected joint. · Do not smoke. Smoking can make it harder for your body to fight the infection. If you need help quitting, talk to your doctor about stop-smoking programs and medicines. These can increase your chances of quitting for good. When should you call for help? Call your doctor now or seek immediate medical care if:    · You have worse symptoms of infection, such as:  ? Increased pain, swelling, warmth, or redness. ? Red streaks leading from the area. ? Pus draining from the area. ? A fever.     · You cannot use your joint. Watch closely for changes in your health, and be sure to contact your doctor if:    · You do not get better as expected. Where can you learn more? Go to http://www.gray.com/  Enter C265 in the search box to learn more about \"Septic Arthritis: Care Instructions. \"  Current as of: July 1, 2021               Content Version: 13.0  © 5266-2676 [employer]. Care instructions adapted under license by [employer] (which disclaims liability or warranty for this information). If you have questions about a medical condition or this instruction, always ask your healthcare professional. [address] any warranty or liability for your use of this information.

## 2021-09-30 NOTE — PROCEDURES
INTERVENTIONAL RADIOLOGY POST PICC LINE NOTE       September 30, 2021       3:00 PM     Preoperative Diagnosis:   IV Access    Postoperative Diagnosis:  Same. : Josh Teran PA-C    Assistant:  None. Attending Physician: True    Type of Anesthesia:  1% Lidocaine local    Procedure/Description: right basilic  upper extremity PICC Line. Findings:  right basilic 5 Slovenian catheter placed. Tip at SVC/RA junction. OK to use. Length 41 cm. Estimated blood Loss: Minimal    Specimen Removed: None    Drains: None     Complications:  None. Condition:  Stable.     Discharge Plan:  continue present therapy    Carmen Israel PA-C

## 2021-09-30 NOTE — PROGRESS NOTES
Discharge orders noted. Discussed with Charge Nurse Rick Hillman for pt to get all doses of his IV abx before he leaves today. Met with pt. He is yet to get his PICC line. Informed him he has to get all doses of IV abx today fore he lives. He expressed understanding. He stated he has a ride home. Discussed with Rick Bray of [facility]. 1330: Updated pt's [facility], [address]. 1600: Home care orders for wound care and labs sent to [facility] and Rick Bray was notified. She is requesting for specific wound care orders. Sent text message to Dr. Maria Fernanda Whitlock.       Sherice Paulino, MOOSEN RN  Care Management  Pager: [phone]

## 2021-10-01 ENCOUNTER — TELEPHONE (OUTPATIENT)
Dept: FAMILY MEDICINE CLINIC | Age: 36
End: 2021-10-01

## 2021-10-01 ENCOUNTER — HOSPITAL ENCOUNTER (EMERGENCY)
Age: 36
Discharge: HOME | End: 2021-10-01
Attending: STUDENT IN AN ORGANIZED HEALTH CARE EDUCATION/TRAINING PROGRAM
Payer: MEDICAID

## 2021-10-01 ENCOUNTER — TELEPHONE (OUTPATIENT)
Dept: ORTHOPEDIC SURGERY | Age: 36
End: 2021-10-01

## 2021-10-01 VITALS
RESPIRATION RATE: 17 BRPM | TEMPERATURE: 99.5 F | OXYGEN SATURATION: 100 % | DIASTOLIC BLOOD PRESSURE: 109 MMHG | SYSTOLIC BLOOD PRESSURE: 163 MMHG | HEART RATE: 76 BPM

## 2021-10-01 DIAGNOSIS — G89.18 POST-OP PAIN: Primary | ICD-10-CM

## 2021-10-01 LAB
ALBUMIN SERPL-MCNC: 3.4 G/DL (ref 3.4–5)
ALBUMIN/GLOB SERPL: 0.9 {RATIO} (ref 0.8–1.7)
ALP SERPL-CCNC: 48 U/L (ref 45–117)
ALT SERPL-CCNC: 17 U/L (ref 16–61)
ANION GAP SERPL CALC-SCNC: 10 MMOL/L (ref 3–18)
AST SERPL-CCNC: 19 U/L (ref 10–38)
BACTERIA SPEC CULT: NORMAL
BACTERIA SPEC CULT: NORMAL
BASOPHILS # BLD: 0 K/UL (ref 0–0.1)
BASOPHILS NFR BLD: 0 % (ref 0–2)
BILIRUB SERPL-MCNC: 0.4 MG/DL (ref 0.2–1)
BUN SERPL-MCNC: 6 MG/DL (ref 7–18)
BUN/CREAT SERPL: 9 (ref 12–20)
CALCIUM SERPL-MCNC: 9.1 MG/DL (ref 8.5–10.1)
CHLORIDE SERPL-SCNC: 107 MMOL/L (ref 100–111)
CO2 SERPL-SCNC: 25 MMOL/L (ref 21–32)
CREAT SERPL-MCNC: 0.68 MG/DL (ref 0.6–1.3)
DIFFERENTIAL METHOD BLD: ABNORMAL
EOSINOPHIL # BLD: 0.1 K/UL (ref 0–0.4)
EOSINOPHIL NFR BLD: 2 % (ref 0–5)
ERYTHROCYTE [DISTWIDTH] IN BLOOD BY AUTOMATED COUNT: 12.9 % (ref 11.6–14.5)
GLOBULIN SER CALC-MCNC: 3.8 G/DL (ref 2–4)
GLUCOSE SERPL-MCNC: 129 MG/DL (ref 74–99)
HCT VFR BLD AUTO: 33.5 % (ref 36–48)
HGB BLD-MCNC: 11.6 G/DL (ref 13–16)
LYMPHOCYTES # BLD: 0.8 K/UL (ref 0.9–3.6)
LYMPHOCYTES NFR BLD: 15 % (ref 21–52)
MAGNESIUM SERPL-MCNC: 2 MG/DL (ref 1.6–2.6)
MCH RBC QN AUTO: 32.2 PG (ref 24–34)
MCHC RBC AUTO-ENTMCNC: 34.6 G/DL (ref 31–37)
MCV RBC AUTO: 93.1 FL (ref 78–100)
MONOCYTES # BLD: 0.8 K/UL (ref 0.05–1.2)
MONOCYTES NFR BLD: 16 % (ref 3–10)
NEUTS SEG # BLD: 3.4 K/UL (ref 1.8–8)
NEUTS SEG NFR BLD: 66 % (ref 40–73)
PLATELET # BLD AUTO: 245 K/UL (ref 135–420)
PMV BLD AUTO: 8.8 FL (ref 9.2–11.8)
POTASSIUM SERPL-SCNC: 3.3 MMOL/L (ref 3.5–5.5)
PROT SERPL-MCNC: 7.2 G/DL (ref 6.4–8.2)
RBC # BLD AUTO: 3.6 M/UL (ref 4.35–5.65)
SERVICE CMNT-IMP: NORMAL
SERVICE CMNT-IMP: NORMAL
SODIUM SERPL-SCNC: 142 MMOL/L (ref 136–145)
WBC # BLD AUTO: 5.1 K/UL (ref 4.6–13.2)

## 2021-10-01 PROCEDURE — 99282 EMERGENCY DEPT VISIT SF MDM: CPT

## 2021-10-01 PROCEDURE — 85025 COMPLETE CBC W/AUTO DIFF WBC: CPT

## 2021-10-01 PROCEDURE — 74011250637 HC RX REV CODE- 250/637: Performed by: STUDENT IN AN ORGANIZED HEALTH CARE EDUCATION/TRAINING PROGRAM

## 2021-10-01 PROCEDURE — 83735 ASSAY OF MAGNESIUM: CPT

## 2021-10-01 PROCEDURE — 80053 COMPREHEN METABOLIC PANEL: CPT

## 2021-10-01 RX ORDER — IBUPROFEN 600 MG/1
600 TABLET ORAL ONCE
Status: COMPLETED | OUTPATIENT
Start: 2021-10-01 | End: 2021-10-01

## 2021-10-01 RX ORDER — HYDROCODONE BITARTRATE AND ACETAMINOPHEN 5; 325 MG/1; MG/1
1 TABLET ORAL ONCE
Status: COMPLETED | OUTPATIENT
Start: 2021-10-01 | End: 2021-10-01

## 2021-10-01 RX ADMIN — HYDROCODONE BITARTRATE AND ACETAMINOPHEN 1 TABLET: 5; 325 TABLET ORAL at 21:36

## 2021-10-01 RX ADMIN — IBUPROFEN 600 MG: 600 TABLET, FILM COATED ORAL at 21:36

## 2021-10-01 NOTE — TELEPHONE ENCOUNTER
Spoke with patient. Patient states that the redness is \"not as bad\" as it was earlier and he is running a low grade temp of 99.5. Instructed patient to go to the ED.   Patient stated \"I am stuck at my home with my dogs right not, I will see what I can do\"

## 2021-10-01 NOTE — TELEPHONE ENCOUNTER
Spoke with patient. Patient states that everything has been taken care of and that the home health nurse is coming out today.

## 2021-10-01 NOTE — TELEPHONE ENCOUNTER
Patient called in stating that the pharmacy did not receive medication for the gabapentin. They told him that the did not see the medication listed for him.

## 2021-10-01 NOTE — ED TRIAGE NOTES
Patient had surgery on his left foot yesterday (Septic Arthritis) and is now c/o red streaks and fever. Contacted the surgeon and told to return to the ED.

## 2021-10-01 NOTE — TELEPHONE ENCOUNTER
Patient reports swelling and redness in left ankle  - swelling since about 7am & redness at about 11 am.  Area is slightly warm also. Patient had slight fever (99.3) earlier today while taking Tylenol. Current temp is 99.1. Patient has contacted ED and they are paging a doctor and suggested he contact us. He says he was not given wound care instructions at discharge either. Home care is still pending orders also.   Please contact patient to advise at [phone]

## 2021-10-01 NOTE — TELEPHONE ENCOUNTER
Spoke with the patient today. Advised the patient that medication was on profile. The pharmacy is filling the medication now.  He verbalized understanding

## 2021-10-01 NOTE — PROGRESS NOTES
Problem: Falls - Risk of  Goal: *Absence of Falls  Description: Document Yajaira Led Fall Risk and appropriate interventions in the flowsheet.   Outcome: Progressing Towards Goal  Note: Fall Risk Interventions:  Mobility Interventions: Communicate number of staff needed for ambulation/transfer, Patient to call before getting OOB, Utilize walker, cane, or other assistive device         Medication Interventions: Patient to call before getting OOB, Teach patient to arise slowly    Elimination Interventions: Call light in reach, Patient to call for help with toileting needs, Toileting schedule/hourly rounds, Urinal in reach              Problem: Patient Education: Go to Patient Education Activity  Goal: Patient/Family Education  Outcome: Progressing Towards Goal     Problem: Pain  Goal: *Control of Pain  Outcome: Progressing Towards Goal     Problem: Patient Education: Go to Patient Education Activity  Goal: Patient/Family Education  Outcome: Progressing Towards Goal     Problem: Alcohol Withdrawal  Goal: *STG: Participates in treatment plan  Outcome: Progressing Towards Goal  Goal: *STG: Remains safe in hospital  Outcome: Progressing Towards Goal  Goal: *STG: Seeks staff when symptoms of withdrawal increase  Outcome: Progressing Towards Goal  Goal: *STG: Complies with medication therapy  Outcome: Progressing Towards Goal  Goal: *STG: Attends activities and groups  Outcome: Progressing Towards Goal  Goal: *STG: Will identify negative impact of chemical dependency including the use of tobacco, alcohol, and other substances  Outcome: Progressing Towards Goal  Goal: *STG: Verbalizes abstinence as an achievable goal  Outcome: Progressing Towards Goal  Goal: *STG: Agrees to participate in outpatient after care program to support ongoing mental health  Outcome: Progressing Towards Goal  Goal: *STG: Able to indentify relapse triggers including interpersonal/social and familial factors  Outcome: Progressing Towards Goal  Goal: *STG: Identify lifestyle changes to support long term sobriety such as vocation, employment, education, and legal issues  Outcome: Progressing Towards Goal  Goal: *STG: Maintains appropriate nutrition and hydration  Outcome: Progressing Towards Goal  Goal: *STG: Vital signs within defined limits  Outcome: Progressing Towards Goal  Goal: *STG/LTG: Relapse prevention plan in place to include housing/aftercare, leisure activities, and spirituality  Outcome: Progressing Towards Goal  Goal: Interventions  Outcome: Progressing Towards Goal     Problem: Patient Education: Go to Patient Education Activity  Goal: Patient/Family Education  Outcome: Progressing Towards Goal     Problem: Patient Education: Go to Patient Education Activity  Goal: Patient/Family Education  Outcome: Progressing Towards Goal     Problem: Sepsis: Day 3  Goal: Off Pathway (Use only if patient is Off Pathway)  Outcome: Progressing Towards Goal  Goal: *Central Venous Pressure maintained at 8-12 mm Hg  Outcome: Progressing Towards Goal  Goal: *Oxygen saturation within defined limits  Outcome: Progressing Towards Goal  Goal: *Vital sign stability  Outcome: Progressing Towards Goal  Goal: *Tolerating diet  Outcome: Progressing Towards Goal  Goal: *Demonstrates progressive activity  Outcome: Progressing Towards Goal  Goal: Activity/Safety  Outcome: Progressing Towards Goal  Goal: Consults, if ordered  Outcome: Progressing Towards Goal  Goal: Diagnostic Test/Procedures  Outcome: Progressing Towards Goal  Goal: Nutrition/Diet  Outcome: Progressing Towards Goal  Goal: Discharge Planning  Outcome: Progressing Towards Goal  Goal: Medications  Outcome: Progressing Towards Goal  Goal: Respiratory  Outcome: Progressing Towards Goal  Goal: Treatments/Interventions/Procedures  Outcome: Progressing Towards Goal  Goal: Psychosocial  Outcome: Progressing Towards Goal     Problem: Sepsis: Day 4  Goal: Off Pathway (Use only if patient is Off Pathway)  Outcome: Progressing Towards Goal  Goal: Activity/Safety  Outcome: Progressing Towards Goal  Goal: Consults, if ordered  Outcome: Progressing Towards Goal  Goal: Diagnostic Test/Procedures  Outcome: Progressing Towards Goal  Goal: Nutrition/Diet  Outcome: Progressing Towards Goal  Goal: Discharge Planning  Outcome: Progressing Towards Goal  Goal: Medications  Outcome: Progressing Towards Goal  Goal: Respiratory  Outcome: Progressing Towards Goal  Goal: Treatments/Interventions/Procedures  Outcome: Progressing Towards Goal  Goal: Psychosocial  Outcome: Progressing Towards Goal  Goal: *Oxygen saturation within defined limits  Outcome: Progressing Towards Goal  Goal: *Hemodynamically stable  Outcome: Progressing Towards Goal  Goal: *Vital signs within defined limits  Outcome: Progressing Towards Goal  Goal: *Tolerating diet  Outcome: Progressing Towards Goal  Goal: *Demonstrates progressive activity  Outcome: Progressing Towards Goal  Goal: *Fluid volume maintenance  Outcome: Progressing Towards Goal     Problem: Sepsis: Day 5  Goal: Off Pathway (Use only if patient is Off Pathway)  Outcome: Progressing Towards Goal  Goal: *Oxygen saturation within defined limits  Outcome: Progressing Towards Goal  Goal: *Vital signs within defined limits  Outcome: Progressing Towards Goal  Goal: *Tolerating diet  Outcome: Progressing Towards Goal  Goal: *Demonstrates progressive activity  Outcome: Progressing Towards Goal  Goal: *Discharge plan identified  Outcome: Progressing Towards Goal  Goal: Activity/Safety  Outcome: Progressing Towards Goal  Goal: Consults, if ordered  Outcome: Progressing Towards Goal  Goal: Diagnostic Test/Procedures  Outcome: Progressing Towards Goal  Goal: Nutrition/Diet  Outcome: Progressing Towards Goal  Goal: Discharge Planning  Outcome: Progressing Towards Goal  Goal: Medications  Outcome: Progressing Towards Goal  Goal: Respiratory  Outcome: Progressing Towards Goal  Goal: Treatments/Interventions/Procedures  Outcome: Progressing Towards Goal  Goal: Psychosocial  Outcome: Progressing Towards Goal     Problem: Sepsis: Day 6  Goal: Off Pathway (Use only if patient is Off Pathway)  Outcome: Progressing Towards Goal  Goal: *Oxygen saturation within defined limits  Outcome: Progressing Towards Goal  Goal: *Vital signs within defined limits  Outcome: Progressing Towards Goal  Goal: *Tolerating diet  Outcome: Progressing Towards Goal  Goal: *Demonstrates progressive activity  Outcome: Progressing Towards Goal  Goal: Influenza immunization  Outcome: Progressing Towards Goal  Goal: *Pneumococcal immunization  Outcome: Progressing Towards Goal  Goal: Activity/Safety  Outcome: Progressing Towards Goal  Goal: Diagnostic Test/Procedures  Outcome: Progressing Towards Goal  Goal: Nutrition/Diet  Outcome: Progressing Towards Goal  Goal: Discharge Planning  Outcome: Progressing Towards Goal  Goal: Medications  Outcome: Progressing Towards Goal  Goal: Respiratory  Outcome: Progressing Towards Goal  Goal: Treatments/Interventions/Procedures  Outcome: Progressing Towards Goal  Goal: Psychosocial  Outcome: Progressing Towards Goal     Problem: Sepsis: Discharge Outcomes  Goal: *Vital signs within defined limits  Outcome: Progressing Towards Goal  Goal: *Tolerating diet  Outcome: Progressing Towards Goal  Goal: *Verbalizes understanding and describes prescribed diet  Outcome: Progressing Towards Goal  Goal: *Demonstrates progressive activity  Outcome: Progressing Towards Goal  Goal: *Describes follow-up/return visits to physicians  Outcome: Progressing Towards Goal  Goal: *Verbalizes name, dosage, time, side effects, and number of days to continue medications  Outcome: Progressing Towards Goal  Goal: *Influenza immunization (Oct-Mar only)  Outcome: Progressing Towards Goal  Goal: *Pneumococcal immunization  Outcome: Progressing Towards Goal  Goal: *Lungs clear or at baseline  Outcome: Progressing Towards Goal  Goal: *Oxygen saturation returns to baseline or 90% or better on room air  Outcome: Progressing Towards Goal  Goal: *Glycemic control  Outcome: Progressing Towards Goal  Goal: *Absence of deep venous thrombosis signs and symptoms(Stroke Metric)  Outcome: Progressing Towards Goal  Goal: *Describes available resources and support systems  Outcome: Progressing Towards Goal  Goal: *Optimal pain control at patient's stated goal  Outcome: Progressing Towards Goal

## 2021-10-02 NOTE — DISCHARGE INSTRUCTIONS
Take Tylenol and ibuprofen as needed for pain. Attempt to keep off your foot as much as possible and use crutches at all time. Try to be nonweightbearing to the foot until you follow-up with your orthopedic surgeon. Keep your foot elevated above the level of your heart.

## 2021-10-02 NOTE — ED NOTES
The Provider has reviewed discharge instructions with the patient. The patient verbalized understanding. No further questions at this time.

## 2021-10-02 NOTE — ED PROVIDER NOTES
HPI   Patient is a 38-year-old male with a recent history of septic arthritis of his left ankle who presents with worsening pain and swelling. He states that he was recently discharged the hospital after operative drainage and has been on antibiotics. He did receive his antibiotics today. He feels that the swelling is moving up his legs and that there is red streaking. He states he has had low-grade fevers of 99.6 with taking Tylenol prior to this. He states that he just feels unwell. He is still able to ambulate and get around although it is uncomfortable. The pain is an aching throbbing pain diffuse to his ankle and is not localized to 1 specific area. Denies any runny nose, sore throat, cough, dysuria or recent sick contacts. Past Medical History:   Diagnosis Date    Anxiety     Cholelithiasis 08/18/2013    Note on U/S    Chronic gastritis 10/17/2014    Dr. Nerissa Grande (EGD Result) H. Pylori Neg    Depression     Dog bite of left hand including fingers with infection 11/16/2014    Adm 11/18/14 for IV antibiotics.  ETOH abuse     GERD (gastroesophageal reflux disease)     Hepatic steatosis 02/06/2014    Noted on U/S.     History of plasmapheresis 02/2014    Hypertriglyceridemia, Severe Pancreatitis    Hypertension     Hypertriglyceridemia     Hypomagnesemia 07/24/2016    1.6 mg/dL    Non compliance with medical treatment     Osteomyelitis of left hand ([address] 11/22/2014    MRI Finding @ [facility] 2nd MCP joint.  Pancreatitis 08/17/2013    Highest on Record (2/6/14) 8663. Dr. Nerissa Grande    PICC (peripherally inserted central catheter) in place 46/92/7708    R Basilic Vein (IV Antibiotics for LUE Hand Osteomyelitis).  Rectal bleeding     Substance abuse (HCC)     Several UDS + Opiates.      Vitamin D deficiency 02/17/2014    11.6ng/mL ( ng/mL)       Past Surgical History:   Procedure Laterality Date    HX CYST INCISION AND DRAINAGE Left 12/01/2014    [facility] 2nd digit abscess I&D.     HX ENDOSCOPY  10/17/2014    Dr. Teresa Schneider: EGD/Colonoscopy    HX HEENT      wisdom tooth extraction    HX KNEE ARTHROSCOPY Right          Family History:   Problem Relation Age of Onset    Elevated Lipids Mother     Thyroid Disease Mother     Hypertension Father     Alcohol abuse Maternal Grandmother     MS Paternal Grandmother        Social History     Socioeconomic History    Marital status: SINGLE     Spouse name: Not on file    Number of children: Not on file    Years of education: Not on file    Highest education level: Not on file   Occupational History    Not on file   Tobacco Use    Smoking status: Former Smoker     Quit date: 2012     Years since quittin.0    Smokeless tobacco: Never Used    Tobacco comment: 2018    Substance and Sexual Activity    Alcohol use: Not Currently     Alcohol/week: 13.3 standard drinks     Types: 8 Cans of beer, 8 Standard drinks or equivalent per week    Drug use: No    Sexual activity: Not on file   Other Topics Concern    Not on file   Social History Narrative    Not on file     Social Determinants of Health     Financial Resource Strain:     Difficulty of Paying Living Expenses:    Food Insecurity:     Worried About Running Out of Food in the Last Year:     Dina of Food in the Last Year:    Transportation Needs:     Lack of Transportation (Medical):      Lack of Transportation (Non-Medical):    Physical Activity:     Days of Exercise per Week:     Minutes of Exercise per Session:    Stress:     Feeling of Stress :    Social Connections:     Frequency of Communication with Friends and Family:     Frequency of Social Gatherings with Friends and Family:     Attends Temple Services:     Active Member of Clubs or Organizations:     Attends Club or Organization Meetings:     Marital Status:    Intimate Partner Violence:     Fear of Current or Ex-Partner:     Emotionally Abused:     Physically Abused:     Sexually Abused: ALLERGIES: Amoxicillin, Contrast dye [iodine], Iodinated contrast media, Penicillins, and Wellbutrin [bupropion]    Review of Systems  Constitutional: Positive for subjective fevers  HENT: No ear pain  Eyes: No change in vision  Respiratory: No SOB  Cardio: No chest pain  GI: No blood in stool  : No hematuria  MSK: No back pain  Skin: No rashes  Neuro: No headache    Vitals:    10/01/21 1951   BP: (!) 163/109   Pulse: 76   Resp: 17   Temp: 99.5 °F (37.5 °C)   SpO2: 100%            Physical Exam   General: No acute distress  Head: Normocephalic, atraumatic  Psych: Cooperative and alert  Eyes: No scleral icterus, normal conjunctiva  ENT: Moist oral mucosa  Neck: Supple  CV: Regular rate and rhythm, no pitting edema, palpable DP and radial pulses  Pulm: Clear breath sounds bilaterally without any wheezing or rhonchi, normal respiratory rate  GI: Normal bowel sounds, soft, non-tender  MSK: Able to move, ankle, toes and knees bilaterally  Skin: There is a approximately 3 cm surgical incision with stitches in place that is clean and, dry and intact with some surrounding hematoma, swelling noted throughout the entire ankle extending slightly up the calf, no obvious erythema or warmth compared to the opposite side  Neuro: Alert and conversive    MDM   Patient is a 33-year-old male with a known history of septic arthritis who presents with worsening pain and swelling in his ankle. Patient is concerned about systemic infections of fever and worsening swelling despite being on antibiotics. Review the EMR shows the patient's cultures did not grow anything he was started on dapsone and ceftriaxone. He did receive these both today. From my standpoint patient is hemodynamically stable and otherwise appears comfortable. His temperature here is 99.5 which although mildly elevated not with a fever at this time.   We will obtain blood work to look for signs of a systemic infection and consult his orthopedic surgeon. He is given pain meds. CBC, CMP are within normal limits. White count is 5.1 therefore no significant leukocytosis. I spoke with the patient's surgeon. Patient still has been bearing weight on his foot he recommends that he use her crutches all the time and be nonweightbearing until he can follow-up with him. We discussed different options to help decrease the swelling such as keeping his foot elevated. At this point he does not have any concerning findings of systemic infection and to me his foot does not appear significantly swollen or infected. The redness that the patient sees I believe is just iodine wash that has not been washed off. Patient has marked his wound and he does agree that is not been spreading over the last 24 hours. We did discuss the patient that we have not definitively ruled out DVT however I believe the swelling is most likely related to just post op changes. We discussed concerning findings to look out for for worsening infection and DVT and discussed the treatment options moving forward. Patient is in agreement with the plan to be discharged at this time. Patient stable for discharge at this time. Patient is in agreement with the plan to be discharged at this time. All the patient's questions were answered. Patient was given written instructions on the diagnosis, and states understanding of the plan moving forward. We did discuss important signs and symptoms that should prompt quick return to the emergency department. Disposition: Patient was discharged home in stable condition.   They will follow up with orthopedics    Prescriptions: None    Diagnosis: Postoperative pain  Subacute septic arthritis  Procedures

## 2021-10-04 ENCOUNTER — OFFICE VISIT (OUTPATIENT)
Dept: ORTHOPEDIC SURGERY | Age: 36
End: 2021-10-04
Payer: MEDICAID

## 2021-10-04 VITALS
WEIGHT: 233 LBS | HEART RATE: 69 BPM | OXYGEN SATURATION: 99 % | RESPIRATION RATE: 18 BRPM | BODY MASS INDEX: 29.9 KG/M2 | TEMPERATURE: 97.1 F | HEIGHT: 74 IN

## 2021-10-04 DIAGNOSIS — M00.9 SEPTIC ARTHRITIS OF LEFT ANKLE, DUE TO UNSPECIFIED ORGANISM (HCC): Primary | ICD-10-CM

## 2021-10-04 PROCEDURE — 73610 X-RAY EXAM OF ANKLE: CPT | Performed by: ORTHOPAEDIC SURGERY

## 2021-10-04 PROCEDURE — 99024 POSTOP FOLLOW-UP VISIT: CPT | Performed by: ORTHOPAEDIC SURGERY

## 2021-10-04 RX ORDER — HYDROCODONE BITARTRATE AND ACETAMINOPHEN 5; 325 MG/1; MG/1
1 TABLET ORAL
Qty: 30 TABLET | Refills: 0 | Status: SHIPPED | OUTPATIENT
Start: 2021-10-04 | End: 2021-10-11

## 2021-10-04 NOTE — PROGRESS NOTES
Jerri Hutson  1985     HISTORY OF PRESENT ILLNESS  Jerri Hutson is a 43 y.o. male who presents today for evaluation s/p left ankle irrigation and debridement on 9/27/2021. Pt attended the ED on 10/1/2021 with concerns of swelling and redness that is moving up his legs. Presents today ambulating with crutches. He notes an increase of pain in the last 2 days. Patient denies any fever, chills, chest pain, shortness of breath or calf pain. The remainder of the review of systems is negative. There are no new illness or injuries to report since last seen in the office. There are no changes to medications, allergies, family or social history    PHYSICAL EXAM:   Visit Vitals  Pulse 69   Temp 97.1 °F (36.2 °C)   Resp 18   Ht 6' 2\" (1.88 m)   Wt 233 lb (105.7 kg)   SpO2 99%   BMI 29.92 kg/m²      The patient is a well-developed, well-nourished male in no acute distress. The patient is alert and oriented times three. The patient appears to be well groomed. Mood and affect are normal.  ORTHOPEDIC EXAM of left ankle: Inspection: No swelling, no bruising, effusion present, no edema  Incision, clean, dry, intact, sutures in place  Range of motion: 0-10    ttp diffusely  Stability: Stable  Strength: 3/5    IMAGING: XR of the left ankle with 3 views obtained in the office dated 10/4/2021 was reviewed and read by Dr. Ana Rios: No acute abnormalities     IMPRESSION:      ICD-10-CM ICD-9-CM    1. Septic arthritis of left ankle, due to unspecified organism ([facility]  M00.9 711.07 AMB POC XRAY, ANKLE; COMPLETE, 3+ VIE      AMB SUPPLY ORDER      HYDROcodone-acetaminophen (Norco) 5-325 mg per tablet        PLAN: Pt is improving post-operatively. I would like him to start wearing a ankle boot to help with stability. Prescribed Norco-5 to help with the post-operative pain. Patient given pain medication for short term acute pain relief.  Goal is to treat patient according to above plan and to ultimately have patient off all pain medications once appropriate. If chronic pain management is required beyond what is expected for current orthopedic problem, will refer patient to pain management.  was reviewed and will be reviewed with every medication refill request. Return next week for post-op appointment. Scribed by Randall Laughlin ([facility]) as dictated by MD FRANKI Urbina, Dr. Stan Caro, confirm that all documentation is accurate.     Stan Caro M.D.   [facility] and [facility] Specialist

## 2021-10-05 ENCOUNTER — OFFICE VISIT (OUTPATIENT)
Dept: FAMILY MEDICINE CLINIC | Age: 36
End: 2021-10-05

## 2021-10-05 DIAGNOSIS — Z91.199 NO-SHOW FOR APPOINTMENT: Primary | ICD-10-CM

## 2021-10-05 NOTE — PROGRESS NOTES
Physician Progress Note      PATIENT:               Jordon Hart  CSN #:                  [id number]  :                       1985  ADMIT DATE:       2021 1:16 PM  [address] [place] DATE:        2021 6:33 PM  RESPONDING  PROVIDER #:        Venecia Lerner MD          QUERY TEXT:    Per Op note dated  documentation of debridement. To accurately reflect the procedure performed please document if debridement was excisional or nonexcisional and the deepest depth of tissue removed as down to and including: The medical record reflects the following:  Risk Factors: left septic ankle  Clinical Indicators: Procedure: Irrigation and debridement left ankle  Op note states: Using a 15 blade part of the capsule with evidence of synovitis was debrided sharply and the area was irrigated profusely with Pulsavac irrigation. Treatment: IV abx, surgery as noted above    Thank you,  [facility],[address], [address] [place] [address]  Options provided:  -- Nonexcisional debridement of (please specify), please specify. -- Excisional debridement of (please specify), please specify. -- Other - I will add my own diagnosis  -- Disagree - Not applicable / Not valid  -- Disagree - Clinically unable to determine / Unknown  -- Refer to Clinical Documentation Reviewer    PROVIDER RESPONSE TEXT:    Excisional debridement of By definition, stating that an area of synovitis was debrided sharply, it is in fact an excisional debridement. Thank you    Query created by:  Rolando Peng on 2021 4:40 PM      Electronically signed by:  Venecia Lerner MD 10/5/2021 4:43 PM

## 2021-10-11 LAB
BACTERIA SPEC CULT: NORMAL
BACTERIA SPEC CULT: NORMAL
GRAM STN SPEC: NORMAL
GRAM STN SPEC: NORMAL
SERVICE CMNT-IMP: NORMAL
SERVICE CMNT-IMP: NORMAL

## 2021-10-18 ENCOUNTER — OFFICE VISIT (OUTPATIENT)
Dept: ORTHOPEDIC SURGERY | Age: 36
End: 2021-10-18
Payer: MEDICAID

## 2021-10-18 VITALS
WEIGHT: 233 LBS | HEART RATE: 81 BPM | OXYGEN SATURATION: 99 % | HEIGHT: 74 IN | BODY MASS INDEX: 29.9 KG/M2 | TEMPERATURE: 97.1 F

## 2021-10-18 DIAGNOSIS — G89.18 POST-OPERATIVE PAIN: ICD-10-CM

## 2021-10-18 DIAGNOSIS — M00.9 SEPTIC ARTHRITIS OF LEFT ANKLE, DUE TO UNSPECIFIED ORGANISM (HCC): Primary | ICD-10-CM

## 2021-10-18 DIAGNOSIS — Z98.890 POST-OPERATIVE STATE: ICD-10-CM

## 2021-10-18 PROCEDURE — 99024 POSTOP FOLLOW-UP VISIT: CPT | Performed by: PHYSICIAN ASSISTANT

## 2021-10-18 RX ORDER — IBUPROFEN 800 MG/1
800 TABLET ORAL
Qty: 90 TABLET | Refills: 1 | Status: SHIPPED | OUTPATIENT
Start: 2021-10-18 | End: 2022-01-10

## 2021-10-18 RX ORDER — OXYCODONE AND ACETAMINOPHEN 5; 325 MG/1; MG/1
1 TABLET ORAL
Qty: 15 TABLET | Refills: 0 | Status: SHIPPED | OUTPATIENT
Start: 2021-10-18 | End: 2021-10-23

## 2021-10-18 RX ORDER — FAMOTIDINE 40 MG/1
40 TABLET, FILM COATED ORAL DAILY
Qty: 30 TABLET | Refills: 1 | Status: SHIPPED | OUTPATIENT
Start: 2021-10-18 | End: 2022-04-13 | Stop reason: ALTCHOICE

## 2021-10-18 NOTE — PATIENT INSTRUCTIONS
· Continue activity modification. Wear the CAM boot and Tubigrip. Cover the incision with a large band aid while wearing the CAM boot.  There is no need to sleep in the boot  · Continue the PICC line and care as instrucated   · Follow RICE protocol: Rest, Ice (not directly on the skin) and Elevate   · Take medication as directed, (if tolerated)  · Take Prilosec or Pepcid while taking any antiinflammatory (if tolerated) medication  · Maintain proper nutrition   · Tubigrip provided  · If you are a current smoker, quit or limit smoking  · Please follow up as instructed or sooner as needed

## 2021-10-18 NOTE — PROGRESS NOTES
ASSESSMENT:     Diagnoses and all orders for this visit:      ICD-10-CM ICD-9-CM    1. Septic arthritis of left ankle, due to unspecified organism ([facility]  M00.9 711.07 oxyCODONE-acetaminophen (Percocet) 5-325 mg per tablet   2. Post-operative state  Z98.890 V45.89 oxyCODONE-acetaminophen (Percocet) 5-325 mg per tablet   3. Post-operative pain  G89.18 338.18 oxyCODONE-acetaminophen (Percocet) 5-325 mg per tablet      ibuprofen (MOTRIN) 800 mg tablet      famotidine (PEPCID) 40 mg tablet        PLAN:       · Continue activity modification. Wear the CAM boot and Tubigrip. Cover the incision with a large band aid while wearing the CAM boot. There is no need to sleep in the boot  · Continue the PICC line and care as instrucated   · Follow RICE protocol: Rest, Ice (not directly on the skin) and Elevate   · Take medication as directed, (if tolerated)  · Take Prilosec or Pepcid while taking any antiinflammatory (if tolerated) medication  · Maintain proper nutrition   · Tubigrip provided for swelling  · If you are a current smoker, quit or limit smoking  · Please follow up as instructed or sooner as needed      Follow-up and Dispositions    · Return in about 1 week (around 10/25/2021) for follow up evaluation, suture removal.       Patient expresses understanding of the diagnosis and treatment plan. Patient education has been provided.  was reviewed by Srikanth Pena PA-C on 10/18/2021. Kal Gayle may have a reminder for a \"due or due soon\" health maintenance. I have asked that he contact his primary care provider for follow-up on this health maintenance.       SUBJECTIVE & OBJECTIVE:     STEVE Gayle (1985) is a 43 y.o. male, established patient, 21 days s/p Incision And Drainage Left Ankle - Left completed on 9/27/2021, and he is here for evaluation of the following chief complaint(s): Surgical Follow-up (left foot)      Since last seen in the office, the patient reports that he is doing a little better with off/on pain and swelling. He states that the CAM boot has helped. Patient continues with PICC line x 6 weeks as instructed. He states that Percocet controls his pain better than Norco. Patient denies any fever, chills, CP, SOB or calf pain. The patient denies any new illness or injuries to report since last seen in the office. Doe Hansen  has a past medical history of Anxiety, Cholelithiasis (08/18/2013), Chronic gastritis (10/17/2014), Depression, Dog bite of left hand including fingers with infection (11/16/2014), ETOH abuse, GERD (gastroesophageal reflux disease), Hepatic steatosis (02/06/2014), History of plasmapheresis (02/2014), Hypertension, Hypertriglyceridemia, Hypomagnesemia (07/24/2016), Non compliance with medical treatment, Osteomyelitis of left hand ([facility] (11/22/2014), Pancreatitis (08/17/2013), PICC (peripherally inserted central catheter) in place (12/05/2014), Rectal bleeding, [facility] and Vitamin D deficiency (02/17/2014). Other than previously noted, patient reports no changes in medications, allergies, social or family history. Doe Hansen has been experiencing pain, discomfort and associated symptoms and has tried modalities of treatment and/or self treatment confirmed as outlined in the pain assessment below. Pain Assessment  10/18/2021   Location of Pain Foot   Location Modifiers Left   Severity of Pain 8   Quality of Pain Other (Comment)   Quality of Pain Comment swelling   Duration of Pain -   Frequency of Pain -   Aggravating Factors -   Limiting Behavior -   Relieving Factors -       PHYSICAL EXAM:        Visit Vitals  Pulse 81   Temp 97.1 °F (36.2 °C) (Temporal)   Ht 6' 2\" (1.88 m)   Wt 233 lb (105.7 kg)   SpO2 99%   BMI 29.92 kg/m²         Constitutional: [x] Alert, cooperative, appears well-developed and well-nourished [x] No apparent distress      [] Abnormal - Body mass index is 29.92 kg/m².  makes the treatment plan more complex     Mental status: [x] Alert and awake  [x] Oriented to person/place/time   [x] Normal mood/behavior/speech   [x] Normal dress/motor activity/thought processes/memory      [x] Able to follow commands    [] Abnormal - Dementia    Eyes:   EOM    [x]  Normal    [] Abnormal -   Sclera  [x]  Normal    [] Abnormal -          Discharge [x]  None visible   [] Abnormal -     HENT: [x] Normocephalic, atraumatic  [] Abnormal -   [x] Mouth/Throat: Mucous membranes are moist    External Ears [x] Normal, hearing intact  [] Abnormal -    Neck: [x] Supple. No visualized mass, normal ROM [] Abnormal -     Pulmonary/Chest: [x] Respiratory effort normal   [x] No visualized signs of difficulty breathing or respiratory distress        [] Abnormal -        Cardiovascular/    [x] Normal pulses to each foot  [] Abnormal -   Peripheral Vascular:    Neurological:        [x] No Facial Asymmetry (Cranial nerve 7 motor function) (limited exam due to video visit) [x] No gross defects         [x] No gaze palsy        [] Abnormal -          Skin:        [x] No significant exanthematous lesions or discoloration noted on facial skin or visible areas       [] Abnormal -            Psychiatric:       [x] Normal Affect, mood, judgement, behavior, conduct [] Abnormal -        [x] No Hallucinations      Musculoskeletal:   [x] Normal gait with no signs of ataxia         [x] Normal range of motion of neck        [] Abnormal -       MUSCULOSKELETAL: EXAMINATION OF:     ANKLE/FOOT left    DRESSINGS: CDI   DRAINAGE: none   INCISION: Incision looks good, skin well approximated, no dehiscence, sutures in place without disruption. SKIN: Mild edema, no erythema, no ecchymosis, no warmth. No rash or skin lesions. No signs of infection or cellulitis present. TENDERNESS:  Very mild tenderness to palpation to the ankle  NEUROVASCULAR:  Grossly intact. Motor/Sensory: NL/NL. Positive distal pulses and capillary refill.    DVT ASSESSMENT:  The calf is not tender to palpation. No evidence of DVT seen on physical exam.  LYMPHATICS: No extremity lymphedema, No calf swelling, no tenderness to calf muscles  JOINT MOTION: Limited ROM at this time. There is pain-free range of motion of adjacent joints. Negative joint effusion  JOINT/TENDON STABILITY: No Ankle or Subtalar instability or joint laxity. No peroneal sublux ability or dislocation  ALIGNMENT: Forefoot, Midfoot, Hindfoot WNL. DEFORMITY: None present       An electronic signature was used to authenticate this note. -- William Rachel. Karine [facility], LISA HENRY  10/18/2021      Disclaimer: Sections of this note may have been dictated utilizing voice recognition software, which may have resulted in some phonetic based errors in grammar and contents. Even though attempts were made to correct all the mistakes, some may have been missed, and remained in the body of the document. If questions arise, please contact our department. REVIEW OF SYSTEMS:                  All Below are Negative except as indicated in the HPI: See HPI                Constitutional: negative for fever, chills, night sweats and unexpected weight loss and malaise/fatigue              HEENT: Negative. No hoarseness, no double vision              Respiratory: Negative.  No difficulty breathing, No SOB              Cardiovascular: negative for chest pain, claudication, KNIGHT. (-) Leg swelling               Gastrointestinal: Negative for pain, Blood in stool, incontinence, pelvic pain, N/V/C/D              Genitourinary: No saddle anesthesia, pelvic pain, blood in urine, incontinence, dysuria               Neurological: Negative for dizziness and weakness, visual changes, confusion, headaches, seizures               Phychiatric/Behavioral: Negative for depression, memory loss, substance abuse               Extremities: Negative for hair changes, rash, or skin lesion changes              Hematologic: Negative for bleeding problems, bruising, pallor or swollen lymph nodes              Peripheral Vascular: No calf pain, no circulation deficits              Musculoskeletal: As per HPI above      RADIOGRAPHS & DIAGNOSTIC STUDIES       None    10/18/2021  Jessica Patrick PA-C

## 2021-10-19 ENCOUNTER — HOSPITAL ENCOUNTER (OUTPATIENT)
Dept: INTERVENTIONAL RADIOLOGY/VASCULAR | Age: 36
Discharge: HOME | End: 2021-10-19
Attending: PHYSICIAN ASSISTANT
Payer: MEDICAID

## 2021-10-19 DIAGNOSIS — M00.9 SEPTIC JOINT (HCC): ICD-10-CM

## 2021-10-19 PROCEDURE — 76937 US GUIDE VASCULAR ACCESS: CPT

## 2021-10-19 PROCEDURE — 36573 INSJ PICC RS&I 5 YR+: CPT

## 2021-10-19 PROCEDURE — 36589 REMOVAL TUNNELED CV CATH: CPT

## 2021-10-19 NOTE — PROCEDURES
INTERVENTIONAL RADIOLOGY POST PICC LINE NOTE     October 19, 2021       2:19 PM     Preoperative Diagnosis:   IV Access    Postoperative Diagnosis:  Same. :  Jessika French PA-C    Assistant:  None. Attending Physician: Dr. Kathe Lopez    Type of Anesthesia:  1% Lidocaine local    Procedure/Description: Removal of pre existing malfunctiong PICC, placement of new right basilic upper extremity PICC Line. Findings: Thick tan/brown material in the distal inch of the PICC catheter. New right basilic 5 [place] catheter placed. Tip at SVC/RA junction. OK to use. Length 40 cm. Estimated blood Loss: Minimal    Specimen Removed: None    Drains: None     Complications:  None. Condition:  Stable.     Discharge Plan:  discharge home

## 2021-10-19 NOTE — H&P
OUTPATIENT HISTORY AND PHYSICAL  Today 10/19/2021     Indication/Symptoms:   Del Montes De Oca is a 43 y.o. male with history of left ankle infection and malfunctioning PICC presents for PICC exchange v replacement. Current Meds:    Prior to Admission medications    Medication Sig Start Date End Date Taking? Authorizing Provider   oxyCODONE-acetaminophen (Percocet) 5-325 mg per tablet Take 1 Tablet by mouth every eight (8) hours as needed for Pain (Post operative) for up to 5 days. Max Daily Amount: 3 Tablets. 10/18/21 10/23/21  Neil Camacho PA-C   ibuprofen (MOTRIN) 800 mg tablet Take 1 Tablet by mouth every eight (8) hours as needed for Pain. 10/18/21   Neil Camacho PA-C   famotidine (PEPCID) 40 mg tablet Take 1 Tablet by mouth daily. 10/18/21   Neil Camacho PA-C   atenoloL (TENORMIN) 100 mg tablet Take 1 Tablet by mouth daily for 30 days. 10/1/21 10/31/21  James Hill MD   lisinopriL (PRINIVIL, ZESTRIL) 40 mg tablet Take 1 Tablet by mouth daily for 30 days. 10/1/21 10/31/21  James Hill MD   thiamine HCL (B-1) 100 mg tablet Take 1 Tablet by mouth daily for 30 days. 10/1/21 10/31/21  James Hill MD   therapeutic multivitamin [facility]) tablet Take 1 Tablet by mouth daily for 30 days. 10/1/21 10/31/21  Sonya Babcock MD   folic acid (FOLVITE) 1 mg tablet Take 1 Tablet by mouth daily for 30 days. 10/1/21 10/31/21  James Hill MD   OTHER IV antibiotics with home health care  Ceftriaxone 2 g IV q 24 hour  Daptomycin 6mg/kg IV q 24 hour  Tentative Stop date: till 11/11/21 9/30/21   James Hill MD   gabapentin (NEURONTIN) 400 mg capsule Take 1 Capsule by mouth three (3) times daily. Max Daily Amount: 1,200 mg. 9/17/21   Fiorella CLARK NP   escitalopram oxalate (LEXAPRO) 20 mg tablet Take 1 Tablet by mouth daily. 9/2/21   Fiorella CLARK NP   atorvastatin (LIPITOR) 20 mg tablet Take 1 Tablet by mouth daily. 9/2/21   Shalom Cea T, NP   omeprazole (PRILOSEC) 20 mg capsule Take 1 Cap by mouth Daily (before breakfast). 4/21/21   Shalom Cea T, NP   ondansetron (Zofran ODT) 4 mg disintegrating tablet Take 1 Tab by mouth every eight (8) hours as needed for Nausea or Nausea or Vomiting. 4/21/21   Shalom Cea T, NP   cetirizine (ZYRTEC) 10 mg tablet Take 1 Tab by mouth nightly. 11/9/20   Shalom Cea T, NP   sildenafil citrate (Viagra) 100 mg tablet Take 1 Tab by mouth as needed for Erectile Dysfunction. 8/26/20   Jackie Darnell, JESSE   fluticasone propionate (FLONASE) 50 mcg/actuation nasal spray 2 Sprays by Both Nostrils route daily. 12/1/19   Danna Damon PA   Camphor-Eucalyptus Oil-Menthol (VICKS VAPORUB) 4.8-1.2-2.6 % oint 1 Actuation(s) by Apply Externally route three (3) times daily as needed for Cough or Other (congestion). 12/1/19   CARLITA Godoy       Allergies: Allergies   Allergen Reactions    Amoxicillin Swelling    Contrast Dye [Iodine] Hives     Pt had iv contrast prior to this study without problems, developed hives on 07/04/15 5 minutes post injection    Iodinated Contrast Media Hives     Pt got hives when he received IV contrast in the past    Penicillins Hives and Swelling    Wellbutrin [Bupropion] Other (comments)     Caused aggression       Comorbid Conditions:    Past Medical History:   Diagnosis Date    Anxiety     Cholelithiasis 08/18/2013    Note on U/S    Chronic gastritis 10/17/2014    Dr. Gina Hannah (EGD Result) H. Pylori Neg    Depression     Dog bite of left hand including fingers with infection 11/16/2014    Adm 11/18/14 for IV antibiotics.      ETOH abuse     GERD (gastroesophageal reflux disease)     Hepatic steatosis 02/06/2014    Noted on U/S.     History of plasmapheresis 02/2014    Hypertriglyceridemia, Severe Pancreatitis    Hypertension     Hypertriglyceridemia     Hypomagnesemia 07/24/2016    1.6 mg/dL    Non compliance with medical treatment     Osteomyelitis of left hand ([facility] 11/22/2014    MRI Finding @ LUE 2nd MCP joint.  Pancreatitis 08/17/2013    Highest on Record (2/6/14) 9702. Dr. Rere Loera    PICC (peripherally inserted central catheter) in place 68/62/6013    R Basilic Vein (IV Antibiotics for LUE Hand Osteomyelitis).  Rectal bleeding     Substance abuse (HCC)     Several UDS + Opiates.  Vitamin D deficiency 02/17/2014    11.6ng/mL ( ng/mL)          Past Surgical History:   Procedure Laterality Date    HX CYST INCISION AND DRAINAGE Left 12/01/2014    LUE 2nd digit abscess I&D.  HX ENDOSCOPY  10/17/2014    Dr. Rere Loera: EGD/Colonoscopy    HX HEENT      wisdom tooth extraction    HX KNEE ARTHROSCOPY Right      Data:    There were no vitals taken for this visit.:  No results for input(s): PLT, PLTEXT in the last 72 hours. No lab exists for component:  HCT  No results for input(s): INR, APTT, INREXT in the last 72 hours. No lab exists for component: PT    The H & P and/or progress notes and any available imaging were reviewed. The risks, indications and possible alternatives to the procedure, including doing nothing, were discussed and informed consent was obtained. Physical Exam:      Mental status:   Alert and oriented. Heart:   Regular rate. Lungs:  Normal respiratory effort. The patient is an appropriate candidate to undergo the planned procedure.      CARLITA Allan

## 2021-10-23 ENCOUNTER — HOSPITAL ENCOUNTER (EMERGENCY)
Age: 36
Discharge: HOME | End: 2021-10-23
Attending: STUDENT IN AN ORGANIZED HEALTH CARE EDUCATION/TRAINING PROGRAM
Payer: MEDICAID

## 2021-10-23 VITALS
RESPIRATION RATE: 16 BRPM | OXYGEN SATURATION: 100 % | TEMPERATURE: 99.3 F | SYSTOLIC BLOOD PRESSURE: 168 MMHG | DIASTOLIC BLOOD PRESSURE: 103 MMHG | HEART RATE: 88 BPM

## 2021-10-23 DIAGNOSIS — R00.2 PALPITATIONS: Primary | ICD-10-CM

## 2021-10-23 LAB
ALBUMIN SERPL-MCNC: 4.7 G/DL (ref 3.4–5)
ALBUMIN/GLOB SERPL: 1.1 {RATIO} (ref 0.8–1.7)
ALP SERPL-CCNC: 80 U/L (ref 45–117)
ALT SERPL-CCNC: 47 U/L (ref 16–61)
AMPHET UR QL SCN: NEGATIVE
ANION GAP SERPL CALC-SCNC: 8 MMOL/L (ref 3–18)
AST SERPL-CCNC: 92 U/L (ref 10–38)
ATRIAL RATE: 104 BPM
BARBITURATES UR QL SCN: NEGATIVE
BASOPHILS # BLD: 0.1 K/UL (ref 0–0.1)
BASOPHILS NFR BLD: 1 % (ref 0–2)
BENZODIAZ UR QL: NEGATIVE
BILIRUB SERPL-MCNC: 1.1 MG/DL (ref 0.2–1)
BUN SERPL-MCNC: 7 MG/DL (ref 7–18)
BUN/CREAT SERPL: 8 (ref 12–20)
CALCIUM SERPL-MCNC: 9.2 MG/DL (ref 8.5–10.1)
CALCULATED P AXIS, ECG09: 64 DEGREES
CALCULATED R AXIS, ECG10: 58 DEGREES
CALCULATED T AXIS, ECG11: 68 DEGREES
CANNABINOIDS UR QL SCN: NEGATIVE
CHLORIDE SERPL-SCNC: 99 MMOL/L (ref 100–111)
CO2 SERPL-SCNC: 26 MMOL/L (ref 21–32)
COCAINE UR QL SCN: NEGATIVE
CREAT SERPL-MCNC: 0.89 MG/DL (ref 0.6–1.3)
DIAGNOSIS, 93000: NORMAL
DIFFERENTIAL METHOD BLD: ABNORMAL
EOSINOPHIL # BLD: 0.1 K/UL (ref 0–0.4)
EOSINOPHIL NFR BLD: 1 % (ref 0–5)
ERYTHROCYTE [DISTWIDTH] IN BLOOD BY AUTOMATED COUNT: 12.7 % (ref 11.6–14.5)
ETHANOL SERPL-MCNC: 10 MG/DL (ref 0–3)
GLOBULIN SER CALC-MCNC: 4.1 G/DL (ref 2–4)
GLUCOSE SERPL-MCNC: 100 MG/DL (ref 74–99)
HCT VFR BLD AUTO: 44.2 % (ref 36–48)
HDSCOM,HDSCOM: NORMAL
HGB BLD-MCNC: 15.4 G/DL (ref 13–16)
LYMPHOCYTES # BLD: 1.6 K/UL (ref 0.9–3.6)
LYMPHOCYTES NFR BLD: 13 % (ref 21–52)
MAGNESIUM SERPL-MCNC: 1.7 MG/DL (ref 1.6–2.6)
MCH RBC QN AUTO: 31.6 PG (ref 24–34)
MCHC RBC AUTO-ENTMCNC: 34.8 G/DL (ref 31–37)
MCV RBC AUTO: 90.8 FL (ref 78–100)
METHADONE UR QL: NEGATIVE
MONOCYTES # BLD: 0.8 K/UL (ref 0.05–1.2)
MONOCYTES NFR BLD: 7 % (ref 3–10)
NEUTS SEG # BLD: 9.8 K/UL (ref 1.8–8)
NEUTS SEG NFR BLD: 79 % (ref 40–73)
OPIATES UR QL: NEGATIVE
P-R INTERVAL, ECG05: 162 MS
PCP UR QL: NEGATIVE
PLATELET # BLD AUTO: 280 K/UL (ref 135–420)
PMV BLD AUTO: 8.4 FL (ref 9.2–11.8)
POTASSIUM SERPL-SCNC: 4.2 MMOL/L (ref 3.5–5.5)
PROT SERPL-MCNC: 8.8 G/DL (ref 6.4–8.2)
Q-T INTERVAL, ECG07: 364 MS
QRS DURATION, ECG06: 86 MS
QTC CALCULATION (BEZET), ECG08: 478 MS
RBC # BLD AUTO: 4.87 M/UL (ref 4.35–5.65)
SODIUM SERPL-SCNC: 133 MMOL/L (ref 136–145)
TROPONIN I SERPL-MCNC: <0.02 NG/ML (ref 0–0.04)
VENTRICULAR RATE, ECG03: 104 BPM
WBC # BLD AUTO: 12.4 K/UL (ref 4.6–13.2)

## 2021-10-23 PROCEDURE — 96374 THER/PROPH/DIAG INJ IV PUSH: CPT

## 2021-10-23 PROCEDURE — 93005 ELECTROCARDIOGRAM TRACING: CPT

## 2021-10-23 PROCEDURE — 74011250636 HC RX REV CODE- 250/636: Performed by: STUDENT IN AN ORGANIZED HEALTH CARE EDUCATION/TRAINING PROGRAM

## 2021-10-23 PROCEDURE — 83735 ASSAY OF MAGNESIUM: CPT

## 2021-10-23 PROCEDURE — 80053 COMPREHEN METABOLIC PANEL: CPT

## 2021-10-23 PROCEDURE — 99285 EMERGENCY DEPT VISIT HI MDM: CPT

## 2021-10-23 PROCEDURE — 74011250637 HC RX REV CODE- 250/637: Performed by: STUDENT IN AN ORGANIZED HEALTH CARE EDUCATION/TRAINING PROGRAM

## 2021-10-23 PROCEDURE — 85025 COMPLETE CBC W/AUTO DIFF WBC: CPT

## 2021-10-23 PROCEDURE — 99284 EMERGENCY DEPT VISIT MOD MDM: CPT

## 2021-10-23 PROCEDURE — 82077 ASSAY SPEC XCP UR&BREATH IA: CPT

## 2021-10-23 PROCEDURE — 80307 DRUG TEST PRSMV CHEM ANLYZR: CPT

## 2021-10-23 PROCEDURE — 84484 ASSAY OF TROPONIN QUANT: CPT

## 2021-10-23 RX ORDER — OXYCODONE AND ACETAMINOPHEN 5; 325 MG/1; MG/1
1 TABLET ORAL
Status: COMPLETED | OUTPATIENT
Start: 2021-10-23 | End: 2021-10-23

## 2021-10-23 RX ORDER — LORAZEPAM 1 MG/1
1 TABLET ORAL
Status: COMPLETED | OUTPATIENT
Start: 2021-10-23 | End: 2021-10-23

## 2021-10-23 RX ORDER — KETOROLAC TROMETHAMINE 15 MG/ML
15 INJECTION, SOLUTION INTRAMUSCULAR; INTRAVENOUS ONCE
Status: DISCONTINUED | OUTPATIENT
Start: 2021-10-23 | End: 2021-10-23

## 2021-10-23 RX ORDER — ONDANSETRON 2 MG/ML
4 INJECTION INTRAMUSCULAR; INTRAVENOUS
Status: COMPLETED | OUTPATIENT
Start: 2021-10-23 | End: 2021-10-23

## 2021-10-23 RX ADMIN — SODIUM CHLORIDE 1000 ML: 900 INJECTION, SOLUTION INTRAVENOUS at 15:19

## 2021-10-23 RX ADMIN — LORAZEPAM 1 MG: 1 TABLET ORAL at 14:39

## 2021-10-23 RX ADMIN — ONDANSETRON 4 MG: 2 INJECTION INTRAMUSCULAR; INTRAVENOUS at 15:10

## 2021-10-23 RX ADMIN — OXYCODONE HYDROCHLORIDE AND ACETAMINOPHEN 1 TABLET: 5; 325 TABLET ORAL at 16:24

## 2021-10-23 NOTE — DISCHARGE INSTRUCTIONS
Seen today in the emergency department for palpitations that occurred this morning for about 2 hours self resolved prior to presenting to the emergency department. Additionally described a history of alcohol use that is concerning and could possibly result in dangerous alcohol withdrawal.  We performed labs multiple EKGs with no abnormal findings to suggest an underlying cause of your palpitations. Please call both cardiology and the behavioral health services in [facility] to schedule appointments for further evaluation. Please take Motrin and Tylenol as needed for your ankle pain. These return to the emergency department if you develop any new palpitations, chest pain, lightheadedness, dizziness or have any signs of alcohol withdrawal; like sweatiness, tremor, seeing things that are not there hearing things that are not there or feeling things that are not there.

## 2021-10-23 NOTE — ED TRIAGE NOTES
Patient reports approx 2 hours ago his heart started beating fast with dizziness and diaphoretic. Checked his pulse at home and it was close to 200. In triage it is 109.  Does report he found out he may be homeless soon and does have a hx of anxiety

## 2021-10-24 LAB
ATRIAL RATE: 81 BPM
CALCULATED P AXIS, ECG09: 57 DEGREES
CALCULATED R AXIS, ECG10: -34 DEGREES
CALCULATED T AXIS, ECG11: 48 DEGREES
DIAGNOSIS, 93000: NORMAL
P-R INTERVAL, ECG05: 136 MS
Q-T INTERVAL, ECG07: 406 MS
QRS DURATION, ECG06: 88 MS
QTC CALCULATION (BEZET), ECG08: 471 MS
VENTRICULAR RATE, ECG03: 81 BPM

## 2021-10-26 ENCOUNTER — OFFICE VISIT (OUTPATIENT)
Dept: FAMILY MEDICINE CLINIC | Age: 36
End: 2021-10-26

## 2021-10-26 ENCOUNTER — OFFICE VISIT (OUTPATIENT)
Dept: ORTHOPEDIC SURGERY | Age: 36
End: 2021-10-26
Payer: MEDICAID

## 2021-10-26 VITALS
SYSTOLIC BLOOD PRESSURE: 150 MMHG | TEMPERATURE: 97.1 F | HEART RATE: 82 BPM | BODY MASS INDEX: 29.9 KG/M2 | DIASTOLIC BLOOD PRESSURE: 98 MMHG | RESPIRATION RATE: 20 BRPM | WEIGHT: 233 LBS | HEIGHT: 74 IN | OXYGEN SATURATION: 98 %

## 2021-10-26 VITALS
WEIGHT: 233 LBS | HEART RATE: 88 BPM | HEIGHT: 74 IN | TEMPERATURE: 97.1 F | BODY MASS INDEX: 29.9 KG/M2 | OXYGEN SATURATION: 97 %

## 2021-10-26 DIAGNOSIS — M54.41 CHRONIC RIGHT-SIDED LOW BACK PAIN WITH RIGHT-SIDED SCIATICA: ICD-10-CM

## 2021-10-26 DIAGNOSIS — R94.31 ABNORMAL EKG: ICD-10-CM

## 2021-10-26 DIAGNOSIS — Z09 HOSPITAL DISCHARGE FOLLOW-UP: ICD-10-CM

## 2021-10-26 DIAGNOSIS — M00.9 SEPTIC ARTHRITIS OF LEFT ANKLE, DUE TO UNSPECIFIED ORGANISM (HCC): Primary | ICD-10-CM

## 2021-10-26 DIAGNOSIS — M25.572 ACUTE LEFT ANKLE PAIN: ICD-10-CM

## 2021-10-26 DIAGNOSIS — G89.29 CHRONIC RIGHT-SIDED LOW BACK PAIN WITH RIGHT-SIDED SCIATICA: ICD-10-CM

## 2021-10-26 DIAGNOSIS — F41.0 PANIC ATTACKS: ICD-10-CM

## 2021-10-26 PROCEDURE — 73610 X-RAY EXAM OF ANKLE: CPT | Performed by: ORTHOPAEDIC SURGERY

## 2021-10-26 PROCEDURE — 99214 OFFICE O/P EST MOD 30 MIN: CPT | Performed by: NURSE PRACTITIONER

## 2021-10-26 PROCEDURE — 1111F DSCHRG MED/CURRENT MED MERGE: CPT | Performed by: NURSE PRACTITIONER

## 2021-10-26 PROCEDURE — 99024 POSTOP FOLLOW-UP VISIT: CPT | Performed by: ORTHOPAEDIC SURGERY

## 2021-10-26 RX ORDER — GABAPENTIN 400 MG/1
400 CAPSULE ORAL 3 TIMES DAILY
Qty: 90 CAPSULE | Refills: 2 | Status: SHIPPED | OUTPATIENT
Start: 2021-10-26 | End: 2022-01-27 | Stop reason: SDUPTHER

## 2021-10-26 RX ORDER — THERA TABS 400 MCG
1 TAB ORAL DAILY
Qty: 90 TABLET | Refills: 3 | Status: SHIPPED | OUTPATIENT
Start: 2021-10-26 | End: 2021-11-25

## 2021-10-26 RX ORDER — OXYCODONE AND ACETAMINOPHEN 7.5; 325 MG/1; MG/1
1 TABLET ORAL
Qty: 30 TABLET | Refills: 0 | Status: SHIPPED | OUTPATIENT
Start: 2021-10-26 | End: 2021-12-09 | Stop reason: SDUPTHER

## 2021-10-26 RX ORDER — ATORVASTATIN CALCIUM 20 MG/1
20 TABLET, FILM COATED ORAL DAILY
Qty: 90 TABLET | Refills: 1 | Status: SHIPPED | OUTPATIENT
Start: 2021-10-26 | End: 2022-07-08 | Stop reason: SDUPTHER

## 2021-10-26 RX ORDER — ESCITALOPRAM OXALATE 20 MG/1
20 TABLET ORAL DAILY
Qty: 90 TABLET | Refills: 1 | Status: SHIPPED | OUTPATIENT
Start: 2021-10-26 | End: 2022-07-08 | Stop reason: SDUPTHER

## 2021-10-26 RX ORDER — CLONAZEPAM 1 MG/1
1 TABLET ORAL
Qty: 90 TABLET | Refills: 1 | Status: SHIPPED | OUTPATIENT
Start: 2021-10-26 | End: 2022-01-04 | Stop reason: SDUPTHER

## 2021-10-26 RX ORDER — ATENOLOL 100 MG/1
100 TABLET ORAL 2 TIMES DAILY
Qty: 180 TABLET | Refills: 1 | Status: SHIPPED | OUTPATIENT
Start: 2021-10-26 | End: 2022-06-22

## 2021-10-26 NOTE — PROGRESS NOTES
Karmen Burkitt  1985     HISTORY OF PRESENT ILLNESS  Karmen Burkitt is a 43 y.o. male who presents today for evaluation s/p left ankle irrigation and debridement on 9/27/2021. Pt fell yesterday and reports having an increase of pain. Patient denies any fever, chills, chest pain, shortness of breath or calf pain. The remainder of the review of systems is negative. There are no new illness or injuries to report since last seen in the office. There are no changes to medications, allergies, family or social history    PHYSICAL EXAM:   Visit Vitals  Pulse 88   Temp 97.1 °F (36.2 °C) (Temporal)   Ht 6' 2\" (1.88 m)   Wt 233 lb (105.7 kg)   SpO2 97%   BMI 29.92 kg/m²      The patient is a well-developed, well-nourished male in no acute distress. The patient is alert and oriented times three. The patient appears to be well groomed. Mood and affect are normal.  ORTHOPEDIC EXAM of left ankle: Inspection: No swelling, no bruising, effusion present, no edema  Incision, clean, dry, intact, sutures in place  Range of motion: 0-10    ttp diffusely  Stability: Stable  Strength: 3/5    IMAGING: XR of the left ankle with 3 views obtained in the office dated 10/26/2021 was reviewed and read by Dr. Arlet Frye: no acute abnormalities     XR of the left ankle with 3 views obtained in the office dated 10/4/2021 was reviewed and read by Dr. Arlet Frye: No acute abnormalities     IMPRESSION:      ICD-10-CM ICD-9-CM    1. Septic arthritis of left ankle, due to unspecified [facility]  M00.9 711.07 AMB POC XRAY, ANKLE; COMPLETE, 3+ VIE      REFERRAL TO PHYSICAL THERAPY   2. Acute left ankle pain  M25.572 719.47 AMB POC XRAY, ANKLE; COMPLETE, 3+ VIE        PLAN: Sutures removed. Pt reports taking a fall yesterday but I am hopeful he will continue to improve. Start PT to work on ROM and strengthening. OK to stop wearing the boot. Return in 4 weeks.        Scribed by Peng Holman ([address]) as dictated by Tobin Haynes Joellen Maciel MD    I, Dr. Glory Oseguera, confirm that all documentation is accurate.     Glory Oseguera M.D.   [facility]

## 2021-10-26 NOTE — PROGRESS NOTES
1. \"Have you been to the ER, urgent care clinic since your last visit? Hospitalized since your last visit? \" here today to follow up    2. \"Have you seen or consulted any other health care providers outside of the [address] [place] [address] since your last visit? \" No       Learning Assessment 4/3/2017   PRIMARY LEARNER Patient   HIGHEST LEVEL OF EDUCATION - PRIMARY LEARNER  -   BARRIERS PRIMARY LEARNER -   CO-LEARNER CAREGIVER -   PRIMARY LANGUAGE ENGLISH   LEARNER PREFERENCE PRIMARY DEMONSTRATION   ANSWERED BY Patient   RELATIONSHIP SELF     3 most recent PHQ Screens 4/21/2021   PHQ Not Done -   Little interest or pleasure in doing things Several days   Feeling down, depressed, irritable, or hopeless Not at all   Total Score PHQ 2 1   Trouble falling or staying asleep, or sleeping too much Several days   Feeling tired or having little energy Not at all   Poor appetite, weight loss, or overeating Not at all   Feeling bad about yourself - or that you are a failure or have let yourself or your family down Not at all   Trouble concentrating on things such as school, work, reading, or watching TV Not at all   Moving or speaking so slowly that other people could have noticed; or the opposite being so fidgety that others notice More than half the days   Thoughts of being better off dead, or hurting yourself in some way Not at all   PHQ 9 Score 4   How difficult have these problems made it for you to do your work, take care of your home and get along with others Somewhat difficult     No flowsheet data found. Abuse Screening Questionnaire 4/3/2017   Do you ever feel afraid of your partner? N   Are you in a relationship with someone who physically or mentally threatens you? N   Is it safe for you to go home?  Claude Lobo

## 2021-10-26 NOTE — PATIENT INSTRUCTIONS
Septic Arthritis: Care Instructions  Your Care Instructions     Septic arthritis is a bacterial infection in a joint. This occurs when an infection from another part of the body, such as pneumonia or a skin or kidney infection, travels through the bloodstream to the joint. It may also spread to the joint from an infection in nearby soft tissue, or it can follow a surgery or injury. The joint is often warm, swollen, and tender. Early treatment can prevent permanent damage to the joint. Treatment includes antibiotics and draining the joint to remove the infection. Depending on which part of your body is infected, your doctor may drain the joint with a needle or you may need surgery to drain the joint. Follow-up care is a key part of your treatment and safety. Be sure to make and go to all appointments, and call your doctor if you are having problems. It's also a good idea to know your test results and keep a list of the medicines you take. How can you care for yourself at home? · You will receive antibiotics through a vein (IV) at first. After this, you may take antibiotics by mouth. · Take your antibiotics as directed. Do not stop taking them just because you feel better. You need to take the full course of antibiotics. · Rest the joint as much as you can. · If possible, prop up the injured joint on pillows as much as possible for the next 3 days. Try to keep it at or above the level of your heart. This can help reduce pain and swelling. · Follow your doctor's instructions on exercises for the affected joint. · Do not smoke. Smoking can make it harder for your body to fight the infection. If you need help quitting, talk to your doctor about stop-smoking programs and medicines. These can increase your chances of quitting for good. When should you call for help?    Call your doctor now or seek immediate medical care if:    · You have worse symptoms of infection, such as:  ? Increased pain, swelling, warmth, or redness. ? Red streaks leading from the area. ? Pus draining from the area. ? A fever.     · You cannot use your joint. Watch closely for changes in your health, and be sure to contact your doctor if:    · You do not get better as expected. Where can you learn more? Go to http://www.gray.com/  Enter C265 in the search box to learn more about \"Septic Arthritis: Care Instructions. \"  Current as of: July 1, 2021               Content Version: 13.0  © 3006-3575 [employer]. Care instructions adapted under license by [employer] (which disclaims liability or warranty for this information). If you have questions about a medical condition or this instruction, always ask your healthcare professional. [address] any warranty or liability for your use of this information.

## 2021-10-26 NOTE — PROGRESS NOTES
Transitional Care Management Progress Note    Patient: Kody Patel  : 1985  PCP: Matt Pavon NP    Date of office visit: 10/26/2021   Date of admission: 2021  Date of discharge: 2021  Hospital: [facility]    Call initiated w/i 2 business dates of discharge: *No response documented in the last 14 days   Date of the most recent call to the patient: *No documented post hospital discharge outreach found in the last 14 days        Assessment/Plan:     Diagnoses and all orders for this visit:    1. Septic arthritis of left ankle, due to unspecified organism ([facility] [address]  -     oxyCODONE-acetaminophen (PERCOCET 7.5) 7.5-325 mg per tablet; Take 1 Tablet by mouth every four (4) hours as needed for Pain for up to 5 days. Max Daily Amount: 6 Tablets. 2. Hospital discharge follow-up  -     FL DISCHARGE MEDS RECONCILED W/ CURRENT OUTPATIENT MED LIST    3. Chronic right-sided low back pain with right-sided sciatica  -     gabapentin (NEURONTIN) 400 mg capsule; Take 1 Capsule by mouth three (3) times daily. Max Daily Amount: 1,200 mg.    4. Abnormal EKG  -     EKG, 12 LEAD, INITIAL; Future    5. Panic attacks  -     clonazePAM (KlonoPIN) 1 mg tablet; Take 1 Tablet by mouth three (3) times daily as needed for Anxiety. Max Daily Amount: 3 mg. Other orders  -     atenoloL (TENORMIN) 100 mg tablet; Take 1 Tablet by mouth two (2) times a day. -     atorvastatin (LIPITOR) 20 mg tablet; Take 1 Tablet by mouth daily. -     escitalopram oxalate (LEXAPRO) 20 mg tablet; Take 1 Tablet by mouth daily. -     therapeutic multivitamin (THERAGRAN) tablet; Take 1 Tablet by mouth daily for 30 days. HTN/anxiety not completely controlled, increase atenolol to bid  Advised one time rx for percocet will be prescribed for increased pain due to 'rolling ankle this morning'. I have discussed the diagnosis with the patient and the intended plan as seen in the above orders.   The patient has received an after-visit summary and questions were answered concerning future plans. I have discussed medication side effects and warnings with the patient as well. Patient agreeable with above plan and verbalizes understanding. Follow-up and Dispositions    · Return in about 3 weeks (around 11/16/2021) for anxiety/blood pressure, telemedicine MyChart. Subjective:   Mahad Perez is a 43 y.o. male presenting today for follow-up after hospital discharge. This encounter and supporting documentation was reviewed if available. Medication reconciliation was performed today. The main problem requiring admission was Admission Diagnosis: Septic arthritis ([facility] [address] [M00.9]. Complications during admission: none     Interval history/Current status: patient was seen in the ED on 10/23/2021 for heart palpitations. Comments he was sitting at home when his heart rate increased to 200 and he couldn't get it decrease. He take an extra atenolol which eventually did help. Patient states he walking in his back yard and rolled his ankle, didn't have his boot on at the time. This was yesterday morning. Comments since he has had increased pain. Requesting a refill of Percocet. Patient further requesting medication for his anxiety. States he has increased amount of panic attacks as of late. Admitting symptoms have: improved    Medications marked \"taking\" at this time:  Prior to Admission medications    Medication Sig Start Date End Date Taking? Authorizing Provider   ibuprofen (MOTRIN) 800 mg tablet Take 1 Tablet by mouth every eight (8) hours as needed for Pain. 10/18/21  Yes Sussy Ortega PA-C   famotidine (PEPCID) 40 mg tablet Take 1 Tablet by mouth daily. 10/18/21  Yes Sussy Ortega PA-C   atenoloL (TENORMIN) 100 mg tablet Take 1 Tablet by mouth daily for 30 days. 10/1/21 10/31/21 Yes John Aragon MD   lisinopriL (PRINIVIL, ZESTRIL) 40 mg tablet Take 1 Tablet by mouth daily for 30 days. 10/1/21 10/31/21 Yes Saqib Sheridan MD   thiamine HCL (B-1) 100 mg tablet Take 1 Tablet by mouth daily for 30 days. 10/1/21 10/31/21 Yes Saqib Sheridan MD   therapeutic multivitamin [facility]) tablet Take 1 Tablet by mouth daily for 30 days. 10/1/21 10/31/21 Yes Saqib Sheridan MD   folic acid (FOLVITE) 1 mg tablet Take 1 Tablet by mouth daily for 30 days. 10/1/21 10/31/21 Yes Saqib Sheridan MD   OTHER IV antibiotics with home health care  Ceftriaxone 2 g IV q 24 hour  Daptomycin 6mg/kg IV q 24 hour  Tentative Stop date: till 11/11/21 9/30/21  Yes Saqib Sheridan MD   gabapentin (NEURONTIN) 400 mg capsule Take 1 Capsule by mouth three (3) times daily. Max Daily Amount: 1,200 mg. 9/17/21  Yes Steven Mane NP   escitalopram oxalate (LEXAPRO) 20 mg tablet Take 1 Tablet by mouth daily. 9/2/21  Yes Steven Mane NP   atorvastatin (LIPITOR) 20 mg tablet Take 1 Tablet by mouth daily. 9/2/21  Yes Amyraaida Mane NP   omeprazole (PRILOSEC) 20 mg capsule Take 1 Cap by mouth Daily (before breakfast). 4/21/21  Yes Amyraine JESSE Mane   ondansetron (Zofran ODT) 4 mg disintegrating tablet Take 1 Tab by mouth every eight (8) hours as needed for Nausea or Nausea or Vomiting. 4/21/21  Yes Eleonora CLARK NP   cetirizine (ZYRTEC) 10 mg tablet Take 1 Tab by mouth nightly. 11/9/20  Yes Amyraaida Mane NP   sildenafil citrate (Viagra) 100 mg tablet Take 1 Tab by mouth as needed for Erectile Dysfunction. 8/26/20  Yes Enzo Darnell NP   fluticasone propionate (FLONASE) 50 mcg/actuation nasal spray 2 Sprays by Both Nostrils route daily. 12/1/19  Yes Aydee Damon PA   Camphor-Eucalyptus Oil-Menthol (VICKS VAPORUB) 4.8-1.2-2.6 % oint 1 Actuation(s) by Apply Externally route three (3) times daily as needed for Cough or Other (congestion).   Patient not taking: Reported on 10/26/2021 12/1/19   CARLITA Bhardwaj        Review of Systems   Constitutional: Negative for chills and fever. Respiratory: Negative for shortness of breath. Cardiovascular: Negative for chest pain and palpitations. Musculoskeletal: Positive for joint pain (left ankle). Neurological: Negative for dizziness and headaches. Psychiatric/Behavioral: The patient is nervous/anxious. Patient Active Problem List   Diagnosis Code    HTN (hypertension) I10    Hyperlipidemia E78.5    MICHELLE (generalized anxiety disorder) F41.1    Mild depression ([address] F32.0    Acute appendicitis K35.80    Ankle pain M25.579    Ankle swelling M25.473    Fever R50.9    Suspected soft tissue infection R68.89    Septic arthritis ([address] M00.9     Patient Active Problem List    Diagnosis Date Noted    Septic arthritis ([address] 09/26/2021    Ankle pain 09/25/2021    Ankle swelling 09/25/2021    Fever 09/25/2021    Suspected soft tissue infection 09/25/2021    Acute appendicitis 05/07/2020    Mild depression ([address] 06/08/2018    MICHELLE (generalized anxiety disorder) 07/08/2015    Hyperlipidemia 12/05/2013    HTN (hypertension) 09/12/2012     Allergies   Allergen Reactions    Amoxicillin Swelling    Contrast Dye [Iodine] Hives     Pt had iv contrast prior to this study without problems, developed hives on 07/04/15 5 minutes post injection    Iodinated Contrast Media Hives     Pt got hives when he received IV contrast in the past    Penicillins Hives and Swelling    Wellbutrin [Bupropion] Other (comments)     Caused aggression     Past Medical History:   Diagnosis Date    Anxiety     Cholelithiasis 08/18/2013    Note on U/S    Chronic gastritis 10/17/2014    Dr. Ramón Thompson (EGD Result) H. Pylori Neg    Depression     Dog bite of left hand including fingers with infection 11/16/2014    Adm 11/18/14 for IV antibiotics.      ETOH abuse     GERD (gastroesophageal reflux disease)     Hepatic steatosis 02/06/2014    Noted on U/S.     History of plasmapheresis 02/2014    Hypertriglyceridemia, Severe Pancreatitis    Hypertension     Hypertriglyceridemia     Hypomagnesemia 2016    1.6 mg/dL    Non compliance with medical treatment     Osteomyelitis of left hand ([address] 2014    MRI Finding @ LUE 2nd MCP joint.  Pancreatitis 2013    Highest on Record [phone]. Dr. Sowmya Ramos    PICC (peripherally inserted central catheter) in place     R Basilic Vein (IV Antibiotics for LUE Hand Osteomyelitis).  Rectal bleeding     Substance abuse (HCC)     Several UDS + Opiates.  Vitamin D deficiency 2014    11.6ng/mL ( ng/mL)     Past Surgical History:   Procedure Laterality Date    HX CYST INCISION AND DRAINAGE Left 2014    LUE 2nd digit abscess I&D.  HX ENDOSCOPY  10/17/2014    Dr. Sowmya Ramso: EGD/Colonoscopy    HX HEENT      wisdom tooth extraction    HX KNEE ARTHROSCOPY Right      Family History   Problem Relation Age of Onset    Elevated Lipids Mother     Thyroid Disease Mother     Hypertension Father     Alcohol abuse Maternal Grandmother     MS Paternal Grandmother      Social History     Tobacco Use    Smoking status: Former Smoker     Quit date: 2012     Years since quittin.1    Smokeless tobacco: Never Used    Tobacco comment: 2018    Substance Use Topics    Alcohol use: Not Currently     Alcohol/week: 13.3 standard drinks     Types: 8 Cans of beer, 8 Standard drinks or equivalent per week      Objective:   BP (!) 162/108 (BP 1 Location: Left arm, BP Patient Position: Sitting, BP Cuff Size: Adult)   Pulse 82   Temp 97.1 °F (36.2 °C) (Temporal)   Resp 20   Ht 6' 2\" (1.88 m)   Wt 233 lb (105.7 kg)   SpO2 98%   BMI 29.92 kg/m²      Physical Exam  Constitutional:       Appearance: He is well-developed. HENT:      Head: Normocephalic and atraumatic. Cardiovascular:      Rate and Rhythm: Normal rate and regular rhythm. Heart sounds: Normal heart sounds. No murmur heard. No friction rub. No gallop. Pulmonary:      Effort: Pulmonary effort is normal.      Breath sounds: Normal breath sounds. No wheezing, rhonchi or rales. Abdominal:      Tenderness: There is no abdominal tenderness. Musculoskeletal:        Arms:       Cervical back: Normal range of motion and neck supple. Legs:    Skin:     General: Skin is warm and dry. Neurological:      Mental Status: He is alert and oriented to person, place, and time. We discussed the expected course, resolution and complications of the diagnosis(es) in detail. Medication risks, benefits, costs, interactions, and alternatives were discussed as indicated. I advised him to contact the office if his condition worsens, changes or fails to improve as anticipated. He expressed understanding with the diagnosis(es) and plan.      Jhon Medrano NP

## 2021-11-04 ENCOUNTER — HOSPITAL ENCOUNTER (EMERGENCY)
Age: 36
Discharge: HOME | End: 2021-11-04
Attending: EMERGENCY MEDICINE
Payer: MEDICAID

## 2021-11-04 ENCOUNTER — APPOINTMENT (OUTPATIENT)
Dept: GENERAL RADIOLOGY | Age: 36
End: 2021-11-04
Attending: EMERGENCY MEDICINE
Payer: MEDICAID

## 2021-11-04 VITALS
TEMPERATURE: 98.9 F | SYSTOLIC BLOOD PRESSURE: 170 MMHG | HEART RATE: 78 BPM | RESPIRATION RATE: 16 BRPM | DIASTOLIC BLOOD PRESSURE: 100 MMHG | BODY MASS INDEX: 28.88 KG/M2 | OXYGEN SATURATION: 100 % | WEIGHT: 225 LBS | HEIGHT: 74 IN

## 2021-11-04 DIAGNOSIS — G89.18 POST-OP PAIN: Primary | ICD-10-CM

## 2021-11-04 LAB
ALBUMIN SERPL-MCNC: 3.8 G/DL (ref 3.4–5)
ALBUMIN/GLOB SERPL: 1 {RATIO} (ref 0.8–1.7)
ALP SERPL-CCNC: 77 U/L (ref 45–117)
ALT SERPL-CCNC: 24 U/L (ref 16–61)
ANION GAP SERPL CALC-SCNC: 8 MMOL/L (ref 3–18)
APPEARANCE UR: CLEAR
AST SERPL-CCNC: 42 U/L (ref 10–38)
BASOPHILS # BLD: 0 K/UL (ref 0–0.1)
BASOPHILS NFR BLD: 1 % (ref 0–2)
BILIRUB SERPL-MCNC: 0.5 MG/DL (ref 0.2–1)
BILIRUB UR QL: NEGATIVE
BUN SERPL-MCNC: 6 MG/DL (ref 7–18)
BUN/CREAT SERPL: 8 (ref 12–20)
CALCIUM SERPL-MCNC: 8.8 MG/DL (ref 8.5–10.1)
CHLORIDE SERPL-SCNC: 102 MMOL/L (ref 100–111)
CO2 SERPL-SCNC: 29 MMOL/L (ref 21–32)
COLOR UR: YELLOW
CREAT SERPL-MCNC: 0.74 MG/DL (ref 0.6–1.3)
DIFFERENTIAL METHOD BLD: ABNORMAL
EOSINOPHIL # BLD: 0.3 K/UL (ref 0–0.4)
EOSINOPHIL NFR BLD: 3 % (ref 0–5)
ERYTHROCYTE [DISTWIDTH] IN BLOOD BY AUTOMATED COUNT: 12.8 % (ref 11.6–14.5)
GLOBULIN SER CALC-MCNC: 3.8 G/DL (ref 2–4)
GLUCOSE SERPL-MCNC: 82 MG/DL (ref 74–99)
GLUCOSE UR STRIP.AUTO-MCNC: NEGATIVE MG/DL
HCT VFR BLD AUTO: 35.9 % (ref 36–48)
HGB BLD-MCNC: 12.3 G/DL (ref 13–16)
HGB UR QL STRIP: NEGATIVE
KETONES UR QL STRIP.AUTO: NEGATIVE MG/DL
LACTATE BLD-SCNC: 1.99 MMOL/L (ref 0.4–2)
LEUKOCYTE ESTERASE UR QL STRIP.AUTO: NEGATIVE
LYMPHOCYTES # BLD: 1.9 K/UL (ref 0.9–3.6)
LYMPHOCYTES NFR BLD: 22 % (ref 21–52)
MCH RBC QN AUTO: 31.7 PG (ref 24–34)
MCHC RBC AUTO-ENTMCNC: 34.3 G/DL (ref 31–37)
MCV RBC AUTO: 92.5 FL (ref 78–100)
MONOCYTES # BLD: 0.9 K/UL (ref 0.05–1.2)
MONOCYTES NFR BLD: 11 % (ref 3–10)
NEUTS SEG # BLD: 5.2 K/UL (ref 1.8–8)
NEUTS SEG NFR BLD: 62 % (ref 40–73)
NITRITE UR QL STRIP.AUTO: NEGATIVE
PH UR STRIP: 6 [PH] (ref 5–8)
PLATELET # BLD AUTO: 228 K/UL (ref 135–420)
PMV BLD AUTO: 8.4 FL (ref 9.2–11.8)
POTASSIUM SERPL-SCNC: 4 MMOL/L (ref 3.5–5.5)
PROT SERPL-MCNC: 7.6 G/DL (ref 6.4–8.2)
PROT UR STRIP-MCNC: NEGATIVE MG/DL
RBC # BLD AUTO: 3.88 M/UL (ref 4.35–5.65)
SODIUM SERPL-SCNC: 139 MMOL/L (ref 136–145)
SP GR UR REFRACTOMETRY: 1.01 (ref 1–1.03)
UROBILINOGEN UR QL STRIP.AUTO: 0.2 EU/DL (ref 0.2–1)
WBC # BLD AUTO: 8.4 K/UL (ref 4.6–13.2)

## 2021-11-04 PROCEDURE — 80053 COMPREHEN METABOLIC PANEL: CPT

## 2021-11-04 PROCEDURE — 73630 X-RAY EXAM OF FOOT: CPT

## 2021-11-04 PROCEDURE — 74011000258 HC RX REV CODE- 258: Performed by: EMERGENCY MEDICINE

## 2021-11-04 PROCEDURE — 74011250636 HC RX REV CODE- 250/636: Performed by: EMERGENCY MEDICINE

## 2021-11-04 PROCEDURE — 87040 BLOOD CULTURE FOR BACTERIA: CPT

## 2021-11-04 PROCEDURE — 85025 COMPLETE CBC W/AUTO DIFF WBC: CPT

## 2021-11-04 PROCEDURE — 96374 THER/PROPH/DIAG INJ IV PUSH: CPT

## 2021-11-04 PROCEDURE — 83605 ASSAY OF LACTIC ACID: CPT

## 2021-11-04 PROCEDURE — 96375 TX/PRO/DX INJ NEW DRUG ADDON: CPT

## 2021-11-04 PROCEDURE — 99284 EMERGENCY DEPT VISIT MOD MDM: CPT

## 2021-11-04 PROCEDURE — 93005 ELECTROCARDIOGRAM TRACING: CPT

## 2021-11-04 PROCEDURE — 71045 X-RAY EXAM CHEST 1 VIEW: CPT

## 2021-11-04 PROCEDURE — 81003 URINALYSIS AUTO W/O SCOPE: CPT

## 2021-11-04 RX ORDER — SODIUM CHLORIDE 0.9 % (FLUSH) 0.9 %
5-10 SYRINGE (ML) INJECTION AS NEEDED
Status: DISCONTINUED | OUTPATIENT
Start: 2021-11-04 | End: 2021-11-05 | Stop reason: HOSPADM

## 2021-11-04 RX ORDER — MORPHINE SULFATE 4 MG/ML
4 INJECTION INTRAVENOUS
Status: COMPLETED | OUTPATIENT
Start: 2021-11-04 | End: 2021-11-04

## 2021-11-04 RX ORDER — ONDANSETRON 2 MG/ML
4 INJECTION INTRAMUSCULAR; INTRAVENOUS
Status: COMPLETED | OUTPATIENT
Start: 2021-11-04 | End: 2021-11-04

## 2021-11-04 RX ADMIN — SODIUM CHLORIDE 63 ML: 900 INJECTION, SOLUTION INTRAVENOUS at 22:26

## 2021-11-04 RX ADMIN — MORPHINE SULFATE 4 MG: 4 INJECTION INTRAVENOUS at 22:11

## 2021-11-04 RX ADMIN — SODIUM CHLORIDE 1000 ML: 900 INJECTION, SOLUTION INTRAVENOUS at 23:15

## 2021-11-04 RX ADMIN — ONDANSETRON 4 MG: 2 INJECTION INTRAMUSCULAR; INTRAVENOUS at 22:11

## 2021-11-04 RX ADMIN — SODIUM CHLORIDE 1000 ML: 900 INJECTION, SOLUTION INTRAVENOUS at 22:10

## 2021-11-04 RX ADMIN — SODIUM CHLORIDE 1000 ML: 900 INJECTION, SOLUTION INTRAVENOUS at 22:26

## 2021-11-05 LAB
ATRIAL RATE: 83 BPM
CALCULATED P AXIS, ECG09: 53 DEGREES
CALCULATED R AXIS, ECG10: -24 DEGREES
CALCULATED T AXIS, ECG11: 23 DEGREES
DIAGNOSIS, 93000: NORMAL
P-R INTERVAL, ECG05: 152 MS
Q-T INTERVAL, ECG07: 402 MS
QRS DURATION, ECG06: 92 MS
QTC CALCULATION (BEZET), ECG08: 472 MS
VENTRICULAR RATE, ECG03: 83 BPM

## 2021-11-05 NOTE — ED NOTES
Patient remains with elevated BP; he states \"I didn;t take my BP medicine today\". Patient instructed to take his BP asap when he gets home. Provider informed.

## 2021-11-05 NOTE — ED NOTES
Patient greeted / introduced myself . Encouraged to voice any concerns and/ or change in their condition. All questions/concerns addressed and POC addressed; Patient informed of Provider orders, estimated wait time for results and that ongoing hourly rounding will be provided for pain assessment, toileting, and any other concerns. Patient' will be on fall precautions while in this ED; bed locked and in lowest position. Lights are on in room and area around bed dry and free of clutter. Call bell within reach. Past Medical History:   Diagnosis Date    Anxiety     Cholelithiasis 08/18/2013    Note on U/S    Chronic gastritis 10/17/2014    Dr. Estefania Ghosh (EGD Result) H. Pylori Neg    Depression     Dog bite of left hand including fingers with infection 11/16/2014    Adm 11/18/14 for IV antibiotics.  ETOH abuse     GERD (gastroesophageal reflux disease)     Hepatic steatosis 02/06/2014    Noted on U/S.     History of plasmapheresis 02/2014    Hypertriglyceridemia, Severe Pancreatitis    Hypertension     Hypertriglyceridemia     Hypomagnesemia 07/24/2016    1.6 mg/dL    Non compliance with medical treatment     Osteomyelitis of left hand ([facility] [address] 11/22/2014    MRI Finding @ LUE 2nd MCP joint.  Pancreatitis 08/17/2013    Highest on Record (2/6/14) 7623. Dr. Estefania Ghosh    Panic attacks     PICC (peripherally inserted central catheter) in place 69/14/2932    R Basilic Vein (IV Antibiotics for LUE Hand Osteomyelitis).  Rectal bleeding     Substance abuse (HCC)     Several UDS + Opiates.  Vitamin D deficiency 02/17/2014    11.6ng/mL ( ng/mL)     Past Surgical History:   Procedure Laterality Date    HX CYST INCISION AND DRAINAGE Left 12/01/2014    LUE 2nd digit abscess I&D.      HX ENDOSCOPY  10/17/2014    Dr. Estefania Ghosh: EGD/Colonoscopy    HX HEENT      wisdom tooth extraction    HX KNEE ARTHROSCOPY Right

## 2021-11-05 NOTE — ED NOTES
Patient instructed of need for a clean catch urine specimen. Patient verbalized understanding of.  Urinal a bedside

## 2021-11-05 NOTE — ED PROVIDER NOTES
HPI is a 78-year-old male who was diagnosed admitted for septic arthritis 2 weeks ago. Patient admitted to the hospital and had debridement of the wound. He was placed on antibiotics and later discharged home. Patient states over the last 2 to 3 days his left ankle where he had a septic arthritis became swollen. No fever chills shortness of breath. Past Medical History:   Diagnosis Date    Anxiety     Cholelithiasis 08/18/2013    Note on U/S    Chronic gastritis 10/17/2014    Dr. Pattie Gudino (EGD Result) H. Pylori Neg    Depression     Dog bite of left hand including fingers with infection 11/16/2014    Adm 11/18/14 for IV antibiotics.  ETOH abuse     GERD (gastroesophageal reflux disease)     Hepatic steatosis 02/06/2014    Noted on U/S.     History of plasmapheresis 02/2014    Hypertriglyceridemia, Severe Pancreatitis    Hypertension     Hypertriglyceridemia     Hypomagnesemia 07/24/2016    1.6 mg/dL    Non compliance with medical treatment     Osteomyelitis of left hand ([address] 11/22/2014    MRI Finding @ LUE 2nd MCP joint.  Pancreatitis 08/17/2013    Highest on Record (2/6/14) 2723. Dr. Pattie Gudino    PICC (peripherally inserted central catheter) in place 77/65/0158    R Basilic Vein (IV Antibiotics for LUE Hand Osteomyelitis).  Rectal bleeding     Substance abuse (HCC)     Several UDS + Opiates.  Vitamin D deficiency 02/17/2014    11.6ng/mL ( ng/mL)       Past Surgical History:   Procedure Laterality Date    HX CYST INCISION AND DRAINAGE Left 12/01/2014    LUE 2nd digit abscess I&D.      HX ENDOSCOPY  10/17/2014    Dr. Pattie Gudino: EGD/Colonoscopy    HX HEENT      wisdom tooth extraction    HX KNEE ARTHROSCOPY Right          Family History:   Problem Relation Age of Onset    Elevated Lipids Mother     Thyroid Disease Mother     Hypertension Father     Alcohol abuse Maternal Grandmother     Mult Sclerosis Paternal Grandmother        Social History Socioeconomic History    Marital status: SINGLE     Spouse name: Not on file    Number of children: Not on file    Years of education: Not on file    Highest education level: Not on file   Occupational History    Not on file   Tobacco Use    Smoking status: Former Smoker     Quit date: 2012     Years since quittin.1    Smokeless tobacco: Never Used    Tobacco comment: 2018    Substance and Sexual Activity    Alcohol use: Not Currently     Alcohol/week: 13.3 standard drinks     Types: 8 Cans of beer, 8 Standard drinks or equivalent per week    Drug use: No    Sexual activity: Not on file   Other Topics Concern    Not on file   Social History Narrative    Not on file     Social Determinants of Health     Financial Resource Strain:     Difficulty of Paying Living Expenses: Not on file   Food Insecurity:     Worried About Running Out of Food in the Last Year: Not on file    Dina of Food in the Last Year: Not on file   Transportation Needs:     Lack of Transportation (Medical): Not on file    Lack of Transportation (Non-Medical):  Not on file   Physical Activity:     Days of Exercise per Week: Not on file    Minutes of Exercise per Session: Not on file   Stress:     Feeling of Stress : Not on file   Social Connections:     Frequency of Communication with Friends and Family: Not on file    Frequency of Social Gatherings with Friends and Family: Not on file    Attends Voodoo Services: Not on file    Active Member of Clubs or Organizations: Not on file    Attends Club or Organization Meetings: Not on file    Marital Status: Not on file   Intimate Partner Violence:     Fear of Current or Ex-Partner: Not on file    Emotionally Abused: Not on file    Physically Abused: Not on file    Sexually Abused: Not on file   Housing Stability:     Unable to Pay for Housing in the Last Year: Not on file    Number of [place] in the Last Year: Not on file    Unstable Housing in the Last Year: Not on file         ALLERGIES: Amoxicillin, Contrast dye [iodine], Iodinated contrast media, Penicillins, and Wellbutrin [bupropion]    Review of Systems   Constitutional: Negative. HENT: Negative. Eyes: Negative. Respiratory: Negative. Cardiovascular: Negative. Gastrointestinal: Negative. Endocrine: Negative. Genitourinary: Negative. Musculoskeletal: Negative. LEFT FOOT: (+) 4 plus edema, normal ROM, pulses and sensory. Skin: Negative. Allergic/Immunologic: Negative. Neurological: Negative. Hematological: Negative. Psychiatric/Behavioral: Negative. All other systems reviewed and are negative. Vitals:    11/04/21 2105   BP: (!) 174/117   Pulse: 89   Resp: 16   Temp: 98.9 °F (37.2 °C)   SpO2: 100%   Weight: 102.1 kg (225 lb)   Height: 6' 2\" (1.88 m)            Physical Exam  Vitals and nursing note reviewed. Constitutional:       General: He is not in acute distress. Appearance: He is well-developed. HENT:      Head: Normocephalic. Eyes:      Conjunctiva/sclera: Conjunctivae normal.      Pupils: Pupils are equal, round, and reactive to light. Cardiovascular:      Rate and Rhythm: Normal rate and regular rhythm. Heart sounds: Normal heart sounds. No murmur heard. Pulmonary:      Effort: Pulmonary effort is normal. No respiratory distress. Breath sounds: Normal breath sounds. No wheezing or rales. Chest:      Chest wall: No tenderness. Abdominal:      General: Bowel sounds are normal. There is no distension. Palpations: Abdomen is soft. Tenderness: There is no abdominal tenderness. There is no rebound. Musculoskeletal:         General: No tenderness. Normal range of motion. Cervical back: Normal range of motion and neck supple. Comments: LEFT FOOT: (+) 4 plus edema with erythema, normal pulses and sensory. Skin:     General: Skin is warm and dry. Findings: No rash.    Neurological: Mental Status: He is alert and oriented to person, place, and time. Cranial Nerves: No cranial nerve deficit. Motor: No abnormal muscle tone. Coordination: Coordination normal.   Psychiatric:         Behavior: Behavior normal.         Thought Content: Thought content normal.         Judgment: Judgment normal.          MDM     X-ray: Interpreted by me    Lab tests: Interpreted by me       Procedures      Hospital course: Patient main stable throughout ER evaluation. Patient treated with morphine IV for his pain. Patient is on chronic IV antibiotics. No antibiotics started on him. Dx: Postop check. Postop pain. Disp: Continue current medication. Follow-up with orthopedic surgeon in a.m. Return ER needed. Make sure to take current antibiotics. Return ER needed. Dictation disclaimer:  Please note that this dictation was completed with [employer], the computer voice recognition software. Quite often unanticipated grammatical, syntax, homophones, and other interpretive errors are inadvertently transcribed by the computer software. Please disregard these errors. Please excuse any errors that have escaped final proofreading.

## 2021-11-09 ENCOUNTER — OFFICE VISIT (OUTPATIENT)
Dept: ORTHOPEDIC SURGERY | Age: 36
End: 2021-11-09
Payer: MEDICAID

## 2021-11-09 VITALS
HEIGHT: 74 IN | WEIGHT: 236 LBS | HEART RATE: 83 BPM | TEMPERATURE: 97.1 F | OXYGEN SATURATION: 99 % | BODY MASS INDEX: 30.29 KG/M2

## 2021-11-09 DIAGNOSIS — M79.605 PAIN AND SWELLING OF LEFT LOWER EXTREMITY: Primary | ICD-10-CM

## 2021-11-09 DIAGNOSIS — M79.89 PAIN AND SWELLING OF LEFT LOWER EXTREMITY: Primary | ICD-10-CM

## 2021-11-09 DIAGNOSIS — M00.9 SEPTIC ARTHRITIS OF LEFT ANKLE, DUE TO UNSPECIFIED ORGANISM (HCC): ICD-10-CM

## 2021-11-09 PROCEDURE — 99024 POSTOP FOLLOW-UP VISIT: CPT | Performed by: ORTHOPAEDIC SURGERY

## 2021-11-09 NOTE — PROGRESS NOTES
Maritza Doran  1985   Chief Complaint   Patient presents with    Foot Pain     Left        HISTORY OF PRESENT ILLNESS  Maritza Doran is a 43 y.o. male who presents today for evaluation of persistent pain to left ankle after an irrigation debridement procedure on 9/27/2021. Apparently he had to go to the emergency room on Friday with swelling in the same although today there is no significant swelling in the ankle. He says that he has severe pain    Current Outpatient Medications   Medication Sig Dispense Refill    atenoloL (TENORMIN) 100 mg tablet Take 1 Tablet by mouth two (2) times a day. 180 Tablet 1    atorvastatin (LIPITOR) 20 mg tablet Take 1 Tablet by mouth daily. 90 Tablet 1    escitalopram oxalate (LEXAPRO) 20 mg tablet Take 1 Tablet by mouth daily. 90 Tablet 1    gabapentin (NEURONTIN) 400 mg capsule Take 1 Capsule by mouth three (3) times daily. Max Daily Amount: 1,200 mg. 90 Capsule 2    therapeutic multivitamin (THERAGRAN) tablet Take 1 Tablet by mouth daily for 30 days. 90 Tablet 3    clonazePAM (KlonoPIN) 1 mg tablet Take 1 Tablet by mouth three (3) times daily as needed for Anxiety. Max Daily Amount: 3 mg. 90 Tablet 1    ibuprofen (MOTRIN) 800 mg tablet Take 1 Tablet by mouth every eight (8) hours as needed for Pain. 90 Tablet 1    famotidine (PEPCID) 40 mg tablet Take 1 Tablet by mouth daily. 30 Tablet 1    OTHER IV antibiotics with home health care  Ceftriaxone 2 g IV q 24 hour  Daptomycin 6mg/kg IV q 24 hour  Tentative Stop date: till 11/11/21 1 Each 0    omeprazole (PRILOSEC) 20 mg capsule Take 1 Cap by mouth Daily (before breakfast). 30 Cap 1    ondansetron (Zofran ODT) 4 mg disintegrating tablet Take 1 Tab by mouth every eight (8) hours as needed for Nausea or Nausea or Vomiting. 10 Tab 0    cetirizine (ZYRTEC) 10 mg tablet Take 1 Tab by mouth nightly. 30 Tab 1    sildenafil citrate (Viagra) 100 mg tablet Take 1 Tab by mouth as needed for Erectile Dysfunction. 12 Tab 5    fluticasone propionate (FLONASE) 50 mcg/actuation nasal spray 2 Sprays by Both Nostrils route daily. 1 Bottle 0    Camphor-Eucalyptus Oil-Menthol (VICKS VAPORUB) 4.8-1.2-2.6 % oint 1 Actuation(s) by Apply Externally route three (3) times daily as needed for Cough or Other (congestion). (Patient not taking: Reported on 10/26/2021) 50 g 0      Allergies   Allergen Reactions    Amoxicillin Swelling    Contrast Dye [Iodine] Hives     Pt had iv contrast prior to this study without problems, developed hives on 07/04/15 5 minutes post injection    Iodinated Contrast Media Hives     Pt got hives when he received IV contrast in the past    Penicillins Hives and Swelling    Wellbutrin [Bupropion] Other (comments)     Caused aggression      Social History     Socioeconomic History    Marital status: SINGLE     Spouse name: Not on file    Number of children: Not on file    Years of education: Not on file    Highest education level: Not on file   Occupational History    Not on file   Tobacco Use    Smoking status: Former Smoker     Quit date: 2012     Years since quittin.1    Smokeless tobacco: Never Used    Tobacco comment: 2018    Substance and Sexual Activity    Alcohol use: Not Currently     Alcohol/week: 13.3 standard drinks     Types: 8 Cans of beer, 8 Standard drinks or equivalent per week    Drug use: No    Sexual activity: Not on file   Other Topics Concern    Not on file   Social History Narrative    Not on file     Social Determinants of Health     Financial Resource Strain:     Difficulty of Paying Living Expenses: Not on file   Food Insecurity:     Worried About Running Out of Food in the Last Year: Not on file    Dina of Food in the Last Year: Not on file   Transportation Needs:     Lack of Transportation (Medical): Not on file    Lack of Transportation (Non-Medical):  Not on file   Physical Activity:     Days of Exercise per Week: Not on file    Minutes of Exercise per Session: Not on file   Stress:     Feeling of Stress : Not on file   Social Connections:     Frequency of Communication with Friends and Family: Not on file    Frequency of Social Gatherings with Friends and Family: Not on file    Attends Adventism Services: Not on file    Active Member of Clubs or Organizations: Not on file    Attends Club or Organization Meetings: Not on file    Marital Status: Not on file   Intimate Partner Violence:     Fear of Current or Ex-Partner: Not on file    Emotionally Abused: Not on file    Physically Abused: Not on file    Sexually Abused: Not on file   Housing Stability:     Unable to Pay for Housing in the Last Year: Not on file    Number of [place] in the Last Year: Not on file    Unstable Housing in the Last Year: Not on file        [address]   Patient denies: Weight loss, Fever/Chills, HA, Visual changes, Fatigue, Chest pain, SOB, Abdominal pain, N/V/D/C, Blood in stool or urine, Edema. Pertinent positive as above in HPI. All others were negative    PHYSICAL EXAM:   Visit Vitals  Pulse 83   Temp 97.1 °F (36.2 °C) (Temporal)   Ht 6' 2\" (1.88 m)   Wt 236 lb (107 kg)   SpO2 99%   BMI 30.30 kg/m²     The patient is a well-developed, well-nourished male   in no acute distress. The patient is alert and oriented times three. The patient is alert and oriented times three. Mood and affect are normal.  LYMPHATIC: lymph nodes are not enlarged and are within normal limits  SKIN: normal in color and non tender to palpation. There are no bruises or abrasions noted. NEUROLOGICAL: Motor sensory exam is within normal limits. Reflexes are equal bilaterally.  There is normal sensation to pinprick and light touch  MUSCULOSKELETAL:  Examination diffuse mild swelling +1 pitting edema no instability motor sensory exam is normal the wound is clean and healed and there is no redness    PROCEDURE: None    RADIOGRAPHS/X-RAYS: None at this time because of the persistent pain and recurrent swelling that he has had will obtain a noninvasive study to rule out a DVT    IMPRESSION:      ICD-10-CM ICD-9-CM    1. Pain and swelling of left lower extremity  M79.605 729.5 DUPLEX LOWER EXT VENOUS LEFT    M79.89 729.81    2. Septic arthritis of left ankle, due to unspecified organism ([facility]  M00.9 711.07 MRI ANKLE LT WO CONT        PLAN: At this time given the persistent pain and recurrent swelling will obtain an MRI to assess for possibility of osteo although clinically his ankle looks a lot better.   We will also obtain a noninvasive study to assess for the possibility of a DVT      Scribed by Navneet Encinas ([address]) as dictated by MD Analy Cheema M.D.   [facility]

## 2021-11-15 DIAGNOSIS — M00.9 SEPTIC ARTHRITIS OF LEFT ANKLE, DUE TO UNSPECIFIED ORGANISM (HCC): ICD-10-CM

## 2021-11-15 RX ORDER — OXYCODONE AND ACETAMINOPHEN 7.5; 325 MG/1; MG/1
1 TABLET ORAL
Qty: 30 TABLET | Refills: 0 | OUTPATIENT
Start: 2021-11-15 | End: 2021-11-20

## 2021-11-15 NOTE — TELEPHONE ENCOUNTER
Pt requesting med refill    Requested Prescriptions     Pending Prescriptions Disp Refills    oxyCODONE-acetaminophen (PERCOCET 7.5) 7.5-325 mg per tablet 30 Tablet 0     Sig: Take 1 Tablet by mouth every four (4) hours as needed for Pain for up to 5 days. Max Daily Amount: 6 Tablets.

## 2021-11-15 NOTE — TELEPHONE ENCOUNTER
[facility] date for Percocet as 10/26/21 for a 5 d/s. UDS done 10/23/21    Last Visit: 10/26/21 with JESSE Arias  Next Appointment: 12/9/21 with JESSE Arias  Previous Refill Encounter(s): 10/26/21 #30    Requested Prescriptions     Pending Prescriptions Disp Refills    oxyCODONE-acetaminophen (PERCOCET 7.5) 7.5-325 mg per tablet 30 Tablet 0     Sig: Take 1 Tablet by mouth every four (4) hours as needed for Pain for up to 5 days. Max Daily Amount: 6 Tablets.

## 2021-11-17 ENCOUNTER — HOSPITAL ENCOUNTER (EMERGENCY)
Age: 36
Discharge: LEFT BEFORE DISCHARGE | End: 2021-11-17
Attending: STUDENT IN AN ORGANIZED HEALTH CARE EDUCATION/TRAINING PROGRAM
Payer: MEDICAID

## 2021-11-17 ENCOUNTER — HOSPITAL ENCOUNTER (EMERGENCY)
Age: 36
Discharge: HOME | End: 2021-11-17
Attending: STUDENT IN AN ORGANIZED HEALTH CARE EDUCATION/TRAINING PROGRAM
Payer: MEDICAID

## 2021-11-17 ENCOUNTER — APPOINTMENT (OUTPATIENT)
Dept: GENERAL RADIOLOGY | Age: 36
End: 2021-11-17
Attending: STUDENT IN AN ORGANIZED HEALTH CARE EDUCATION/TRAINING PROGRAM
Payer: MEDICAID

## 2021-11-17 VITALS
HEART RATE: 95 BPM | WEIGHT: 232 LBS | SYSTOLIC BLOOD PRESSURE: 169 MMHG | TEMPERATURE: 100.4 F | HEIGHT: 74 IN | RESPIRATION RATE: 20 BRPM | BODY MASS INDEX: 29.77 KG/M2 | OXYGEN SATURATION: 97 % | DIASTOLIC BLOOD PRESSURE: 113 MMHG

## 2021-11-17 VITALS
TEMPERATURE: 98.9 F | RESPIRATION RATE: 18 BRPM | HEIGHT: 74 IN | HEART RATE: 75 BPM | OXYGEN SATURATION: 97 % | DIASTOLIC BLOOD PRESSURE: 98 MMHG | SYSTOLIC BLOOD PRESSURE: 160 MMHG | BODY MASS INDEX: 29.52 KG/M2 | WEIGHT: 230 LBS

## 2021-11-17 DIAGNOSIS — Z98.890 HISTORY OF INCISION AND DRAINAGE: ICD-10-CM

## 2021-11-17 DIAGNOSIS — T80.219A PICC LINE INFECTION, INITIAL ENCOUNTER: ICD-10-CM

## 2021-11-17 DIAGNOSIS — T80.219A INFECTION OF PERIPHERALLY INSERTED CENTRAL CATHETER (PICC), INITIAL ENCOUNTER: ICD-10-CM

## 2021-11-17 DIAGNOSIS — R50.9 FEVER, UNSPECIFIED FEVER CAUSE: Primary | ICD-10-CM

## 2021-11-17 LAB
ANION GAP SERPL CALC-SCNC: 6 MMOL/L (ref 3–18)
ANION GAP SERPL CALC-SCNC: 9 MMOL/L (ref 3–18)
APPEARANCE UR: CLEAR
ATRIAL RATE: 95 BPM
B PERT DNA SPEC QL NAA+PROBE: NOT DETECTED
BASOPHILS # BLD: 0 K/UL (ref 0–0.1)
BASOPHILS # BLD: 0 K/UL (ref 0–0.1)
BASOPHILS NFR BLD: 0 % (ref 0–2)
BASOPHILS NFR BLD: 0 % (ref 0–2)
BILIRUB UR QL: NEGATIVE
BORDETELLA PARAPERTUSSIS PCR, BORPAR: NOT DETECTED
BUN SERPL-MCNC: 6 MG/DL (ref 7–18)
BUN SERPL-MCNC: 6 MG/DL (ref 7–18)
BUN/CREAT SERPL: 7 (ref 12–20)
BUN/CREAT SERPL: 8 (ref 12–20)
C PNEUM DNA SPEC QL NAA+PROBE: NOT DETECTED
CALCIUM SERPL-MCNC: 8.4 MG/DL (ref 8.5–10.1)
CALCIUM SERPL-MCNC: 8.9 MG/DL (ref 8.5–10.1)
CALCULATED P AXIS, ECG09: 48 DEGREES
CALCULATED R AXIS, ECG10: -17 DEGREES
CALCULATED T AXIS, ECG11: 25 DEGREES
CHLORIDE SERPL-SCNC: 103 MMOL/L (ref 100–111)
CHLORIDE SERPL-SCNC: 110 MMOL/L (ref 100–111)
CO2 SERPL-SCNC: 24 MMOL/L (ref 21–32)
CO2 SERPL-SCNC: 25 MMOL/L (ref 21–32)
COLOR UR: YELLOW
CREAT SERPL-MCNC: 0.71 MG/DL (ref 0.6–1.3)
CREAT SERPL-MCNC: 0.83 MG/DL (ref 0.6–1.3)
DIAGNOSIS, 93000: NORMAL
DIFFERENTIAL METHOD BLD: ABNORMAL
DIFFERENTIAL METHOD BLD: ABNORMAL
EOSINOPHIL # BLD: 0.1 K/UL (ref 0–0.4)
EOSINOPHIL # BLD: 0.2 K/UL (ref 0–0.4)
EOSINOPHIL NFR BLD: 1 % (ref 0–5)
EOSINOPHIL NFR BLD: 2 % (ref 0–5)
ERYTHROCYTE [DISTWIDTH] IN BLOOD BY AUTOMATED COUNT: 13 % (ref 11.6–14.5)
ERYTHROCYTE [DISTWIDTH] IN BLOOD BY AUTOMATED COUNT: 13.2 % (ref 11.6–14.5)
FLUAV SUBTYP SPEC NAA+PROBE: NOT DETECTED
FLUBV RNA SPEC QL NAA+PROBE: NOT DETECTED
GLUCOSE SERPL-MCNC: 101 MG/DL (ref 74–99)
GLUCOSE SERPL-MCNC: 87 MG/DL (ref 74–99)
GLUCOSE UR STRIP.AUTO-MCNC: NEGATIVE MG/DL
HADV DNA SPEC QL NAA+PROBE: NOT DETECTED
HCOV 229E RNA SPEC QL NAA+PROBE: NOT DETECTED
HCOV HKU1 RNA SPEC QL NAA+PROBE: NOT DETECTED
HCOV NL63 RNA SPEC QL NAA+PROBE: NOT DETECTED
HCOV OC43 RNA SPEC QL NAA+PROBE: NOT DETECTED
HCT VFR BLD AUTO: 33.6 % (ref 36–48)
HCT VFR BLD AUTO: 34.5 % (ref 36–48)
HGB BLD-MCNC: 11.2 G/DL (ref 13–16)
HGB BLD-MCNC: 11.6 G/DL (ref 13–16)
HGB UR QL STRIP: NEGATIVE
HMPV RNA SPEC QL NAA+PROBE: NOT DETECTED
HPIV1 RNA SPEC QL NAA+PROBE: NOT DETECTED
HPIV2 RNA SPEC QL NAA+PROBE: NOT DETECTED
HPIV3 RNA SPEC QL NAA+PROBE: NOT DETECTED
HPIV4 RNA SPEC QL NAA+PROBE: NOT DETECTED
IMM GRANULOCYTES # BLD AUTO: 0 K/UL (ref 0–0.04)
IMM GRANULOCYTES # BLD AUTO: 0.1 K/UL (ref 0–0.04)
IMM GRANULOCYTES NFR BLD AUTO: 0 % (ref 0–0.5)
IMM GRANULOCYTES NFR BLD AUTO: 0 % (ref 0–0.5)
KETONES UR QL STRIP.AUTO: NEGATIVE MG/DL
LACTATE BLD-SCNC: 1.57 MMOL/L (ref 0.4–2)
LEUKOCYTE ESTERASE UR QL STRIP.AUTO: NEGATIVE
LYMPHOCYTES # BLD: 0.9 K/UL (ref 0.9–3.6)
LYMPHOCYTES # BLD: 1.2 K/UL (ref 0.9–3.6)
LYMPHOCYTES NFR BLD: 17 % (ref 21–52)
LYMPHOCYTES NFR BLD: 8 % (ref 21–52)
M PNEUMO DNA SPEC QL NAA+PROBE: NOT DETECTED
MCH RBC QN AUTO: 30.6 PG (ref 24–34)
MCH RBC QN AUTO: 31.1 PG (ref 24–34)
MCHC RBC AUTO-ENTMCNC: 33.3 G/DL (ref 31–37)
MCHC RBC AUTO-ENTMCNC: 33.6 G/DL (ref 31–37)
MCV RBC AUTO: 91 FL (ref 78–100)
MCV RBC AUTO: 93.3 FL (ref 78–100)
MONOCYTES # BLD: 0.8 K/UL (ref 0.05–1.2)
MONOCYTES # BLD: 1.2 K/UL (ref 0.05–1.2)
MONOCYTES NFR BLD: 11 % (ref 3–10)
MONOCYTES NFR BLD: 12 % (ref 3–10)
NEUTS SEG # BLD: 4.9 K/UL (ref 1.8–8)
NEUTS SEG # BLD: 9.3 K/UL (ref 1.8–8)
NEUTS SEG NFR BLD: 69 % (ref 40–73)
NEUTS SEG NFR BLD: 80 % (ref 40–73)
NITRITE UR QL STRIP.AUTO: NEGATIVE
NRBC # BLD: 0 K/UL (ref 0–0.01)
NRBC # BLD: 0 K/UL (ref 0–0.01)
NRBC BLD-RTO: 0 PER 100 WBC
NRBC BLD-RTO: 0 PER 100 WBC
P-R INTERVAL, ECG05: 154 MS
PH UR STRIP: 6 [PH] (ref 5–8)
PLATELET # BLD AUTO: 169 K/UL (ref 135–420)
PLATELET # BLD AUTO: 188 K/UL (ref 135–420)
PMV BLD AUTO: 8.4 FL (ref 9.2–11.8)
PMV BLD AUTO: 8.5 FL (ref 9.2–11.8)
POTASSIUM SERPL-SCNC: 3.3 MMOL/L (ref 3.5–5.5)
POTASSIUM SERPL-SCNC: 3.7 MMOL/L (ref 3.5–5.5)
PROT UR STRIP-MCNC: NEGATIVE MG/DL
Q-T INTERVAL, ECG07: 378 MS
QRS DURATION, ECG06: 90 MS
QTC CALCULATION (BEZET), ECG08: 475 MS
RBC # BLD AUTO: 3.6 M/UL (ref 4.35–5.65)
RBC # BLD AUTO: 3.79 M/UL (ref 4.35–5.65)
RSV RNA SPEC QL NAA+PROBE: NOT DETECTED
RV+EV RNA SPEC QL NAA+PROBE: NOT DETECTED
SARS-COV-2 PCR, COVPCR: NOT DETECTED
SODIUM SERPL-SCNC: 136 MMOL/L (ref 136–145)
SODIUM SERPL-SCNC: 141 MMOL/L (ref 136–145)
SP GR UR REFRACTOMETRY: <1.005 (ref 1–1.03)
UROBILINOGEN UR QL STRIP.AUTO: 0.2 EU/DL (ref 0.2–1)
VENTRICULAR RATE, ECG03: 95 BPM
WBC # BLD AUTO: 11.6 K/UL (ref 4.6–13.2)
WBC # BLD AUTO: 7.1 K/UL (ref 4.6–13.2)

## 2021-11-17 PROCEDURE — 85025 COMPLETE CBC W/AUTO DIFF WBC: CPT

## 2021-11-17 PROCEDURE — 80048 BASIC METABOLIC PNL TOTAL CA: CPT

## 2021-11-17 PROCEDURE — 96367 TX/PROPH/DG ADDL SEQ IV INF: CPT

## 2021-11-17 PROCEDURE — 96375 TX/PRO/DX INJ NEW DRUG ADDON: CPT

## 2021-11-17 PROCEDURE — 93005 ELECTROCARDIOGRAM TRACING: CPT

## 2021-11-17 PROCEDURE — 73610 X-RAY EXAM OF ANKLE: CPT

## 2021-11-17 PROCEDURE — 81003 URINALYSIS AUTO W/O SCOPE: CPT

## 2021-11-17 PROCEDURE — 96365 THER/PROPH/DIAG IV INF INIT: CPT

## 2021-11-17 PROCEDURE — 0202U NFCT DS 22 TRGT SARS-COV-2: CPT

## 2021-11-17 PROCEDURE — 83605 ASSAY OF LACTIC ACID: CPT

## 2021-11-17 PROCEDURE — 99282 EMERGENCY DEPT VISIT SF MDM: CPT

## 2021-11-17 PROCEDURE — 74011000258 HC RX REV CODE- 258: Performed by: STUDENT IN AN ORGANIZED HEALTH CARE EDUCATION/TRAINING PROGRAM

## 2021-11-17 PROCEDURE — 74011250637 HC RX REV CODE- 250/637: Performed by: STUDENT IN AN ORGANIZED HEALTH CARE EDUCATION/TRAINING PROGRAM

## 2021-11-17 PROCEDURE — 71045 X-RAY EXAM CHEST 1 VIEW: CPT

## 2021-11-17 PROCEDURE — 99284 EMERGENCY DEPT VISIT MOD MDM: CPT

## 2021-11-17 PROCEDURE — 74011250636 HC RX REV CODE- 250/636: Performed by: STUDENT IN AN ORGANIZED HEALTH CARE EDUCATION/TRAINING PROGRAM

## 2021-11-17 PROCEDURE — 87040 BLOOD CULTURE FOR BACTERIA: CPT

## 2021-11-17 RX ORDER — ONDANSETRON 2 MG/ML
4 INJECTION INTRAMUSCULAR; INTRAVENOUS ONCE
Status: COMPLETED | OUTPATIENT
Start: 2021-11-17 | End: 2021-11-17

## 2021-11-17 RX ORDER — VANCOMYCIN 2 GRAM/500 ML IN 0.9 % SODIUM CHLORIDE INTRAVENOUS
2000 ONCE
Status: COMPLETED | OUTPATIENT
Start: 2021-11-17 | End: 2021-11-17

## 2021-11-17 RX ORDER — MORPHINE SULFATE 4 MG/ML
4 INJECTION INTRAVENOUS
Status: DISCONTINUED | OUTPATIENT
Start: 2021-11-17 | End: 2021-11-17

## 2021-11-17 RX ORDER — VANCOMYCIN/0.9 % SOD CHLORIDE 1.5G/250ML
1500 PLASTIC BAG, INJECTION (ML) INTRAVENOUS EVERY 12 HOURS
Status: DISCONTINUED | OUTPATIENT
Start: 2021-11-17 | End: 2021-11-17

## 2021-11-17 RX ORDER — ONDANSETRON 2 MG/ML
4 INJECTION INTRAMUSCULAR; INTRAVENOUS
Status: DISCONTINUED | OUTPATIENT
Start: 2021-11-17 | End: 2021-11-17

## 2021-11-17 RX ORDER — VANCOMYCIN/0.9 % SOD CHLORIDE 1.5G/250ML
1500 PLASTIC BAG, INJECTION (ML) INTRAVENOUS EVERY 8 HOURS
Status: DISCONTINUED | OUTPATIENT
Start: 2021-11-17 | End: 2021-11-17

## 2021-11-17 RX ORDER — ACETAMINOPHEN 500 MG
1000 TABLET ORAL ONCE
Status: COMPLETED | OUTPATIENT
Start: 2021-11-17 | End: 2021-11-17

## 2021-11-17 RX ORDER — SODIUM CHLORIDE 0.9 % (FLUSH) 0.9 %
5-10 SYRINGE (ML) INJECTION AS NEEDED
Status: DISCONTINUED | OUTPATIENT
Start: 2021-11-17 | End: 2021-11-17 | Stop reason: HOSPADM

## 2021-11-17 RX ORDER — MORPHINE SULFATE 4 MG/ML
4 INJECTION INTRAVENOUS ONCE
Status: COMPLETED | OUTPATIENT
Start: 2021-11-17 | End: 2021-11-17

## 2021-11-17 RX ADMIN — ACETAMINOPHEN 1000 MG: 500 TABLET ORAL at 07:05

## 2021-11-17 RX ADMIN — SODIUM CHLORIDE 1000 ML: 900 INJECTION, SOLUTION INTRAVENOUS at 10:01

## 2021-11-17 RX ADMIN — ONDANSETRON 4 MG: 2 INJECTION INTRAMUSCULAR; INTRAVENOUS at 08:39

## 2021-11-17 RX ADMIN — PIPERACILLIN AND TAZOBACTAM 4.5 G: 4; .5 INJECTION, POWDER, FOR SOLUTION INTRAVENOUS at 06:58

## 2021-11-17 RX ADMIN — SODIUM CHLORIDE 1000 ML: 900 INJECTION, SOLUTION INTRAVENOUS at 08:39

## 2021-11-17 RX ADMIN — VANCOMYCIN HYDROCHLORIDE 2000 MG: 10 INJECTION, POWDER, LYOPHILIZED, FOR SOLUTION INTRAVENOUS at 08:35

## 2021-11-17 RX ADMIN — SODIUM CHLORIDE 1000 ML: 900 INJECTION, SOLUTION INTRAVENOUS at 06:54

## 2021-11-17 RX ADMIN — MORPHINE SULFATE 4 MG: 4 INJECTION, SOLUTION INTRAMUSCULAR; INTRAVENOUS at 08:39

## 2021-11-17 NOTE — ED TRIAGE NOTES
Patient c/o fever 103 F upon waking. He endorses he had surgery on left ankle for septic arthritis about a month ago.

## 2021-11-17 NOTE — ED PROVIDER NOTES
EMERGENCY DEPARTMENT HISTORY AND PHYSICAL EXAM    6:26 PM      Date: 11/17/2021  Patient Name: Del Montes De Oca    History of Presenting Illness     Chief Complaint   Patient presents with    Post-Op Problem    Fever         History Provided By: Patient  Location/Duration/Severity/Modifying factors   Is a 41-year-old male sending for fever and concerns of sepsis. He left the emergency department today AMA he was scheduled for admission to receive IV antibiotics for possible infection of retained PICC line. He had a I&D of his left ankle on the 27th of number. Received IV antibiotics through a PICC line and was scheduled to have removed though never had it officially removed. Came in this morning for fever of 103 which was controlled with ibuprofen and acetaminophen. Dr. Asiya Rehman had spoken with surgery and orthopedic surgery who did not feel any infection could be coming from his ankle. His left ankle is swollen and warm though the wound looks healed appropriately and does not look have an overt sign of infection. They thought this morning was that the infectious source was a PICC line. Labs and blood cultures were drawn prior to the patient deciding to leave [place]. He states that he had fever again at home of 101 and that was well controlled with Tylenol.           PCP: Nikita Ho NP    Current Facility-Administered Medications   Medication Dose Route Frequency Provider Last Rate Last Admin    morphine injection 4 mg  4 mg IntraVENous NOW CARLITA Bell        ondansetron [facility]) injection 4 mg  4 mg IntraVENous NOW CARLITA Bell        piperacillin-tazobactam (ZOSYN) 3.375 g in 0.9% sodium chloride (MBP/ADV) 100 mL MBP  3.375 g IntraVENous Q6H CARLITA Bell        vancomycin (VANCOCIN) 1500 mg in  ml infusion  1,500 mg IntraVENous Q12H Beatriz Lyles [place]         Current Outpatient Medications   Medication Sig Dispense Refill    atenoloL (TENORMIN) 100 mg tablet Take 1 Tablet by mouth two (2) times a day. 180 Tablet 1    atorvastatin (LIPITOR) 20 mg tablet Take 1 Tablet by mouth daily. 90 Tablet 1    escitalopram oxalate (LEXAPRO) 20 mg tablet Take 1 Tablet by mouth daily. 90 Tablet 1    gabapentin (NEURONTIN) 400 mg capsule Take 1 Capsule by mouth three (3) times daily. Max Daily Amount: 1,200 mg. 90 Capsule 2    therapeutic multivitamin (THERAGRAN) tablet Take 1 Tablet by mouth daily for 30 days. 90 Tablet 3    clonazePAM (KlonoPIN) 1 mg tablet Take 1 Tablet by mouth three (3) times daily as needed for Anxiety. Max Daily Amount: 3 mg. 90 Tablet 1    ibuprofen (MOTRIN) 800 mg tablet Take 1 Tablet by mouth every eight (8) hours as needed for Pain. 90 Tablet 1    famotidine (PEPCID) 40 mg tablet Take 1 Tablet by mouth daily. 30 Tablet 1    OTHER IV antibiotics with home health care  Ceftriaxone 2 g IV q 24 hour  Daptomycin 6mg/kg IV q 24 hour  Tentative Stop date: till 11/11/21 1 Each 0    omeprazole (PRILOSEC) 20 mg capsule Take 1 Cap by mouth Daily (before breakfast). 30 Cap 1    ondansetron (Zofran ODT) 4 mg disintegrating tablet Take 1 Tab by mouth every eight (8) hours as needed for Nausea or Nausea or Vomiting. 10 Tab 0    cetirizine (ZYRTEC) 10 mg tablet Take 1 Tab by mouth nightly. 30 Tab 1    sildenafil citrate (Viagra) 100 mg tablet Take 1 Tab by mouth as needed for Erectile Dysfunction. 12 Tab 5    fluticasone propionate (FLONASE) 50 mcg/actuation nasal spray 2 Sprays by Both Nostrils route daily. 1 Bottle 0    Camphor-Eucalyptus Oil-Menthol (VICKS VAPORUB) 4.8-1.2-2.6 % oint 1 Actuation(s) by Apply Externally route three (3) times daily as needed for Cough or Other (congestion). (Patient not taking: Reported on 10/26/2021) 50 g 0       Past History     Past Medical History:  Past Medical History:   Diagnosis Date    Anxiety     Cholelithiasis 08/18/2013    Note on U/S    Chronic gastritis 10/17/2014    Dr. Zari Barnes (EGD Result) H. Pylori Neg    Depression     Dog bite of left hand including fingers with infection 2014    Adm 14 for IV antibiotics.  ETOH abuse     GERD (gastroesophageal reflux disease)     Hepatic steatosis 2014    Noted on U/S.     History of plasmapheresis 2014    Hypertriglyceridemia, Severe Pancreatitis    Hypertension     Hypertriglyceridemia     Hypomagnesemia 2016    1.6 mg/dL    Non compliance with medical treatment     Osteomyelitis of left hand ([address] 2014    MRI Finding @ LUE 2nd MCP joint.  Pancreatitis 2013    Highest on Record [phone]. Dr. Ramón Thompson    Panic attacks     PICC (peripherally inserted central catheter) in place     R Basilic Vein (IV Antibiotics for LUE Hand Osteomyelitis).  Rectal bleeding     Substance abuse (HCC)     Several UDS + Opiates.  Vitamin D deficiency 2014    11.6ng/mL ( ng/mL)       Past Surgical History:  Past Surgical History:   Procedure Laterality Date    HX CYST INCISION AND DRAINAGE Left 2014    LUE 2nd digit abscess I&D.  HX ENDOSCOPY  10/17/2014    Dr. Ramón Thompson: EGD/Colonoscopy    HX HEENT      wisdom tooth extraction    HX KNEE ARTHROSCOPY Right     HX ORTHOPAEDIC      Left foot       Family History:  Family History   Problem Relation Age of Onset    Elevated Lipids Mother     Thyroid Disease Mother     Hypertension Father     Alcohol abuse Maternal Grandmother     Mult Sclerosis Paternal Grandmother        Social History:  Social History     Tobacco Use    Smoking status: Former Smoker     Quit date: 2012     Years since quittin.1    Smokeless tobacco: Never Used    Tobacco comment: 2018    Substance Use Topics    Alcohol use: Not Currently     Alcohol/week: 13.3 standard drinks     Types: 8 Cans of beer, 8 Standard drinks or equivalent per week    Drug use: No       Allergies:   Allergies   Allergen Reactions    Amoxicillin Swelling    Contrast Dye [Iodine] Hives     Pt had iv contrast prior to this study without problems, developed hives on 07/04/15 5 minutes post injection    Iodinated Contrast Media Hives     Pt got hives when he received IV contrast in the past    Penicillins Hives and Swelling    Wellbutrin [Bupropion] Other (comments)     Caused aggression         Review of Systems       Review of Systems   Constitutional: Positive for fever. Negative for chills and fatigue. HENT: Negative for rhinorrhea, sinus pressure, sinus pain and tinnitus. Respiratory: Negative for cough, chest tightness and shortness of breath. Cardiovascular: Positive for leg swelling. Negative for chest pain and palpitations. Gastrointestinal: Negative for abdominal pain, diarrhea, nausea and vomiting. Genitourinary: Negative for enuresis, frequency and hematuria. Musculoskeletal: Positive for arthralgias. Negative for myalgias. Skin: Negative for color change, rash and wound. Neurological: Negative for dizziness, weakness, light-headedness and headaches. Psychiatric/Behavioral: Negative for agitation and confusion. Physical Exam     Visit Vitals  BP (!) 160/98   Pulse 75   Temp 98.9 °F (37.2 °C)   Resp 18   Ht 6' 2\" (1.88 m)   Wt 104.3 kg (230 lb)   SpO2 97%   BMI 29.53 kg/m²         Physical Exam  Constitutional:       General: He is not in acute distress. Appearance: Normal appearance. He is not ill-appearing, toxic-appearing or diaphoretic. HENT:      Head: Normocephalic and atraumatic. Right Ear: External ear normal.      Left Ear: External ear normal.   Eyes:      Extraocular Movements: Extraocular movements intact. Cardiovascular:      Rate and Rhythm: Normal rate and regular rhythm. Pulses: Normal pulses. Heart sounds: Normal heart sounds. Pulmonary:      Effort: Pulmonary effort is normal.      Breath sounds: Normal breath sounds. Abdominal:      General: Abdomen is flat. Palpations: Abdomen is soft. Musculoskeletal:         General: Swelling present. No tenderness or deformity. Right lower leg: No edema. Left lower leg: Edema present. Comments: Mild swelling of L ankle, not erythematous, wound looks appropriately healed. No obvious signs of infection    Kerlex over R arm where PICC was removed. Skin:     General: Skin is warm and dry. Capillary Refill: Capillary refill takes less than 2 seconds. Neurological:      General: No focal deficit present. Mental Status: He is alert and oriented to person, place, and time.    Psychiatric:         Mood and Affect: Mood normal.           Diagnostic Study Results     Labs -  Recent Results (from the past 12 hour(s))   RESPIRATORY VIRUS PANEL W/COVID-19, PCR    Collection Time: 11/17/21  6:36 AM    Specimen: Nasopharyngeal   Result Value Ref Range    Adenovirus Not detected NOTD      Coronavirus 229E Not detected NOTD      Coronavirus HKU1 Not detected NOTD      Coronavirus CVNL63 Not detected NOTD      Coronavirus OC43 Not detected NOTD      SARS-CoV-2, PCR Not detected NOTD      Metapneumovirus Not detected NOTD      Rhinovirus and Enterovirus Not detected NOTD      Influenza A Not detected NOTD      Influenza B Not detected NOTD      Parainfluenza 1 Not detected NOTD      Parainfluenza 2 Not detected NOTD      Parainfluenza 3 Not detected NOTD      Parainfluenza virus 4 Not detected NOTD      RSV by PCR Not detected NOTD      B. parapertussis, PCR Not detected NOTD      Bordetella pertussis - PCR Not detected NOTD      Chlamydophila pneumoniae DNA, QL, PCR Not detected NOTD      Mycoplasma pneumoniae DNA, QL, PCR Not detected NOTD     URINALYSIS W/ RFLX MICROSCOPIC    Collection Time: 11/17/21  6:43 AM   Result Value Ref Range    Color YELLOW      Appearance CLEAR      Specific gravity <1.005 (L) 1.005 - 1.030    pH (UA) 6.0 5.0 - 8.0      Protein Negative NEG mg/dL    Glucose Negative NEG mg/dL    Ketone Negative NEG mg/dL    Bilirubin Negative NEG      Blood Negative NEG      Urobilinogen 0.2 0.2 - 1.0 EU/dL    Nitrites Negative NEG      Leukocyte Esterase Negative NEG     CBC WITH AUTOMATED DIFF    Collection Time: 11/17/21  5:30 PM   Result Value Ref Range    WBC 7.1 4.6 - 13.2 K/uL    RBC 3.60 (L) 4.35 - 5.65 M/uL    HGB 11.2 (L) 13.0 - 16.0 g/dL    HCT 33.6 (L) 36.0 - 48.0 %    MCV 93.3 78.0 - 100.0 FL    MCH 31.1 24.0 - 34.0 PG    MCHC 33.3 31.0 - 37.0 g/dL    RDW 13.2 11.6 - 14.5 %    PLATELET 295 309 - 316 K/uL    MPV 8.4 (L) 9.2 - 11.8 FL    NRBC 0.0 0  WBC    ABSOLUTE NRBC 0.00 0.00 - 0.01 K/uL    NEUTROPHILS 69 40 - 73 %    LYMPHOCYTES 17 (L) 21 - 52 %    MONOCYTES 12 (H) 3 - 10 %    EOSINOPHILS 2 0 - 5 %    BASOPHILS 0 0 - 2 %    IMMATURE GRANULOCYTES 0 0.0 - 0.5 %    ABS. NEUTROPHILS 4.9 1.8 - 8.0 K/UL    ABS. LYMPHOCYTES 1.2 0.9 - 3.6 K/UL    ABS. MONOCYTES 0.8 0.05 - 1.2 K/UL    ABS. EOSINOPHILS 0.2 0.0 - 0.4 K/UL    ABS. BASOPHILS 0.0 0.0 - 0.1 K/UL    ABS. IMM. GRANS. 0.0 0.00 - 0.04 K/UL    DF AUTOMATED     METABOLIC PANEL, BASIC    Collection Time: 11/17/21  5:30 PM   Result Value Ref Range    Sodium 141 136 - 145 mmol/L    Potassium 3.7 3.5 - 5.5 mmol/L    Chloride 110 100 - 111 mmol/L    CO2 25 21 - 32 mmol/L    Anion gap 6 3.0 - 18 mmol/L    Glucose 87 74 - 99 mg/dL    BUN 6 (L) 7.0 - 18 MG/DL    Creatinine 0.71 0.6 - 1.3 MG/DL    BUN/Creatinine ratio 8 (L) 12 - 20      GFR est AA >60 >60 ml/min/1.73m2    GFR est non-AA >60 >60 ml/min/1.73m2    Calcium 8.4 (L) 8.5 - 10.1 MG/DL       Radiologic Studies -   No orders to display         Medical Decision Making   I am the first provider for this patient. I reviewed the vital signs, available nursing notes, past medical history, past surgical history, family history and social history. Vital Signs-Reviewed the patient's vital signs.     Records Reviewed: Nursing Notes, Old Medical Records, Previous Radiology Studies and Previous Laboratory Studies (Time of Review: 6:26 PM)    ED Course: Progress Notes, Reevaluation, and Consults:         Provider Notes (Medical Decision Making):   MDM  Number of Diagnoses or Management Options  Fever, unspecified fever cause  History of incision and drainage  PICC line infection, initial encounter  Diagnosis management comments: Patient is a 75-year-old male presenting for fever. Patient recently left AMA with concerns for sepsis. Upon review of his lab work he has no elevated white count, chemistry is unremarkable, UA was unremarkable, chest x-ray and ankle x-ray were unremarkable. He has been afebrile his entire time in the department. Spoke with the admitting physician Dr. Javier Flores, he does not believe this patient meets any requirements for admission at this time. discussed earlier concerns for sepsis and possible infection of the PICC line, with reassuring lab work and no fever and needed the provider is comfortable with the patient being discharged and called with the results of the blood cultures for return if positive. Reviewed patient's past medical record and notes from previous visit this morning. Discussed with the patient his lab findings as well as the potential need for admission in the future if his cultures came back positive. He feels safe going home tonight. And understands that he needs to follow-up with his surgeon. Can continue to control his fever with Tylenol. Knows to return with uncontrollable fevers inability keep liquids down any increased swelling redness of his ankle or arm. Upon attempt to discharge the patient he was visibly upset that he was told a different story this morning he felt that we were missing something. Discussed at length with him his lab results and imaging findings. As well as my discussion with the internal medicine physician.  Lengthy discussion greater than 20 minutes the patient though still upset felt that he was safe to go home and was agreeable to the plan of returning if symptoms worsens and or if blood cultures came back as positive. Diagnosis     Clinical Impression:   1. PICC line infection, initial encounter    2. Fever, unspecified fever cause    3. History of incision and drainage        Disposition: Discharged    Follow-up Information    None          Patient's Medications   Start Taking    No medications on file   Continue Taking    ATENOLOL (TENORMIN) 100 MG TABLET    Take 1 Tablet by mouth two (2) times a day. ATORVASTATIN (LIPITOR) 20 MG TABLET    Take 1 Tablet by mouth daily. CAMPHOR-EUCALYPTUS OIL-MENTHOL (VICKS VAPORUB) 4.8-1.2-2.6 % OINT    1 Actuation(s) by Apply Externally route three (3) times daily as needed for Cough or Other (congestion). CETIRIZINE (ZYRTEC) 10 MG TABLET    Take 1 Tab by mouth nightly. CLONAZEPAM (KLONOPIN) 1 MG TABLET    Take 1 Tablet by mouth three (3) times daily as needed for Anxiety. Max Daily Amount: 3 mg. ESCITALOPRAM OXALATE (LEXAPRO) 20 MG TABLET    Take 1 Tablet by mouth daily. FAMOTIDINE (PEPCID) 40 MG TABLET    Take 1 Tablet by mouth daily. FLUTICASONE PROPIONATE (FLONASE) 50 MCG/ACTUATION NASAL SPRAY    2 Sprays by Both Nostrils route daily. GABAPENTIN (NEURONTIN) 400 MG CAPSULE    Take 1 Capsule by mouth three (3) times daily. Max Daily Amount: 1,200 mg. IBUPROFEN (MOTRIN) 800 MG TABLET    Take 1 Tablet by mouth every eight (8) hours as needed for Pain. OMEPRAZOLE (PRILOSEC) 20 MG CAPSULE    Take 1 Cap by mouth Daily (before breakfast). ONDANSETRON (ZOFRAN ODT) 4 MG DISINTEGRATING TABLET    Take 1 Tab by mouth every eight (8) hours as needed for Nausea or Nausea or Vomiting. OTHER    IV antibiotics with home health care  Ceftriaxone 2 g IV q 24 hour  Daptomycin 6mg/kg IV q 24 hour  Tentative Stop date: till 11/11/21    SILDENAFIL CITRATE (VIAGRA) 100 MG TABLET    Take 1 Tab by mouth as needed for Erectile Dysfunction.     THERAPEUTIC MULTIVITAMIN (THERAGRAN) TABLET    Take 1 Tablet by mouth daily for 30 days. These Medications have changed    No medications on file   Stop Taking    No medications on file     Disclaimer: Sections of this note are dictated using utilizing voice recognition software. Minor typographical errors may be present. If questions arise, please do not hesitate to contact me or call our department.

## 2021-11-17 NOTE — ED NOTES
Pt signs AMA form, states that he needs to go home and take care of his animals, but patient states he is willing to come back for treatment once his animals are taken care of. Pt stable, in no acute distress at this time, no further questions or complaints at this time. Pt ambulates out of ED with all personal belongings without difficulty.

## 2021-11-17 NOTE — ED NOTES
I performed a brief evaluation, including history and physical, of the patient here in triage and I have determined that pt will need further treatment and evaluation from the main side ER physician. I have placed initial orders to help in expediting patients care.      November 17, 2021 at 5:21 PM - CARLITA Ordonez        Visit Vitals  BP (!) 160/98   Pulse 75   Temp 98.9 °F (37.2 °C)   Resp 18   Ht 6' 2\" (1.88 m)   Wt 104.3 kg (230 lb)   SpO2 97%   BMI 29.53 kg/m²

## 2021-11-17 NOTE — ED PROVIDER NOTES
EMERGENCY DEPARTMENT HISTORY AND PHYSICAL EXAM    6:23 AM    Date: 11/17/2021  Patient Name: Aurelia Bennett    History of Presenting Illness     Chief Complaint   Patient presents with    Post-Op Problem    Fever       History Provided By: Patient  Location/Duration/Severity/Modifying factors     Post-Op Problem  Pertinent negatives include no chest pain, no abdominal pain, no headaches and no shortness of breath. Fever   Pertinent negatives include no chest pain, no vomiting, no headaches, no sore throat, no cough, no shortness of breath and no neck pain. Aurelia Bennett is a 43 y.o. male past medical history of septic arthritis of the left ankle status post irrigation and debridement on 9/27/2021, alcohol abuse presenting for evaluation of fever. He said he woke up this morning after drinking with some friends last night noticed he had a fever of 103. He took ibuprofen 800 mg and he brought the temperature down a little bit and he feels slightly better. He said he does not feel like his ankle is infected, he says the swelling has dramatically improved over the last several weeks, he has not noticed any redness, still able ambulate and range his ankle well. He has a PICC line that was placed at the time of the surgery so that he could receive IV antibiotics, it was supposed to come out although he has not been able to successfully make an appointment to do so. He denies cough, congestion, shortness of breath, chest pain, abdominal pain, nausea, vomiting, dysuria or diarrhea. He is vaccinated against COVID-19, but did not receive his flu shot this year. Drinks regularly, no history of withdrawal.  No other medical complaints.     PCP: Ortiz Ortiz NP    Current Facility-Administered Medications   Medication Dose Route Frequency Provider Last Rate Last Admin    morphine injection 4 mg  4 mg IntraVENous NOW CARLITA Velasco        ondansetron [facility]) injection 4 mg  4 mg IntraVENous NOW CARLITA Van        piperacillin-tazobactam (ZOSYN) 3.375 g in 0.9% sodium chloride (MBP/ADV) 100 mL MBP  3.375 g IntraVENous Q6H CARLITA Van        vancomycin (VANCOCIN) 1500 mg in  ml infusion  1,500 mg IntraVENous Q12H Omar Setter, [id number]         Current Outpatient Medications   Medication Sig Dispense Refill    atenoloL (TENORMIN) 100 mg tablet Take 1 Tablet by mouth two (2) times a day. 180 Tablet 1    atorvastatin (LIPITOR) 20 mg tablet Take 1 Tablet by mouth daily. 90 Tablet 1    escitalopram oxalate (LEXAPRO) 20 mg tablet Take 1 Tablet by mouth daily. 90 Tablet 1    gabapentin (NEURONTIN) 400 mg capsule Take 1 Capsule by mouth three (3) times daily. Max Daily Amount: 1,200 mg. 90 Capsule 2    therapeutic multivitamin (THERAGRAN) tablet Take 1 Tablet by mouth daily for 30 days. 90 Tablet 3    clonazePAM (KlonoPIN) 1 mg tablet Take 1 Tablet by mouth three (3) times daily as needed for Anxiety. Max Daily Amount: 3 mg. 90 Tablet 1    ibuprofen (MOTRIN) 800 mg tablet Take 1 Tablet by mouth every eight (8) hours as needed for Pain. 90 Tablet 1    famotidine (PEPCID) 40 mg tablet Take 1 Tablet by mouth daily. 30 Tablet 1    OTHER IV antibiotics with home health care  Ceftriaxone 2 g IV q 24 hour  Daptomycin 6mg/kg IV q 24 hour  Tentative Stop date: till 11/11/21 1 Each 0    omeprazole (PRILOSEC) 20 mg capsule Take 1 Cap by mouth Daily (before breakfast). 30 Cap 1    ondansetron (Zofran ODT) 4 mg disintegrating tablet Take 1 Tab by mouth every eight (8) hours as needed for Nausea or Nausea or Vomiting. 10 Tab 0    cetirizine (ZYRTEC) 10 mg tablet Take 1 Tab by mouth nightly. 30 Tab 1    sildenafil citrate (Viagra) 100 mg tablet Take 1 Tab by mouth as needed for Erectile Dysfunction. 12 Tab 5    fluticasone propionate (FLONASE) 50 mcg/actuation nasal spray 2 Sprays by Both Nostrils route daily.  1 Bottle 0    Camphor-Eucalyptus Oil-Menthol (VICKS VAPORUB) 4.8-1.2-2.6 % oint 1 Actuation(s) by Apply Externally route three (3) times daily as needed for Cough or Other (congestion). (Patient not taking: Reported on 10/26/2021) 50 g 0       Past History     Past Medical History:  Past Medical History:   Diagnosis Date    Anxiety     Cholelithiasis 08/18/2013    Note on U/S    Chronic gastritis 10/17/2014    Dr. Estefania Ghosh (EGD Result) H. Pylori Neg    Depression     Dog bite of left hand including fingers with infection 11/16/2014    Adm 11/18/14 for IV antibiotics.  ETOH abuse     GERD (gastroesophageal reflux disease)     Hepatic steatosis 02/06/2014    Noted on U/S.     History of plasmapheresis 02/2014    Hypertriglyceridemia, Severe Pancreatitis    Hypertension     Hypertriglyceridemia     Hypomagnesemia 07/24/2016    1.6 mg/dL    Non compliance with medical treatment     Osteomyelitis of left hand ([address] 11/22/2014    MRI Finding @ LUE 2nd MCP joint.  Pancreatitis 08/17/2013    Highest on Record (2/6/14) 8493. Dr. Estefania Ghosh    Panic attacks     PICC (peripherally inserted central catheter) in place 58/22/5193    R Basilic Vein (IV Antibiotics for LUE Hand Osteomyelitis).  Rectal bleeding     Substance abuse (HCC)     Several UDS + Opiates.  Vitamin D deficiency 02/17/2014    11.6ng/mL ( ng/mL)       Past Surgical History:  Past Surgical History:   Procedure Laterality Date    HX CYST INCISION AND DRAINAGE Left 12/01/2014    LUE 2nd digit abscess I&D.      HX ENDOSCOPY  10/17/2014    Dr. Estefania Ghosh: EGD/Colonoscopy    HX HEENT      wisdom tooth extraction    HX KNEE ARTHROSCOPY Right     HX ORTHOPAEDIC      Left foot       Family History:  Family History   Problem Relation Age of Onset    Elevated Lipids Mother     Thyroid Disease Mother     Hypertension Father     Alcohol abuse Maternal Grandmother     Mult Sclerosis Paternal Grandmother        Social History:  Social History     Tobacco Use    Smoking status: Former Smoker Quit date: 2012     Years since quittin.1    Smokeless tobacco: Never Used    Tobacco comment: 2018    Substance Use Topics    Alcohol use: Not Currently     Alcohol/week: 13.3 standard drinks     Types: 8 Cans of beer, 8 Standard drinks or equivalent per week    Drug use: No       Allergies: Allergies   Allergen Reactions    Amoxicillin Swelling    Contrast Dye [Iodine] Hives     Pt had iv contrast prior to this study without problems, developed hives on 07/04/15 5 minutes post injection    Iodinated Contrast Media Hives     Pt got hives when he received IV contrast in the past    Penicillins Hives and Swelling    Wellbutrin [Bupropion] Other (comments)     Caused aggression       I reviewed and confirmed the above information with patient and updated as necessary. Review of Systems     Review of Systems   Constitutional: Positive for fever. Negative for diaphoresis. HENT: Negative for ear pain and sore throat. Eyes:        No acute change in vision   Respiratory: Negative for cough and shortness of breath. Cardiovascular: Negative for chest pain and leg swelling. Gastrointestinal: Negative for abdominal pain and vomiting. Genitourinary: Negative for dysuria. Musculoskeletal: Negative for neck pain. Skin: Negative for wound. Neurological: Negative for weakness and headaches. Physical Exam     Visit Vitals  BP (!) 160/98   Pulse 75   Temp 98.9 °F (37.2 °C)   Resp 18   Ht 6' 2\" (1.88 m)   Wt 104.3 kg (230 lb)   SpO2 97%   BMI 29.53 kg/m²       Physical Exam  Vitals and nursing note reviewed. Constitutional:       Appearance: Normal appearance. He is not ill-appearing. Comments: Adult male resting comfortably in a hospital chair   HENT:      Mouth/Throat:      Mouth: Mucous membranes are moist.   Eyes:      Pupils: Pupils are equal, round, and reactive to light. Cardiovascular:      Rate and Rhythm: Normal rate and regular rhythm.       Pulses: Normal pulses. Heart sounds: Normal heart sounds. Pulmonary:      Effort: Pulmonary effort is normal.      Breath sounds: Normal breath sounds. Abdominal:      General: Abdomen is flat. Tenderness: There is no abdominal tenderness. Musculoskeletal:         General: No swelling or tenderness. Normal range of motion. Cervical back: Normal range of motion. Comments: No tenderness to palpation of the left ankle, surgical incision well-healed. No erythema. Full range of motion. Independently ambulatory. Skin:     General: Skin is warm. Neurological:      General: No focal deficit present. Mental Status: He is alert and oriented to person, place, and time.          Diagnostic Study Results     Labs -  Recent Results (from the past 24 hour(s))   EKG, 12 LEAD, INITIAL    Collection Time: 11/17/21  5:36 AM   Result Value Ref Range    Ventricular Rate 95 BPM    Atrial Rate 95 BPM    P-R Interval 154 ms    QRS Duration 90 ms    Q-T Interval 378 ms    QTC Calculation (Bezet) 475 ms    Calculated P Axis 48 degrees    Calculated R Axis -17 degrees    Calculated T Axis 25 degrees    Diagnosis       Normal sinus rhythm  Nonspecific ST abnormality  When compared with ECG of 04-NOV-2021 21:39,  Nonspecific T wave abnormality, improved in Anterior leads  Confirmed by Geryl Pump ([id number]) on 11/17/2021 10:58:20 AM     CBC WITH AUTOMATED DIFF    Collection Time: 11/17/21  5:48 AM   Result Value Ref Range    WBC 11.6 4.6 - 13.2 K/uL    RBC 3.79 (L) 4.35 - 5.65 M/uL    HGB 11.6 (L) 13.0 - 16.0 g/dL    HCT 34.5 (L) 36.0 - 48.0 %    MCV 91.0 78.0 - 100.0 FL    MCH 30.6 24.0 - 34.0 PG    MCHC 33.6 31.0 - 37.0 g/dL    RDW 13.0 11.6 - 14.5 %    PLATELET 401 854 - 117 K/uL    MPV 8.5 (L) 9.2 - 11.8 FL    NRBC 0.0 0  WBC    ABSOLUTE NRBC 0.00 0.00 - 0.01 K/uL    NEUTROPHILS 80 (H) 40 - 73 %    LYMPHOCYTES 8 (L) 21 - 52 %    MONOCYTES 11 (H) 3 - 10 %    EOSINOPHILS 1 0 - 5 %    BASOPHILS 0 0 - 2 %    IMMATURE GRANULOCYTES 0 0.0 - 0.5 %    ABS. NEUTROPHILS 9.3 (H) 1.8 - 8.0 K/UL    ABS. LYMPHOCYTES 0.9 0.9 - 3.6 K/UL    ABS. MONOCYTES 1.2 0.05 - 1.2 K/UL    ABS. EOSINOPHILS 0.1 0.0 - 0.4 K/UL    ABS. BASOPHILS 0.0 0.0 - 0.1 K/UL    ABS. IMM.  GRANS. 0.1 (H) 0.00 - 0.04 K/UL    DF AUTOMATED     METABOLIC PANEL, BASIC    Collection Time: 11/17/21  5:48 AM   Result Value Ref Range    Sodium 136 136 - 145 mmol/L    Potassium 3.3 (L) 3.5 - 5.5 mmol/L    Chloride 103 100 - 111 mmol/L    CO2 24 21 - 32 mmol/L    Anion gap 9 3.0 - 18 mmol/L    Glucose 101 (H) 74 - 99 mg/dL    BUN 6 (L) 7.0 - 18 MG/DL    Creatinine 0.83 0.6 - 1.3 MG/DL    BUN/Creatinine ratio 7 (L) 12 - 20      GFR est AA >60 >60 ml/min/1.73m2    GFR est non-AA >60 >60 ml/min/1.73m2    Calcium 8.9 8.5 - 10.1 MG/DL   POC LACTIC ACID    Collection Time: 11/17/21  5:56 AM   Result Value Ref Range    Lactic Acid (POC) 1.57 0.40 - 2.00 mmol/L   RESPIRATORY VIRUS PANEL W/COVID-19, PCR    Collection Time: 11/17/21  6:36 AM    Specimen: Nasopharyngeal   Result Value Ref Range    Adenovirus Not detected NOTD      Coronavirus 229E Not detected NOTD      Coronavirus HKU1 Not detected NOTD      Coronavirus CVNL63 Not detected NOTD      Coronavirus OC43 Not detected NOTD      SARS-CoV-2, PCR Not detected NOTD      Metapneumovirus Not detected NOTD      Rhinovirus and Enterovirus Not detected NOTD      Influenza A Not detected NOTD      Influenza B Not detected NOTD      Parainfluenza 1 Not detected NOTD      Parainfluenza 2 Not detected NOTD      Parainfluenza 3 Not detected NOTD      Parainfluenza virus 4 Not detected NOTD      RSV by PCR Not detected NOTD      B. parapertussis, PCR Not detected NOTD      Bordetella pertussis - PCR Not detected NOTD      Chlamydophila pneumoniae DNA, QL, PCR Not detected NOTD      Mycoplasma pneumoniae DNA, QL, PCR Not detected NOTD     URINALYSIS W/ RFLX MICROSCOPIC    Collection Time: 11/17/21  6:43 AM   Result Value Ref Range    Color YELLOW      Appearance CLEAR      Specific gravity <1.005 (L) 1.005 - 1.030    pH (UA) 6.0 5.0 - 8.0      Protein Negative NEG mg/dL    Glucose Negative NEG mg/dL    Ketone Negative NEG mg/dL    Bilirubin Negative NEG      Blood Negative NEG      Urobilinogen 0.2 0.2 - 1.0 EU/dL    Nitrites Negative NEG      Leukocyte Esterase Negative NEG     CBC WITH AUTOMATED DIFF    Collection Time: 11/17/21  5:30 PM   Result Value Ref Range    WBC 7.1 4.6 - 13.2 K/uL    RBC 3.60 (L) 4.35 - 5.65 M/uL    HGB 11.2 (L) 13.0 - 16.0 g/dL    HCT 33.6 (L) 36.0 - 48.0 %    MCV 93.3 78.0 - 100.0 FL    MCH 31.1 24.0 - 34.0 PG    MCHC 33.3 31.0 - 37.0 g/dL    RDW 13.2 11.6 - 14.5 %    PLATELET 424 800 - 269 K/uL    MPV 8.4 (L) 9.2 - 11.8 FL    NRBC 0.0 0  WBC    ABSOLUTE NRBC 0.00 0.00 - 0.01 K/uL    NEUTROPHILS 69 40 - 73 %    LYMPHOCYTES 17 (L) 21 - 52 %    MONOCYTES 12 (H) 3 - 10 %    EOSINOPHILS 2 0 - 5 %    BASOPHILS 0 0 - 2 %    IMMATURE GRANULOCYTES 0 0.0 - 0.5 %    ABS. NEUTROPHILS 4.9 1.8 - 8.0 K/UL    ABS. LYMPHOCYTES 1.2 0.9 - 3.6 K/UL    ABS. MONOCYTES 0.8 0.05 - 1.2 K/UL    ABS. EOSINOPHILS 0.2 0.0 - 0.4 K/UL    ABS. BASOPHILS 0.0 0.0 - 0.1 K/UL    ABS. IMM. GRANS. 0.0 0.00 - 0.04 K/UL    DF AUTOMATED     METABOLIC PANEL, BASIC    Collection Time: 11/17/21  5:30 PM   Result Value Ref Range    Sodium 141 136 - 145 mmol/L    Potassium 3.7 3.5 - 5.5 mmol/L    Chloride 110 100 - 111 mmol/L    CO2 25 21 - 32 mmol/L    Anion gap 6 3.0 - 18 mmol/L    Glucose 87 74 - 99 mg/dL    BUN 6 (L) 7.0 - 18 MG/DL    Creatinine 0.71 0.6 - 1.3 MG/DL    BUN/Creatinine ratio 8 (L) 12 - 20      GFR est AA >60 >60 ml/min/1.73m2    GFR est non-AA >60 >60 ml/min/1.73m2    Calcium 8.4 (L) 8.5 - 10.1 MG/DL         Radiologic Studies -   No orders to display           Medical Decision Making   I am the first provider for this patient.     I reviewed the vital signs, available nursing notes, past medical history, past surgical history, family history and social history. Vital Signs-Reviewed the patient's vital signs. EKG: Nondiagnostic    Records Reviewed: Nursing Notes and Old Medical Records (Time of Review: 6:23 AM)    Provider Notes (Medical Decision Making):   MDM  25-year-old male here with a fever, likely source of bacteremia due to PICC line, low clinical suspicion for septic arthritis with full range of motion, no erythema, significant swelling or pain. Viral infection, sepsis also considered. ED Course: Progress Notes, Reevaluation, and Consults:  Patient arrives febrile to 100.4, tachycardic to 95, tachypneic at 20, meeting 3/4 SIRS criteria's. No obvious source at this time although PICC line is certainly considered. Will order sepsis alert, treat with vancomycin, Zosyn, IV fluids. Long discussion at the bedside with the patient, he would not like any arthrocentesis at this time, does not feel that his ankle is infection site source. Discussed with both his surgeon and the on-call orthopedic surgeon, sent for photos and described physical exam.  They also do not feel that his ankle is the source. They do not recommend arthrocentesis. Screening labs without leukocytosis, BMP unremarkable  X-ray of the ankle  IMPRESSION  No acute fracture or dislocation.     Mild diffuse soft tissue edema.     Prominent os trigonum which can be associated with posterior impingement. Chest x-ray negative    Discussed above findings with patient, would like to admit him for suspected line infection however he says that he needs to leave the hospital to take care of his dogs. Discussed at length the risk of doing so including death and worsening of his condition. He understands this and would like to leave [address] regardless. He is of sound mind to make this decision. Given that his PICC line is likely his source and we are unable to obtain a culture from the site anyway will remove for source control.     Patient assures me he will return to the emergency department later this evening for reevaluation. Procedures  36 minutes of critical care time for sepsis. Diagnosis     Clinical Impression:   1. PICC line infection, initial encounter    2. Fever, unspecified fever cause    3. History of incision and drainage        Disposition: AMA    Follow-up Information    None          Patient's Medications   Start Taking    No medications on file   Continue Taking    ATENOLOL (TENORMIN) 100 MG TABLET    Take 1 Tablet by mouth two (2) times a day. ATORVASTATIN (LIPITOR) 20 MG TABLET    Take 1 Tablet by mouth daily. CAMPHOR-EUCALYPTUS OIL-MENTHOL (VICKS VAPORUB) 4.8-1.2-2.6 % OINT    1 Actuation(s) by Apply Externally route three (3) times daily as needed for Cough or Other (congestion). CETIRIZINE (ZYRTEC) 10 MG TABLET    Take 1 Tab by mouth nightly. CLONAZEPAM (KLONOPIN) 1 MG TABLET    Take 1 Tablet by mouth three (3) times daily as needed for Anxiety. Max Daily Amount: 3 mg. ESCITALOPRAM OXALATE (LEXAPRO) 20 MG TABLET    Take 1 Tablet by mouth daily. FAMOTIDINE (PEPCID) 40 MG TABLET    Take 1 Tablet by mouth daily. FLUTICASONE PROPIONATE (FLONASE) 50 MCG/ACTUATION NASAL SPRAY    2 Sprays by Both Nostrils route daily. GABAPENTIN (NEURONTIN) 400 MG CAPSULE    Take 1 Capsule by mouth three (3) times daily. Max Daily Amount: 1,200 mg. IBUPROFEN (MOTRIN) 800 MG TABLET    Take 1 Tablet by mouth every eight (8) hours as needed for Pain. OMEPRAZOLE (PRILOSEC) 20 MG CAPSULE    Take 1 Cap by mouth Daily (before breakfast). ONDANSETRON (ZOFRAN ODT) 4 MG DISINTEGRATING TABLET    Take 1 Tab by mouth every eight (8) hours as needed for Nausea or Nausea or Vomiting. OTHER    IV antibiotics with home health care  Ceftriaxone 2 g IV q 24 hour  Daptomycin 6mg/kg IV q 24 hour  Tentative Stop date: till 11/11/21    SILDENAFIL CITRATE (VIAGRA) 100 MG TABLET    Take 1 Tab by mouth as needed for Erectile Dysfunction. THERAPEUTIC MULTIVITAMIN (THERAGRAN) TABLET    Take 1 Tablet by mouth daily for 30 days. These Medications have changed    No medications on file   Stop Taking    No medications on file       Christine Farley MD   Emergency Medicine   November 17, 2021, 6:23 AM     This note is dictated utilizing Dragon voice recognition software. Unfortunately this leads to occasional typographical errors using the voice recognition. I apologize in advance if the situation occurs. If questions occur please do not hesitate to contact me directly.     Curtis Oliveros MD

## 2021-11-18 NOTE — DISCHARGE INSTRUCTIONS
You were seen here today for fever. We did lab work and imaging. You should follow-up with your orthopedic surgeon within the next week. You will receive a call if your blood cultures are positive which at that point you should return to the hospital.  He is return to the nearest emergency department if you experience any fevers that are uncontrolled, are unable to keep down liquids, any increased redness swelling or pain in your ankle or any other symptoms concerning to you.

## 2021-12-03 ENCOUNTER — NURSE TRIAGE (OUTPATIENT)
Dept: OTHER | Facility: CLINIC | Age: 36
End: 2021-12-03

## 2021-12-03 ENCOUNTER — TELEPHONE (OUTPATIENT)
Dept: FAMILY MEDICINE CLINIC | Age: 36
End: 2021-12-03

## 2021-12-03 NOTE — TELEPHONE ENCOUNTER
Received call from [place] at [facility] with Red Flag Complaint. Brief description of triage: R flank pain    Triage indicates for patient to see PCP today for a virtual visit if no appt available advised pt to go to urgent care or ER. Care advice provided, patient verbalizes understanding; denies any other questions or concerns; instructed to call back for any new or worsening symptoms. Writer provided warm transfer to [facility] for appointment scheduling. Attention Provider: Thank you for allowing me to participate in the care of your patient. The patient was connected to triage in response to information provided to the [facility]. Please do not respond through this encounter as the response is not directed to a shared pool. Reason for Disposition   MODERATE pain (e.g., interferes with normal activities or awakens from sleep)    Answer Assessment - Initial Assessment Questions  1. LOCATION: \"Where does it hurt? \" (e.g., left, right)      R sided flank pain    2. ONSET: \"When did the pain start? \"      5-6 days    3. SEVERITY: \"How bad is the pain? \" (e.g., Scale 1-10; mild, moderate, or severe)    - MILD (1-3): doesn't interfere with normal activities     - MODERATE (4-7): interferes with normal activities or awakens from sleep     - SEVERE (8-10): excruciating pain and patient unable to do normal activities (stays in bed)        6-7 with regular breathing    4. PATTERN: \"Does the pain come and go, or is it constant? \"       constant worst coughing and sneezing    5. CAUSE: \"What do you think is causing the pain? \"  Pt was in the hospital for 6- 7 days pt was dx with lung emboli and heart infection - pt was admitted to get antibiotics and lasix. Pt was DX with COVID while he was in the hospital.  Pt explained the pain has gotten a lot worst since he has been out of the hospital     6. OTHER SYMPTOMS:  \"Do you have any other symptoms? \" (e.g., fever, abdominal pain, vomiting, leg weakness, burning with urination, blood in urine)      Pt denies sx's, only leg pain in bilateral legs, 6 weeks prior had surgery on L ankle for arthritic cellulitis    7. PREGNANCY:  \"Is there any chance you are pregnant? \" \"When was your last menstrual period? \"      n/a    Protocols used:  FLANK PAIN-ADULT-[place]

## 2021-12-09 ENCOUNTER — VIRTUAL VISIT (OUTPATIENT)
Dept: FAMILY MEDICINE CLINIC | Age: 36
End: 2021-12-09
Payer: MEDICAID

## 2021-12-09 DIAGNOSIS — I50.31 ACUTE DIASTOLIC CHF (CONGESTIVE HEART FAILURE) (HCC): Primary | ICD-10-CM

## 2021-12-09 DIAGNOSIS — I26.99 RIGHT PULMONARY EMBOLUS (HCC): ICD-10-CM

## 2021-12-09 DIAGNOSIS — Z09 HOSPITAL DISCHARGE FOLLOW-UP: ICD-10-CM

## 2021-12-09 DIAGNOSIS — M00.9 SEPTIC ARTHRITIS OF LEFT ANKLE, DUE TO UNSPECIFIED ORGANISM (HCC): ICD-10-CM

## 2021-12-09 PROCEDURE — 99213 OFFICE O/P EST LOW 20 MIN: CPT | Performed by: NURSE PRACTITIONER

## 2021-12-09 RX ORDER — OXYCODONE AND ACETAMINOPHEN 7.5; 325 MG/1; MG/1
1 TABLET ORAL
Qty: 30 TABLET | Refills: 0 | Status: SHIPPED | OUTPATIENT
Start: 2021-12-09 | End: 2021-12-14

## 2021-12-09 NOTE — PROGRESS NOTES
Carol Nelson is a 43 y.o. male who was seen by synchronous (real-time) audio-video technology on 12/9/2021 for Hospital Follow Up and Blood Clot (PE)    Assessment & Plan:     Diagnoses and all orders for this visit:    1. Acute diastolic CHF (congestive heart failure) (HCC)  -     REFERRAL TO CARDIOLOGY    2. Right pulmonary embolus (HCC)  -     REFERRAL TO PULMONARY DISEASE    3. Septic arthritis of left ankle, due to unspecified organism ([address]  -     oxyCODONE-acetaminophen (PERCOCET 7.5) 7.5-325 mg per tablet; Take 1 Tablet by mouth every four (4) hours as needed for Pain for up to 5 days. Max Daily Amount: 6 Tablets. 4. Hospital discharge follow-up      Advised will approve one-time prescription for Percocet for for pain. No further refills will be authorized. Follow-up and Dispositions    · Return in about 3 weeks (around 12/30/2021) for PE, in office follow up. Routing History       I spent at least 20 minutes on this visit with this established patient. 712  Subjective:   Patient was seen virtually by a provider with [facility] on 12/3/2021 for chest pain on breathing. Patient was advised to report to the ED due to new onset right lower chest pain. Patient states he didn't get to the ED as recommended. Comments he felt he won't have gotten any assistance with his pain if he went to the ED. Reports he knows the pain is due to his right PE. Per patient he was warned he may have pain. Patient reports he is being seen weekly by home health. States he does perform deep breathing exercises as he can tolerate. Patient states he hasn't drank any ETOH since he was discharged. She also expresses concerns regarding being diagnosed with acute CHF when he was admitted to [facility] and inquires whether this was not found sooner. Patient has not been seen by cardiology in the past will refer today.   Patient also has not been evaluated by pulmonary for pulmonary embolism since discharge from [facility] we will also place this referral today. Prior to Admission medications    Medication Sig Start Date End Date Taking? Authorizing Provider   atenoloL (TENORMIN) 100 mg tablet Take 1 Tablet by mouth two (2) times a day. 10/26/21   Julio CLARK NP   atorvastatin (LIPITOR) 20 mg tablet Take 1 Tablet by mouth daily. 10/26/21   Julio CLARK NP   escitalopram oxalate (LEXAPRO) 20 mg tablet Take 1 Tablet by mouth daily. 10/26/21   Julio CLARK NP   gabapentin (NEURONTIN) 400 mg capsule Take 1 Capsule by mouth three (3) times daily. Max Daily Amount: 1,200 mg. 10/26/21   Julio CLARK NP   clonazePAM (KlonoPIN) 1 mg tablet Take 1 Tablet by mouth three (3) times daily as needed for Anxiety. Max Daily Amount: 3 mg. 10/26/21   Julio CLARK NP   ibuprofen (MOTRIN) 800 mg tablet Take 1 Tablet by mouth every eight (8) hours as needed for Pain. 10/18/21   Qi Cano PA-C   famotidine (PEPCID) 40 mg tablet Take 1 Tablet by mouth daily. 10/18/21   Qi Cano PA-C   OTHER IV antibiotics with home health care  Ceftriaxone 2 g IV q 24 hour  Daptomycin 6mg/kg IV q 24 hour  Tentative Stop date: till 11/11/21 9/30/21   Dinora Ferreira MD   omeprazole (PRILOSEC) 20 mg capsule Take 1 Cap by mouth Daily (before breakfast). 4/21/21   Julio CLARK NP   ondansetron (Zofran ODT) 4 mg disintegrating tablet Take 1 Tab by mouth every eight (8) hours as needed for Nausea or Nausea or Vomiting. 4/21/21   Julio CLARK NP   cetirizine (ZYRTEC) 10 mg tablet Take 1 Tab by mouth nightly. 11/9/20   Julio CLARK NP   sildenafil citrate (Viagra) 100 mg tablet Take 1 Tab by mouth as needed for Erectile Dysfunction. 8/26/20   Nick Darnell, NP   fluticasone propionate (FLONASE) 50 mcg/actuation nasal spray 2 Sprays by Both Nostrils route daily.  12/1/19   Dayanara Damon, PA   Camphor-Eucalyptus Oil-Menthol (VICKS VAPORUB) 4.8-1.2-2.6 % oint 1 Actuation(s) by Apply Externally route three (3) times daily as needed for Cough or Other (congestion). Patient not taking: Reported on 10/26/2021 12/1/19   CARLITA Jones     Patient Active Problem List   Diagnosis Code    HTN (hypertension) I10    Hyperlipidemia E78.5    MICHELLE (generalized anxiety disorder) F41.1    Mild depression ([address] F32.0    Acute appendicitis K35.80    Ankle pain M25.579    Ankle swelling M25.473    Fever R50.9    Suspected soft tissue infection R68.89    Septic arthritis ([address] M00.9     Patient Active Problem List    Diagnosis Date Noted    Septic arthritis ([address] 09/26/2021    Ankle pain 09/25/2021    Ankle swelling 09/25/2021    Fever 09/25/2021    Suspected soft tissue infection 09/25/2021    Acute appendicitis 05/07/2020    Mild depression ([address] 06/08/2018    MICHELLE (generalized anxiety disorder) 07/08/2015    Hyperlipidemia 12/05/2013    HTN (hypertension) 09/12/2012     Allergies   Allergen Reactions    Amoxicillin Swelling    Contrast Dye [Iodine] Hives     Pt had iv contrast prior to this study without problems, developed hives on 07/04/15 5 minutes post injection    Iodinated Contrast Media Hives     Pt got hives when he received IV contrast in the past    Penicillins Hives and Swelling    Wellbutrin [Bupropion] Other (comments)     Caused aggression     Past Medical History:   Diagnosis Date    Anxiety     Cholelithiasis 08/18/2013    Note on U/S    Chronic gastritis 10/17/2014    Dr. Luiz Blevins (EGD Result) H. Pylori Neg    Depression     Dog bite of left hand including fingers with infection 11/16/2014    Adm 11/18/14 for IV antibiotics.      ETOH abuse     GERD (gastroesophageal reflux disease)     Hepatic steatosis 02/06/2014    Noted on U/S.     History of plasmapheresis 02/2014    Hypertriglyceridemia, Severe Pancreatitis    Hypertension     Hypertriglyceridemia     Hypomagnesemia 07/24/2016    1.6 mg/dL    Non compliance with medical treatment     Osteomyelitis of left hand ([address] 11/22/2014    MRI Finding @ LUE 2nd MCP joint.  Pancreatitis 2013    Highest on Record [phone]. Dr. Berna Gonzales    Panic attacks     PICC (peripherally inserted central catheter) in place     R Basilic Vein (IV Antibiotics for LUE Hand Osteomyelitis).  Rectal bleeding     Substance abuse (HCC)     Several UDS + Opiates.  Vitamin D deficiency 2014    11.6ng/mL ( ng/mL)     Past Surgical History:   Procedure Laterality Date    HX CYST INCISION AND DRAINAGE Left 2014    LUE 2nd digit abscess I&D.  HX ENDOSCOPY  10/17/2014    Dr. Berna Gonzales: EGD/Colonoscopy    HX HEENT      wisdom tooth extraction    HX KNEE ARTHROSCOPY Right     HX ORTHOPAEDIC      Left foot     Family History   Problem Relation Age of Onset    Elevated Lipids Mother     Thyroid Disease Mother     Hypertension Father     Alcohol abuse Maternal Grandmother     Mult Sclerosis Paternal Grandmother      Social History     Tobacco Use    Smoking status: Former Smoker     Quit date: 2012     Years since quittin.3    Smokeless tobacco: Never Used    Tobacco comment: 2018    Substance Use Topics    Alcohol use: Not Currently     Alcohol/week: 13.3 standard drinks     Types: 8 Cans of beer, 8 Standard drinks or equivalent per week       ROS    Objective:   No flowsheet data found. General: alert, cooperative, no distress   Mental  status: normal mood, behavior, speech, dress, motor activity, and thought processes, able to follow commands   HENT: NCAT   Neck: no visualized mass   Resp: no respiratory distress   Neuro: no gross deficits   Skin: no discoloration or lesions of concern on visible areas   Psychiatric: normal affect, consistent with stated mood, no evidence of hallucinations     Additional exam findings: We discussed the expected course, resolution and complications of the diagnosis(es) in detail.   Medication risks, benefits, costs, interactions, and alternatives were discussed as indicated. I advised him to contact the office if his condition worsens, changes or fails to improve as anticipated. He expressed understanding with the diagnosis(es) and plan. Angi Paul, was evaluated through a synchronous (real-time) audio-video encounter. The patient (or guardian if applicable) is aware that this is a billable service. Verbal consent to proceed has been obtained within the past 12 months. The visit was conducted pursuant to the emergency declaration under the [address] [place] [address], [address] [place] [address] authority and the [employer] Act. Patient identification was verified, and a caregiver was present when appropriate. The patient was located in a state where the provider was credentialed to provide care.     Virginia Cortez NP

## 2021-12-29 DIAGNOSIS — I10 ESSENTIAL HYPERTENSION: Primary | ICD-10-CM

## 2021-12-29 NOTE — TELEPHONE ENCOUNTER
Last Visit: 12/9/21 with NP Reyes Quan  Next Appointment: 1/4/22 with NP Reyes Quan  Previous Refill Encounter(s): 9/17/21 #90    Requested Prescriptions     Pending Prescriptions Disp Refills    lisinopriL (PRINIVIL, ZESTRIL) 20 mg tablet 90 Tablet 1     Sig: Take 1 Tablet by mouth daily.

## 2021-12-29 NOTE — TELEPHONE ENCOUNTER
Patient requesting refill for lisinopriL.  Medication is not listed under patient's current medication

## 2021-12-29 NOTE — TELEPHONE ENCOUNTER
Patient requesting refill for linsinopril. Medication is not listed under patient's current medications.

## 2021-12-31 RX ORDER — LISINOPRIL 40 MG/1
40 TABLET ORAL DAILY
Qty: 90 TABLET | Refills: 1 | Status: SHIPPED | OUTPATIENT
Start: 2021-12-31 | End: 2022-07-06

## 2022-01-04 ENCOUNTER — PATIENT MESSAGE (OUTPATIENT)
Dept: FAMILY MEDICINE CLINIC | Age: 37
End: 2022-01-04

## 2022-01-04 DIAGNOSIS — F41.0 PANIC ATTACKS: ICD-10-CM

## 2022-01-04 NOTE — TELEPHONE ENCOUNTER
Last visit 12/09/2021 Virtual visit NP Delicia Hitchcock   Next appointment Pt canceled 01/04/2022   Previous refill encounter(s)   10/26/2021 Klonopin #90 with 1 refill    No access to     Requested Prescriptions     Pending Prescriptions Disp Refills    clonazePAM (KlonoPIN) 1 mg tablet 90 Tablet 1     Sig: Take 1 Tablet by mouth three (3) times daily as needed for Anxiety. Max Daily Amount: 3 mg.

## 2022-01-05 NOTE — TELEPHONE ENCOUNTER
Patient was started on this while in the hospital and states he was advised to continue for 6 months total. Please sign if appropriate. Klonopin is already pending in another encounter. Last Visit: 12/9/21 with NP Leonardo Alvarez  Next Appointment: 1/4/21 pt cancelled appt    Requested Prescriptions     Pending Prescriptions Disp Refills    apixaban (ELIQUIS) 5 mg tablet 60 Tablet 4     Sig: Take 1 Tablet by mouth two (2) times a day.        From outside meds list:

## 2022-01-10 RX ORDER — CLONAZEPAM 1 MG/1
1 TABLET ORAL
Qty: 90 TABLET | Refills: 1 | Status: SHIPPED | OUTPATIENT
Start: 2022-01-10 | End: 2022-06-06 | Stop reason: SDUPTHER

## 2022-01-27 DIAGNOSIS — G89.29 CHRONIC RIGHT-SIDED LOW BACK PAIN WITH RIGHT-SIDED SCIATICA: ICD-10-CM

## 2022-01-27 DIAGNOSIS — M54.41 CHRONIC RIGHT-SIDED LOW BACK PAIN WITH RIGHT-SIDED SCIATICA: ICD-10-CM

## 2022-01-28 NOTE — TELEPHONE ENCOUNTER
[facility] Ne[facility]ontin as 1/4/22 for a 30 d/s. Last Visit: 12/9/21 with NP Amy Park  Next Appointment: 1/4/22 pt cancelled appt  Previous Refill Encounter(s): 10/26/21 #90 with 2 refill    Requested Prescriptions     Pending Prescriptions Disp Refills    gabapentin (NEURONTIN) 400 mg capsule 90 Capsule 2     Sig: Take 1 Capsule by mouth three (3) times daily. Max Daily Amount: 1,200 mg.

## 2022-01-31 RX ORDER — ATENOLOL 100 MG/1
100 TABLET ORAL 2 TIMES DAILY
Qty: 180 TABLET | Refills: 1 | Status: CANCELLED | OUTPATIENT
Start: 2022-01-31

## 2022-02-04 ENCOUNTER — APPOINTMENT (OUTPATIENT)
Dept: VASCULAR SURGERY | Age: 37
End: 2022-02-04
Attending: EMERGENCY MEDICINE
Payer: MEDICAID

## 2022-02-04 ENCOUNTER — HOSPITAL ENCOUNTER (EMERGENCY)
Age: 37
Discharge: HOME | End: 2022-02-04
Attending: EMERGENCY MEDICINE
Payer: MEDICAID

## 2022-02-04 ENCOUNTER — APPOINTMENT (OUTPATIENT)
Dept: GENERAL RADIOLOGY | Age: 37
End: 2022-02-04
Attending: STUDENT IN AN ORGANIZED HEALTH CARE EDUCATION/TRAINING PROGRAM
Payer: MEDICAID

## 2022-02-04 VITALS
SYSTOLIC BLOOD PRESSURE: 105 MMHG | OXYGEN SATURATION: 93 % | HEIGHT: 74 IN | TEMPERATURE: 98.5 F | DIASTOLIC BLOOD PRESSURE: 59 MMHG | RESPIRATION RATE: 18 BRPM | BODY MASS INDEX: 31.97 KG/M2 | HEART RATE: 69 BPM | WEIGHT: 249.1 LBS

## 2022-02-04 DIAGNOSIS — M79.89 LEFT LEG SWELLING: Primary | ICD-10-CM

## 2022-02-04 DIAGNOSIS — M79.89 RIGHT LEG SWELLING: ICD-10-CM

## 2022-02-04 LAB
ALBUMIN SERPL-MCNC: 3.9 G/DL (ref 3.4–5)
ALBUMIN/GLOB SERPL: 1.5 {RATIO} (ref 0.8–1.7)
ALP SERPL-CCNC: 48 U/L (ref 45–117)
ALT SERPL-CCNC: 24 U/L (ref 16–61)
ANION GAP SERPL CALC-SCNC: 6 MMOL/L (ref 3–18)
AST SERPL-CCNC: 43 U/L (ref 10–38)
ATRIAL RATE: 80 BPM
BASOPHILS # BLD: 0 K/UL (ref 0–0.1)
BASOPHILS NFR BLD: 0 % (ref 0–2)
BILIRUB SERPL-MCNC: 0.8 MG/DL (ref 0.2–1)
BNP SERPL-MCNC: 93 PG/ML (ref 0–450)
BUN SERPL-MCNC: 10 MG/DL (ref 7–18)
BUN/CREAT SERPL: 11 (ref 12–20)
CALCIUM SERPL-MCNC: 8.7 MG/DL (ref 8.5–10.1)
CALCULATED P AXIS, ECG09: 47 DEGREES
CALCULATED R AXIS, ECG10: -25 DEGREES
CALCULATED T AXIS, ECG11: 39 DEGREES
CHLORIDE SERPL-SCNC: 98 MMOL/L (ref 100–111)
CO2 SERPL-SCNC: 28 MMOL/L (ref 21–32)
CREAT SERPL-MCNC: 0.87 MG/DL (ref 0.6–1.3)
D DIMER PPP FEU-MCNC: 0.47 UG/ML(FEU)
DIAGNOSIS, 93000: NORMAL
DIFFERENTIAL METHOD BLD: ABNORMAL
EOSINOPHIL # BLD: 0.3 K/UL (ref 0–0.4)
EOSINOPHIL NFR BLD: 6 % (ref 0–5)
ERYTHROCYTE [DISTWIDTH] IN BLOOD BY AUTOMATED COUNT: 12.9 % (ref 11.6–14.5)
GLOBULIN SER CALC-MCNC: 2.6 G/DL (ref 2–4)
GLUCOSE SERPL-MCNC: 102 MG/DL (ref 74–99)
HCT VFR BLD AUTO: 31.5 % (ref 36–48)
HGB BLD-MCNC: 11.2 G/DL (ref 13–16)
IMM GRANULOCYTES # BLD AUTO: 0 K/UL (ref 0–0.04)
IMM GRANULOCYTES NFR BLD AUTO: 0 % (ref 0–0.5)
LYMPHOCYTES # BLD: 1.6 K/UL (ref 0.9–3.6)
LYMPHOCYTES NFR BLD: 34 % (ref 21–52)
MCH RBC QN AUTO: 31.2 PG (ref 24–34)
MCHC RBC AUTO-ENTMCNC: 35.6 G/DL (ref 31–37)
MCV RBC AUTO: 87.7 FL (ref 78–100)
MONOCYTES # BLD: 0.6 K/UL (ref 0.05–1.2)
MONOCYTES NFR BLD: 14 % (ref 3–10)
NEUTS SEG # BLD: 2.1 K/UL (ref 1.8–8)
NEUTS SEG NFR BLD: 45 % (ref 40–73)
NRBC # BLD: 0 K/UL (ref 0–0.01)
NRBC BLD-RTO: 0 PER 100 WBC
P-R INTERVAL, ECG05: 172 MS
PLATELET # BLD AUTO: 177 K/UL (ref 135–420)
PMV BLD AUTO: 9.1 FL (ref 9.2–11.8)
POTASSIUM SERPL-SCNC: 3.9 MMOL/L (ref 3.5–5.5)
PROT SERPL-MCNC: 6.5 G/DL (ref 6.4–8.2)
Q-T INTERVAL, ECG07: 410 MS
QRS DURATION, ECG06: 96 MS
QTC CALCULATION (BEZET), ECG08: 472 MS
RBC # BLD AUTO: 3.59 M/UL (ref 4.35–5.65)
SODIUM SERPL-SCNC: 132 MMOL/L (ref 136–145)
TROPONIN-HIGH SENSITIVITY: 9 NG/L (ref 0–78)
VENTRICULAR RATE, ECG03: 80 BPM
WBC # BLD AUTO: 4.6 K/UL (ref 4.6–13.2)

## 2022-02-04 PROCEDURE — 80053 COMPREHEN METABOLIC PANEL: CPT

## 2022-02-04 PROCEDURE — 96374 THER/PROPH/DIAG INJ IV PUSH: CPT

## 2022-02-04 PROCEDURE — 93970 EXTREMITY STUDY: CPT

## 2022-02-04 PROCEDURE — 71045 X-RAY EXAM CHEST 1 VIEW: CPT

## 2022-02-04 PROCEDURE — 83880 ASSAY OF NATRIURETIC PEPTIDE: CPT

## 2022-02-04 PROCEDURE — 85025 COMPLETE CBC W/AUTO DIFF WBC: CPT

## 2022-02-04 PROCEDURE — 84484 ASSAY OF TROPONIN QUANT: CPT

## 2022-02-04 PROCEDURE — 99284 EMERGENCY DEPT VISIT MOD MDM: CPT

## 2022-02-04 PROCEDURE — 74011250636 HC RX REV CODE- 250/636: Performed by: EMERGENCY MEDICINE

## 2022-02-04 PROCEDURE — 85379 FIBRIN DEGRADATION QUANT: CPT

## 2022-02-04 PROCEDURE — 93005 ELECTROCARDIOGRAM TRACING: CPT

## 2022-02-04 RX ORDER — FUROSEMIDE 10 MG/ML
40 INJECTION INTRAMUSCULAR; INTRAVENOUS ONCE
Status: COMPLETED | OUTPATIENT
Start: 2022-02-04 | End: 2022-02-04

## 2022-02-04 RX ADMIN — FUROSEMIDE 40 MG: 10 INJECTION, SOLUTION INTRAMUSCULAR; INTRAVENOUS at 08:09

## 2022-02-04 NOTE — ED PROVIDER NOTES
EMERGENCY DEPARTMENT HISTORY AND PHYSICAL EXAM  This was created with voice recognition software and transcription errors may be present. 7:31 AM  Date: 2/4/2022  Patient Name: Angi Paul    History of Presenting Illness     Chief Complaint:    History Provided By:     HPI: Angi Paul is a 43 y.o. male past medical history of anxiety cholelithiasis chronic gastritis depression alcohol abuse hypertension hypertriglyceridemia hypomagnesemia osteomyelitis substance abuse thromboembolus who presents with bilateral lower extremity edema. Patient states he was recently diagnosed with heart failure and admitted to [facility] in [place]. Was not discharged with any medications. Now presents with worsening bilateral lower extremity edema. No chest pain or shortness of breath    PCP: Carla Pino NP      Past History     Past Medical History:  Past Medical History:   Diagnosis Date    Anxiety     Cholelithiasis 08/18/2013    Note on U/S    Chronic gastritis 10/17/2014    Dr. Kalie Aviles (EGD Result) H. Pylori Neg    Depression     Dog bite of left hand including fingers with infection 11/16/2014    Adm 11/18/14 for IV antibiotics.  ETOH abuse     GERD (gastroesophageal reflux disease)     Hepatic steatosis 02/06/2014    Noted on U/S.     History of plasmapheresis 02/2014    Hypertriglyceridemia, Severe Pancreatitis    Hypertension     Hypertriglyceridemia     Hypomagnesemia 07/24/2016    1.6 mg/dL    Non compliance with medical treatment     Osteomyelitis of left hand ([address] 11/22/2014    MRI Finding @ LUE 2nd MCP joint.  Pancreatitis 08/17/2013    Highest on Record (2/6/14) 1943. Dr. Kalie Aviles    Panic attacks     PICC (peripherally inserted central catheter) in place 51/27/3682    R Basilic Vein (IV Antibiotics for LUE Hand Osteomyelitis).  Rectal bleeding     Substance abuse (HCC)     Several UDS + Opiates.      Thromboembolus ([address]     Vitamin D deficiency 2014    11.6ng/mL ( ng/mL)       Past Surgical History:  Past Surgical History:   Procedure Laterality Date    HX CYST INCISION AND DRAINAGE Left 2014    LUE 2nd digit abscess I&D.  HX ENDOSCOPY  10/17/2014    Dr. Yanelis Montero: EGD/Colonoscopy    HX HEENT      wisdom tooth extraction    HX KNEE ARTHROSCOPY Right     HX ORTHOPAEDIC      Left foot       Family History:  Family History   Problem Relation Age of Onset    Elevated Lipids Mother     Thyroid Disease Mother     Hypertension Father     Alcohol abuse Maternal Grandmother     Mult Sclerosis Paternal Grandmother        Social History:  Social History     Tobacco Use    Smoking status: Former Smoker     Quit date: 2012     Years since quittin.3    Smokeless tobacco: Never Used    Tobacco comment: 2018    Substance Use Topics    Alcohol use: Not Currently     Alcohol/week: 13.3 standard drinks     Types: 8 Cans of beer, 8 Standard drinks or equivalent per week    Drug use: No       Allergies: Allergies   Allergen Reactions    Amoxicillin Swelling    Contrast Dye [Iodine] Hives     Pt had iv contrast prior to this study without problems, developed hives on 07/04/15 5 minutes post injection    Iodinated Contrast Media Hives     Pt got hives when he received IV contrast in the past    Penicillins Hives and Swelling    Wellbutrin [Bupropion] Other (comments)     Caused aggression       Review of Systems     Review of Systems   All other systems reviewed and are negative. 10 point review of systems otherwise negative unless noted in HPI. Physical Exam       Physical Exam  Constitutional:       Appearance: He is well-developed. HENT:      Head: Normocephalic and atraumatic. Eyes:      Pupils: Pupils are equal, round, and reactive to light. Cardiovascular:      Rate and Rhythm: Normal rate and regular rhythm. Heart sounds: Normal heart sounds. No murmur heard. No friction rub.    Pulmonary: Effort: Pulmonary effort is normal. No respiratory distress. Breath sounds: Normal breath sounds. No wheezing. Abdominal:      General: There is no distension. Palpations: Abdomen is soft. Tenderness: There is no abdominal tenderness. There is no guarding or rebound. Musculoskeletal:         General: Normal range of motion. Cervical back: Normal range of motion and neck supple. Skin:     General: Skin is warm and dry. Comments: Bilateral lower extremity edema with weeping   Neurological:      Mental Status: He is alert and oriented to person, place, and time. Psychiatric:         Behavior: Behavior normal.         Thought Content: Thought content normal.         Diagnostic Study Results     Vital Signs  EKG:  Labs:   Imaging:     Medical Decision Making     ED Course: Progress Notes, Reevaluation, and Consults:    I will be the provider of record for this patient. Provider Notes (Medical Decision Making): Patient presents with bilateral lower extremity edema. History of PE patient is complaining of bilateral pain but falling asleep in the room. Allergies noted. Patient is on Eliquis. Will check basic labs x-ray EKG         Diagnosis     Clinical Impression: No diagnosis found. Disposition:        Patient's Medications   Start Taking    No medications on file   Continue Taking    APIXABAN (ELIQUIS) 5 MG TABLET    Take 1 Tablet by mouth two (2) times a day. ATENOLOL (TENORMIN) 100 MG TABLET    Take 1 Tablet by mouth two (2) times a day. ATORVASTATIN (LIPITOR) 20 MG TABLET    Take 1 Tablet by mouth daily. CAMPHOR-EUCALYPTUS OIL-MENTHOL (VICKS VAPORUB) 4.8-1.2-2.6 % OINT    1 Actuation(s) by Apply Externally route three (3) times daily as needed for Cough or Other (congestion). CETIRIZINE (ZYRTEC) 10 MG TABLET    Take 1 Tab by mouth nightly. CLONAZEPAM (KLONOPIN) 1 MG TABLET    Take 1 Tablet by mouth three (3) times daily as needed for Anxiety.  Max Daily Amount: 3 mg. ESCITALOPRAM OXALATE (LEXAPRO) 20 MG TABLET    Take 1 Tablet by mouth daily. FAMOTIDINE (PEPCID) 40 MG TABLET    Take 1 Tablet by mouth daily. FLUTICASONE PROPIONATE (FLONASE) 50 MCG/ACTUATION NASAL SPRAY    2 Sprays by Both Nostrils route daily. GABAPENTIN (NEURONTIN) 400 MG CAPSULE    Take 1 Capsule by mouth three (3) times daily. Max Daily Amount: 1,200 mg. LISINOPRIL (PRINIVIL, ZESTRIL) 40 MG TABLET    Take 1 Tablet by mouth daily. OMEPRAZOLE (PRILOSEC) 20 MG CAPSULE    Take 1 Cap by mouth Daily (before breakfast). ONDANSETRON (ZOFRAN ODT) 4 MG DISINTEGRATING TABLET    Take 1 Tab by mouth every eight (8) hours as needed for Nausea or Nausea or Vomiting. OTHER    IV antibiotics with home health care  Ceftriaxone 2 g IV q 24 hour  Daptomycin 6mg/kg IV q 24 hour  Tentative Stop date: till 11/11/21    SILDENAFIL CITRATE (VIAGRA) 100 MG TABLET    Take 1 Tab by mouth as needed for Erectile Dysfunction.    These Medications have changed    No medications on file   Stop Taking    No medications on file

## 2022-02-04 NOTE — ED TRIAGE NOTES
Patient states he was recently diagnosed with heart failure.   Pt having edema both feet with drainage in legs

## 2022-02-06 RX ORDER — ATENOLOL 100 MG/1
100 TABLET ORAL 2 TIMES DAILY
Qty: 180 TABLET | Refills: 1 | Status: CANCELLED | OUTPATIENT
Start: 2022-02-06

## 2022-02-11 RX ORDER — GABAPENTIN 400 MG/1
400 CAPSULE ORAL 3 TIMES DAILY
Qty: 90 CAPSULE | Refills: 2 | Status: SHIPPED | OUTPATIENT
Start: 2022-02-11 | End: 2022-06-22

## 2022-03-07 DIAGNOSIS — F41.0 PANIC ATTACKS: ICD-10-CM

## 2022-03-07 RX ORDER — CLONAZEPAM 1 MG/1
1 TABLET ORAL
Qty: 90 TABLET | Refills: 1 | Status: CANCELLED | OUTPATIENT
Start: 2022-03-07

## 2022-03-18 PROBLEM — F32.A MILD DEPRESSION: Status: ACTIVE | Noted: 2018-06-08

## 2022-03-18 PROBLEM — R68.89 SUSPECTED SOFT TISSUE INFECTION: Status: ACTIVE | Noted: 2021-09-25

## 2022-03-19 PROBLEM — K35.80 ACUTE APPENDICITIS: Status: ACTIVE | Noted: 2020-05-07

## 2022-03-19 PROBLEM — M25.473 ANKLE SWELLING: Status: ACTIVE | Noted: 2021-09-25

## 2022-03-19 PROBLEM — M25.579 ANKLE PAIN: Status: ACTIVE | Noted: 2021-09-25

## 2022-03-19 PROBLEM — R50.9 FEVER: Status: ACTIVE | Noted: 2021-09-25

## 2022-03-20 PROBLEM — M00.9 SEPTIC ARTHRITIS (HCC): Status: ACTIVE | Noted: 2021-09-26

## 2022-04-13 ENCOUNTER — OFFICE VISIT (OUTPATIENT)
Dept: CARDIOLOGY CLINIC | Age: 37
End: 2022-04-13
Payer: MEDICAID

## 2022-04-13 VITALS
WEIGHT: 231 LBS | HEIGHT: 74 IN | HEART RATE: 72 BPM | OXYGEN SATURATION: 99 % | SYSTOLIC BLOOD PRESSURE: 128 MMHG | BODY MASS INDEX: 29.65 KG/M2 | DIASTOLIC BLOOD PRESSURE: 83 MMHG

## 2022-04-13 DIAGNOSIS — Z86.711 HISTORY OF PULMONARY EMBOLISM: ICD-10-CM

## 2022-04-13 DIAGNOSIS — I10 PRIMARY HYPERTENSION: ICD-10-CM

## 2022-04-13 DIAGNOSIS — I50.32 CHRONIC DIASTOLIC CONGESTIVE HEART FAILURE (HCC): Primary | ICD-10-CM

## 2022-04-13 DIAGNOSIS — E78.5 HYPERLIPIDEMIA, UNSPECIFIED HYPERLIPIDEMIA TYPE: ICD-10-CM

## 2022-04-13 PROCEDURE — 99204 OFFICE O/P NEW MOD 45 MIN: CPT | Performed by: INTERNAL MEDICINE

## 2022-04-13 RX ORDER — HYDROCHLOROTHIAZIDE 12.5 MG/1
12.5 TABLET ORAL DAILY
Qty: 30 TABLET | Refills: 3 | Status: SHIPPED | OUTPATIENT
Start: 2022-04-13 | End: 2022-07-14 | Stop reason: SDUPTHER

## 2022-04-13 NOTE — PATIENT INSTRUCTIONS
Medications Discontinued During This Encounter   Medication Reason    Comp Stocking,Knee,Regular,Sml misc Therapy Completed    Camphor-Eucalyptus Oil-Menthol (VICKS VAPORUB) 4.8-1.2-2.6 % oint LIST CLEANUP    calcium carbonate (TUMS PO) LIST CLEANUP    famotidine (PEPCID) 40 mg tablet Alternate Therapy          Avoiding Triggers With Heart Failure: Care Instructions  Your Care Instructions     Triggers are anything that make your heart failure flare up. A flare-up is also called \"sudden heart failure\" or \"acute heart failure. \" When you have a flare-up, fluid builds up in your lungs, and you have problems breathing. You might need to go to the hospital. By watching for changes in your condition and avoiding triggers, you can prevent heart failure flare-ups. Follow-up care is a key part of your treatment and safety. Be sure to make and go to all appointments, and call your doctor if you are having problems. It's also a good idea to know your test results and keep a list of the medicines you take. How can you care for yourself at home? Watch for changes in your weight and condition  · Weigh yourself without clothing at the same time each day. Record your weight. Call your doctor if you have sudden weight gain, such as more than 2 to 3 pounds in a day or 5 pounds in a week. (Your doctor may suggest a different range of weight gain.) A sudden weight gain may mean that your heart failure is getting worse. · Keep a daily record of your symptoms. Write down any changes in how you feel, such as new shortness of breath, cough, or problems eating. Also record if your ankles are more swollen than usual and if you feel more tired than usual. Note anything that you ate or did that could have triggered these changes. Limit sodium  Sodium causes your body to hold on to extra water. This may cause your heart failure symptoms to get worse. People get most of their sodium from processed foods.  Fast food and restaurant meals also tend to be very high in sodium. · Your doctor may suggest that you limit sodium. Your doctor can tell you how much sodium is right for you. This includes limiting sodium in cooked and packaged foods. · Read food labels on cans and food packages. They tell you how much sodium you get in one serving. Check the serving size. If you eat more than one serving, you are getting more sodium. · Be aware that sodium can come in forms other than salt, including monosodium glutamate (MSG), sodium citrate, and sodium bicarbonate (baking soda). MSG is often added to Asian food. You can sometimes ask for food without MSG or salt. · Slowly reducing salt will help you adjust to the taste. Take the salt shaker off the table. · Flavor your food with garlic, lemon juice, onion, vinegar, herbs, and spices instead of salt. Do not use soy sauce, steak sauce, onion salt, garlic salt, mustard, or ketchup on your food, unless it is labeled \"low-sodium\" or \"low-salt. \"  · Make your own salad dressings, sauces, and ketchup without adding salt. · Use fresh or frozen ingredients, instead of canned ones, whenever you can. Choose low-sodium canned goods. · Eat less processed food and food from restaurants, including fast food. Exercise as directed  Moderate, regular exercise is very good for your heart. It improves your blood flow and helps control your weight. But too much exercise can stress your heart and cause a heart failure flare-up. · Check with your doctor before you start an exercise program.  · Walking is an easy way to get exercise. Start out slowly. Gradually increase the length and pace of your walk. Swimming, riding a bike, and using a treadmill are also good forms of exercise. · When you exercise, watch for signs that your heart is working too hard. You are pushing yourself too hard if you cannot talk while you are exercising.  If you become short of breath or dizzy or have chest pain, stop, sit down, and rest.  · Do not exercise when you do not feel well. Take medicines correctly  · Take your medicines exactly as prescribed. Call your doctor if you think you are having a problem with your medicine. · Make a list of all the medicines you take. Include those prescribed to you by other doctors and any over-the-counter medicines, vitamins, or supplements you take. Take this list with you when you go to any doctor. · Take your medicines at the same time every day. It may help you to post a list of all the medicines you take every day and what time of day you take them. · Make taking your medicine as simple as you can. Plan times to take your medicines when you are doing other things, such as eating a meal or getting ready for bed. This will make it easier to remember to take your medicines. · Get organized. Use helpful tools, such as daily or weekly pill containers. When should you call for help? Call 911  if you have symptoms of sudden heart failure such as:    · You have severe trouble breathing.     · You cough up pink, foamy mucus.     · You have a new irregular or rapid heartbeat. Call your doctor now or seek immediate medical care if:    · You have new or increased shortness of breath.     · You are dizzy or lightheaded, or you feel like you may faint.     · You have sudden weight gain, such as more than 2 to 3 pounds in a day or 5 pounds in a week. (Your doctor may suggest a different range of weight gain.)     · You have increased swelling in your legs, ankles, or feet.     · You are suddenly so tired or weak that you cannot do your usual activities. Watch closely for changes in your health, and be sure to contact your doctor if you develop new symptoms. Where can you learn more? Go to http://www.gray.com/  Enter V089 in the search box to learn more about \"Avoiding Triggers With Heart Failure: Care Instructions. \"  Current as of: January 10, 2022               Content Version: 13.2  © 9620-2657 [employer]. Care instructions adapted under license by [employer] (which disclaims liability or warranty for this information). If you have questions about a medical condition or this instruction, always ask your healthcare professional. [place] [address] any warranty or liability for your use of this information.

## 2022-04-13 NOTE — PROGRESS NOTES
HISTORY OF PRESENT ILLNESS  Russ Calzada is a 43 y.o. male. 4/22 seen for edema which has been present for a few months. Was worse in 11/21 below the knees which was also painful and red and he was treated with IV antibiotics at home with a PICC line. Ankle cultures were done but were negative per patient. He was admitted on [facility] in 11/21 and treated for CHF. Found to have pulmonary embolism as well during that admission. Was being treated with PICC line for cellulitis. He was also Covid positive at that time. Anticoagulation and diuresis given. Discharged on pain medications which helped the ankle pain. Also has PTSD    Leg Swelling  The history is provided by the patient. This is a new problem. The current episode started more than 1 week ago (9/21). The problem has been rapidly improving. Associated symptoms include chest pain. Pertinent negatives include no headaches and no shortness of breath. Chest Pain (Angina)   The history is provided by the patient. This is a new problem. The current episode started more than 1 week ago. The problem has not changed since onset. Duration of episode(s) is 2 hours. The problem occurs every several days (1-2/wk). The pain is associated with rest and normal activity. The pain is present in the lateral region and right side. The pain does not radiate. Associated symptoms include lower extremity edema. Pertinent negatives include no claudication, no cough, no dizziness, no fever, no headaches, no malaise/fatigue, no nausea, no orthopnea, no palpitations, no PND, no shortness of breath and no vomiting. He has tried rest for the symptoms. The treatment provided moderate relief.      Allergies   Allergen Reactions    Amoxicillin Swelling    Contrast Dye [Iodine] Hives     Pt had iv contrast prior to this study without problems, developed hives on 07/04/15 5 minutes post injection    Iodinated Contrast Media Hives     Pt got hives when he received IV contrast in the past    Penicillins Hives and Swelling    Wellbutrin [Bupropion] Other (comments)     Caused aggression       Past Medical History:   Diagnosis Date    Anxiety     Cholelithiasis 2013    Note on U/S    Chronic gastritis 10/17/2014    Dr. Sheron Carty (EGD Result) H. Pylori Neg    Depression     Dog bite of left hand including fingers with infection 2014    Adm 14 for IV antibiotics.  ETOH abuse     GERD (gastroesophageal reflux disease)     Hepatic steatosis 2014    Noted on U/S.     History of plasmapheresis 2014    Hypertriglyceridemia, Severe Pancreatitis    Hypertension     Hypertriglyceridemia     Hypomagnesemia 2016    1.6 mg/dL    Non compliance with medical treatment     Osteomyelitis of left hand ([address] 2014    MRI Finding @ LUE 2nd MCP joint.  Pancreatitis 2013    Highest on Record [phone]. Dr. Sheron Carty    Panic attacks     PICC (peripherally inserted central catheter) in place     R Basilic Vein (IV Antibiotics for LUE Hand Osteomyelitis).  Pulmonary emboli (HCC) 2021    Rectal bleeding     Substance abuse (HCC)     Several UDS + Opiates.      Thromboembolus [facility]     Vitamin D deficiency 2014    11.6ng/mL ( ng/mL)       Family History   Problem Relation Age of Onset    Elevated Lipids Mother     Thyroid Disease Mother     Hypertension Father     Alcohol abuse Maternal Grandmother     Mult Sclerosis Paternal Grandmother     Stroke Neg Hx     Heart Attack Neg Hx        Social History     Tobacco Use    Smoking status: Former Smoker     Quit date: 2012     Years since quittin.5    Smokeless tobacco: Never Used    Tobacco comment: 2018    Substance Use Topics    Alcohol use: Not Currently     Alcohol/week: 3.0 standard drinks     Types: 3 Cans of beer per week     Comment: 1X a week    Drug use: No        Current Outpatient Medications Medication Sig    Comp Stocking,Knee,Regular,Sml misc Use daily Size to fit per pt. Small,med or large.  hydroCHLOROthiazide (HYDRODIURIL) 12.5 mg tablet Take 1 Tablet by mouth daily.  gabapentin (NEURONTIN) 400 mg capsule Take 1 Capsule by mouth three (3) times daily. Max Daily Amount: 1,200 mg.  clonazePAM (KlonoPIN) 1 mg tablet Take 1 Tablet by mouth three (3) times daily as needed for Anxiety. Max Daily Amount: 3 mg.  apixaban (ELIQUIS) 5 mg tablet Take 1 Tablet by mouth two (2) times a day.  lisinopriL (PRINIVIL, ZESTRIL) 40 mg tablet Take 1 Tablet by mouth daily.  atenoloL (TENORMIN) 100 mg tablet Take 1 Tablet by mouth two (2) times a day.  atorvastatin (LIPITOR) 20 mg tablet Take 1 Tablet by mouth daily.  escitalopram oxalate (LEXAPRO) 20 mg tablet Take 1 Tablet by mouth daily.  omeprazole (PRILOSEC) 20 mg capsule Take 1 Cap by mouth Daily (before breakfast).  ondansetron (Zofran ODT) 4 mg disintegrating tablet Take 1 Tab by mouth every eight (8) hours as needed for Nausea or Nausea or Vomiting.  cetirizine (ZYRTEC) 10 mg tablet Take 1 Tab by mouth nightly.  sildenafil citrate (Viagra) 100 mg tablet Take 1 Tab by mouth as needed for Erectile Dysfunction.  fluticasone propionate (FLONASE) 50 mcg/actuation nasal spray 2 Sprays by Both Nostrils route daily.  OTHER IV antibiotics with home health care  Ceftriaxone 2 g IV q 24 hour  Daptomycin 6mg/kg IV q 24 hour  Tentative Stop date: till 11/11/21 (Patient not taking: Reported on 4/13/2022)     No current facility-administered medications for this visit. Past Surgical History:   Procedure Laterality Date    HX CYST INCISION AND DRAINAGE Left 12/01/2014    LUE 2nd digit abscess I&D.      HX ENDOSCOPY  10/17/2014    Dr. Dortohy Ochoa: EGD/Colonoscopy    HX HEENT      wisdom tooth extraction    HX KNEE ARTHROSCOPY Right     HX ORTHOPAEDIC      Left foot       Visit Vitals  /83 (BP 1 Location: Left upper arm, BP Patient Position: Sitting, BP Cuff Size: Adult)   Pulse 72   Ht 6' 2\" (1.88 m)   Wt 104.8 kg (231 lb)   SpO2 99%   BMI 29.66 kg/m²       Diagnostic Studies:  I have reviewed the relevant tests done on the patient and show as follows  EKG tracings reviewed by me today. EKG Results     None        XR Results (most recent):  Results from Hospital Encounter encounter on 02/04/22    XR CHEST PORT    Narrative  EXAM: XR CHEST PORT    INDICATION: leg swelling/history pe    COMPARISON: 11/17/2021. FINDINGS: A single view of the chest demonstrates clear lungs. The cardiac and  mediastinal contours and pulmonary vascularity are normal. The bones and soft  tissues are within normal limits. PICC line has been removed. Impression  No acute findings. Mr. Jaciel Galicia has a reminder for a \"due or due soon\" health maintenance. I have asked that he contact his primary care provider for follow-up on this health maintenance. Review of Systems   Constitutional: Negative for chills, fever, malaise/fatigue and weight loss. HENT: Negative for nosebleeds. Eyes: Negative for discharge. Respiratory: Negative for cough, shortness of breath and wheezing. Cardiovascular: Positive for chest pain and leg swelling. Negative for palpitations, orthopnea, claudication and PND. Gastrointestinal: Negative for diarrhea, nausea and vomiting. Genitourinary: Negative for dysuria and hematuria. Musculoskeletal: Negative for joint pain. Skin: Negative for rash. Neurological: Negative for dizziness, seizures, loss of consciousness and headaches. Endo/Heme/Allergies: Negative for polydipsia. Does not bruise/bleed easily. Psychiatric/Behavioral: Negative for depression and substance abuse. The patient does not have insomnia.       11/21 echo  CONCLUSIONS     * Complete transthoracic echocardiogram performed with 2D imaging, color   Doppler, and spectral Doppler.     * Left ventricular systolic function is normal with an ejection fraction of   60 % by visual estimation.     * Left ventricular chamber size is normal.     * Left ventricular diastolic function: normal.     * Right ventricular systolic function is normal with TAPSE measuring 3.15   cm.     * Left atrial chamber is mildly enlarged with a left atrial volume index of   38.71 ml/m^2 by BP MOD.     * Dilated inferior vena cava with >50% collapse upon inspiration. PASP 29   mmHg.     * Trace mitral, aortic, tricuspid, and pulmonic regurgitation.     * Right heart chamber dimensions are within normal limits. 11/21 CTA chest  Impression  Performed by RADIOLOGY    Filling defects within two right upper lobe peripheral pulmonary artery branches consistent with minimal pulmonary emboli. Signed By: Hans Sky MD on 11/20/2021 2:24 AM    Physical Exam  Constitutional:       General: He is not in acute distress. Appearance: He is well-developed. HENT:      Head: Normocephalic and atraumatic. Mouth/Throat:      Dentition: Normal dentition. Eyes:      General: No scleral icterus. Right eye: No discharge. Left eye: No discharge. Neck:      Thyroid: No thyromegaly. Vascular: No carotid bruit or JVD. Cardiovascular:      Rate and Rhythm: Normal rate and regular rhythm. Pulses: Intact distal pulses. Heart sounds: Normal heart sounds, S1 normal and S2 normal. No murmur heard. No friction rub. No gallop. Pulmonary:      Effort: Pulmonary effort is normal.      Breath sounds: Normal breath sounds. No wheezing or rales. Abdominal:      Palpations: Abdomen is soft. There is no mass. Tenderness: There is no abdominal tenderness. Musculoskeletal:      Cervical back: Neck supple. Right lower leg: Edema (2) present. Left lower leg: Edema (2) present. Lymphadenopathy:      Cervical:      Right cervical: No superficial cervical adenopathy. Left cervical: No superficial cervical adenopathy.    Skin:     General: Skin is warm and dry. Findings: No rash. Neurological:      Mental Status: He is alert and oriented to person, place, and time. Psychiatric:         Behavior: Behavior normal.         ASSESSMENT and PLAN      Diagnoses and all orders for this visit:    1. Chronic diastolic congestive heart failure (HCC)  -     HEPATIC FUNCTION PANEL; Future  -     METABOLIC PANEL, BASIC; Future  -     Comp Stocking,Knee,Regular,Sml misc; Use daily Size to fit per pt. Small,med or large. -     hydroCHLOROthiazide (HYDRODIURIL) 12.5 mg tablet; Take 1 Tablet by mouth daily. 2. Primary hypertension    3. Hyperlipidemia, unspecified hyperlipidemia type  -     LIPID PANEL; Future    4. History of pulmonary embolism  -     REFERRAL TO HEMATOLOGY        Pertinent laboratory and test data reviewed and discussed with patient. See patient instructions also for other medical advice given    Medications Discontinued During This Encounter   Medication Reason    Comp Stocking,Knee,Regular,Sml misc Therapy Completed    Camphor-Eucalyptus Oil-Menthol (VICKS VAPORUB) 4.8-1.2-2.6 % oint LIST CLEANUP    calcium carbonate (TUMS PO) LIST CLEANUP    famotidine (PEPCID) 40 mg tablet Alternate Therapy       Follow-up and Dispositions    · Return in about 3 months (around 7/13/2022), or if symptoms worsen or fail to improve, for post test.       4/13/2022 CHF is improved but still persistent with 2+ edema. Add HCTZ and follow the electrolytes closely. Check lipids and LFTs due to hyperlipidemia and specially history of pancreatitis many years ago. Get a hematology opinion for history of pulmonary embolism in case any hypercoagulability work-up is needed but it may have been due to PICC line. Blood pressure is under fair control and will be watched on HCTZ. Total time taken in the visit was more than 45 minutes.

## 2022-05-18 ENCOUNTER — VIRTUAL VISIT (OUTPATIENT)
Dept: FAMILY MEDICINE CLINIC | Age: 37
End: 2022-05-18
Payer: MEDICAID

## 2022-05-18 DIAGNOSIS — U07.1 COVID-19: Primary | ICD-10-CM

## 2022-05-18 PROCEDURE — 99213 OFFICE O/P EST LOW 20 MIN: CPT | Performed by: NURSE PRACTITIONER

## 2022-05-18 RX ORDER — GUAIFENESIN AND DEXTROMETHORPHAN HYDROBROMIDE 1200; 60 MG/1; MG/1
1 TABLET, EXTENDED RELEASE ORAL
Qty: 30 TABLET | Refills: 0 | Status: SHIPPED | OUTPATIENT
Start: 2022-05-18

## 2022-05-18 RX ORDER — ONDANSETRON 4 MG/1
4 TABLET, ORALLY DISINTEGRATING ORAL
Qty: 10 TABLET | Refills: 0 | Status: SHIPPED | OUTPATIENT
Start: 2022-05-18 | End: 2022-07-14 | Stop reason: ALTCHOICE

## 2022-05-18 RX ORDER — NIRMATRELVIR AND RITONAVIR 300-100 MG
3 KIT ORAL EVERY 12 HOURS
Qty: 1 BOX | Refills: 0 | Status: SHIPPED | OUTPATIENT
Start: 2022-05-18 | End: 2022-05-23

## 2022-05-18 RX ORDER — BISMUTH SUBSALICYLATE 262 MG
1 TABLET,CHEWABLE ORAL DAILY
Qty: 90 TABLET | Refills: 1 | Status: SHIPPED | OUTPATIENT
Start: 2022-05-18 | End: 2022-09-22 | Stop reason: SDUPTHER

## 2022-05-18 NOTE — PROGRESS NOTES
Mian Shane is a 43 y.o. male who was seen by synchronous (real-time) audio-video technology on 5/18/2022 for Positive For Covid-19 (tested positive 5/17/22) and Cough    Assessment & Plan:   Diagnoses and all orders for this visit:    1. COVID-19    Other orders  -     nirmatrelvir-ritonavir (Paxlovid, EUA,) 150 mg x 2- 100 mg tablet; Take 3 Tablets by mouth every twelve (12) hours for 5 days. -     multivitamin (ONE A DAY) tablet; Take 1 Tablet by mouth daily. -     ondansetron (Zofran ODT) 4 mg disintegrating tablet; Take 1 Tablet by mouth every eight (8) hours as needed for Nausea or Nausea or Vomiting.  -     dextromethorphan-guaiFENesin (Mucinex DM) 60-1,200 mg Tb12; Take 1 Tablet by mouth every twelve (12) hours as needed for Cough or Congestion. My chart message sent to patient with the following information. Patient also called and informed of information below. Patient verbalized understanding. You have been prescribed Paxlovid for COVID which you will take 2 times daily for 5 days.    -Decrease your dosage of Eliquis to 2.5 mg(1/2 tab) twice a day while taking Paxlovid. -DO NOT take lipitor 20 mg while taking Paxlovid. alarm signs when to seek emergent care provided and reviewed       I spent at least 20 minutes on this visit with this established patient. 712  Subjective:   Patient tested positive for COVID yesterday. Reports he has a cough, headache, diarrhea, nausea, sore throat, loss of smell, mild loss of taste. States his cough is productive of of brown/green sputum. Prior to Admission medications    Medication Sig Start Date End Date Taking? Authorizing Provider   hydroCHLOROthiazide (HYDRODIURIL) 12.5 mg tablet Take 1 Tablet by mouth daily. 4/13/22  Yes Elton Miguel MD   gabapentin (NEURONTIN) 400 mg capsule Take 1 Capsule by mouth three (3) times daily.  Max Daily Amount: 1,200 mg. 2/11/22  Yes Tushar Irby NP   clonazePAM (KlonoPIN) 1 mg tablet Take 1 Tablet by mouth three (3) times daily as needed for Anxiety. Max Daily Amount: 3 mg. 1/10/22  Yes Cristina Atkins NP   apixaban (ELIQUIS) 5 mg tablet Take 1 Tablet by mouth two (2) times a day. 1/10/22  Yes Cristina Atkins NP   lisinopriL (PRINIVIL, ZESTRIL) 40 mg tablet Take 1 Tablet by mouth daily. 12/31/21  Yes Cristina Atkins NP   atenoloL (TENORMIN) 100 mg tablet Take 1 Tablet by mouth two (2) times a day. 10/26/21  Yes Cristina Atkins NP   atorvastatin (LIPITOR) 20 mg tablet Take 1 Tablet by mouth daily. 10/26/21  Yes Cristina Atkins NP   escitalopram oxalate (LEXAPRO) 20 mg tablet Take 1 Tablet by mouth daily. 10/26/21  Yes Cristina Atkins NP   cetirizine (ZYRTEC) 10 mg tablet Take 1 Tab by mouth nightly. 11/9/20  Yes Critsina Atkins NP   sildenafil citrate (Viagra) 100 mg tablet Take 1 Tab by mouth as needed for Erectile Dysfunction. 8/26/20  Yes Aylin Darnell NP   fluticasone propionate (FLONASE) 50 mcg/actuation nasal spray 2 Sprays by Both Nostrils route daily. 12/1/19  Yes Galyo, Buck Scheuermann, PA   Comp Stocking,Knee,Regular,Sml misc Use daily Size to fit per pt. Small,med or large. Patient not taking: Reported on 5/18/2022 4/13/22   Medina Thakkar MD   OTHER IV antibiotics with home health care  Ceftriaxone 2 g IV q 24 hour  Daptomycin 6mg/kg IV q 24 hour  Tentative Stop date: till 11/11/21  Patient not taking: Reported on 4/13/2022 9/30/21   Reyes Pray, MD   omeprazole (PRILOSEC) 20 mg capsule Take 1 Cap by mouth Daily (before breakfast). Patient not taking: Reported on 5/18/2022 4/21/21   Cristina Atkins NP   ondansetron (Zofran ODT) 4 mg disintegrating tablet Take 1 Tab by mouth every eight (8) hours as needed for Nausea or Nausea or Vomiting. Patient not taking: Reported on 5/18/2022 4/21/21   Cristina Atkins NP     Patient Active Problem List   Diagnosis Code    HTN (hypertension) I10    Hyperlipidemia E78.5    MICHELLE (generalized anxiety disorder) F41.1    Mild depression F32. A    Acute appendicitis K35.80    Ankle pain M25.579    Ankle swelling M25.473    Fever R50.9    Suspected soft tissue infection R68.89    Septic arthritis (HCC) M00.9     Patient Active Problem List    Diagnosis Date Noted    Septic arthritis ([facility] 09/26/2021    Ankle pain 09/25/2021    Ankle swelling 09/25/2021    Fever 09/25/2021    Suspected soft tissue infection 09/25/2021    Acute appendicitis 05/07/2020    Mild depression 06/08/2018    MICHELLE (generalized anxiety disorder) 07/08/2015    Hyperlipidemia 12/05/2013    HTN (hypertension) 09/12/2012     Allergies   Allergen Reactions    Amoxicillin Swelling    Contrast Dye [Iodine] Hives     Pt had iv contrast prior to this study without problems, developed hives on 07/04/15 5 minutes post injection    Iodinated Contrast Media Hives     Pt got hives when he received IV contrast in the past    Penicillins Hives and Swelling    Wellbutrin [Bupropion] Other (comments)     Caused aggression     Past Medical History:   Diagnosis Date    Anxiety     Cholelithiasis 08/18/2013    Note on U/S    Chronic gastritis 10/17/2014    Dr. Irine Peabody (EGD Result) H. Pylori Neg    Depression     Dog bite of left hand including fingers with infection 11/16/2014    Adm 11/18/14 for IV antibiotics.  ETOH abuse     GERD (gastroesophageal reflux disease)     Hepatic steatosis 02/06/2014    Noted on U/S.     History of plasmapheresis 02/2014    Hypertriglyceridemia, Severe Pancreatitis    Hypertension     Hypertriglyceridemia     Hypomagnesemia 07/24/2016    1.6 mg/dL    Non compliance with medical treatment     Osteomyelitis of left hand ([facility] 11/22/2014    MRI Finding @ LUE 2nd MCP joint.  Pancreatitis 08/17/2013    Highest on Record (2/6/14) 0364. Dr. Irine Peabody    Panic attacks     PICC (peripherally inserted central catheter) in place 42/89/6620    R Basilic Vein (IV Antibiotics for LUE Hand Osteomyelitis).      Pulmonary emboli ([facility] 11/2021  Rectal bleeding     Substance abuse (HCC)     Several UDS + Opiates.  Thromboembolus ([address]     Vitamin D deficiency 2014    11.6ng/mL ( ng/mL)     Past Surgical History:   Procedure Laterality Date    HX CYST INCISION AND DRAINAGE Left 2014    LUE 2nd digit abscess I&D.  HX ENDOSCOPY  10/17/2014    Dr. Pelon Carrasco: EGD/Colonoscopy    HX HEENT      wisdom tooth extraction    HX KNEE ARTHROSCOPY Right     HX ORTHOPAEDIC      Left foot     Family History   Problem Relation Age of Onset    Elevated Lipids Mother     Thyroid Disease Mother     Hypertension Father     Alcohol abuse Maternal Grandmother     Mult Sclerosis Paternal Grandmother     Stroke Neg Hx     Heart Attack Neg Hx      Social History     Tobacco Use    Smoking status: Former Smoker     Quit date: 2012     Years since quittin.6    Smokeless tobacco: Never Used    Tobacco comment: 2018    Substance Use Topics    Alcohol use: Not Currently     Alcohol/week: 3.0 standard drinks     Types: 3 Cans of beer per week     Comment: 1X a week       ROS    Objective:   No flowsheet data found. General: alert, cooperative, no distress   Mental  status: normal mood, behavior, speech, dress, motor activity, and thought processes, able to follow commands   HENT: NCAT   Neck: no visualized mass   Resp: no respiratory distress   Neuro: no gross deficits   Skin: no discoloration or lesions of concern on visible areas   Psychiatric: normal affect, consistent with stated mood, no evidence of hallucinations     Additional exam findings: We discussed the expected course, resolution and complications of the diagnosis(es) in detail. Medication risks, benefits, costs, interactions, and alternatives were discussed as indicated. I advised him to contact the office if his condition worsens, changes or fails to improve as anticipated. He expressed understanding with the diagnosis(es) and plan.      Holly Rowan Jaciel Galicia, was evaluated through a synchronous (real-time) audio-video encounter. The patient (or guardian if applicable) is aware that this is a billable service, which includes applicable co-pays. Verbal consent to proceed has been obtained. The visit was conducted pursuant to the emergency declaration under the [address] [place] [address] and the [employer] and [employer] Act. Patient identification was verified, and a caregiver was present when appropriate. The patient was located at home in a state where the provider was licensed to provide care.     Nerissa Kendall NP

## 2022-05-18 NOTE — PATIENT INSTRUCTIONS
You have been prescribed Paxlovid for COVID which you will take 2 times daily for 5 days.    -Decrease your dosage of Eliquis to 2.5 mg(1/2 tab) twice a day while taking Paxlovid. -DO NOT take lipitor 20 mg while taking Paxlovid.

## 2022-05-26 ENCOUNTER — TELEPHONE (OUTPATIENT)
Dept: FAMILY MEDICINE CLINIC | Age: 37
End: 2022-05-26

## 2022-05-26 NOTE — TELEPHONE ENCOUNTER
----- Message from Gerald Shook sent at 5/26/2022 11:27 AM EDT -----  Subject: Message to Provider    QUESTIONS  Information for Provider? patient called and stated that he tested   positive for covid last Tuesday (5/10/22)- he also stated that he took   Paxlovid for treatment and stopped taking it about 3 days ago- his   symptoms have now returned as of today. patient is wanting to speak with a   nurse regarding what to do next- please advise  ---------------------------------------------------------------------------  --------------  [address]  What is the best way for the office to contact you? OK to leave message on   voicemail  Preferred Call Back Phone Number? [phone]  ---------------------------------------------------------------------------  --------------  SCRIPT ANSWERS  Relationship to Patient?  Self

## 2022-06-06 DIAGNOSIS — F41.0 PANIC ATTACKS: ICD-10-CM

## 2022-06-06 NOTE — TELEPHONE ENCOUNTER
[facility] last [facility] date for Klonopin as 5/10/22 for a 30 d/s. Last Visit: 5/18/22 with JESSE Mc  Next Appointment: none  Previous Refill Encounter(s): 1/10/22 #90 with 1 refill    Requested Prescriptions     Pending Prescriptions Disp Refills    clonazePAM (KlonoPIN) 1 mg tablet 90 Tablet 1     Sig: Take 1 Tablet by mouth three (3) times daily as needed for Anxiety. Max Daily Amount: 3 mg.          For Blake Conde in place:    Recommendation Provided To:    Intervention Detail: New Rx: 1, reason: Patient Preference   Gap Closed?:    Intervention Accepted By:   [facility] Time Spent (min): 5

## 2022-06-09 NOTE — TELEPHONE ENCOUNTER
ecc rep called for pt to check status of refill request for    clonazePAM (Danette Quiet)     Adv pending provider approval, please adv [phone]

## 2022-06-10 ENCOUNTER — TELEPHONE (OUTPATIENT)
Dept: FAMILY MEDICINE CLINIC | Age: 37
End: 2022-06-10

## 2022-06-10 RX ORDER — CLONAZEPAM 1 MG/1
1 TABLET ORAL
Qty: 45 TABLET | Refills: 0 | Status: SHIPPED | OUTPATIENT
Start: 2022-06-10 | End: 2022-07-08 | Stop reason: SDUPTHER

## 2022-06-10 NOTE — TELEPHONE ENCOUNTER
Patient called requesting to speak to manager in regards to medication refill stating that this has been an ongoing issue to get he's medication and upset that he was advised to schedule appt for next med refill also stated he he did not speak with manger today he will go over [facility].  Thank you

## 2022-06-10 NOTE — TELEPHONE ENCOUNTER
Patient contacted me expressing his frustration with the time it takes for this to be refilled. He states this is a on going problem and he is now out of medication. Advised him I will forward to the  as well as Dr. Woodrow Perera to get this taken care of today.

## 2022-06-10 NOTE — TELEPHONE ENCOUNTER
Called patient today at 2:03 and at 5:15 in regards to him wanting to speak with a manager. I did leave a message that he could call me on Monday if he needed to.

## 2022-07-05 DIAGNOSIS — I10 ESSENTIAL HYPERTENSION: ICD-10-CM

## 2022-07-05 RX ORDER — LISINOPRIL 40 MG/1
40 TABLET ORAL DAILY
Qty: 90 TABLET | Refills: 1 | OUTPATIENT
Start: 2022-07-05

## 2022-07-05 NOTE — TELEPHONE ENCOUNTER
Duplicate      For Pharmacy Admin Tracking Only     CPA in place:    Recommendation Provided To:    Intervention Detail: Discontinued Rx: 1, reason: Duplicate Therapy   Gap Closed?:    Intervention Accepted By:   Anisha Archer Time Spent (min): 5

## 2022-07-08 ENCOUNTER — OFFICE VISIT (OUTPATIENT)
Dept: FAMILY MEDICINE CLINIC | Age: 37
End: 2022-07-08
Payer: MEDICAID

## 2022-07-08 VITALS
TEMPERATURE: 97.7 F | BODY MASS INDEX: 31.6 KG/M2 | HEIGHT: 74 IN | WEIGHT: 246.2 LBS | DIASTOLIC BLOOD PRESSURE: 83 MMHG | RESPIRATION RATE: 16 BRPM | OXYGEN SATURATION: 99 % | SYSTOLIC BLOOD PRESSURE: 131 MMHG | HEART RATE: 68 BPM

## 2022-07-08 DIAGNOSIS — R60.0 BILATERAL LOWER EXTREMITY EDEMA: ICD-10-CM

## 2022-07-08 DIAGNOSIS — Z79.899 ENCOUNTER FOR LONG-TERM (CURRENT) USE OF HIGH-RISK MEDICATION: ICD-10-CM

## 2022-07-08 DIAGNOSIS — F41.9 MODERATE ANXIETY: ICD-10-CM

## 2022-07-08 DIAGNOSIS — F41.1 GAD (GENERALIZED ANXIETY DISORDER): ICD-10-CM

## 2022-07-08 DIAGNOSIS — F32.A MINIMAL DEPRESSION: ICD-10-CM

## 2022-07-08 DIAGNOSIS — I10 ESSENTIAL HYPERTENSION: Primary | ICD-10-CM

## 2022-07-08 DIAGNOSIS — F41.0 PANIC ATTACKS: ICD-10-CM

## 2022-07-08 DIAGNOSIS — M50.30 DDD (DEGENERATIVE DISC DISEASE), CERVICAL: ICD-10-CM

## 2022-07-08 DIAGNOSIS — E78.00 PURE HYPERCHOLESTEROLEMIA: ICD-10-CM

## 2022-07-08 DIAGNOSIS — M54.41 CHRONIC RIGHT-SIDED LOW BACK PAIN WITH RIGHT-SIDED SCIATICA: ICD-10-CM

## 2022-07-08 DIAGNOSIS — E66.9 OBESITY, CLASS I, BMI 30-34.9: ICD-10-CM

## 2022-07-08 DIAGNOSIS — G89.29 CHRONIC RIGHT-SIDED LOW BACK PAIN WITH RIGHT-SIDED SCIATICA: ICD-10-CM

## 2022-07-08 PROCEDURE — 99214 OFFICE O/P EST MOD 30 MIN: CPT | Performed by: NURSE PRACTITIONER

## 2022-07-08 RX ORDER — ATORVASTATIN CALCIUM 20 MG/1
20 TABLET, FILM COATED ORAL DAILY
Qty: 90 TABLET | Refills: 1 | Status: SHIPPED | OUTPATIENT
Start: 2022-07-08

## 2022-07-08 RX ORDER — GABAPENTIN 400 MG/1
CAPSULE ORAL
Qty: 90 CAPSULE | Refills: 2 | Status: SHIPPED | OUTPATIENT
Start: 2022-07-08

## 2022-07-08 RX ORDER — POTASSIUM CHLORIDE 750 MG/1
10 TABLET, EXTENDED RELEASE ORAL DAILY
Qty: 5 TABLET | Refills: 0 | Status: SHIPPED | OUTPATIENT
Start: 2022-07-08 | End: 2022-07-13

## 2022-07-08 RX ORDER — ESCITALOPRAM OXALATE 20 MG/1
20 TABLET ORAL DAILY
Qty: 90 TABLET | Refills: 1 | Status: SHIPPED | OUTPATIENT
Start: 2022-07-08 | End: 2022-11-03

## 2022-07-08 RX ORDER — TRAMADOL HYDROCHLORIDE 50 MG/1
50 TABLET ORAL
Qty: 12 TABLET | Refills: 0 | Status: SHIPPED | OUTPATIENT
Start: 2022-07-08 | End: 2022-07-11

## 2022-07-08 RX ORDER — FUROSEMIDE 20 MG/1
20 TABLET ORAL DAILY
Qty: 5 TABLET | Refills: 0 | Status: SHIPPED | OUTPATIENT
Start: 2022-07-08 | End: 2022-07-13

## 2022-07-08 RX ORDER — CLONAZEPAM 1 MG/1
1 TABLET ORAL
Qty: 45 TABLET | Refills: 0 | Status: SHIPPED | OUTPATIENT
Start: 2022-07-08 | End: 2022-08-26 | Stop reason: SDUPTHER

## 2022-07-08 NOTE — PROGRESS NOTES
1. \"Have you been to the ER, urgent care clinic since your last visit? Hospitalized since your last visit? \" No    2. \"Have you seen or consulted any other health care providers outside of the [address] [place] [address] since your last visit? \" [facility]     3. For patients aged 39-70: Has the patient had a colonoscopy / FIT/ Cologuard?  NA - based on age

## 2022-07-08 NOTE — PROGRESS NOTES
Monica Calabrese is a 43 y.o. male who was seen in clinic today (7/8/2022) for Cholesterol Problem and Hypertension    Assessment & Plan:   Diagnoses and all orders for this visit:    1. Essential hypertension    2. Panic attacks  -     clonazePAM (KlonoPIN) 1 mg tablet; Take 1 Tablet by mouth three (3) times daily as needed for Anxiety. Max Daily Amount: 3 mg.    3. MICHELLE (generalized anxiety disorder)    4. Pure hypercholesterolemia  -     LIPID PANEL; Future  -     METABOLIC PANEL, COMPREHENSIVE; Future  -     CBC W/O DIFF; Future    5. Encounter for long-term (current) use of high-risk medication  -     9-DRUG SCREEN, TREATMENT CTR; Future    6. Obesity, Class I, BMI 30-34.9  -     HEMOGLOBIN A1C WITH EAG; Future    7. Minimal depression    8. Moderate anxiety    9. Chronic right-sided low back pain with right-sided sciatica  -     gabapentin (NEURONTIN) 400 mg capsule; TAKE ONE CAPSULE BY MOUTH THREE TIMES A DAY    10. Bilateral lower extremity edema    11. DDD (degenerative disc disease), cervical  -     traMADoL (ULTRAM) 50 mg tablet; Take 1 Tablet by mouth every six (6) hours as needed for Pain for up to 3 days. Max Daily Amount: 200 mg. Other orders  -     atorvastatin (LIPITOR) 20 mg tablet; Take 1 Tablet by mouth daily. -     escitalopram oxalate (LEXAPRO) 20 mg tablet; Take 1 Tablet by mouth daily. -     furosemide (LASIX) 20 mg tablet; Take 1 Tablet by mouth daily for 5 days. -     potassium chloride (KLOR-CON M10) 10 mEq tablet; Take 1 Tablet by mouth daily for 5 days. Patient medication usage agreement for controlled substances signed see scanned copy  I have discussed the diagnosis with the patient and the intended plan as seen in the above orders. The patient has received an after-visit summary and questions were answered concerning future plans. I have discussed medication side effects and warnings with the patient as well.  Patient agreeable with above plan and verbalizes understanding. Follow-up and Dispositions    Return in about 3 months (around 10/8/2022) for HTN, anxiety/depression, in office follow up. Subjective:   Hypertension ROS: taking medications as instructed, no medication side effects noted, no TIA's, no chest pain on exertion, no dyspnea on exertion, no swelling of ankles. Patient states he had 'heat stroke' and stopped taking HCTZ, this was 1 1/2 weeks ago. Cardiovascular risk analysis - LDL goal is under 100. Compliance with treatment thus far has been fair. ROS: taking medications as instructed, no medication side effects noted, no TIA's, no chest pain on exertion, no dyspnea on exertion, no swelling of ankles. New concerns: patient states since having COVID he gets tired like he is not getting enough sleep. Patient is currently in between housing and is sleeping sitting up in his car. Neck pain taking ibuprofen not helpful. Requesting alternate therapy to assist when pain is increased. Anxiety/Depression: reports symptoms are currently controlled with current regimen of lexapro and prn clonazepam.    Lab Results   Component Value Date/Time    Sodium 132 (L) 02/04/2022 05:50 AM    Potassium 3.9 02/04/2022 05:50 AM    Chloride 98 (L) 02/04/2022 05:50 AM    CO2 28 02/04/2022 05:50 AM    Anion gap 6 02/04/2022 05:50 AM    Glucose 102 (H) 02/04/2022 05:50 AM    BUN 10 02/04/2022 05:50 AM    Creatinine 0.87 02/04/2022 05:50 AM    BUN/Creatinine ratio 11 (L) 02/04/2022 05:50 AM    GFR est AA >60 02/04/2022 05:50 AM    GFR est non-AA >60 02/04/2022 05:50 AM    Calcium 8.7 02/04/2022 05:50 AM    Bilirubin, total 0.8 02/04/2022 05:50 AM    Alk.  phosphatase 48 02/04/2022 05:50 AM    Protein, total 6.5 02/04/2022 05:50 AM    Albumin 3.9 02/04/2022 05:50 AM    Globulin 2.6 02/04/2022 05:50 AM    A-G Ratio 1.5 02/04/2022 05:50 AM    ALT (SGPT) 24 02/04/2022 05:50 AM    AST (SGOT) 43 (H) 02/04/2022 05:50 AM     Lab Results   Component Value Date/Time Cholesterol, total 254 (H) 08/01/2014 11:26 AM    HDL Cholesterol 38 (L) 08/01/2014 11:26 AM    LDL, calculated 150 (H) 08/01/2014 11:26 AM    VLDL, calculated 66 (H) 08/01/2014 11:26 AM    Triglyceride 328 (H) 08/01/2014 11:26 AM    CHOL/HDL Ratio 7.4 (H) 02/25/2014 01:50 AM     Lab Results   Component Value Date/Time    Hemoglobin A1c <3.5 (L) 02/09/2014 02:12 AM     Lab Results   Component Value Date/Time    VITAMIN D, 25-HYDROXY 11.6 (L) 02/17/2014 09:41 AM       Lab Results   Component Value Date/Time    WBC 4.6 02/04/2022 05:50 AM    HGB 11.2 (L) 02/04/2022 05:50 AM    HCT 31.5 (L) 02/04/2022 05:50 AM    PLATELET 559 02/33/6715 05:50 AM    MCV 87.7 02/04/2022 05:50 AM       Wt Readings from Last 3 Encounters:   07/08/22 246 lb 3.2 oz (111.7 kg)   04/13/22 231 lb (104.8 kg)   02/04/22 249 lb 1.6 oz (113 kg)     Temp Readings from Last 3 Encounters:   02/04/22 98.5 °F (36.9 °C)   11/17/21 98.9 °F (37.2 °C)   11/17/21 100.4 °F (38 °C)     BP Readings from Last 3 Encounters:   04/13/22 128/83   02/04/22 (!) 105/59   11/17/21 (!) 160/98     Pulse Readings from Last 3 Encounters:   04/13/22 72   02/04/22 69   11/17/21 75       Prior to Admission medications    Medication Sig Start Date End Date Taking? Authorizing Provider   lisinopriL (PRINIVIL, ZESTRIL) 40 mg tablet TAKE ONE TABLET BY MOUTH DAILY 7/6/22   Pawan CLARK NP   atenoloL (TENORMIN) 100 mg tablet TAKE ONE TABLET BY MOUTH TWICE A DAY 6/22/22   Pawan CLARK NP   gabapentin (NEURONTIN) 400 mg capsule TAKE ONE CAPSULE BY MOUTH THREE TIMES A DAY 6/22/22   Pawan CLARK NP   clonazePAM (KlonoPIN) 1 mg tablet Take 1 Tablet by mouth three (3) times daily as needed for Anxiety. Max Daily Amount: 3 mg. 6/10/22   Pawan CLARK NP   multivitamin (ONE A DAY) tablet Take 1 Tablet by mouth daily.  5/18/22   Pawan CLARK NP   ondansetron (Zofran ODT) 4 mg disintegrating tablet Take 1 Tablet by mouth every eight (8) hours as needed for Nausea or Nausea or Vomiting. 5/18/22   Lindsay Looney NP   dextromethorphan-guaiFENesin (Mucinex DM) 60-1,200 mg Tb12 Take 1 Tablet by mouth every twelve (12) hours as needed for Cough or Congestion. 5/18/22   Lindsay Looney NP   Comp Stocking,Knee,Regular,Sml misc Use daily Size to fit per pt. Small,med or large. Patient not taking: Reported on 5/18/2022 4/13/22   James Nunez MD   hydroCHLOROthiazide (HYDRODIURIL) 12.5 mg tablet Take 1 Tablet by mouth daily. 4/13/22   James Nunez MD   apixaban (ELIQUIS) 5 mg tablet Take 1 Tablet by mouth two (2) times a day. 1/10/22   Priscella Marcell CLARK NP   atorvastatin (LIPITOR) 20 mg tablet Take 1 Tablet by mouth daily. 10/26/21   Priscella Marcell CLARK NP   escitalopram oxalate (LEXAPRO) 20 mg tablet Take 1 Tablet by mouth daily. 10/26/21   Priscella Marcell CLARK NP   omeprazole (PRILOSEC) 20 mg capsule Take 1 Cap by mouth Daily (before breakfast). Patient not taking: Reported on 5/18/2022 4/21/21   Priscella Marcell CLARK NP   cetirizine (ZYRTEC) 10 mg tablet Take 1 Tab by mouth nightly. 11/9/20   Priscella Marcell CLARK NP   sildenafil citrate (Viagra) 100 mg tablet Take 1 Tab by mouth as needed for Erectile Dysfunction. 8/26/20   Sigifredo Darnell, JESSE   fluticasone propionate (FLONASE) 50 mcg/actuation nasal spray 2 Sprays by Both Nostrils route daily. 12/1/19   CARLITA Adam     The following sections were reviewed & updated as appropriate: PMH, PSH, FH, and SH. Review of Systems   Constitutional:  Positive for fatigue. Negative for activity change, appetite change, chills and fever. Respiratory:  Negative for chest tightness and shortness of breath. Cardiovascular:  Positive for leg swelling (intermittently). Negative for chest pain. Musculoskeletal:  Positive for neck pain. Neurological:  Negative for dizziness and headaches.         Objective:     Visit Vitals  /83 (BP 1 Location: Left upper arm, BP Patient Position: Sitting, BP Cuff Size: Large adult)   Pulse 68   Temp 97.7 °F (36.5 °C) (Temporal)   Resp 16   Ht 6' 2\" (1.88 m)   Wt 246 lb 3.2 oz (111.7 kg)   SpO2 99%   BMI 31.61 kg/m²      Physical Exam  Constitutional:       General: He is not in acute distress. Appearance: He is well-developed. He is not diaphoretic. HENT:      Head: Normocephalic and atraumatic. Neck:      Vascular: No carotid bruit. Cardiovascular:      Rate and Rhythm: Normal rate and regular rhythm. Heart sounds: Normal heart sounds. No murmur heard. No friction rub. No gallop. Pulmonary:      Effort: Pulmonary effort is normal.      Breath sounds: Normal breath sounds. No stridor. No wheezing or rales. Abdominal:      General: Bowel sounds are normal.      Palpations: Abdomen is soft. Tenderness: There is no abdominal tenderness. Musculoskeletal:      Cervical back: Normal range of motion and neck supple. Right lower leg: Edema (trace) present. Left lower leg: Edema (trace) present. Lymphadenopathy:      Cervical: No cervical adenopathy. Skin:     General: Skin is warm and dry. Neurological:      Mental Status: He is alert and oriented to person, place, and time. Disclaimer: The patient understands our medical plan. Alternatives have been explained and offered. The risks, benefits and significant side effects of all medications have been reviewed. Anticipated time course and progression of condition reviewed. All questions have been addressed. He is encouraged to employ the information provided in the after visit summary, which was reviewed. Where applicable, he is instructed to call the clinic if he has not been notified either by phone or through [address] with the results of his tests or with an appointment plan for any referrals within 1 week(s). No news is not good news; it's no news. The patient  is to call if his condition worsens or fails to improve or if significant side effects are experienced.      Aspects of this note may have been generated using voice recognition software. Despite editing, there may be unrecognized errors.        Olivia Teresa NP

## 2022-07-10 LAB
6-ACETYLMORPHINE SCREEN URINE: NORMAL
AMPHETAMINE SCREEN, URINE: NORMAL
BARBITURATE, 796665: NORMAL
BENZODIAZEPINES, 714830: NORMAL
CANNABINOIDS, 796667: NORMAL
COCAINE (METABOLITE), 796668: NORMAL
CREATININE, URINE: 98 MG/DL
HYDROCODONE, URINE: NORMAL
OPIATES, 714864: NORMAL
OXYCODONE URINE: NORMAL
PH, DRUG SCREEN, DSPH: 5.5 (ref 4.5–8)
SP GR UR REFRACTOMETRY: 1.03 (ref 1–1.03)

## 2022-07-14 ENCOUNTER — OFFICE VISIT (OUTPATIENT)
Dept: CARDIOLOGY CLINIC | Age: 37
End: 2022-07-14
Payer: MEDICAID

## 2022-07-14 ENCOUNTER — HOSPITAL ENCOUNTER (OUTPATIENT)
Dept: LAB | Age: 37
Discharge: HOME | End: 2022-07-14

## 2022-07-14 VITALS
SYSTOLIC BLOOD PRESSURE: 129 MMHG | BODY MASS INDEX: 31.44 KG/M2 | HEART RATE: 72 BPM | OXYGEN SATURATION: 96 % | HEIGHT: 74 IN | WEIGHT: 245 LBS | DIASTOLIC BLOOD PRESSURE: 82 MMHG

## 2022-07-14 DIAGNOSIS — I50.32 CHRONIC DIASTOLIC CONGESTIVE HEART FAILURE (HCC): Primary | ICD-10-CM

## 2022-07-14 DIAGNOSIS — Z86.711 HISTORY OF PULMONARY EMBOLISM: ICD-10-CM

## 2022-07-14 DIAGNOSIS — F10.10 ALCOHOL ABUSE: ICD-10-CM

## 2022-07-14 DIAGNOSIS — I10 ESSENTIAL HYPERTENSION: ICD-10-CM

## 2022-07-14 DIAGNOSIS — E78.00 HYPERCHOLESTEREMIA: ICD-10-CM

## 2022-07-14 LAB
A-G RATIO,AGRAT: 1.8 RATIO (ref 1.1–2.6)
ABSOLUTE LYMPHOCYTE COUNT, 10803: 1.8 K/UL (ref 1–4.8)
ALBUMIN SERPL-MCNC: 4.8 G/DL (ref 3.5–5)
ALP SERPL-CCNC: 71 U/L (ref 25–115)
ALT SERPL-CCNC: 43 U/L (ref 5–40)
AST SERPL W P-5'-P-CCNC: 118 U/L (ref 10–37)
BASOPHILS # BLD: 0.1 K/UL (ref 0–0.2)
BASOPHILS NFR BLD: 1 % (ref 0–2)
BILIRUB SERPL-MCNC: 0.8 MG/DL (ref 0.2–1.2)
BILIRUBIN, DIRECT,CBIL: <0.2 MG/DL (ref 0–0.3)
CHOLEST SERPL-MCNC: 394 MG/DL (ref 110–200)
EOSINOPHIL # BLD: 0.2 K/UL (ref 0–0.5)
EOSINOPHIL NFR BLD: 5 % (ref 0–6)
ERYTHROCYTE [DISTWIDTH] IN BLOOD BY AUTOMATED COUNT: 13 % (ref 10–15.5)
GLOBULIN,GLOB: 2.6 G/DL (ref 2–4)
GRANULOCYTES,GRANS: 43 % (ref 40–75)
HCT VFR BLD AUTO: 41.7 % (ref 36.6–51.9)
HDLC SERPL-MCNC: 13.6 MG/DL (ref 0–5)
HDLC SERPL-MCNC: 29 MG/DL
HGB BLD-MCNC: 14.4 G/DL (ref 13.2–17.3)
LDL, DIRECT,DLDL: 35 MG/DL (ref 50–99)
LDL/HDL RATIO,LDHD: 1.2
LDLC SERPL CALC-MCNC: ABNORMAL MG/DL
LYMPHOCYTES, LYMLT: 40 % (ref 20–45)
MCH RBC QN AUTO: 33 PG (ref 26–34)
MCHC RBC AUTO-ENTMCNC: 35 G/DL (ref 31–36)
MCV RBC AUTO: 96 FL (ref 80–95)
MONOCYTES # BLD: 0.5 K/UL (ref 0.1–1)
MONOCYTES NFR BLD: 12 % (ref 3–12)
NEUTROPHILS # BLD AUTO: 2 K/UL (ref 1.8–7.7)
NON-HDL CHOLESTEROL, 011976: ABNORMAL
PLATELET # BLD AUTO: 214 K/UL (ref 140–440)
PMV BLD AUTO: 10.3 FL (ref 9–13)
PROT SERPL-MCNC: 7.4 G/DL (ref 6.4–8.3)
RBC # BLD AUTO: 4.34 M/UL (ref 3.8–5.8)
SENTARA SPECIMEN COL,SENBCF: NORMAL
TRIGL SERPL-MCNC: 2120 MG/DL (ref 40–149)
VLDLC SERPL CALC-MCNC: ABNORMAL MG/DL
WBC # BLD AUTO: 4.6 K/UL (ref 4–11)

## 2022-07-14 PROCEDURE — 99001 SPECIMEN HANDLING PT-LAB: CPT

## 2022-07-14 PROCEDURE — 99214 OFFICE O/P EST MOD 30 MIN: CPT | Performed by: INTERNAL MEDICINE

## 2022-07-14 RX ORDER — HYDROCHLOROTHIAZIDE 12.5 MG/1
12.5 TABLET ORAL DAILY
Qty: 90 TABLET | Refills: 1 | Status: SHIPPED | OUTPATIENT
Start: 2022-07-14

## 2022-07-14 NOTE — PROGRESS NOTES
1. Have you been to the ER, urgent care clinic since your last visit? Hospitalized since your last visit? No    2. Have you seen or consulted any other health care providers outside of the [address] [place] [address] since your last visit? Include any pap smears or colon screening. No    3. Since your last visit, have you had any of the following symptoms?      swelling in legs/arms. 4.  Have you had any blood work, X-rays or cardiac testing? Yes When: 7/14 Where: [facility]     Requested: YES     In [facility]: NO    5. Where do you normally have your labs drawn? [facility]    6. Do you need any refills today?   No

## 2022-07-14 NOTE — PROGRESS NOTES
HISTORY OF PRESENT ILLNESS  Monica Calabrese is a 42 y.o. male. 4/22 seen for edema which has been present for a few months. Was worse in 11/21 below the knees which was also painful and red and he was treated with IV antibiotics at home with a PICC line. Ankle cultures were done but were negative per patient. He was admitted on [facility] in 11/21 and treated for CHF. Found to have pulmonary embolism as well during that admission. Was being treated with PICC line for cellulitis. He was also Covid positive at that time. Anticoagulation and diuresis given. Discharged on pain medications which helped the ankle pain. Also has PTSD    Leg Swelling  The history is provided by the patient. This is a new problem. The current episode started more than 1 week ago (9/21). The problem occurs daily. The problem has not changed since onset. Associated symptoms include chest pain. Pertinent negatives include no headaches and no shortness of breath. Chest Pain (Angina)   The history is provided by the patient. This is a new problem. The current episode started more than 1 week ago. The problem has been resolved. Duration of episode(s) is 2 hours. The problem occurs every several days (1-2/wk). The pain is associated with rest and normal activity. The pain is present in the lateral region and right side. The pain does not radiate. Associated symptoms include lower extremity edema. Pertinent negatives include no claudication, no cough, no dizziness, no fever, no headaches, no malaise/fatigue, no nausea, no orthopnea, no palpitations, no PND, no shortness of breath and no vomiting. He has tried rest for the symptoms. The treatment provided moderate relief. Follow-up  Associated symptoms include chest pain. Pertinent negatives include no headaches and no shortness of breath.      Allergies   Allergen Reactions    Amoxicillin Swelling    Contrast Dye [Iodine] Hives     Pt had iv contrast prior to this study without problems, developed hives on 07/04/15 5 minutes post injection    Iodinated Contrast Media Hives     Pt got hives when he received IV contrast in the past    Penicillins Hives and Swelling    Wellbutrin [Bupropion] Other (comments)     Caused aggression       Past Medical History:   Diagnosis Date    Anxiety     Cholelithiasis 2013    Note on U/S    Chronic gastritis 10/17/2014    Dr. Mcnair Grade (EGD Result) H. Pylori Neg    Depression     Dog bite of left hand including fingers with infection 2014    Adm 14 for IV antibiotics.  ETOH abuse     GERD (gastroesophageal reflux disease)     Hepatic steatosis 2014    Noted on U/S.     History of plasmapheresis 2014    Hypertriglyceridemia, Severe Pancreatitis    Hypertension     Hypertriglyceridemia     Hypomagnesemia 2016    1.6 mg/dL    Non compliance with medical treatment     Osteomyelitis of left hand ([facility] [address] 2014    MRI Finding @ [facility] 2nd MCP joint.  Pancreatitis 2013    Highest on Record [phone]. Dr. Mcnair Grade    Panic attacks     PICC (peripherally inserted central catheter) in place     R Basilic Vein (IV Antibiotics for LUE Hand Osteomyelitis).  Pulmonary emboli (HCC) 2021    Rectal bleeding     Substance abuse (HCC)     Several UDS + Opiates.      Thromboembolus ([facility] [address]     Vitamin D deficiency 2014    11.6ng/mL ( ng/mL)       Family History   Problem Relation Age of Onset    Elevated Lipids Mother     Thyroid Disease Mother     Hypertension Father     Alcohol abuse Maternal Grandmother     Mult Sclerosis Paternal Grandmother     Stroke Neg Hx     Heart Attack Neg Hx        Social History     Tobacco Use    Smoking status: Former Smoker     Quit date: 2012     Years since quittin.8    Smokeless tobacco: Never Used    Tobacco comment: 2018    Vaping Use    Vaping Use: Never used   Substance Use Topics    Alcohol use: Yes     Alcohol/week: 3.0 standard drinks     Types: 3 Cans of beer per week     Comment: 1X a week    Drug use: No        Current Outpatient Medications   Medication Sig    buprenorphine HCl/naloxone HCl (SUBOXONE SL) 24 mg by SubLINGual route daily.  gabapentin (NEURONTIN) 400 mg capsule TAKE ONE CAPSULE BY MOUTH THREE TIMES A DAY    clonazePAM (KlonoPIN) 1 mg tablet Take 1 Tablet by mouth three (3) times daily as needed for Anxiety. Max Daily Amount: 3 mg.  atorvastatin (LIPITOR) 20 mg tablet Take 1 Tablet by mouth daily.  escitalopram oxalate (LEXAPRO) 20 mg tablet Take 1 Tablet by mouth daily.  lisinopriL (PRINIVIL, ZESTRIL) 40 mg tablet TAKE ONE TABLET BY MOUTH DAILY    atenoloL (TENORMIN) 100 mg tablet TAKE ONE TABLET BY MOUTH TWICE A DAY    multivitamin (ONE A DAY) tablet Take 1 Tablet by mouth daily.  dextromethorphan-guaiFENesin (Mucinex DM) 60-1,200 mg Tb12 Take 1 Tablet by mouth every twelve (12) hours as needed for Cough or Congestion.  Comp Stocking,Knee,Regular,Sml misc Use daily Size to fit per pt. Small,med or large.  hydroCHLOROthiazide (HYDRODIURIL) 12.5 mg tablet Take 1 Tablet by mouth daily.  apixaban (ELIQUIS) 5 mg tablet Take 1 Tablet by mouth two (2) times a day.  cetirizine (ZYRTEC) 10 mg tablet Take 1 Tab by mouth nightly.  sildenafil citrate (Viagra) 100 mg tablet Take 1 Tab by mouth as needed for Erectile Dysfunction.  fluticasone propionate (FLONASE) 50 mcg/actuation nasal spray 2 Sprays by Both Nostrils route daily. No current facility-administered medications for this visit. Past Surgical History:   Procedure Laterality Date    HX CYST INCISION AND DRAINAGE Left 12/01/2014    LUE 2nd digit abscess I&D.      HX ENDOSCOPY  10/17/2014    Dr. Jensen Doing: EGD/Colonoscopy    HX HEENT      wisdom tooth extraction    HX KNEE ARTHROSCOPY Right     HX ORTHOPAEDIC      Left foot       Visit Vitals  /82 (BP 1 Location: Left upper arm, BP Patient Position: Sitting, BP Cuff Size: Adult long)   Pulse 72   Ht 6' 2\" (1.88 m)   Wt 111.1 kg (245 lb)   SpO2 96%   BMI 31.46 kg/m²       Diagnostic Studies:  I have reviewed the relevant tests done on the patient and show as follows  EKG tracings reviewed by me today. EKG Results     None        XR Results (most recent):  Results from Hospital Encounter encounter on 02/04/22    XR CHEST PORT    Narrative  EXAM: XR CHEST PORT    INDICATION: leg swelling/history pe    COMPARISON: 11/17/2021. FINDINGS: A single view of the chest demonstrates clear lungs. The cardiac and  mediastinal contours and pulmonary vascularity are normal. The bones and soft  tissues are within normal limits. PICC line has been removed. Impression  No acute findings. Mr. Elliott Laird has a reminder for a \"due or due soon\" health maintenance. I have asked that he contact his primary care provider for follow-up on this health maintenance. Review of Systems   Constitutional: Negative for chills, fever, malaise/fatigue and weight loss. HENT: Negative for nosebleeds. Eyes: Negative for discharge. Respiratory: Negative for cough, shortness of breath and wheezing. Cardiovascular: Positive for chest pain and leg swelling. Negative for palpitations, orthopnea, claudication and PND. Gastrointestinal: Negative for diarrhea, nausea and vomiting. Genitourinary: Negative for dysuria and hematuria. Musculoskeletal: Negative for joint pain. Skin: Negative for rash. Neurological: Negative for dizziness, seizures, loss of consciousness and headaches. Endo/Heme/Allergies: Negative for polydipsia. Does not bruise/bleed easily. Psychiatric/Behavioral: Negative for depression and substance abuse. The patient does not have insomnia.       11/21 echo  CONCLUSIONS     * Complete transthoracic echocardiogram performed with 2D imaging, color   Doppler, and spectral Doppler.     * Left ventricular systolic function is normal with an ejection fraction of   60 % by visual estimation.     * Left ventricular chamber size is normal.     * Left ventricular diastolic function: normal.     * Right ventricular systolic function is normal with TAPSE measuring 3.15   cm.     * Left atrial chamber is mildly enlarged with a left atrial volume index of   38.71 ml/m^2 by BP MOD.     * Dilated inferior vena cava with >50% collapse upon inspiration. PASP 29   mmHg.     * Trace mitral, aortic, tricuspid, and pulmonic regurgitation.     * Right heart chamber dimensions are within normal limits. 11/21 CTA chest  Impression  Performed by RADIOLOGY    Filling defects within two right upper lobe peripheral pulmonary artery branches consistent with minimal pulmonary emboli. Signed By: Renae Trinidad MD on 11/20/2021 2:24 AM    Physical Exam  Constitutional:       General: He is not in acute distress. Appearance: He is well-developed. HENT:      Head: Normocephalic and atraumatic. Mouth/Throat:      Dentition: Normal dentition. Eyes:      General: No scleral icterus. Right eye: No discharge. Left eye: No discharge. Neck:      Thyroid: No thyromegaly. Vascular: No carotid bruit or JVD. Cardiovascular:      Rate and Rhythm: Normal rate and regular rhythm. Pulses: Intact distal pulses. Heart sounds: Normal heart sounds, S1 normal and S2 normal. No murmur heard. No friction rub. No gallop. Pulmonary:      Effort: Pulmonary effort is normal.      Breath sounds: Normal breath sounds. No wheezing or rales. Abdominal:      Palpations: Abdomen is soft. There is no mass. Tenderness: There is no abdominal tenderness. Musculoskeletal:      Cervical back: Neck supple. Right lower leg: Edema (2) present. Left lower leg: Edema (2) present. Lymphadenopathy:      Cervical:      Right cervical: No superficial cervical adenopathy. Left cervical: No superficial cervical adenopathy.    Skin: General: Skin is warm and dry. Findings: No rash. Neurological:      Mental Status: He is alert and oriented to person, place, and time. Psychiatric:         Behavior: Behavior normal.         ASSESSMENT and PLAN        4/13/2022 CHF is improved but still persistent with 2+ edema. Add HCTZ and follow the electrolytes closely. Check lipids and LFTs due to hyperlipidemia and specially history of pancreatitis many years ago. Get a hematology opinion for history of pulmonary embolism in case any hypercoagulability work-up is needed but it may have been due to PICC line. Blood pressure is under fair control and will be watched on HCTZ. Total time taken in the visit was more than 45 minutes. Diagnoses and all orders for this visit:    1. Chronic diastolic congestive heart failure (HCC)  -     hydroCHLOROthiazide (HYDRODIURIL) 12.5 mg tablet; Take 1 Tablet by mouth daily.  -     ECHO ADULT COMPLETE; Future    2. Essential hypertension    3. Hypercholesteremia    4. History of pulmonary embolism  -     ECHO ADULT COMPLETE; Future    5. Alcohol abuse        Pertinent laboratory and test data reviewed and discussed with patient. See patient instructions also for other medical advice given    Medications Discontinued During This Encounter   Medication Reason    omeprazole (PRILOSEC) 20 mg capsule Therapy Completed    ondansetron (Zofran ODT) 4 mg disintegrating tablet Therapy Completed    hydroCHLOROthiazide (HYDRODIURIL) 12.5 mg tablet REORDER       Follow-up and Dispositions    · Return in about 3 months (around 10/14/2022), or if symptoms worsen or fail to improve, for post test.       7/14/2022 6 CHF is compensated NYHA class I-II. Blood pressure is controlled. Lipids are pending. Check an echo to rule out any pulmonary hypertension due to history of pulmonary embolism. Hematology appointment is pending is well. Patient does drink excessive alcohol and we have had a discussion to try to stop it. He will try.

## 2022-07-14 NOTE — PATIENT INSTRUCTIONS
Medications Discontinued During This Encounter   Medication Reason    omeprazole (PRILOSEC) 20 mg capsule Therapy Completed    ondansetron (Zofran ODT) 4 mg disintegrating tablet Therapy Completed    hydroCHLOROthiazide (HYDRODIURIL) 12.5 mg tablet REORDER          Learning About the Mediterranean Diet  What is the Mediterranean diet? The Mediterranean diet is a style of eating rather than a diet plan. It features foods eaten in [place], [place], [place], and other countries along the [facility]. It emphasizes eating foods like fish, fruits, vegetables, beans, high-fiber breads and whole grains, nuts, and olive oil. This style of eating includes limited red meat, cheese, and sweets. Why choose the Mediterranean diet? A Mediterranean-style diet may improve heart health. It contains more fat than other heart-healthy diets. But the fats are mainly from nuts, unsaturated oils (such as fish oils and olive oil), and certain nut or seed oils (such as canola, soybean, or flaxseed oil). These fats may help protect the heart and blood vessels. How can you get started on the Mediterranean diet? Here are some things you can do to switch to a more Mediterranean way of eating. What to eat  · Eat a variety of fruits and vegetables each day, such as grapes, blueberries, tomatoes, broccoli, peppers, figs, olives, spinach, eggplant, beans, lentils, and chickpeas. · Eat a variety of whole-grain foods each day, such as oats, brown rice, and whole wheat bread, pasta, and couscous. · Eat fish at least 2 times a week. Try tuna, salmon, mackerel, lake trout, herring, or sardines. · Eat moderate amounts of low-fat dairy products, such as milk, cheese, or yogurt. · Eat moderate amounts of poultry and eggs. · Choose healthy (unsaturated) fats, such as nuts, olive oil, and certain nut or seed oils like canola, soybean, and flaxseed.   · Limit unhealthy (saturated) fats, such as butter, palm oil, and coconut oil. And limit fats found in animal products, such as meat and dairy products made with whole milk. Try to eat red meat only a few times a month in very small amounts. · Limit sweets and desserts to only a few times a week. This includes sugar-sweetened drinks like soda. The Mediterranean diet may also include red wine with your meal--1 glass each day for women and up to 2 glasses a day for men. Tips for eating at home  · Use herbs, spices, garlic, lemon zest, and citrus juice instead of salt to add flavor to foods. · Add avocado slices to your sandwich instead of wilks. · Have fish for lunch or dinner instead of red meat. Brush the fish with olive oil, and broil or grill it. · Sprinkle your salad with seeds or nuts instead of cheese. · Cook with olive or canola oil instead of butter or oils that are high in saturated fat. · Switch from 2% milk or whole milk to 1% or fat-free milk. · Dip raw vegetables in a vinaigrette dressing or hummus instead of dips made from mayonnaise or sour cream.  · Have a piece of fruit for dessert instead of a piece of cake. Try baked apples, or have some dried fruit. Tips for eating out  · Try broiled, grilled, baked, or poached fish instead of having it fried or breaded. · Ask your  to have your meals prepared with olive oil instead of butter. · Order dishes made with marinara sauce or sauces made from olive oil. Avoid sauces made from cream or mayonnaise. · Choose whole-grain breads, whole wheat pasta and pizza crust, brown rice, beans, and lentils. · Cut back on butter or margarine on bread. Instead, you can dip your bread in a small amount of olive oil. · Ask for a side salad or grilled vegetables instead of french fries or chips. Where can you learn more? Go to http://www.frederick.com/  Enter O407 in the search box to learn more about \"Learning About the Mediterranean Diet. \"  Current as of: September 8, 2021               Content Version: 13.2  © 8296-0452 [employer]. Care instructions adapted under license by [employer] (which disclaims liability or warranty for this information). If you have questions about a medical condition or this instruction, always ask your healthcare professional. [address] any warranty or liability for your use of this information.

## 2022-07-15 ENCOUNTER — TELEPHONE (OUTPATIENT)
Dept: CARDIOLOGY CLINIC | Age: 37
End: 2022-07-15

## 2022-07-15 DIAGNOSIS — R79.89 ELEVATED LFTS: ICD-10-CM

## 2022-07-15 DIAGNOSIS — E78.00 HYPERCHOLESTEREMIA: Primary | ICD-10-CM

## 2022-07-15 LAB
A-G RATIO,AGRAT: 1.9 RATIO (ref 1.1–2.6)
ALBUMIN SERPL-MCNC: 4.8 G/DL (ref 3.5–5)
ALP SERPL-CCNC: 73 U/L (ref 25–115)
ALT SERPL-CCNC: 42 U/L (ref 5–40)
ANION GAP SERPL CALC-SCNC: 16 MMOL/L (ref 3–15)
AST SERPL W P-5'-P-CCNC: 119 U/L (ref 10–37)
AVG GLU, 10930: 92 MG/DL (ref 91–123)
BILIRUB SERPL-MCNC: 0.7 MG/DL (ref 0.2–1.2)
BUN SERPL-MCNC: 16 MG/DL (ref 6–22)
CALCIUM SERPL-MCNC: 9.1 MG/DL (ref 8.4–10.5)
CHLORIDE SERPL-SCNC: 98 MMOL/L (ref 98–110)
CO2 SERPL-SCNC: 24 MMOL/L (ref 20–32)
CREAT SERPL-MCNC: 0.8 MG/DL (ref 0.5–1.2)
GLOBULIN,GLOB: 2.5 G/DL (ref 2–4)
GLOMERULAR FILTRATION RATE: >60 ML/MIN/1.73 SQ.M.
GLUCOSE SERPL-MCNC: 98 MG/DL (ref 70–99)
HBA1C MFR BLD HPLC: 4.8 % (ref 4.8–5.6)
POTASSIUM SERPL-SCNC: 4.4 MMOL/L (ref 3.5–5.5)
PROT SERPL-MCNC: 7.3 G/DL (ref 6.4–8.3)
SODIUM SERPL-SCNC: 138 MMOL/L (ref 133–145)

## 2022-07-15 NOTE — TELEPHONE ENCOUNTER
Patient called stating his labs have dropped in his chart and very scared due to the results. Would like a call back today with instructions on what he has to do.

## 2022-07-15 NOTE — TELEPHONE ENCOUNTER
Spoke with pt regarding lab results, advised to go on strict diet and try to lose weight and stop drinking alcohol. Pt stated has not been eating a lot of fatty foods. Liver enzymes mildly elevated and would like to be repeated in 2 weeks before prescribing medicine for triglycerides. Pt stated he would prefer to be on a medication for triglycerides due to prior Hx and would like you to reconsider. In the past pt had to be in ICU and was told his body doesn't metabolize triglycerides. He was put on Lopid and stated it helped.

## 2022-07-15 NOTE — TELEPHONE ENCOUNTER
I called patient and explained that I do not feel safe giving him medicine for triglycerides till the liver enzymes show downward trend. He promised me he will cut down his drinking and we will repeat the liver profile in 2 weeks. As liver functions reduce towards normalization and he stopped drinking alcohol, we can add fenofibrate. If he gets any significant symptoms of pancreatitis or other problems, he will have to go to emergency room.

## 2022-07-25 DIAGNOSIS — E78.00 HYPERCHOLESTEREMIA: ICD-10-CM

## 2022-07-25 DIAGNOSIS — R79.89 ELEVATED LFTS: ICD-10-CM

## 2022-08-03 DIAGNOSIS — M54.41 CHRONIC RIGHT-SIDED LOW BACK PAIN WITH RIGHT-SIDED SCIATICA: ICD-10-CM

## 2022-08-03 DIAGNOSIS — I50.32 CHRONIC DIASTOLIC CONGESTIVE HEART FAILURE (HCC): ICD-10-CM

## 2022-08-03 DIAGNOSIS — G89.29 CHRONIC RIGHT-SIDED LOW BACK PAIN WITH RIGHT-SIDED SCIATICA: ICD-10-CM

## 2022-08-03 RX ORDER — GABAPENTIN 400 MG/1
CAPSULE ORAL
Qty: 90 CAPSULE | Refills: 2 | OUTPATIENT
Start: 2022-08-03

## 2022-08-03 NOTE — TELEPHONE ENCOUNTER
Neurontin was sent on 7/8/22 for #90 with 2 refills      For Pharmacy [facility] in place:   Recommendation Provided To:    Intervention Detail: Discontinued Rx: 1, reason: Duplicate Therapy  Gap Closed?:   Intervention Accepted By:   Time Spent (min): 5

## 2022-08-26 DIAGNOSIS — F41.0 PANIC ATTACKS: ICD-10-CM

## 2022-08-26 NOTE — TELEPHONE ENCOUNTER
Patient mychart request for refill for clonazepam.  No Access to . Hina Campbell    Last Visit: 7/8/22 NP Yoselyn Murphy  Next Appointment: 10/7/22 NP Yoselyn Murphy  Previous Refill Encounter(s): 7/8/22 45    Requested Prescriptions     Pending Prescriptions Disp Refills    clonazePAM (KlonoPIN) 1 mg tablet 45 Tablet 0     Sig: Take 1 Tablet by mouth three (3) times daily as needed for Anxiety. Max Daily Amount: 3 mg. For [facility] in place:   Recommendation Provided To:    Intervention Detail: New Rx: 1, reason: Patient Preference  Gap Closed?:   Intervention Accepted By:   Time Spent (min): 5

## 2022-09-01 RX ORDER — CLONAZEPAM 1 MG/1
1 TABLET ORAL
Qty: 45 TABLET | Refills: 0 | Status: SHIPPED | OUTPATIENT
Start: 2022-09-01 | End: 2022-10-24

## 2022-09-22 RX ORDER — ATENOLOL 100 MG/1
100 TABLET ORAL 2 TIMES DAILY
Qty: 180 TABLET | Refills: 0 | Status: CANCELLED | OUTPATIENT
Start: 2022-09-22

## 2022-09-22 NOTE — TELEPHONE ENCOUNTER
Tenormin is already pending in another encounter    Last Visit: 7/8/22 with NP Aba Welch  Next Appointment: 10/7/22 with NP Aba Welch  Previous Refill Encounter(s): 5/18/22 #90 with 1 refill    Requested Prescriptions     Pending Prescriptions Disp Refills    multivitamin (ONE A DAY) tablet 90 Tablet 1     Sig: Take 1 Tablet by mouth daily. For [address] in place:   Recommendation Provided To:    Intervention Detail: Discontinued Rx: 1, reason: Duplicate Therapy and New Rx: 1, reason: Patient Preference  Gap Closed?:   Intervention Accepted By:   Time Spent (min): 5

## 2022-09-22 NOTE — TELEPHONE ENCOUNTER
Last Visit: 7/8/22 with JESSE Champagne  Next Appointment: 10/7/22 with JESSE Champagne  Previous Refill Encounter(s): 6/22/22 #180    Requested Prescriptions     Pending Prescriptions Disp Refills    atenoloL (TENORMIN) 100 mg tablet [Pharmacy Med Name: ATENOLOL 100 MG TABLET] 180 Tablet 1     Sig: TAKE ONE TABLET BY MOUTH TWICE A DAY         For Pharmacy Admin Tracking Only    CPA in place:   Recommendation Provided To:    Intervention Detail: New Rx: 1, reason: Patient Preference  Gap Closed?:   Intervention Accepted By:   Time Spent (min): 5

## 2022-09-25 RX ORDER — ATENOLOL 100 MG/1
TABLET ORAL
Qty: 180 TABLET | Refills: 1 | Status: SHIPPED | OUTPATIENT
Start: 2022-09-25

## 2022-09-25 RX ORDER — BISMUTH SUBSALICYLATE 262 MG
1 TABLET,CHEWABLE ORAL DAILY
Qty: 90 TABLET | Refills: 1 | Status: SHIPPED | OUTPATIENT
Start: 2022-09-25 | End: 2022-10-03 | Stop reason: SDUPTHER

## 2022-10-03 RX ORDER — BISMUTH SUBSALICYLATE 262 MG
1 TABLET,CHEWABLE ORAL DAILY
Qty: 90 TABLET | Refills: 3 | Status: SHIPPED | OUTPATIENT
Start: 2022-10-03

## 2022-10-11 ENCOUNTER — OFFICE VISIT (OUTPATIENT)
Dept: FAMILY MEDICINE CLINIC | Age: 37
End: 2022-10-11
Payer: MEDICAID

## 2022-10-11 VITALS
DIASTOLIC BLOOD PRESSURE: 84 MMHG | HEIGHT: 74 IN | BODY MASS INDEX: 31.11 KG/M2 | TEMPERATURE: 98.1 F | SYSTOLIC BLOOD PRESSURE: 128 MMHG | WEIGHT: 242.4 LBS | RESPIRATION RATE: 16 BRPM | HEART RATE: 64 BPM | OXYGEN SATURATION: 96 %

## 2022-10-11 DIAGNOSIS — I10 ESSENTIAL HYPERTENSION: Primary | ICD-10-CM

## 2022-10-11 DIAGNOSIS — N52.2 DRUG-INDUCED ERECTILE DYSFUNCTION: ICD-10-CM

## 2022-10-11 DIAGNOSIS — E78.00 PURE HYPERCHOLESTEROLEMIA: ICD-10-CM

## 2022-10-11 PROCEDURE — 99213 OFFICE O/P EST LOW 20 MIN: CPT | Performed by: NURSE PRACTITIONER

## 2022-10-11 RX ORDER — SILDENAFIL 100 MG/1
100 TABLET, FILM COATED ORAL AS NEEDED
Qty: 12 TABLET | Refills: 5 | Status: SHIPPED | OUTPATIENT
Start: 2022-10-11

## 2022-10-11 RX ORDER — GEMFIBROZIL 600 MG/1
600 TABLET, FILM COATED ORAL 2 TIMES DAILY
Qty: 180 TABLET | Refills: 1 | Status: SHIPPED | OUTPATIENT
Start: 2022-10-11

## 2022-10-11 RX ORDER — LISINOPRIL 40 MG/1
40 TABLET ORAL DAILY
Qty: 90 TABLET | Refills: 3 | Status: SHIPPED | OUTPATIENT
Start: 2022-10-11

## 2022-10-11 RX ORDER — BISMUTH SUBSALICYLATE 262 MG
1 TABLET,CHEWABLE ORAL DAILY
Qty: 90 TABLET | Refills: 3 | Status: CANCELLED | OUTPATIENT
Start: 2022-10-11

## 2022-10-11 NOTE — PROGRESS NOTES
1. \"Have you been to the ER, urgent care clinic since your last visit? Hospitalized since your last visit? \" No    2. \"Have you seen or consulted any other health care providers outside of the [address] [place] [address] since your last visit? \"  [facility]       3. For patients aged 39-70: Has the patient had a colonoscopy / FIT/ Cologuard?  NA - based on age

## 2022-10-11 NOTE — PROGRESS NOTES
Carol Nelson is a 42 y.o. male who was seen in clinic today (10/11/2022) for Anxiety, Depression, and Hypertension    Assessment & Plan:   Diagnoses and all orders for this visit:    1. Essential hypertension  -     lisinopriL (PRINIVIL, ZESTRIL) 40 mg tablet; Take 1 Tablet by mouth daily. 2. Drug-induced erectile dysfunction  -     sildenafil citrate (Viagra) 100 mg tablet; Take 1 Tablet by mouth as needed for Erectile Dysfunction. 3. Pure hypercholesterolemia  -     LIPID PANEL; Future  -     METABOLIC PANEL, COMPREHENSIVE; Future    Other orders  -     gemfibroziL (LOPID) 600 mg tablet; Take 1 Tablet by mouth two (2) times a day. I have discussed the diagnosis with the patient and the intended plan as seen in the above orders. The patient has received an after-visit summary and questions were answered concerning future plans. I have discussed medication side effects and warnings with the patient as well. Patient agreeable with above plan and verbalizes understanding. Follow-up and Dispositions    Return in about 6 weeks (around 11/22/2022) for HLD since beginning medication, virtual follow up. Subjective:   Hypertension ROS: taking medications as instructed, no medication side effects noted, patient does not perform home BP monitoring, no TIA's, no chest pain on exertion, no dyspnea on exertion, no swelling of ankles. Patient states he has been eating better as of late. Patient reports he was dx with Factor V and antiphosolipid syndrome. Patient is followed by Dr. Nasra Hernandez.       Lab Results   Component Value Date/Time    Sodium 138 07/14/2022 08:42 AM    Potassium 4.4 07/14/2022 08:42 AM    Chloride 98 07/14/2022 08:42 AM    CO2 24 07/14/2022 08:42 AM    Anion gap 16.0 (H) 07/14/2022 08:42 AM    Glucose 98 07/14/2022 08:42 AM    BUN 16 07/14/2022 08:42 AM    Creatinine 0.8 07/14/2022 08:42 AM    BUN/Creatinine ratio 11 (L) 02/04/2022 05:50 AM    GFR est AA >60 02/04/2022 05:50 AM GFR est non-AA >60 02/04/2022 05:50 AM    Calcium 9.1 07/14/2022 08:42 AM    Bilirubin, total 0.7 07/14/2022 08:42 AM    Bilirubin, total 0.8 07/14/2022 08:42 AM    Alk. phosphatase 73 07/14/2022 08:42 AM    Alk. phosphatase 71 07/14/2022 08:42 AM    Protein, total 7.3 07/14/2022 08:42 AM    Protein, total 7.4 07/14/2022 08:42 AM    Albumin 4.8 07/14/2022 08:42 AM    Albumin 4.8 07/14/2022 08:42 AM    Globulin 2.5 07/14/2022 08:42 AM    Globulin 2.6 07/14/2022 08:42 AM    A-G Ratio 1.9 07/14/2022 08:42 AM    A-G Ratio 1.8 07/14/2022 08:42 AM    ALT (SGPT) 42 (H) 07/14/2022 08:42 AM    ALT (SGPT) 43 (H) 07/14/2022 08:42 AM    AST (SGOT) 119 (H) 07/14/2022 08:42 AM    AST (SGOT) 118 (H) 07/14/2022 08:42 AM     Lab Results   Component Value Date/Time    Cholesterol, total 394 (H) 07/14/2022 08:42 AM    HDL Cholesterol 29 (L) 07/14/2022 08:42 AM    LDL,Direct 35 (L) 07/14/2022 08:42 AM    LDL, calculated  07/14/2022 08:42 AM      Comment:      Triglyceride value is too high to calculate LDL. Blaire Juarez Direct LDL is being  performed. VLDL, calculated  07/14/2022 08:42 AM      Comment:       Unable to calculate. Triglyceride value is too high.         Triglyceride 2,120 (H) 07/14/2022 08:42 AM    CHOL/HDL Ratio 7.4 (H) 02/25/2014 01:50 AM     Lab Results   Component Value Date/Time    Hemoglobin A1c 4.8 07/14/2022 08:42 AM     Lab Results   Component Value Date/Time    VITAMIN D, 25-HYDROXY 11.6 (L) 02/17/2014 09:41 AM       Lab Results   Component Value Date/Time    WBC 4.6 07/14/2022 08:42 AM    HGB 14.4 07/14/2022 08:42 AM    HCT 41.7 07/14/2022 08:42 AM    PLATELET 216 54/09/2005 08:42 AM    MCV 96 (H) 07/14/2022 08:42 AM       Wt Readings from Last 3 Encounters:   10/11/22 242 lb 6.4 oz (110 kg)   09/20/22 245 lb (111.1 kg)   07/14/22 245 lb (111.1 kg)     Temp Readings from Last 3 Encounters:   10/11/22 98.1 °F (36.7 °C) (Temporal)   07/08/22 97.7 °F (36.5 °C) (Temporal)   02/04/22 98.5 °F (36.9 °C)     BP Readings from Last 3 Encounters:   10/11/22 128/84   09/20/22 129/82   07/14/22 129/82     Pulse Readings from Last 3 Encounters:   10/11/22 64   07/14/22 72   07/08/22 68       Prior to Admission medications    Medication Sig Start Date End Date Taking? Authorizing Provider   multivitamin (ONE A DAY) tablet Take 1 Tablet by mouth daily. 10/3/22  Yes Alcides Betancourt NP   atenoloL (TENORMIN) 100 mg tablet TAKE ONE TABLET BY MOUTH TWICE A DAY 9/25/22  Yes Houston CLARK NP   clonazePAM (KlonoPIN) 1 mg tablet Take 1 Tablet by mouth three (3) times daily as needed for Anxiety. Max Daily Amount: 3 mg. 9/1/22  Yes Alcides Betancourt NP   buprenorphine HCl/naloxone HCl (SUBOXONE SL) 24 mg by SubLINGual route daily. Yes Provider, Historical   gabapentin (NEURONTIN) 400 mg capsule TAKE ONE CAPSULE BY MOUTH THREE TIMES A DAY 7/8/22  Yes Alcides Betancourt NP   atorvastatin (LIPITOR) 20 mg tablet Take 1 Tablet by mouth daily. 7/8/22  Yes Alcides Betancourt NP   escitalopram oxalate (LEXAPRO) 20 mg tablet Take 1 Tablet by mouth daily. 7/8/22  Yes Alcides Betancourt NP   lisinopriL (PRINIVIL, ZESTRIL) 40 mg tablet TAKE ONE TABLET BY MOUTH DAILY 7/6/22  Yes Alcides Betancourt NP   sildenafil citrate (Viagra) 100 mg tablet Take 1 Tab by mouth as needed for Erectile Dysfunction. 8/26/20  Yes Smiley Darnell NP   hydroCHLOROthiazide (HYDRODIURIL) 12.5 mg tablet Take 1 Tablet by mouth daily. Patient not taking: Reported on 10/11/2022 7/14/22   Santana Leiva MD   dextromethorphan-guaiFENesin (Mucinex DM) 60-1,200 mg Tb12 Take 1 Tablet by mouth every twelve (12) hours as needed for Cough or Congestion. Patient not taking: Reported on 10/11/2022 5/18/22   Alcides Betancourt NP   Comp Stocking,Knee,Regular,Sml misc Use daily Size to fit per pt. Small,med or large. Patient not taking: Reported on 10/11/2022 4/13/22   Santana Leiva MD   apixaban Esperanza Babcock) 5 mg tablet Take 1 Tablet by mouth two (2) times a day.   Patient not taking: Reported on 10/11/2022 1/10/22   Robbie Esters T, NP   cetirizine (ZYRTEC) 10 mg tablet Take 1 Tab by mouth nightly. Patient not taking: Reported on 10/11/2022 11/9/20   Robbie Esters T, NP   fluticasone propionate (FLONASE) 50 mcg/actuation nasal spray 2 Sprays by Both Nostrils route daily. Patient not taking: Reported on 10/11/2022 12/1/19   CARLITA Alfred         The following sections were reviewed & updated as appropriate: PMH, PSH, FH, and SH. Review of Systems   Constitutional:  Negative for activity change, appetite change, chills, fatigue and fever. Respiratory:  Negative for chest tightness and shortness of breath. Cardiovascular:  Negative for chest pain and leg swelling. Neurological:  Negative for dizziness and headaches. Objective:   Visit Vitals  /84 (BP 1 Location: Left upper arm, BP Patient Position: Sitting, BP Cuff Size: Large adult)   Pulse 64   Temp 98.1 °F (36.7 °C) (Temporal)   Resp 16   Ht 6' 2\" (1.88 m)   Wt 242 lb 6.4 oz (110 kg)   SpO2 96%   BMI 31.12 kg/m²      Physical Exam  Constitutional:       General: He is not in acute distress. Appearance: He is well-developed. He is not diaphoretic. HENT:      Head: Normocephalic and atraumatic. Neck:      Vascular: No carotid bruit. Cardiovascular:      Rate and Rhythm: Normal rate and regular rhythm. Heart sounds: Normal heart sounds. No murmur heard. No friction rub. No gallop. Pulmonary:      Effort: Pulmonary effort is normal.      Breath sounds: Normal breath sounds. No stridor. No wheezing or rales. Abdominal:      General: Bowel sounds are normal.      Palpations: Abdomen is soft. Tenderness: There is no abdominal tenderness. Musculoskeletal:      Cervical back: Normal range of motion and neck supple. Lymphadenopathy:      Cervical: No cervical adenopathy. Skin:     General: Skin is warm and dry. Neurological:      Mental Status: He is alert and oriented to person, place, and time. Disclaimer: The patient understands our medical plan. Alternatives have been explained and offered. The risks, benefits and significant side effects of all medications have been reviewed. Anticipated time course and progression of condition reviewed. All questions have been addressed. He is encouraged to employ the information provided in the after visit summary, which was reviewed. Where applicable, he is instructed to call the clinic if he has not been notified either by phone or through [address] with the results of his tests or with an appointment plan for any referrals within 1 week(s). No news is not good news; it's no news. The patient  is to call if his condition worsens or fails to improve or if significant side effects are experienced. Aspects of this note may have been generated using voice recognition software. Despite editing, there may be unrecognized errors.        Lala Lizarraga NP

## 2022-10-18 ENCOUNTER — APPOINTMENT (OUTPATIENT)
Dept: GENERAL RADIOLOGY | Age: 37
End: 2022-10-18
Attending: EMERGENCY MEDICINE
Payer: MEDICAID

## 2022-10-18 ENCOUNTER — HOSPITAL ENCOUNTER (EMERGENCY)
Age: 37
Discharge: LEFT BEFORE DISCHARGE | End: 2022-10-18
Payer: MEDICAID

## 2022-10-18 VITALS
RESPIRATION RATE: 19 BRPM | HEART RATE: 72 BPM | HEIGHT: 74 IN | SYSTOLIC BLOOD PRESSURE: 162 MMHG | TEMPERATURE: 98.1 F | DIASTOLIC BLOOD PRESSURE: 104 MMHG | BODY MASS INDEX: 30.16 KG/M2 | OXYGEN SATURATION: 98 % | WEIGHT: 235 LBS

## 2022-10-18 PROCEDURE — 73610 X-RAY EXAM OF ANKLE: CPT

## 2022-10-18 PROCEDURE — 73620 X-RAY EXAM OF FOOT: CPT

## 2022-10-18 PROCEDURE — 75810000275 HC EMERGENCY DEPT VISIT NO LEVEL OF CARE

## 2022-10-19 NOTE — ED NOTES
Pt out of wheelchair and ambulatory, told  registration that he was leaving. Pt seen ambulating out of ED lobby.

## 2022-10-19 NOTE — ED TRIAGE NOTES
Pt states he was sleeping, woke up and stepped on boot. Reports L ankle and foot pain. Swelling noted. +etoh. Pt appears uncomfortable in triage.  Describes \"popping and cracking\" with pressure

## 2022-10-22 DIAGNOSIS — F41.0 PANIC ATTACKS: ICD-10-CM

## 2022-10-24 RX ORDER — CLONAZEPAM 1 MG/1
TABLET ORAL
Qty: 45 TABLET | Refills: 0 | Status: SHIPPED | OUTPATIENT
Start: 2022-10-24 | End: 2022-11-22 | Stop reason: SDUPTHER

## 2022-11-22 ENCOUNTER — VIRTUAL VISIT (OUTPATIENT)
Dept: FAMILY MEDICINE CLINIC | Age: 37
End: 2022-11-22
Payer: MEDICAID

## 2022-11-22 DIAGNOSIS — F41.0 PANIC ATTACKS: ICD-10-CM

## 2022-11-22 DIAGNOSIS — E78.00 PURE HYPERCHOLESTEROLEMIA: Primary | ICD-10-CM

## 2022-11-22 DIAGNOSIS — M54.41 CHRONIC RIGHT-SIDED LOW BACK PAIN WITH RIGHT-SIDED SCIATICA: ICD-10-CM

## 2022-11-22 DIAGNOSIS — G89.29 CHRONIC RIGHT-SIDED LOW BACK PAIN WITH RIGHT-SIDED SCIATICA: ICD-10-CM

## 2022-11-22 PROCEDURE — 99213 OFFICE O/P EST LOW 20 MIN: CPT | Performed by: NURSE PRACTITIONER

## 2022-11-22 RX ORDER — DOCUSATE SODIUM 100 MG/1
100 CAPSULE, LIQUID FILLED ORAL 2 TIMES DAILY
Qty: 60 CAPSULE | Refills: 2 | Status: SHIPPED | OUTPATIENT
Start: 2022-11-22 | End: 2023-02-20

## 2022-11-22 RX ORDER — CLONAZEPAM 1 MG/1
TABLET ORAL
Qty: 45 TABLET | Refills: 2 | Status: SHIPPED | OUTPATIENT
Start: 2022-11-22

## 2022-11-22 RX ORDER — GABAPENTIN 400 MG/1
CAPSULE ORAL
Qty: 90 CAPSULE | Refills: 2 | Status: SHIPPED | OUTPATIENT
Start: 2022-11-22

## 2022-11-22 NOTE — Clinical Note
HTN/HLD, anxiety/depression, fasting labs 1 wk prior, in office follow up in 4 months Normal/Anxious/Other

## 2022-11-22 NOTE — PROGRESS NOTES
Chriss Escobedo is a 42 y.o. male who was seen by synchronous (real-time) audio-video technology on 11/22/2022 for Cholesterol Problem      Assessment & Plan:   Diagnoses and all orders for this visit:    1. Pure hypercholesterolemia    2. Panic attacks  -     clonazePAM (KlonoPIN) 1 mg tablet; TAKE ONE TABLET BY MOUTH THREE TIMES A DAY AS NEEDED FOR ANXIETY    3. Chronic right-sided low back pain with right-sided sciatica  -     gabapentin (NEURONTIN) 400 mg capsule; TAKE ONE CAPSULE BY MOUTH THREE TIMES A DAY    Other orders  -     docusate sodium (COLACE) 100 mg capsule; Take 1 Capsule by mouth two (2) times a day for 90 days. Begin colace for constipation as above  Requested refills provided  State he will have previously ordered fasting labs drawn at [facility] on Saturday. Follow-up and Dispositions    Return in about 4 months (around 3/22/2023) for HTN/HLD, anxiety/depression, fasting labs 1 wk prior, in office follow up. Subjective:   Cardiovascular risk analysis - LDL goal is under 100. Compliance with treatment thus far has been good. ROS: taking medications as instructed, no medication side effects noted, no TIA's, no chest pain on exertion, no dyspnea on exertion, no swelling of ankles. New concerns: Patient recently fractured his left leg on 10/19/2022. Patient states he has been trying to get in touch with the orthopedist regarding pain. Reports he has not heard from the office as of yet. Further states he also tried to contact the pain management office he was referred to and per patient he was informed since his pain is not chronic he does not qualify to be seen. 10/11/2022  Chriss Escobedo is a 42 y.o. male who was seen in clinic today (10/11/2022) for Anxiety, Depression, and Hypertension    Assessment & Plan:   Diagnoses and all orders for this visit:    1. Essential hypertension  -     lisinopriL (PRINIVIL, ZESTRIL) 40 mg tablet; Take 1 Tablet by mouth daily.     2. Drug-induced erectile dysfunction  -     sildenafil citrate (Viagra) 100 mg tablet; Take 1 Tablet by mouth as needed for Erectile Dysfunction. 3. Pure hypercholesterolemia  -     LIPID PANEL; Future  -     METABOLIC PANEL, COMPREHENSIVE; Future    Other orders  -     gemfibroziL (LOPID) 600 mg tablet; Take 1 Tablet by mouth two (2) times a day. I have discussed the diagnosis with the patient and the intended plan as seen in the above orders. The patient has received an after-visit summary and questions were answered concerning future plans. I have discussed medication side effects and warnings with the patient as well. Patient agreeable with above plan and verbalizes understanding. Follow-up and Dispositions    Return in about 6 weeks (around 11/22/2022) for HLD since beginning medication, virtual follow up. Subjective:   Hypertension ROS: taking medications as instructed, no medication side effects noted, patient does not perform home BP monitoring, no TIA's, no chest pain on exertion, no dyspnea on exertion, no swelling of ankles. Patient states he has been eating better as of late. Patient reports he was dx with Factor V and antiphosolipid syndrome. Patient is followed by Dr. Jose Guadalupe Caba. Prior to Admission medications    Medication Sig Start Date End Date Taking? Authorizing Provider   escitalopram oxalate (LEXAPRO) 20 mg tablet TAKE ONE TABLET BY MOUTH DAILY 11/3/22   Eileen CALRK NP   clonazePAM (KlonoPIN) 1 mg tablet TAKE ONE TABLET BY MOUTH THREE TIMES A DAY AS NEEDED FOR ANXIETY 10/24/22   Eileen CLARK NP   lisinopriL (PRINIVIL, ZESTRIL) 40 mg tablet Take 1 Tablet by mouth daily. 10/11/22   Eileen CLARK NP   sildenafil citrate (Viagra) 100 mg tablet Take 1 Tablet by mouth as needed for Erectile Dysfunction. 10/11/22   Eileen CLARK NP   gemfibroziL (LOPID) 600 mg tablet Take 1 Tablet by mouth two (2) times a day.  10/11/22   Brittany Kingsley NP   multivitamin (ONE A DAY) tablet Take 1 Tablet by mouth daily. 10/3/22   Emily Smoker T, JESSE   atenoloL (TENORMIN) 100 mg tablet TAKE ONE TABLET BY MOUTH TWICE A DAY 9/25/22   Emily CLARK NP   hydroCHLOROthiazide (HYDRODIURIL) 12.5 mg tablet Take 1 Tablet by mouth daily. Patient not taking: Reported on 10/11/2022 7/14/22   Rose Grissom MD   buprenorphine HCl/naloxone HCl (SUBOXONE SL) 24 mg by SubLINGual route daily. Provider, Historical   gabapentin (NEURONTIN) 400 mg capsule TAKE ONE CAPSULE BY MOUTH THREE TIMES A DAY 7/8/22   Emily Doll T, JESSE   atorvastatin (LIPITOR) 20 mg tablet Take 1 Tablet by mouth daily. 7/8/22   Judy Sanders NP   dextromethorphan-guaiFENesin (Mucinex DM) 60-1,200 mg Tb12 Take 1 Tablet by mouth every twelve (12) hours as needed for Cough or Congestion. Patient not taking: Reported on 10/11/2022 5/18/22   Judy Sanders NP   Comp Stocking,Knee,Regular,Sml misc Use daily Size to fit per pt. Small,med or large. Patient not taking: Reported on 10/11/2022 4/13/22   Rose Grissom MD   [facility]) 5 mg tablet Take 1 Tablet by mouth two (2) times a day. Patient not taking: Reported on 10/11/2022 1/10/22   Emily Doll TJESSE   cetirizine (ZYRTEC) 10 mg tablet Take 1 Tab by mouth nightly. Patient not taking: Reported on 10/11/2022 11/9/20   Emily CLARK NP   fluticasone propionate (FLONASE) 50 mcg/actuation nasal spray 2 Sprays by Both Nostrils route daily. Patient not taking: Reported on 10/11/2022 12/1/19   CARLITA Banda     Patient Active Problem List   Diagnosis Code    Essential hypertension I10    Hypercholesteremia E78.00    MICHELLE (generalized anxiety disorder) F41.1    Mild depression F32. A    Acute appendicitis K35.80    Ankle pain M25.579    Ankle swelling M25.473    Fever R50.9    Suspected soft tissue infection R68.89    Septic arthritis (HCC) M00.9    Alcohol abuse F10.10    Chronic diastolic congestive heart failure (HCC) I50.32    History of pulmonary embolism Z86.711 Patient Active Problem List    Diagnosis Date Noted    Alcohol abuse 07/14/2022    Chronic diastolic congestive heart failure ([facility] 07/14/2022    History of pulmonary embolism 07/14/2022    Septic arthritis ([facility] 09/26/2021    Ankle pain 09/25/2021    Ankle swelling 09/25/2021    Fever 09/25/2021    Suspected soft tissue infection 09/25/2021    Acute appendicitis 05/07/2020    Mild depression 06/08/2018    MICHELLE (generalized anxiety disorder) 07/08/2015    Hypercholesteremia 12/05/2013    Essential hypertension 09/12/2012     Current Outpatient Medications   Medication Sig Dispense Refill    escitalopram oxalate (LEXAPRO) 20 mg tablet TAKE ONE TABLET BY MOUTH DAILY 90 Tablet 3    clonazePAM (KlonoPIN) 1 mg tablet TAKE ONE TABLET BY MOUTH THREE TIMES A DAY AS NEEDED FOR ANXIETY 45 Tablet 0    lisinopriL (PRINIVIL, ZESTRIL) 40 mg tablet Take 1 Tablet by mouth daily. 90 Tablet 3    sildenafil citrate (Viagra) 100 mg tablet Take 1 Tablet by mouth as needed for Erectile Dysfunction. 12 Tablet 5    gemfibroziL (LOPID) 600 mg tablet Take 1 Tablet by mouth two (2) times a day. 180 Tablet 1    multivitamin (ONE A DAY) tablet Take 1 Tablet by mouth daily. 90 Tablet 3    atenoloL (TENORMIN) 100 mg tablet TAKE ONE TABLET BY MOUTH TWICE A  Tablet 1    hydroCHLOROthiazide (HYDRODIURIL) 12.5 mg tablet Take 1 Tablet by mouth daily. (Patient not taking: Reported on 10/11/2022) 90 Tablet 1    buprenorphine HCl/naloxone HCl (SUBOXONE SL) 24 mg by SubLINGual route daily. gabapentin (NEURONTIN) 400 mg capsule TAKE ONE CAPSULE BY MOUTH THREE TIMES A DAY 90 Capsule 2    atorvastatin (LIPITOR) 20 mg tablet Take 1 Tablet by mouth daily. 90 Tablet 1    dextromethorphan-guaiFENesin (Mucinex DM) 60-1,200 mg Tb12 Take 1 Tablet by mouth every twelve (12) hours as needed for Cough or Congestion. (Patient not taking: Reported on 10/11/2022) 30 Tablet 0    Comp Stocking,Knee,Regular,Sml misc Use daily Size to fit per pt.  Small,med or large. (Patient not taking: Reported on 10/11/2022) 1 Each 0    apixaban (ELIQUIS) 5 mg tablet Take 1 Tablet by mouth two (2) times a day. (Patient not taking: Reported on 10/11/2022) 60 Tablet 4    cetirizine (ZYRTEC) 10 mg tablet Take 1 Tab by mouth nightly. (Patient not taking: Reported on 10/11/2022) 30 Tab 1    fluticasone propionate (FLONASE) 50 mcg/actuation nasal spray 2 Sprays by Both Nostrils route daily. (Patient not taking: Reported on 10/11/2022) 1 Bottle 0     Allergies   Allergen Reactions    Amoxicillin Swelling    Contrast Dye [Iodine] Hives     Pt had iv contrast prior to this study without problems, developed hives on 07/04/15 5 minutes post injection    Iodinated Contrast Media Hives     Pt got hives when he received IV contrast in the past    Penicillins Hives and Swelling    Wellbutrin [Bupropion] Other (comments)     Caused aggression     Past Medical History:   Diagnosis Date    Anxiety     Cholelithiasis 08/18/2013    Note on U/S    Chronic gastritis 10/17/2014    Dr. Zach Garza (EGD Result) H. Pylori Neg    Depression     Dog bite of left hand including fingers with infection 11/16/2014    Adm 11/18/14 for IV antibiotics. ETOH abuse     GERD (gastroesophageal reflux disease)     Hepatic steatosis 02/06/2014    Noted on U/S. History of plasmapheresis 02/2014    Hypertriglyceridemia, Severe Pancreatitis    Hypertension     Hypertriglyceridemia     Hypomagnesemia 07/24/2016    1.6 mg/dL    Non compliance with medical treatment     Osteomyelitis of left hand ([address] 11/22/2014    MRI Finding @ LUE 2nd MCP joint. Pancreatitis 08/17/2013    Highest on Record (2/6/14) 9485. Dr. Zach Garza    Panic attacks     PICC (peripherally inserted central catheter) in place 99/31/6265    R Basilic Vein (IV Antibiotics for LUE Hand Osteomyelitis). Pulmonary emboli ([address] 11/2021    Rectal bleeding     Substance abuse ([address]     Several UDS + Opiates.      Thromboembolus ([address]     Vitamin D deficiency 02/17/2014    11.6ng/mL ( ng/mL)     Past Surgical History:   Procedure Laterality Date    HX CYST INCISION AND DRAINAGE Left 12/01/2014    LUE 2nd digit abscess I&D. HX ENDOSCOPY  10/17/2014    Dr. Veronica Murphy: EGD/Colonoscopy    HX HEENT      wisdom tooth extraction    HX KNEE ARTHROSCOPY Right     HX ORTHOPAEDIC      Left foot     Family History   Problem Relation Age of Onset    Elevated Lipids Mother     Thyroid Disease Mother     Hypertension Father     Alcohol abuse Maternal Grandmother     Mult Sclerosis Paternal Grandmother     Stroke Neg Hx     Heart Attack Neg Hx      Social History     Tobacco Use    Smoking status: Former     Types: Cigarettes     Quit date: 9/17/2012     Years since quitting: 10.1    Smokeless tobacco: Never    Tobacco comments:     November 2018    Substance Use Topics    Alcohol use: Yes     Alcohol/week: 42.0 standard drinks     Types: 42 Shots of liquor per week       ROS    Objective:   No flowsheet data found. General: alert, cooperative, no distress   Mental  status: normal mood, behavior, speech, dress, motor activity, and thought processes, able to follow commands   HENT: NCAT   Neck: no visualized mass   Resp: no respiratory distress   Neuro: no gross deficits   Skin: no discoloration or lesions of concern on visible areas   Psychiatric: normal affect, consistent with stated mood, no evidence of hallucinations     Additional exam findings: We discussed the expected course, resolution and complications of the diagnosis(es) in detail. Medication risks, benefits, costs, interactions, and alternatives were discussed as indicated. I advised him to contact the office if his condition worsens, changes or fails to improve as anticipated. He expressed understanding with the diagnosis(es) and plan. Warden Jessicaemilia, was evaluated through a synchronous (real-time) audio-video encounter.  The patient (or guardian if applicable) is aware that this is a billable service, which includes applicable co-pays. This Virtual Visit was conducted with patient's (and/or legal guardian's) consent. The visit was conducted pursuant to the emergency declaration under the [address] [place] [address] authority and the [employer] and [employer] General Act. Patient identification was verified, and a caregiver was present when appropriate. The patient was located at: Home: [address] [place] [id number]  The provider was located at:  Facility (Appt Department): [address] [place] [address] [address] JESSE Ocampo

## 2022-11-22 NOTE — PATIENT INSTRUCTIONS
[facility] Laboratory Locations    Sites open Monday to Friday. ? indicates the location is open Saturdays. Call your preferred location for test preparation, business hours and other information you need. ? [facility] at [address], [place]     [phone]  Monday - Friday,  7:00 a.m. - 5:00 p.m. Saturday,   No appointment needed  [facility], [address]  [phone]  Monday-Friday, 6:30 a.m. - 6:00 p.m. No appointment needed     ? JEREMIAH HUFFMAN \[facility] [place] [facility]\"" Outpatient Laboratory  [address]  Monday - Friday, 7:00 a.m. - 6:00 p.m. Saturday, 9:00 a.m. - noon    [address]  [address] [facility]  [phone]  Monday-Friday, 7:00 a.m. - 6:00  p.m. No appointment needed    ? [facility] HOSP - [facility], [address] [place] [address] [address]  [phone]  Monday - Friday, 7:00 a.m. - 3:30 p.m.     Saturday, 7:00 a.m. to noon  No appointment needed

## 2023-01-11 DIAGNOSIS — I10 ESSENTIAL HYPERTENSION: ICD-10-CM

## 2023-01-12 RX ORDER — LISINOPRIL 40 MG/1
40 TABLET ORAL DAILY
Qty: 90 TABLET | Refills: 3 | OUTPATIENT
Start: 2023-01-12

## 2023-01-12 NOTE — TELEPHONE ENCOUNTER
Last Visit: 11-   Next Appointment: 03-      Requested Prescriptions     Pending Prescriptions Disp Refills    lisinopriL (PRINIVIL, ZESTRIL) 40 mg tablet 90 Tablet 3     Sig: Take 1 Tablet by mouth daily.

## 2023-01-12 NOTE — TELEPHONE ENCOUNTER
Patient put in a request for a refill on lisinopril 40mg. Patient was prescribed a 90 day supply on 10/11/22 and given 3 refills. Patient needs to contact his pharmacy for a refill.

## 2023-02-20 ENCOUNTER — TELEPHONE (OUTPATIENT)
Age: 38
End: 2023-02-20

## 2023-02-20 NOTE — TELEPHONE ENCOUNTER
Patient called stating he needs a letter stating he is in stable condition and on current treatment plan for medication (Clonazepam),patient also states he is trying to get a job that's the reason for the letter and needs to be cleared for is [employer] and  if possible to send letter to   Email- [email or web address]   [phone]   Please adv if patient needs appt

## 2023-02-21 NOTE — TELEPHONE ENCOUNTER
Spoke with patient to inform him that we did receive his messages but the provider does have 7 to 10 business days to complete. Patient stated he is in bergeron need and he only has $3.87 in his bank account. Patient stated he is really trying to get this job or he will be living in his car. I informed patient I will give KEVIN Sanchez this information.

## 2023-02-21 NOTE — TELEPHONE ENCOUNTER
Patient called to check on status of letter to be sent to [facility] [employer] Stating patient is in stable condition and on current treatment plan due to medication (Clonazepam) also stated he  only has $3.87 cents to is name and needs to be cleared by [employer] to drive motor vehicle.   [facility] phone [phone]   Please advise.

## 2023-03-15 ENCOUNTER — TELEPHONE (OUTPATIENT)
Age: 38
End: 2023-03-15

## 2023-03-24 DIAGNOSIS — M54.41 LOW BACK PAIN WITH RIGHT-SIDED SCIATICA, UNSPECIFIED BACK PAIN LATERALITY, UNSPECIFIED CHRONICITY: Primary | ICD-10-CM

## 2023-03-24 NOTE — TELEPHONE ENCOUNTER
Patient called requesting refill request for Gabapentin, advised pt could send a request for a courtesy fill unitl next appt, pt became upset stating that we need to fill today because the pharmacy called it in, adv nothing recvd from them, however, his missed appt from 3/21 as well as missed lab appt from the week before needs to be scheduled, pt stated that he called to the call center and cancelled the appt and chose not to reschedule at that time. Stated that the office reached out on that same day to change time and was unable to reach pt, pt did not show for either time. Patient stated that he didn't need to schedule now to just fill medication today, adv provider not back into office until Monday, he stated that he was now recording the call and if anything happened to him that it would be the fault of the office for not providing care and his meds, advised pt that labs requested, appts scheduled and provider consults provides what is needed from a clinical side and that there is a responsibility on the patients side to keep appts so that the best care can be given, also adv how important it is for labs and appts be up to date, just as discussed previously with the office. Patient initially declined to schedule an appt, then he agreed but declined multiple upcoming appts and only agreed to a time for almost a month away.

## 2023-03-27 NOTE — TELEPHONE ENCOUNTER
Last Visit: 11- VV   Next Appointment: none  Previous Refill Encounter: 11- #90caps with 2 refills  Last Urine Drug Screen: 10-  Controlled Substance Agreement: 07-  : Last filled on 03- for 30  d/s.        Requested Prescriptions     Pending Prescriptions Disp Refills    gabapentin (NEURONTIN) 400 MG capsule 90 capsule      Sig: TAKE ONE CAPSULE BY MOUTH THREE TIMES A DAY

## 2023-03-29 RX ORDER — GABAPENTIN 400 MG/1
CAPSULE ORAL
Qty: 90 CAPSULE | Refills: 5 | Status: SHIPPED | OUTPATIENT
Start: 2023-03-29 | End: 2023-04-06 | Stop reason: SDUPTHER

## 2023-04-05 NOTE — TELEPHONE ENCOUNTER
Lorie calling stating they need the gabapentin that was sent to [facility] transferred to them at [place] on [address] [place] [address] due to [facility] no taking his insurance. They can't have it transferred due to it being a new script.

## 2023-04-06 RX ORDER — GABAPENTIN 400 MG/1
CAPSULE ORAL
Qty: 90 CAPSULE | Refills: 5 | Status: SHIPPED | OUTPATIENT
Start: 2023-04-06 | End: 2023-05-05

## 2023-04-07 ENCOUNTER — OFFICE VISIT (OUTPATIENT)
Age: 38
End: 2023-04-07
Payer: MEDICAID

## 2023-04-07 VITALS
RESPIRATION RATE: 18 BRPM | HEIGHT: 74 IN | SYSTOLIC BLOOD PRESSURE: 138 MMHG | BODY MASS INDEX: 30.01 KG/M2 | TEMPERATURE: 97.9 F | WEIGHT: 233.8 LBS | OXYGEN SATURATION: 94 % | DIASTOLIC BLOOD PRESSURE: 92 MMHG | HEART RATE: 86 BPM

## 2023-04-07 DIAGNOSIS — F41.1 GENERALIZED ANXIETY DISORDER: ICD-10-CM

## 2023-04-07 DIAGNOSIS — F41.0 PANIC DISORDER (EPISODIC PAROXYSMAL ANXIETY): ICD-10-CM

## 2023-04-07 DIAGNOSIS — M79.641 RIGHT HAND PAIN: Primary | ICD-10-CM

## 2023-04-07 DIAGNOSIS — E78.00 PURE HYPERCHOLESTEROLEMIA, UNSPECIFIED: ICD-10-CM

## 2023-04-07 DIAGNOSIS — I10 ESSENTIAL (PRIMARY) HYPERTENSION: ICD-10-CM

## 2023-04-07 PROCEDURE — 99214 OFFICE O/P EST MOD 30 MIN: CPT | Performed by: NURSE PRACTITIONER

## 2023-04-07 PROCEDURE — 3080F DIAST BP >= 90 MM HG: CPT | Performed by: NURSE PRACTITIONER

## 2023-04-07 PROCEDURE — 3075F SYST BP GE 130 - 139MM HG: CPT | Performed by: NURSE PRACTITIONER

## 2023-04-07 RX ORDER — MULTIVITAMIN
1 TABLET ORAL DAILY
COMMUNITY
Start: 2023-03-23

## 2023-04-07 RX ORDER — IBUPROFEN 600 MG/1
TABLET ORAL
COMMUNITY
Start: 2022-10-19 | End: 2023-04-07

## 2023-04-07 RX ORDER — LISINOPRIL 40 MG/1
40 TABLET ORAL DAILY
Qty: 90 TABLET | Refills: 2 | Status: SHIPPED | OUTPATIENT
Start: 2023-04-07

## 2023-04-07 RX ORDER — ATENOLOL 100 MG/1
100 TABLET ORAL 2 TIMES DAILY
Qty: 180 TABLET | Refills: 2 | Status: SHIPPED | OUTPATIENT
Start: 2023-04-07

## 2023-04-07 RX ORDER — OXYCODONE HYDROCHLORIDE AND ACETAMINOPHEN 5; 325 MG/1; MG/1
TABLET ORAL
COMMUNITY
Start: 2021-11-24 | End: 2023-04-07 | Stop reason: SDUPTHER

## 2023-04-07 RX ORDER — OXYCODONE HYDROCHLORIDE AND ACETAMINOPHEN 5; 325 MG/1; MG/1
1 TABLET ORAL EVERY 4 HOURS PRN
Qty: 18 TABLET | Refills: 0 | Status: SHIPPED | OUTPATIENT
Start: 2023-04-07 | End: 2023-04-10

## 2023-04-07 RX ORDER — ATORVASTATIN CALCIUM 20 MG/1
20 TABLET, FILM COATED ORAL DAILY
Qty: 90 TABLET | Refills: 2 | Status: SHIPPED | OUTPATIENT
Start: 2023-04-07

## 2023-04-07 RX ORDER — ONDANSETRON 4 MG/1
TABLET, ORALLY DISINTEGRATING ORAL
COMMUNITY

## 2023-04-07 RX ORDER — NALOXONE HYDROCHLORIDE 4 MG/.1ML
SPRAY NASAL
COMMUNITY

## 2023-04-07 RX ORDER — PSEUDOEPHEDRINE HCL 30 MG
TABLET ORAL
COMMUNITY

## 2023-04-07 RX ORDER — OXYCODONE HYDROCHLORIDE 5 MG/1
TABLET ORAL
COMMUNITY

## 2023-04-07 SDOH — ECONOMIC STABILITY: FOOD INSECURITY: WITHIN THE PAST 12 MONTHS, THE FOOD YOU BOUGHT JUST DIDN'T LAST AND YOU DIDN'T HAVE MONEY TO GET MORE.: OFTEN TRUE

## 2023-04-07 SDOH — ECONOMIC STABILITY: HOUSING INSECURITY
IN THE LAST 12 MONTHS, WAS THERE A TIME WHEN YOU DID NOT HAVE A STEADY PLACE TO SLEEP OR SLEPT IN A SHELTER (INCLUDING NOW)?: YES

## 2023-04-07 SDOH — ECONOMIC STABILITY: FOOD INSECURITY: WITHIN THE PAST 12 MONTHS, YOU WORRIED THAT YOUR FOOD WOULD RUN OUT BEFORE YOU GOT MONEY TO BUY MORE.: OFTEN TRUE

## 2023-04-07 SDOH — ECONOMIC STABILITY: INCOME INSECURITY: HOW HARD IS IT FOR YOU TO PAY FOR THE VERY BASICS LIKE FOOD, HOUSING, MEDICAL CARE, AND HEATING?: VERY HARD

## 2023-04-07 ASSESSMENT — ANXIETY QUESTIONNAIRES
3. WORRYING TOO MUCH ABOUT DIFFERENT THINGS: 3
2. NOT BEING ABLE TO STOP OR CONTROL WORRYING: 2
4. TROUBLE RELAXING: 3
GAD7 TOTAL SCORE: 13
7. FEELING AFRAID AS IF SOMETHING AWFUL MIGHT HAPPEN: 1
IF YOU CHECKED OFF ANY PROBLEMS ON THIS QUESTIONNAIRE, HOW DIFFICULT HAVE THESE PROBLEMS MADE IT FOR YOU TO DO YOUR WORK, TAKE CARE OF THINGS AT HOME, OR GET ALONG WITH OTHER PEOPLE: NOT DIFFICULT AT ALL
5. BEING SO RESTLESS THAT IT IS HARD TO SIT STILL: 1
1. FEELING NERVOUS, ANXIOUS, OR ON EDGE: 3
6. BECOMING EASILY ANNOYED OR IRRITABLE: 0

## 2023-04-07 ASSESSMENT — ENCOUNTER SYMPTOMS
SHORTNESS OF BREATH: 0
COUGH: 0

## 2023-04-07 ASSESSMENT — PATIENT HEALTH QUESTIONNAIRE - PHQ9
SUM OF ALL RESPONSES TO PHQ QUESTIONS 1-9: 0
SUM OF ALL RESPONSES TO PHQ9 QUESTIONS 1 & 2: 0
SUM OF ALL RESPONSES TO PHQ QUESTIONS 1-9: 0
2. FEELING DOWN, DEPRESSED OR HOPELESS: 0
1. LITTLE INTEREST OR PLEASURE IN DOING THINGS: 0
SUM OF ALL RESPONSES TO PHQ QUESTIONS 1-9: 0
SUM OF ALL RESPONSES TO PHQ QUESTIONS 1-9: 0

## 2023-04-07 NOTE — PATIENT INSTRUCTIONS
that is right for you. Phone toll-free: 6-525.749.6924  Website:  www. pparx. [address]    What they offer: The [address] [place] [address] gives you and your family access to a free Prescription Drug Card program and savings of up to 75% at more than 50,000 national and [employer]. Phone toll-free: 6-587.497.7184  Website: www.Advanced[employer]Tec    additional resources? Call 211 or Find Help: https://www. SoThree.Pedius/ FOOD RESOURCES    Serge Stack of [facility]  What they offer: Assistance with finding a food pantry, soup kitchen or resource near you. Website: https://"[employer]".org/get-help/community-resources/  Provide instructions for assistance with Jonel Espinosa ([employer] [place]) application on this site. SNAP ([address])  What they offer: SNAP is used like cash to buy eligible food items from authorized retailers. Apply for benefits online: Beka.  Apply for benefits by telephone: [phone]    Meals on Wheels  What they offer: Meals on Wheels is a program that delivers meals to individuals at home who are unable to purchase or prepare their own meals. Website: https://[employer]. org/how-we-help/meals-on-wheels/  Requirements can vary by program and areas served. To apply:  [address] [place] [address] Avenue: [phone] (DU -Fri 8:30 a.m. to 4:30 p.m.)  Coverage Area:  [place], [place], [place], [place], [facility]  Website: www.[facility].org/contact-us/  Jacki Nguyen [phone]   Coverage Areas: [address] [facility], [address] [place] [address]. [facility]  What they offer:  [employer] provides comprehensive services to the disadvantaged/low-income community, homebound seniors and homeless in [place] and [place].   Phone

## 2023-04-07 NOTE — PROGRESS NOTES
1. \"Have you been to the ER, urgent care clinic since your last visit? Hospitalized since your last visit? \" No    2. \"Have you seen or consulted any other health care providers outside of the [address] [place] [address] since your last visit? \" No     3. For patients aged 39-70: Has the patient had a colonoscopy / FIT/ Cologuard?  NA - based on age
kg)   09/20/22 245 lb (111.1 kg)     Temp Readings from Last 3 Encounters:   04/07/23 97.9 °F (36.6 °C) (Temporal)     BP Readings from Last 3 Encounters:   04/07/23 (!) 139/98   10/11/22 128/84   09/20/22 129/82     Pulse Readings from Last 3 Encounters:   04/07/23 86   10/11/22 64   07/14/22 72     Prior to Admission medications    Medication Sig Start Date End Date Taking? Authorizing Provider   docusate (COLACE, DULCOLAX) 100 MG CAPS docusate sodium 100 mg capsule   Yes Historical Provider, MD   ibuprofen (ADVIL;MOTRIN) 600 MG tablet ibuprofen 600 mg tablet 10/19/22  Yes Historical Provider, MD   naloxone 4 MG/0.1ML LIQD nasal spray naloxone 4 mg/actuation nasal spray   Yes Historical Provider, MD   atenolol (TENORMIN) 100 MG tablet Take 1 tablet by mouth 2 times daily 4/7/23  Yes VANESA Jaramillo NP   lisinopril (PRINIVIL;ZESTRIL) 40 MG tablet Take 1 tablet by mouth daily 4/7/23  Yes VANESA Jaramillo NP   gabapentin (NEURONTIN) 400 MG capsule TAKE ONE CAPSULE BY MOUTH THREE TIMES A DAY 4/6/23 5/5/23 Yes VANESA Jaramillo NP   clonazePAM (KLONOPIN) 1 MG tablet TAKE ONE TABLET BY MOUTH THREE TIMES A DAY AS NEEDED FOR ANXIETY 11/22/22  Yes Ar Automatic Reconciliation   escitalopram (LEXAPRO) 20 MG tablet TAKE ONE TABLET BY MOUTH DAILY 11/3/22  Yes Ar Automatic Reconciliation   gemfibrozil (LOPID) 600 MG tablet Take 1 tablet by mouth 2 times daily 10/11/22  Yes Ar Automatic Reconciliation   sildenafil (VIAGRA) 100 MG tablet Take 1 tablet by mouth as needed 10/11/22  Yes Ar Automatic Reconciliation   Multiple Vitamin (MULTIVITAMIN) TABS Take 1 tablet by mouth daily  Patient not taking: Reported on 4/7/2023 3/23/23   Historical Provider, MD   ondansetron (ZOFRAN-ODT) 4 MG disintegrating tablet ondansetron 4 mg disintegrating tablet   DISSOLVE 1 TABLET ON TONGUE EVERY EIGHT (8) HOURS AS NEEDED FOR NAUSEA OR NAUSEA OR VOMITING.   Patient not taking: Reported on 4/7/2023    Historical Provider, MD

## 2023-04-08 LAB
A/G RATIO: 2.2 RATIO (ref 1.1–2.6)
ALBUMIN SERPL-MCNC: 4.9 G/DL (ref 3.5–5)
ALP BLD-CCNC: 47 U/L (ref 25–115)
ALT SERPL-CCNC: 59 U/L (ref 5–40)
ANION GAP SERPL CALCULATED.3IONS-SCNC: 13 MMOL/L (ref 3–15)
AST SERPL-CCNC: 125 U/L (ref 10–37)
BASOPHILS # BLD: 1 % (ref 0–2)
BASOPHILS ABSOLUTE: 0.1 K/UL (ref 0–0.2)
BILIRUB SERPL-MCNC: 0.7 MG/DL (ref 0.2–1.2)
BUN BLDV-MCNC: 8 MG/DL (ref 6–22)
CALCIUM SERPL-MCNC: 9.7 MG/DL (ref 8.4–10.5)
CHLORIDE BLD-SCNC: 100 MMOL/L (ref 98–110)
CHOLESTEROL/HDL RATIO: 5.5 (ref 0–5)
CHOLESTEROL: 299 MG/DL (ref 110–200)
CO2: 27 MMOL/L (ref 20–32)
CREAT SERPL-MCNC: 0.7 MG/DL (ref 0.5–1.2)
EOSINOPHIL # BLD: 8 % (ref 0–6)
EOSINOPHILS ABSOLUTE: 0.4 K/UL (ref 0–0.5)
GLOBULIN: 2.2 G/DL (ref 2–4)
GLOMERULAR FILTRATION RATE: >60 ML/MIN/1.73 SQ.M.
GLUCOSE: 83 MG/DL (ref 70–99)
HCT VFR BLD CALC: 42.6 % (ref 36.6–51.9)
HDLC SERPL-MCNC: 54 MG/DL
HEMOGLOBIN: 14.3 G/DL (ref 13.2–17.3)
LDL CHOLESTEROL CALCULATED: 182 MG/DL (ref 50–99)
LDL/HDL RATIO: 3.4
LYMPHOCYTES # BLD: 33 % (ref 20–45)
LYMPHOCYTES ABSOLUTE: 1.6 K/UL (ref 1–4.8)
MCH RBC QN AUTO: 32 PG (ref 26–34)
MCHC RBC AUTO-ENTMCNC: 34 G/DL (ref 31–36)
MCV RBC AUTO: 95 FL (ref 80–95)
MONOCYTES ABSOLUTE: 0.6 K/UL (ref 0.1–1)
MONOCYTES: 13 % (ref 3–12)
NEUTROPHILS ABSOLUTE: 2.2 K/UL (ref 1.8–7.7)
NEUTROPHILS: 45 % (ref 40–75)
NON-HDL CHOLESTEROL: 245 MG/DL
PDW BLD-RTO: 12.7 % (ref 10–15.5)
PLATELET # BLD: 153 K/UL (ref 140–440)
PMV BLD AUTO: 10.4 FL (ref 9–13)
POTASSIUM SERPL-SCNC: 5.6 MMOL/L (ref 3.5–5.5)
RBC: 4.5 M/UL (ref 3.8–5.8)
SODIUM BLD-SCNC: 140 MMOL/L (ref 133–145)
TOTAL PROTEIN: 7.1 G/DL (ref 6.4–8.3)
TRIGL SERPL-MCNC: 314 MG/DL (ref 40–149)
VLDLC SERPL CALC-MCNC: 63 MG/DL (ref 8–30)
WBC: 4.8 K/UL (ref 4–11)

## 2023-04-21 ENCOUNTER — PATIENT MESSAGE (OUTPATIENT)
Age: 38
End: 2023-04-21

## 2023-04-21 DIAGNOSIS — R79.89 ELEVATED LFTS: Primary | ICD-10-CM

## 2023-07-27 ENCOUNTER — TELEPHONE (OUTPATIENT)
Age: 38
End: 2023-07-27

## 2023-07-27 NOTE — TELEPHONE ENCOUNTER
----- Message from Nehemias Hargrove sent at 7/27/2023 11:13 AM EDT -----  Subject: Appointment Request    Reason for Call: Established Patient Appointment needed: Routine Existing   Condition Follow Up    QUESTIONS    Reason for appointment request? Available appointments did not meet   patient need     Additional Information for Provider? Patient was in the ER on 7/19. He was   diagnosed as anemic and found thickening of his intestinal and bladder   walls.  He is scheduled on 8/11 but wants something sooner, please advise  ---------------------------------------------------------------------------  --------------  Carlos Nieves INFO  [phone]; OK to leave message on voicemail  ---------------------------------------------------------------------------  --------------  SCRIPT ANSWERS

## 2023-07-27 NOTE — TELEPHONE ENCOUNTER
Spoke with patient to schedule appt for sooner time. Patient was offered 7/28 and 8/3.  Says he is not able to come in tomorrow but will call us back about the 8/3 appt after speaking with his boss

## 2023-07-31 NOTE — TELEPHONE ENCOUNTER
Last Visit: 04- OV   Next Appointment: 08-  Previous Refill Encounter: 04- #60 tabs with 3 refills      Requested Prescriptions     Pending Prescriptions Disp Refills    ELIQUIS 5 MG TABS tablet [Pharmacy Med Name: ELIQUIS 5MG TABLETS] 60 tablet 3     Sig: TAKE 1 TABLET BY MOUTH TWICE DAILY

## 2023-08-04 RX ORDER — APIXABAN 5 MG/1
TABLET, FILM COATED ORAL
Qty: 60 TABLET | Refills: 3 | Status: SHIPPED | OUTPATIENT
Start: 2023-08-04

## 2023-08-07 NOTE — PROGRESS NOTES
Noble Metzger (:  1985) is a Established patient, presenting virtually for evaluation of the following:      Noble Metzger, was evaluated through a synchronous (real-time) audio-video encounter. The patient (or guardian if applicable) is aware that this is a billable service, which includes applicable co-pays. This Virtual Visit was conducted with patient's (and/or legal guardian's) consent. Patient identification was verified, and a caregiver was present when appropriate.    The patient was located at Home: [address] [place], [place] [id number]  Provider was located at [facility] (Appt Dept): [address] [facility], [address] [address]

## 2023-08-08 ENCOUNTER — TELEMEDICINE (OUTPATIENT)
Age: 38
End: 2023-08-08
Payer: MEDICAID

## 2023-08-08 DIAGNOSIS — F32.A ANXIETY AND DEPRESSION: Primary | ICD-10-CM

## 2023-08-08 DIAGNOSIS — F41.9 ANXIETY AND DEPRESSION: Primary | ICD-10-CM

## 2023-08-08 DIAGNOSIS — K76.0 HEPATIC STEATOSIS: ICD-10-CM

## 2023-08-08 DIAGNOSIS — K80.20 CALCULUS OF GALLBLADDER WITHOUT CHOLECYSTITIS WITHOUT OBSTRUCTION: ICD-10-CM

## 2023-08-08 DIAGNOSIS — F41.0 PANIC DISORDER (EPISODIC PAROXYSMAL ANXIETY): ICD-10-CM

## 2023-08-08 PROCEDURE — 99214 OFFICE O/P EST MOD 30 MIN: CPT | Performed by: NURSE PRACTITIONER

## 2023-08-08 RX ORDER — CHLORDIAZEPOXIDE HYDROCHLORIDE 25 MG/1
CAPSULE, GELATIN COATED ORAL
COMMUNITY
Start: 2023-07-20 | End: 2023-08-31 | Stop reason: SDUPTHER

## 2023-08-08 RX ORDER — BUPRENORPHINE HYDROCHLORIDE AND NALOXONE HYDROCHLORIDE DIHYDRATE 8; 2 MG/1; MG/1
TABLET SUBLINGUAL
COMMUNITY
Start: 2023-07-28

## 2023-08-08 RX ORDER — CLONAZEPAM 1 MG/1
1 TABLET ORAL 3 TIMES DAILY PRN
Qty: 60 TABLET | Refills: 2 | OUTPATIENT
Start: 2023-08-08 | End: 2023-11-06

## 2023-08-08 NOTE — TELEPHONE ENCOUNTER
Patient has 2 refills on Clonazepam at [facility] on [facility]. Patient has been informed via [address] [place] [address].

## 2023-08-08 NOTE — PROGRESS NOTES
Danny Bain is a 34 y.o. (1985) male is a Established patient, who was seen by synchronous (real-time) audio-video technology on 8/8/2023 for evaluation of the following:   Chief Complaint   Patient presents with    Anxiety    Depression      1315 Video/audio quality poor toward the end of visit. Assessment & Plan:   Destiny Mejia was seen today for anxiety and depression. Diagnoses and all orders for this visit:    Anxiety and depression    Hepatic steatosis    Calculus of gallbladder without cholecystitis without obstruction      Advised to follow up with GI for severe hepatic  steatosis and fluid content in his small and large intestines. No follow-ups on file. Subjective:   Patient reports he was prescribed chlordiazepoxide for ETOH withdrawal.  Patient reports he is followed by [facility] outpatient rehabilitation drug/alcohol abuse program and prescribed saboxone. He is seen every 2 weeks. Patient has been seen by GI for elevated LFTs 2-3 weeks ago after he was seen in the ED. Per patient an US was performed. Per patient he was informed his liver was 'bad'. Due to follow up in 3 months. Patient states he has concerns regarding his lab results from the ED and also thickening of bladder wall seen on his CT scan. Stopped taking lopid and atorvastatin    7/20/2023 [facility] ED  Chief Complaint   Patient presents with   ABDOMINAL PAIN     Danny Bain is a 34 y.o. male cc abdominal pain, nausea vomiting and diarrhea. Patient with a history of heavy alcohol abuse usually drinks about a half a liter of vodka per day now down to 1/4 L of vodka a day with a history of pancreatitis, congestive heart failure among others. Patient arrives with 2 days of nausea vomiting and diarrhea described as liquid and without blood or mucus. Last drink was this morning.  Indicates his abdominal pain is more from the nausea and vomiting and he is unremarkable exam does not declare a number on his

## 2023-08-08 NOTE — TELEPHONE ENCOUNTER
Patient has 2 refills on Clonazepam at [facility]. Patient has been informed via [address] [place] [address].

## 2023-08-09 RX ORDER — CLONAZEPAM 1 MG/1
1 TABLET ORAL 3 TIMES DAILY PRN
Qty: 60 TABLET | Refills: 2 | OUTPATIENT
Start: 2023-08-09 | End: 2023-11-07

## 2023-08-31 ENCOUNTER — HOSPITAL ENCOUNTER (EMERGENCY)
Facility: HOSPITAL | Age: 38
Discharge: HOME | End: 2023-08-31
Attending: EMERGENCY MEDICINE
Payer: MEDICAID

## 2023-08-31 VITALS
RESPIRATION RATE: 18 BRPM | WEIGHT: 220 LBS | TEMPERATURE: 99.7 F | DIASTOLIC BLOOD PRESSURE: 107 MMHG | OXYGEN SATURATION: 96 % | HEART RATE: 88 BPM | BODY MASS INDEX: 28.25 KG/M2 | SYSTOLIC BLOOD PRESSURE: 133 MMHG

## 2023-08-31 DIAGNOSIS — K92.0 HEMATEMESIS WITH NAUSEA: Primary | ICD-10-CM

## 2023-08-31 DIAGNOSIS — F10.930 ALCOHOL WITHDRAWAL SYNDROME WITHOUT COMPLICATION (HCC): ICD-10-CM

## 2023-08-31 DIAGNOSIS — K29.21 ACUTE ALCOHOLIC GASTRITIS WITH HEMORRHAGE: ICD-10-CM

## 2023-08-31 LAB
ALBUMIN SERPL-MCNC: 3.6 G/DL (ref 3.4–5)
ALBUMIN/GLOB SERPL: 1.1 (ref 0.8–1.7)
ALP SERPL-CCNC: 47 U/L (ref 45–117)
ALT SERPL-CCNC: 34 U/L (ref 16–61)
ANION GAP SERPL CALC-SCNC: 8 MMOL/L (ref 3–18)
AST SERPL-CCNC: 61 U/L (ref 10–38)
BASOPHILS # BLD: 0.1 K/UL (ref 0–0.1)
BASOPHILS NFR BLD: 1 % (ref 0–2)
BILIRUB SERPL-MCNC: 0.4 MG/DL (ref 0.2–1)
BUN SERPL-MCNC: 13 MG/DL (ref 7–18)
BUN/CREAT SERPL: 15 (ref 12–20)
CALCIUM SERPL-MCNC: 8.4 MG/DL (ref 8.5–10.1)
CHLORIDE SERPL-SCNC: 103 MMOL/L (ref 100–111)
CO2 SERPL-SCNC: 28 MMOL/L (ref 21–32)
CREAT SERPL-MCNC: 0.84 MG/DL (ref 0.6–1.3)
DIFFERENTIAL METHOD BLD: ABNORMAL
EOSINOPHIL # BLD: 0.2 K/UL (ref 0–0.4)
EOSINOPHIL NFR BLD: 3 % (ref 0–5)
ERYTHROCYTE [DISTWIDTH] IN BLOOD BY AUTOMATED COUNT: 11.7 % (ref 11.6–14.5)
GLOBULIN SER CALC-MCNC: 3.4 G/DL (ref 2–4)
GLUCOSE SERPL-MCNC: 137 MG/DL (ref 74–99)
HCT VFR BLD AUTO: 38.8 % (ref 36–48)
HGB BLD-MCNC: 14 G/DL (ref 13–16)
IMM GRANULOCYTES # BLD AUTO: 0 K/UL (ref 0–0.04)
IMM GRANULOCYTES NFR BLD AUTO: 0 % (ref 0–0.5)
LYMPHOCYTES # BLD: 1.7 K/UL (ref 0.9–3.6)
LYMPHOCYTES NFR BLD: 31 % (ref 21–52)
MCH RBC QN AUTO: 32.8 PG (ref 24–34)
MCHC RBC AUTO-ENTMCNC: 36.1 G/DL (ref 31–37)
MCV RBC AUTO: 90.9 FL (ref 78–100)
MONOCYTES # BLD: 0.6 K/UL (ref 0.05–1.2)
MONOCYTES NFR BLD: 11 % (ref 3–10)
NEUTS SEG # BLD: 2.9 K/UL (ref 1.8–8)
NEUTS SEG NFR BLD: 54 % (ref 40–73)
NRBC # BLD: 0 K/UL (ref 0–0.01)
NRBC BLD-RTO: 0 PER 100 WBC
PLATELET # BLD AUTO: 200 K/UL (ref 135–420)
PMV BLD AUTO: 8.8 FL (ref 9.2–11.8)
POTASSIUM SERPL-SCNC: 4.1 MMOL/L (ref 3.5–5.5)
PROT SERPL-MCNC: 7 G/DL (ref 6.4–8.2)
RBC # BLD AUTO: 4.27 M/UL (ref 4.35–5.65)
SODIUM SERPL-SCNC: 139 MMOL/L (ref 136–145)
WBC # BLD AUTO: 5.4 K/UL (ref 4.6–13.2)

## 2023-08-31 PROCEDURE — A4216 STERILE WATER/SALINE, 10 ML: HCPCS | Performed by: EMERGENCY MEDICINE

## 2023-08-31 PROCEDURE — 96374 THER/PROPH/DIAG INJ IV PUSH: CPT

## 2023-08-31 PROCEDURE — 2580000003 HC RX 258: Performed by: EMERGENCY MEDICINE

## 2023-08-31 PROCEDURE — C9113 INJ PANTOPRAZOLE SODIUM, VIA: HCPCS | Performed by: EMERGENCY MEDICINE

## 2023-08-31 PROCEDURE — 6370000000 HC RX 637 (ALT 250 FOR IP): Performed by: EMERGENCY MEDICINE

## 2023-08-31 PROCEDURE — 96375 TX/PRO/DX INJ NEW DRUG ADDON: CPT

## 2023-08-31 PROCEDURE — 6360000002 HC RX W HCPCS: Performed by: EMERGENCY MEDICINE

## 2023-08-31 PROCEDURE — 85025 COMPLETE CBC W/AUTO DIFF WBC: CPT

## 2023-08-31 PROCEDURE — 80053 COMPREHEN METABOLIC PANEL: CPT

## 2023-08-31 PROCEDURE — 99284 EMERGENCY DEPT VISIT MOD MDM: CPT

## 2023-08-31 RX ORDER — CHLORDIAZEPOXIDE HYDROCHLORIDE 25 MG/1
25 CAPSULE, GELATIN COATED ORAL 3 TIMES DAILY PRN
Qty: 15 CAPSULE | Refills: 0 | Status: SHIPPED | OUTPATIENT
Start: 2023-08-31 | End: 2023-09-05

## 2023-08-31 RX ORDER — OMEPRAZOLE 20 MG/1
20 CAPSULE, DELAYED RELEASE ORAL DAILY
Qty: 30 CAPSULE | Refills: 1 | Status: SHIPPED | OUTPATIENT
Start: 2023-08-31 | End: 2023-10-30

## 2023-08-31 RX ORDER — ONDANSETRON 4 MG/1
4 TABLET, ORALLY DISINTEGRATING ORAL EVERY 8 HOURS PRN
Qty: 21 TABLET | Refills: 0 | Status: SHIPPED | OUTPATIENT
Start: 2023-08-31

## 2023-08-31 RX ORDER — SUCRALFATE ORAL 1 G/10ML
1 SUSPENSION ORAL 4 TIMES DAILY
Qty: 280 ML | Refills: 0 | Status: SHIPPED | OUTPATIENT
Start: 2023-08-31 | End: 2023-09-07

## 2023-08-31 RX ORDER — METOCLOPRAMIDE HYDROCHLORIDE 5 MG/ML
10 INJECTION INTRAMUSCULAR; INTRAVENOUS
Status: COMPLETED | OUTPATIENT
Start: 2023-08-31 | End: 2023-08-31

## 2023-08-31 RX ADMIN — ALUMINUM HYDROXIDE, MAGNESIUM HYDROXIDE, AND SIMETHICONE 40 ML: 200; 200; 20 SUSPENSION ORAL at 07:39

## 2023-08-31 RX ADMIN — METOCLOPRAMIDE HYDROCHLORIDE 10 MG: 5 INJECTION INTRAMUSCULAR; INTRAVENOUS at 07:40

## 2023-08-31 RX ADMIN — PANTOPRAZOLE SODIUM 80 MG: 40 INJECTION, POWDER, FOR SOLUTION INTRAVENOUS at 07:30

## 2023-08-31 ASSESSMENT — LIFESTYLE VARIABLES
HOW OFTEN DO YOU HAVE A DRINK CONTAINING ALCOHOL: 4 OR MORE TIMES A WEEK
HOW MANY STANDARD DRINKS CONTAINING ALCOHOL DO YOU HAVE ON A TYPICAL DAY: 7 TO 9

## 2023-08-31 NOTE — ED PROVIDER NOTES
EMERGENCY DEPARTMENT HISTORY AND PHYSICAL EXAM      Date: 8/31/2023  Patient Name: Karel Boxer      History of Presenting Illness     Chief Complaint   Patient presents with    Hematemesis       Location/Duration/Severity/Modifying factors   Chief Complaint   Patient presents with    Hematemesis       HPI:  Karel Boxer is a 34 y.o. male with PMH significant for alcoholism, hypertension, not taking any blood thinning agents at this time presents with 2 episodes of gross hematemesis this morning. Patient has felt ill, nauseous fatigued since yesterday. Pt  denies of consciousness, black or bloody stools. Does feel fatigued but has not lost consciousness or been close to losing consciousness. Treatments attempted at home include none. Recently went on an alcohol binge over the weekend after running out of his Librium. Has a prior history of gastritis, ulcer disease approximate 9 years ago. PCP: VANESA Sims NP    No current facility-administered medications for this encounter. Current Outpatient Medications   Medication Sig Dispense Refill    omeprazole (PRILOSEC) 20 MG delayed release capsule Take 1 capsule by mouth Daily 30 capsule 1    sucralfate (CARAFATE) 1 GM/10ML suspension Take 10 mLs by mouth 4 times daily for 7 days 280 mL 0    ondansetron (ZOFRAN-ODT) 4 MG disintegrating tablet Place 1 tablet under the tongue every 8 hours as needed for Nausea or Vomiting 21 tablet 0    chlordiazePOXIDE (LIBRIUM) 25 MG capsule Take 1 capsule by mouth 3 times daily as needed for Anxiety (Alcohol withdrawal symptoms of anxiety, tremors) for up to 5 days. Max Daily Amount: 75 mg 15 capsule 0    buprenorphine-naloxone (SUBOXONE) 8-2 MG SUBL SL tablet PLACE 1 TABLET UNDER TONGUE TWICE A DAY      ELIQUIS 5 MG TABS tablet TAKE 1 TABLET BY MOUTH TWICE DAILY 60 tablet 3    clonazePAM (KLONOPIN) 1 MG tablet Take 1 tablet by mouth 3 times daily as needed for Anxiety for up to 90 days.  Max Daily

## 2023-08-31 NOTE — ED NOTES
Reviewed d/c paperwork and patient was d/c ambulatory from ED. No concerns voiced at this time.      Enzo Ramirez RN  08/31/23 [id number]

## 2023-08-31 NOTE — DISCHARGE INSTRUCTIONS
Limit intake of spicy foods, alcohol, caffeine, chocolate as this can worsen your symptoms. Would start with full liquid diet, soft bland foods for least the next 1 to 2 days until your symptoms are improving. Start taking the Carafate and omeprazole daily as prescribed. Have prescribed Zofran for nausea as well as refill your Librium for alcohol withdrawal symptoms.

## 2023-09-28 DIAGNOSIS — M54.41 LOW BACK PAIN WITH RIGHT-SIDED SCIATICA, UNSPECIFIED BACK PAIN LATERALITY, UNSPECIFIED CHRONICITY: ICD-10-CM

## 2023-09-28 NOTE — TELEPHONE ENCOUNTER
Last Visit: 08- VV   Next Appointment: none  Previous Refill Encounter: 04-  #90caps with 5 refills  Last Urine Drug Screen: 07-  Controlled Substance Agreement: 07-  : Last filled on 08-- for 30 d/s.      Requested Prescriptions     Pending Prescriptions Disp Refills    gabapentin (NEURONTIN) 400 MG capsule 90 capsule 5     Sig: TAKE ONE CAPSULE BY MOUTH THREE TIMES A DAY

## 2023-10-04 RX ORDER — GABAPENTIN 400 MG/1
CAPSULE ORAL
Qty: 90 CAPSULE | Refills: 5 | Status: SHIPPED | OUTPATIENT
Start: 2023-10-04 | End: 2024-01-30

## 2023-11-16 ENCOUNTER — OFFICE VISIT (OUTPATIENT)
Age: 38
End: 2023-11-16
Payer: MEDICAID

## 2023-11-16 VITALS
OXYGEN SATURATION: 98 % | RESPIRATION RATE: 18 BRPM | TEMPERATURE: 97.9 F | HEART RATE: 74 BPM | BODY MASS INDEX: 30.8 KG/M2 | SYSTOLIC BLOOD PRESSURE: 136 MMHG | WEIGHT: 240 LBS | DIASTOLIC BLOOD PRESSURE: 90 MMHG | HEIGHT: 74 IN

## 2023-11-16 DIAGNOSIS — I10 ESSENTIAL (PRIMARY) HYPERTENSION: ICD-10-CM

## 2023-11-16 DIAGNOSIS — G89.29 CHRONIC RIGHT-SIDED LOW BACK PAIN WITH RIGHT-SIDED SCIATICA: ICD-10-CM

## 2023-11-16 DIAGNOSIS — M54.41 CHRONIC RIGHT-SIDED LOW BACK PAIN WITH RIGHT-SIDED SCIATICA: ICD-10-CM

## 2023-11-16 DIAGNOSIS — Z79.899 ENCOUNTER FOR LONG-TERM (CURRENT) USE OF HIGH-RISK MEDICATION: ICD-10-CM

## 2023-11-16 DIAGNOSIS — E78.00 HYPERCHOLESTEREMIA: ICD-10-CM

## 2023-11-16 DIAGNOSIS — F41.0 PANIC DISORDER (EPISODIC PAROXYSMAL ANXIETY): ICD-10-CM

## 2023-11-16 DIAGNOSIS — R60.0 BILATERAL LEG EDEMA: Primary | ICD-10-CM

## 2023-11-16 DIAGNOSIS — E66.9 OBESITY, CLASS I, BMI 30-34.9: ICD-10-CM

## 2023-11-16 LAB
AMPHETAMINE, URINE, POC: NEGATIVE
BARBITURATES, URINE, POC: NORMAL
BENZODIAZEPINES, URINE, POC: NORMAL
BUPRENORPHINE, URINE, POC: NEGATIVE
COCAINE, URINE, POC: NEGATIVE
CREATININE, URINE, POC: NORMAL
LOT EXP DATE, POC: NORMAL
Lab: NORMAL
MARIJUANA (THC), URINE, POC: NEGATIVE
METHADONE, URINE, POC: NEGATIVE
METHAMPHETAMINE, URINE, POC: NEGATIVE
OPIATES, URINE, POC: NEGATIVE
OXYCODONE, URINE, POC: NORMAL
PH, POC: NORMAL
PHENCYCLIDINE, URINE, POC: NEGATIVE
SPECIFIC GRAVITY, URINE, POC: NORMAL (ref 1–1.03)
TRICYCLICS, URINE, POC: NEGATIVE
VALID INTERNAL CONTROL, POC: YES

## 2023-11-16 PROCEDURE — 81003 URINALYSIS AUTO W/O SCOPE: CPT | Performed by: NURSE PRACTITIONER

## 2023-11-16 RX ORDER — MULTIVITAMIN
1 TABLET ORAL DAILY
Qty: 100 TABLET | Refills: 3 | Status: SHIPPED | OUTPATIENT
Start: 2023-11-16

## 2023-11-16 RX ORDER — ESCITALOPRAM OXALATE 20 MG/1
20 TABLET ORAL DAILY
Qty: 90 TABLET | Refills: 3 | Status: SHIPPED | OUTPATIENT
Start: 2023-11-16

## 2023-11-16 RX ORDER — CLONAZEPAM 1 MG/1
1 TABLET ORAL 3 TIMES DAILY PRN
Qty: 60 TABLET | Refills: 2 | Status: SHIPPED | OUTPATIENT
Start: 2023-11-16 | End: 2024-02-14

## 2023-11-16 RX ORDER — CETIRIZINE HYDROCHLORIDE 10 MG/1
10 TABLET ORAL DAILY
Qty: 90 TABLET | Refills: 3 | Status: SHIPPED | OUTPATIENT
Start: 2023-11-16

## 2023-11-16 NOTE — PATIENT INSTRUCTIONS
[facility] Laboratory Locations    Sites open Monday to Friday. ? indicates the location is open Saturdays. Call your preferred location for test preparation, business hours and other information you need. ? [facility], [address], [address] [facility]    [phone]  Monday - Friday,  7:00 a.m. - 5:00 p.m. Saturday,   No appointment needed  [facility]  [phone]  Monday-Friday, 6:30 a.m. - 6:00 p.m. No appointment needed     ? [facility] BOUCH[facility]  [address] [facility], [facility]  Monday - Friday, 7:00 a.m. - 6:00 p.m. Saturday, 9:00 a.m. - noon    [address], [address] [facility]  [phone]  Monday-Friday, 7:00 a.m. - 6:00  p.m. No appointment needed    ? [facility]    [address] [address], [address] [place] [address] 6  [phone]  Monday - Friday, 7:00 a.m. - 3:30 p.m.     Saturday, 7:00 a.m. to noon  No appointment needed

## 2023-11-17 LAB
A/G RATIO: 1.5 RATIO (ref 1.1–2.6)
ALBUMIN SERPL-MCNC: 4.3 G/DL (ref 3.5–5)
ALP BLD-CCNC: 56 U/L (ref 25–115)
ALT SERPL-CCNC: 23 U/L (ref 5–40)
ANION GAP SERPL CALCULATED.3IONS-SCNC: 12 MMOL/L (ref 3–15)
AST SERPL-CCNC: 39 U/L (ref 10–37)
AVERAGE GLUCOSE: 87 MG/DL (ref 91–123)
B-TYPE NATRIURETIC PEPTIDE: 50 PG/ML
BASOPHILS # BLD: 1 % (ref 0–2)
BASOPHILS ABSOLUTE: 0.1 K/UL (ref 0–0.2)
BILIRUB SERPL-MCNC: 0.4 MG/DL (ref 0.2–1.2)
BUN BLDV-MCNC: 9 MG/DL (ref 6–22)
CALCIUM SERPL-MCNC: 9.3 MG/DL (ref 8.4–10.5)
CHLORIDE BLD-SCNC: 99 MMOL/L (ref 98–110)
CO2: 27 MMOL/L (ref 20–32)
CREAT SERPL-MCNC: 0.7 MG/DL (ref 0.5–1.2)
EOSINOPHIL # BLD: 5 % (ref 0–6)
EOSINOPHILS ABSOLUTE: 0.3 K/UL (ref 0–0.5)
GLOBULIN: 2.8 G/DL (ref 2–4)
GLOMERULAR FILTRATION RATE: >60 ML/MIN/1.73 SQ.M.
GLUCOSE: 71 MG/DL (ref 70–99)
HBA1C MFR BLD: 4.7 % (ref 4.8–5.6)
HCT VFR BLD CALC: 42.9 % (ref 36.6–51.9)
HEMOGLOBIN: 14.9 G/DL (ref 13.2–17.3)
LYMPHOCYTES # BLD: 31 % (ref 20–45)
LYMPHOCYTES ABSOLUTE: 1.9 K/UL (ref 1–4.8)
MCH RBC QN AUTO: 31 PG (ref 26–34)
MCHC RBC AUTO-ENTMCNC: 35 G/DL (ref 31–36)
MCV RBC AUTO: 90 FL (ref 80–95)
MONOCYTES ABSOLUTE: 0.8 K/UL (ref 0.1–1)
MONOCYTES: 12 % (ref 3–12)
NEUTROPHILS ABSOLUTE: 3.1 K/UL (ref 1.8–7.7)
NEUTROPHILS: 50 % (ref 40–75)
PDW BLD-RTO: 11.9 % (ref 10–15.5)
PLATELET # BLD: 317 K/UL (ref 140–440)
PMV BLD AUTO: 10.1 FL (ref 9–13)
POTASSIUM SERPL-SCNC: 4.5 MMOL/L (ref 3.5–5.5)
RBC: 4.78 M/UL (ref 3.8–5.8)
SODIUM BLD-SCNC: 138 MMOL/L (ref 133–145)
TOTAL PROTEIN: 7.1 G/DL (ref 6.4–8.3)
WBC: 6.1 K/UL (ref 4–11)

## 2023-11-27 ENCOUNTER — TELEPHONE (OUTPATIENT)
Age: 38
End: 2023-11-27

## 2023-11-27 NOTE — TELEPHONE ENCOUNTER
Patient called to request a call back from the practice manager regarding a form requested at appt and forwarded forms in [facility]. States that he sent forms prior to the appt on 11/16, this appt pt arrived late for and provider agreed to work him in, pt left to go to office max to have forms printed but shows uploaded into his mychart. Pt checking on status.

## 2023-11-28 NOTE — TELEPHONE ENCOUNTER
Called patient back and LM stating that it has not been the 7-10 business days for his paperwork. I stated for the patient to give me a call back if he has any questions.

## 2023-11-29 ENCOUNTER — PATIENT MESSAGE (OUTPATIENT)
Age: 38
End: 2023-11-29

## 2023-11-30 ENCOUNTER — TELEPHONE (OUTPATIENT)
Age: 38
End: 2023-11-30

## 2023-11-30 NOTE — TELEPHONE ENCOUNTER
Left patient a voice message to contact the office. Patient's form is ready for  at the  or he can access it in [address] [place] [address].

## 2023-12-29 NOTE — TELEPHONE ENCOUNTER
Last Visit: 11- OV   Next Appointment: 03-  Previous Refill Encounter: 08- #60 tabs with 3 refills      Requested Prescriptions     Pending Prescriptions Disp Refills    ELIQUIS 5 MG TABS tablet [Pharmacy Med Name: ELIQUIS 5MG TABLETS] 60 tablet 3     Sig: TAKE 1 TABLET BY MOUTH TWICE DAILY

## 2024-01-02 RX ORDER — ATENOLOL 100 MG/1
100 TABLET ORAL 2 TIMES DAILY
Qty: 180 TABLET | Refills: 2 | OUTPATIENT
Start: 2024-01-02

## 2024-01-02 RX ORDER — LISINOPRIL 40 MG/1
40 TABLET ORAL DAILY
Qty: 90 TABLET | Refills: 2 | OUTPATIENT
Start: 2024-01-02

## 2024-01-02 RX ORDER — ATORVASTATIN CALCIUM 20 MG/1
20 TABLET, FILM COATED ORAL DAILY
Qty: 90 TABLET | Refills: 2 | OUTPATIENT
Start: 2024-01-02

## 2024-01-02 RX ORDER — APIXABAN 5 MG/1
TABLET, FILM COATED ORAL
Qty: 60 TABLET | Refills: 3 | Status: SHIPPED | OUTPATIENT
Start: 2024-01-02

## 2024-02-19 DIAGNOSIS — F41.0 PANIC DISORDER (EPISODIC PAROXYSMAL ANXIETY): ICD-10-CM

## 2024-02-19 RX ORDER — ESCITALOPRAM OXALATE 20 MG/1
20 TABLET ORAL DAILY
Qty: 90 TABLET | Refills: 3 | Status: CANCELLED | OUTPATIENT
Start: 2024-02-19

## 2024-02-21 NOTE — TELEPHONE ENCOUNTER
[facility]am as 1/23/24 for a 20 d/s.      Last Visit: 11/16/23  Next Appointment: 3/22/24  Previous Refill Encounter(s) Clonazepam: Date: 11/16/23 #60  Previous Refill Encounter(s) Multi Vitamin: Date: 11/16/23 #100 Refill 3  Previous Refill Encounter(s) Lexapro: Date: 11/16/23 #90 Refill 3  Controlled Substance Agreement: 11/16/23  Urine Drug Screen: 11/16/3    Requested Prescriptions     Pending Prescriptions Disp Refills    clonazePAM (KLONOPIN) 1 MG tablet 60 tablet 2     Sig: Take 1 tablet by mouth 3 times daily as needed for Anxiety for up to 90 days. Max Daily Amount: 3 mg    Multiple Vitamin (MULTIVITAMIN) TABS 100 tablet 3     Sig: Take 1 tablet by mouth daily    escitalopram (LEXAPRO) 20 MG tablet 90 tablet 3     Sig: Take 1 tablet by mouth daily

## 2024-02-23 RX ORDER — MULTIVITAMIN
1 TABLET ORAL DAILY
Qty: 90 TABLET | Refills: 4 | Status: SHIPPED | OUTPATIENT
Start: 2024-02-23

## 2024-02-23 RX ORDER — CLONAZEPAM 1 MG/1
1 TABLET ORAL 3 TIMES DAILY PRN
Qty: 60 TABLET | Refills: 2 | Status: SHIPPED | OUTPATIENT
Start: 2024-02-23 | End: 2024-05-23

## 2024-03-22 ENCOUNTER — OFFICE VISIT (OUTPATIENT)
Age: 39
End: 2024-03-22
Payer: MEDICAID

## 2024-03-22 VITALS
WEIGHT: 234 LBS | BODY MASS INDEX: 30.03 KG/M2 | OXYGEN SATURATION: 97 % | TEMPERATURE: 99.4 F | HEIGHT: 74 IN | HEART RATE: 85 BPM | RESPIRATION RATE: 18 BRPM | SYSTOLIC BLOOD PRESSURE: 114 MMHG | DIASTOLIC BLOOD PRESSURE: 80 MMHG

## 2024-03-22 DIAGNOSIS — K85.90 ACUTE PANCREATITIS, UNSPECIFIED COMPLICATION STATUS, UNSPECIFIED PANCREATITIS TYPE: ICD-10-CM

## 2024-03-22 DIAGNOSIS — E78.00 HYPERCHOLESTEREMIA: Primary | ICD-10-CM

## 2024-03-22 DIAGNOSIS — E66.9 OBESITY, CLASS I, BMI 30-34.9: ICD-10-CM

## 2024-03-22 DIAGNOSIS — F41.1 GAD (GENERALIZED ANXIETY DISORDER): ICD-10-CM

## 2024-03-22 DIAGNOSIS — I10 ESSENTIAL HYPERTENSION: ICD-10-CM

## 2024-03-22 DIAGNOSIS — M54.41 LOW BACK PAIN WITH RIGHT-SIDED SCIATICA, UNSPECIFIED BACK PAIN LATERALITY, UNSPECIFIED CHRONICITY: ICD-10-CM

## 2024-03-22 PROCEDURE — 3074F SYST BP LT 130 MM HG: CPT | Performed by: NURSE PRACTITIONER

## 2024-03-22 PROCEDURE — 3079F DIAST BP 80-89 MM HG: CPT | Performed by: NURSE PRACTITIONER

## 2024-03-22 PROCEDURE — 99214 OFFICE O/P EST MOD 30 MIN: CPT | Performed by: NURSE PRACTITIONER

## 2024-03-22 RX ORDER — GABAPENTIN 400 MG/1
CAPSULE ORAL
Qty: 90 CAPSULE | Refills: 5 | Status: SHIPPED | OUTPATIENT
Start: 2024-03-22 | End: 2024-09-08

## 2024-03-22 RX ORDER — ESCITALOPRAM OXALATE 20 MG/1
20 TABLET ORAL DAILY
Qty: 90 TABLET | Refills: 3 | Status: SHIPPED | OUTPATIENT
Start: 2024-03-22

## 2024-03-22 RX ORDER — FAMOTIDINE 20 MG/1
20 TABLET, FILM COATED ORAL 2 TIMES DAILY
COMMUNITY
Start: 2024-01-29

## 2024-03-22 RX ORDER — MULTIVITAMIN
1 TABLET ORAL DAILY
Qty: 90 TABLET | Refills: 4 | Status: SHIPPED | OUTPATIENT
Start: 2024-03-22

## 2024-03-22 ASSESSMENT — PATIENT HEALTH QUESTIONNAIRE - PHQ9
SUM OF ALL RESPONSES TO PHQ9 QUESTIONS 1 & 2: 0
SUM OF ALL RESPONSES TO PHQ QUESTIONS 1-9: 0
2. FEELING DOWN, DEPRESSED OR HOPELESS: NOT AT ALL

## 2024-03-22 NOTE — PROGRESS NOTES
1. \"Have you been to the ER, urgent care clinic since your last visit?  Hospitalized since your last visit?\"  [facility] ER    2. \"Have you seen or consulted any other health care providers outside of the [facility] since your last visit?\" No     3. For patients aged 45-75: Has the patient had a colonoscopy / FIT/ Cologuard? NA - based on age      
10/4/23 3/22/24 Yes Kathy Wing APRN - NP   omeprazole (PRILOSEC) 20 MG delayed release capsule Take 1 capsule by mouth Daily 8/31/23 3/22/24 Yes Young Chapin DO   ondansetron (ZOFRAN-ODT) 4 MG disintegrating tablet Place 1 tablet under the tongue every 8 hours as needed for Nausea or Vomiting 8/31/23  Yes Young Chapin DO   buprenorphine-naloxone (SUBOXONE) 8-2 MG SUBL SL tablet PLACE 1 TABLET UNDER TONGUE TWICE A DAY 7/28/23  Yes Provider, MD Micky   ondansetron (ZOFRAN-ODT) 4 MG disintegrating tablet    Yes Provider, MD Micky   gemfibrozil (LOPID) 600 MG tablet Take 1 tablet by mouth 2 times daily 10/11/22  Yes Automatic Reconciliation, Ar   sildenafil (VIAGRA) 100 MG tablet Take 1 tablet by mouth as needed 10/11/22  Yes Automatic Reconciliation, Ar   ELIQUIS 5 MG TABS tablet TAKE 1 TABLET BY MOUTH TWICE DAILY  Patient not taking: Reported on 3/22/2024 1/2/24   Kathy Wing APRN - NP   cetirizine (ZYRTEC) 10 MG tablet Take 1 tablet by mouth daily  Patient not taking: Reported on 3/22/2024 11/16/23   Kathy Wing APRN - NP   sucralfate (CARAFATE) 1 GM/10ML suspension Take 10 mLs by mouth 4 times daily for 7 days  Patient not taking: Reported on 11/16/2023 8/31/23 9/7/23  Young Chapin DO   hydroCHLOROthiazide (HYDRODIURIL) 12.5 MG tablet Take 1 tablet by mouth daily  Patient not taking: Reported on 3/22/2024 4/15/23   Kathy Wing APRN - NP   docusate (COLACE, DULCOLAX) 100 MG CAPS docusate sodium 100 mg capsule  Patient not taking: Reported on 8/8/2023    ProviderMicky MD   naloxone 4 MG/0.1ML LIQD nasal spray naloxone 4 mg/actuation nasal spray  Patient not taking: Reported on 8/8/2023    ProviderMicky MD   atorvastatin (LIPITOR) 20 MG tablet Take 1 tablet by mouth daily  Patient not taking: Reported on 8/8/2023 4/7/23   Kathy Wing, APRN - NP   Dextromethorphan-guaiFENesin  MG TB12 Take 1 tablet by mouth  Patient not taking: Reported on 4/7/2023 5/18/22

## 2024-03-23 LAB
A/G RATIO: 1.6 RATIO (ref 1.1–2.6)
ALBUMIN SERPL-MCNC: 4.1 G/DL (ref 3.5–5)
ALP BLD-CCNC: 65 U/L (ref 25–115)
ALT SERPL-CCNC: 8 U/L (ref 5–40)
ANION GAP SERPL CALCULATED.3IONS-SCNC: 12 MMOL/L (ref 3–15)
AST SERPL-CCNC: 25 U/L (ref 10–37)
BASOPHILS # BLD: 0 % (ref 0–2)
BASOPHILS ABSOLUTE: 0 K/UL (ref 0–0.2)
BILIRUB SERPL-MCNC: 0.8 MG/DL (ref 0.2–1.2)
BILIRUBIN DIRECT: 0.2 MG/DL (ref 0–0.3)
BUN BLDV-MCNC: 7 MG/DL (ref 6–22)
CALCIUM SERPL-MCNC: 8.7 MG/DL (ref 8.4–10.5)
CHLORIDE BLD-SCNC: 98 MMOL/L (ref 98–110)
CHOLESTEROL/HDL RATIO: 16.6 (ref 0–5)
CHOLESTEROL: 365 MG/DL (ref 110–200)
CO2: 25 MMOL/L (ref 20–32)
CREAT SERPL-MCNC: 0.6 MG/DL (ref 0.5–1.2)
DIRECT LDL: 85 MG/DL (ref 50–99)
EOSINOPHIL # BLD: 3 % (ref 0–6)
EOSINOPHILS ABSOLUTE: 0.3 K/UL (ref 0–0.5)
ESTIMATED AVERAGE GLUCOSE: 93 MG/DL (ref 91–123)
GLOBULIN: 2.5 G/DL (ref 2–4)
GLOMERULAR FILTRATION RATE: >60 ML/MIN/1.73 SQ.M.
GLUCOSE: 90 MG/DL (ref 70–99)
HBA1C MFR BLD: 4.9 % (ref 4.8–5.6)
HCT VFR BLD CALC: 39.6 % (ref 36.6–51.9)
HDLC SERPL-MCNC: 22 MG/DL
HEMOGLOBIN: 13 G/DL (ref 13.2–17.3)
LDL CHOLESTEROL CALCULATED: ABNORMAL
LDL/HDL RATIO: ABNORMAL
LIPASE: 90 U/L (ref 7–60)
LYMPHOCYTES # BLD: 14 % (ref 20–45)
LYMPHOCYTES ABSOLUTE: 1.1 K/UL (ref 1–4.8)
MCH RBC QN AUTO: 31 PG (ref 26–34)
MCHC RBC AUTO-ENTMCNC: 33 G/DL (ref 31–36)
MCV RBC AUTO: 95 FL (ref 80–95)
MONOCYTES ABSOLUTE: 0.9 K/UL (ref 0.1–1)
MONOCYTES: 11 % (ref 3–12)
NEUTROPHILS ABSOLUTE: 5.7 K/UL (ref 1.8–7.7)
NEUTROPHILS: 71 % (ref 40–75)
NON-HDL CHOLESTEROL: 343 MG/DL
PDW BLD-RTO: 12.6 % (ref 10–15.5)
PLATELET # BLD: 140 K/UL (ref 140–440)
PMV BLD AUTO: 10.5 FL (ref 9–13)
POTASSIUM SERPL-SCNC: 4.1 MMOL/L (ref 3.5–5.5)
RBC: 4.18 M/UL (ref 3.8–5.8)
SODIUM BLD-SCNC: 135 MMOL/L (ref 133–145)
TOTAL PROTEIN: 6.6 G/DL (ref 6.4–8.3)
TRIGL SERPL-MCNC: 761 MG/DL (ref 40–149)
VLDLC SERPL CALC-MCNC: ABNORMAL MG/DL
WBC: 8 K/UL (ref 4–11)

## 2024-03-25 ENCOUNTER — TELEPHONE (OUTPATIENT)
Age: 39
End: 2024-03-25

## 2024-03-25 NOTE — TELEPHONE ENCOUNTER
Patient says his employer called him today and informed him that he is needing a return to work note dated for Wednesday March 27, 2024. Patient has been out of work due to waiting to take a class. Patient is requesting if letter can be uploaded via my chart.

## 2024-04-04 ASSESSMENT — ENCOUNTER SYMPTOMS
NAUSEA: 0
BLOOD IN STOOL: 0
ABDOMINAL PAIN: 1

## 2024-06-19 ENCOUNTER — TELEPHONE (OUTPATIENT)
Facility: CLINIC | Age: 39
End: 2024-06-19

## 2024-06-19 NOTE — TELEPHONE ENCOUNTER
Patient will be faxing over an accomodation request form that needs to be completed by KEVIN Wing. Patient says he is currently in [place] and is not able to come in if an appointment is required. Patient is aware of the 7-10 business day turn around time.

## 2024-07-24 ENCOUNTER — TELEPHONE (OUTPATIENT)
Facility: CLINIC | Age: 39
End: 2024-07-24

## 2024-07-24 DIAGNOSIS — I10 ESSENTIAL HYPERTENSION: Primary | ICD-10-CM

## 2024-07-25 ENCOUNTER — TELEPHONE (OUTPATIENT)
Dept: PHARMACY | Facility: CLINIC | Age: 39
End: 2024-07-25

## 2024-07-25 NOTE — TELEPHONE ENCOUNTER
Reason for referral: Hypertension  Referring provider: Kathy Wing  Referring provider office: [facility]  Referred to: Ruperto Olvera, PharmD, BCACP, BC-ADM  Status of Patient: New Patient  Length of Appt: 60 minutes  Type of Appt: In Person   Patient need address: No

## 2024-07-25 NOTE — TELEPHONE ENCOUNTER
**[facility]**    Attempt made to contact patient at the mobile number.    Left a message requesting a call back at [phone] to schedule the appointment        Maria Dolores Camilo [facility]   Ambulatory Pharmacy Clinical   [phone]  Department, toll free: [phone], option 2

## 2024-07-26 NOTE — TELEPHONE ENCOUNTER
**[facility] is to [facility] with the [facility]**    Second Attempt made to contact patient at the mobile number.    Spoke to patient at [phone] number and advised them of the previous message.    New Patient verified understanding and scheduled a virtual MyChart appointment. Hypertension Appointment scheduled for 9/12/24 at 8:00 am with Ruperot Olvera.  -Needed VV due to traveling for [place]-Does not pay for medications.     Maria Dolores Camilo [facility]   Ambulatory Pharmacy Clinical   [phone]  Department, toll free: [phone], option 2      For Pharmacy Admin Tracking Only    Program: Medical Group  Recommendation Provided To: Patient/Caregiver: 2 via Telephone  Intervention Detail: Scheduled Appointment  Intervention Accepted By: Patient/Caregiver: 2  Gap Closed?: Yes   Time Spent (min): 10

## 2024-08-26 DIAGNOSIS — F41.0 PANIC DISORDER (EPISODIC PAROXYSMAL ANXIETY): ICD-10-CM

## 2024-08-26 DIAGNOSIS — M54.41 LOW BACK PAIN WITH RIGHT-SIDED SCIATICA, UNSPECIFIED BACK PAIN LATERALITY, UNSPECIFIED CHRONICITY: ICD-10-CM

## 2024-08-26 RX ORDER — MULTIVITAMIN
1 TABLET ORAL DAILY
Qty: 90 TABLET | Refills: 4 | Status: CANCELLED | OUTPATIENT
Start: 2024-08-26

## 2024-08-26 RX ORDER — GEMFIBROZIL 600 MG/1
600 TABLET, FILM COATED ORAL 2 TIMES DAILY
Qty: 180 TABLET | Refills: 3 | Status: CANCELLED | OUTPATIENT
Start: 2024-08-26

## 2024-08-26 RX ORDER — ESCITALOPRAM OXALATE 20 MG/1
20 TABLET ORAL DAILY
Qty: 90 TABLET | Refills: 3 | Status: CANCELLED | OUTPATIENT
Start: 2024-08-26

## 2024-08-27 NOTE — TELEPHONE ENCOUNTER
[facility] Gabapentin as 7/7/24 for a 30 d/s.   [facility]  reports [facility] last fill date for Clonazepam as 7/29/24 for a 20 d/s.      Last Visit: 4/23/24  Next Appointment: None  Previous Refill Encounter(s) Gabapentin: Date: 3/22/24 #90  Previous Refill Encounter(s) Clonazepan: Date: 5/4/24 #60  Controlled Substance Agreement: 11/16/23  Urine Drug Screen: None    Last Appointment- 4/23/24    Next Appointment- None    Previous Refill Encounter(s) Atenolol: Date: 12/23/23 #180 Refills 2    Requested Prescriptions     Pending Prescriptions Disp Refills    atenolol (TENORMIN) 100 MG tablet 180 tablet 2     Sig: Take 1 tablet by mouth 2 times daily    gabapentin (NEURONTIN) 400 MG capsule 90 capsule 5     Sig: TAKE ONE CAPSULE BY MOUTH THREE TIMES A DAY    clonazePAM (KLONOPIN) 1 MG tablet 60 tablet 2     Sig: Take 1 tablet by mouth 3 times daily as needed for Anxiety for up to 90 days. Max Daily Amount: 3 mg

## 2024-08-28 RX ORDER — GABAPENTIN 400 MG/1
CAPSULE ORAL
Qty: 90 CAPSULE | Refills: 5 | OUTPATIENT
Start: 2024-08-28 | End: 2025-02-12

## 2024-08-28 RX ORDER — CLONAZEPAM 1 MG/1
1 TABLET ORAL 3 TIMES DAILY PRN
Qty: 60 TABLET | Refills: 2 | OUTPATIENT
Start: 2024-08-28 | End: 2024-11-26

## 2024-08-28 RX ORDER — ATENOLOL 100 MG/1
100 TABLET ORAL 2 TIMES DAILY
Qty: 60 TABLET | Refills: 0 | Status: SHIPPED | OUTPATIENT
Start: 2024-08-28

## 2024-08-29 DIAGNOSIS — F41.0 PANIC DISORDER (EPISODIC PAROXYSMAL ANXIETY): ICD-10-CM

## 2024-08-29 DIAGNOSIS — M54.41 LOW BACK PAIN WITH RIGHT-SIDED SCIATICA, UNSPECIFIED BACK PAIN LATERALITY, UNSPECIFIED CHRONICITY: ICD-10-CM

## 2024-09-10 RX ORDER — CLONAZEPAM 1 MG/1
1 TABLET ORAL 3 TIMES DAILY PRN
Qty: 60 TABLET | Refills: 2 | Status: SHIPPED | OUTPATIENT
Start: 2024-09-10 | End: 2024-12-09

## 2024-09-10 RX ORDER — GABAPENTIN 400 MG/1
CAPSULE ORAL
Qty: 90 CAPSULE | Refills: 5 | Status: SHIPPED | OUTPATIENT
Start: 2024-09-10 | End: 2025-02-15

## 2024-10-01 NOTE — TELEPHONE ENCOUNTER
Last appointment: 04/23/2024    Next appointment: none    Patient requesting refill for     lisinopril (PRINIVIL;ZESTRIL) 40 MG tablet [[id number]]    Pharmacy   [facility] - [place], [place] - [address] [facility] - P [phone] - F [phone] [address] [facility] [address]  Phone: [phone]  Fax: [phone]

## 2024-10-02 RX ORDER — ATENOLOL 100 MG/1
100 TABLET ORAL 2 TIMES DAILY
Qty: 60 TABLET | Refills: 1 | Status: SHIPPED | OUTPATIENT
Start: 2024-10-02

## 2024-10-03 RX ORDER — LISINOPRIL 40 MG/1
40 TABLET ORAL DAILY
Qty: 90 TABLET | Refills: 1 | Status: SHIPPED | OUTPATIENT
Start: 2024-10-03

## 2024-10-29 DIAGNOSIS — F41.0 PANIC DISORDER (EPISODIC PAROXYSMAL ANXIETY): ICD-10-CM

## 2024-11-12 ENCOUNTER — TELEPHONE (OUTPATIENT)
Facility: CLINIC | Age: 39
End: 2024-11-12

## 2024-11-12 NOTE — TELEPHONE ENCOUNTER
KEVIN Wing has been notified.  
Patient called to check status of medication refill.   
Per NP Maciej patient will need to schedule an appointment.    Return in about 3 months (around 6/22/2024) for HTN/HLD, fasting labs 1 week prior,   
Please schedule patient an in office appointment.    Return in about 3 months (around 6/22/2024) for HTN/HLD, fasting labs 1 week prior,   
[facility] last fill date for Clonazepam as 7/29/24 for a 20 d/s.      Last Visit: 4/23/24  Next Appointment: None  Previous Refill Encounter(s): Date: 9/10/24 #60  Controlled Substance Agreement: 11/16/23  Urine Drug Screen: None    Requested Prescriptions     Pending Prescriptions Disp Refills    clonazePAM (KLONOPIN) 1 MG tablet 60 tablet 2     Sig: Take 1 tablet by mouth 3 times daily as needed for Anxiety for up to 90 days. Max Daily Amount: 3 mg       
anymore. He then says that he is going to call my boss. I tell him that he can so she can reiterate what I have already shared with him about coming to your appointments and controlled substances etc, if that will help him understand. I ask the patient would he like for me to schedule his appointment for him or would he like to schedule it himself via [employer]. Patient said he would do it himself and I wished him a good day.

## 2024-11-12 NOTE — TELEPHONE ENCOUNTER
Patient called to check status of medication   (Clonazepam) advised patient appointment is required prior to any medication refill. Patient states he is a  and not able to schedule at this time and medication is for his mental health states in the past he request to speak with office manger and was advised every time she's in a meeting states came in office one day and called and advised manager in a meeting again and was looking straight at her. Advised patient I will see if manager is available patient placed on hold than transferred to .

## 2024-11-22 RX ORDER — CLONAZEPAM 1 MG/1
1 TABLET ORAL 3 TIMES DAILY PRN
Qty: 60 TABLET | Refills: 2 | OUTPATIENT
Start: 2024-11-22 | End: 2025-02-20

## 2024-11-25 NOTE — TELEPHONE ENCOUNTER
Last Visit: 4/23/24   Next Appointment: none    Requested Prescriptions     Pending Prescriptions Disp Refills    atenolol (TENORMIN) 100 MG tablet 60 tablet 1     Sig: Take 1 tablet by mouth 2 times daily

## 2024-11-27 ENCOUNTER — HOSPITAL ENCOUNTER (OUTPATIENT)
Facility: HOSPITAL | Age: 39
Setting detail: SPECIMEN
Discharge: HOME | End: 2024-11-30

## 2024-11-27 LAB — SENTARA SPECIMEN COLLECTION: NORMAL

## 2024-11-27 PROCEDURE — 99001 SPECIMEN HANDLING PT-LAB: CPT

## 2024-11-27 NOTE — TELEPHONE ENCOUNTER
Provider has agreed to allow the patient to sign his controlled substance agreement at his lab appointment. Patient must still make his in office appointment for medication to be prescribed. Thank you.

## 2024-11-29 ENCOUNTER — TELEPHONE (OUTPATIENT)
Facility: CLINIC | Age: 39
End: 2024-11-29

## 2024-11-29 RX ORDER — LISINOPRIL 40 MG/1
40 TABLET ORAL DAILY
Qty: 90 TABLET | Refills: 0 | OUTPATIENT
Start: 2024-11-29

## 2024-11-29 NOTE — TELEPHONE ENCOUNTER
Last ov: 4/23/2024  Next ov: 12/4/2024    Requested prescriptions    atenolol (TENORMIN) 100 MG tablet [[id number]]       Pharmacy    [facility] - [place], [place] - [address] [facility] - P [phone] - F [phone] [address] [facility] [address]  Phone: [phone]  Fax: [phone]

## 2024-11-29 NOTE — TELEPHONE ENCOUNTER
Disability forms dropped off by patient.    Called and left message for him to call me, I need more information from him regarding the forms.    Tiff Kingston XRO/   Refilled on 11- with 30 days and 3 refills

## 2024-12-04 ENCOUNTER — OFFICE VISIT (OUTPATIENT)
Facility: CLINIC | Age: 39
End: 2024-12-04
Payer: MEDICAID

## 2024-12-04 VITALS
SYSTOLIC BLOOD PRESSURE: 133 MMHG | TEMPERATURE: 97.9 F | RESPIRATION RATE: 14 BRPM | HEART RATE: 80 BPM | WEIGHT: 226.2 LBS | OXYGEN SATURATION: 98 % | HEIGHT: 74 IN | BODY MASS INDEX: 29.03 KG/M2 | DIASTOLIC BLOOD PRESSURE: 92 MMHG

## 2024-12-04 DIAGNOSIS — F41.1 GAD (GENERALIZED ANXIETY DISORDER): ICD-10-CM

## 2024-12-04 DIAGNOSIS — E78.00 HYPERCHOLESTEREMIA: ICD-10-CM

## 2024-12-04 DIAGNOSIS — F41.0 PANIC DISORDER (EPISODIC PAROXYSMAL ANXIETY): ICD-10-CM

## 2024-12-04 DIAGNOSIS — R73.09 ELEVATED GLUCOSE: ICD-10-CM

## 2024-12-04 DIAGNOSIS — I10 ESSENTIAL HYPERTENSION: Primary | ICD-10-CM

## 2024-12-04 DIAGNOSIS — M54.41 LOW BACK PAIN WITH RIGHT-SIDED SCIATICA, UNSPECIFIED BACK PAIN LATERALITY, UNSPECIFIED CHRONICITY: ICD-10-CM

## 2024-12-04 PROCEDURE — 3075F SYST BP GE 130 - 139MM HG: CPT | Performed by: NURSE PRACTITIONER

## 2024-12-04 PROCEDURE — 3080F DIAST BP >= 90 MM HG: CPT | Performed by: NURSE PRACTITIONER

## 2024-12-04 PROCEDURE — 99214 OFFICE O/P EST MOD 30 MIN: CPT | Performed by: NURSE PRACTITIONER

## 2024-12-04 RX ORDER — CLONAZEPAM 1 MG/1
1 TABLET ORAL 3 TIMES DAILY PRN
Qty: 30 TABLET | Refills: 0 | Status: SHIPPED | OUTPATIENT
Start: 2025-01-02 | End: 2025-02-01

## 2024-12-04 RX ORDER — CLONAZEPAM 1 MG/1
1 TABLET ORAL 3 TIMES DAILY PRN
Qty: 60 TABLET | Refills: 0 | Status: SHIPPED | OUTPATIENT
Start: 2025-02-01 | End: 2025-03-03

## 2024-12-04 RX ORDER — GABAPENTIN 400 MG/1
400 CAPSULE ORAL 3 TIMES DAILY
Qty: 270 CAPSULE | Refills: 0 | Status: SHIPPED | OUTPATIENT
Start: 2024-12-27 | End: 2025-03-27

## 2024-12-04 RX ORDER — ATENOLOL 100 MG/1
100 TABLET ORAL 2 TIMES DAILY
Qty: 180 TABLET | Refills: 1 | Status: SHIPPED | OUTPATIENT
Start: 2024-12-04

## 2024-12-04 RX ORDER — ATENOLOL 100 MG/1
100 TABLET ORAL 2 TIMES DAILY
Qty: 60 TABLET | Refills: 0 | OUTPATIENT
Start: 2024-12-04

## 2024-12-04 RX ORDER — MULTIVITAMIN,THERAPEUTIC
1 TABLET ORAL DAILY
COMMUNITY

## 2024-12-04 RX ORDER — ATENOLOL 100 MG/1
100 TABLET ORAL 2 TIMES DAILY
Qty: 180 TABLET | Refills: 0 | Status: SHIPPED | OUTPATIENT
Start: 2024-12-04

## 2024-12-04 RX ORDER — CLONAZEPAM 1 MG/1
1 TABLET ORAL 3 TIMES DAILY PRN
Qty: 60 TABLET | Refills: 0 | Status: SHIPPED | OUTPATIENT
Start: 2024-12-04 | End: 2025-01-03

## 2024-12-04 NOTE — PROGRESS NOTES
\"Have you been to the ER, urgent care clinic since your last visit?  Hospitalized since your last visit?\"    YES - When: approximately 1 months ago.  Where and Why: [facility] .    “Have you seen or consulted any other health care providers outside our system since your last visit?”    NO          
within 1 week(s).  No news is not good news; it's no news. The patient  is to call if his condition worsens or fails to improve or if significant side effects are experienced.     Aspects of this note may have been generated using voice recognition software. Despite editing, there may be unrecognized errors.       VANESA Hanson - NP   An electronic signature was used to authenticate this note.

## 2024-12-30 ASSESSMENT — ENCOUNTER SYMPTOMS
SHORTNESS OF BREATH: 0
COUGH: 0

## 2025-02-25 ENCOUNTER — TELEPHONE (OUTPATIENT)
Facility: CLINIC | Age: 40
End: 2025-02-25

## 2025-02-25 NOTE — TELEPHONE ENCOUNTER
[facility] in [place] called stating that patient is trying to get his medication filled for   clonazePAM (KLONOPIN) 1 MG tablet [[id number]]  ENDED.     I told the pharmacists the provider can't not fill a controlled substance out of state.

## 2025-02-25 NOTE — TELEPHONE ENCOUNTER
Patient called in requesting that Kathy contact the pharmacy so they can fill his medication. I have informed the patient that the pharmacy has already contacted the office so that she is aware. Patient has been advised that she was currently in the room with patients and will follow up when she has a free moment to do so.

## 2025-02-27 NOTE — TELEPHONE ENCOUNTER
Ludy from [facility] in [place] called stating came in with hand written prescription for Clonazepam needs to confirm provider wrote prescription.   [facility] number [phone]

## 2025-02-27 NOTE — TELEPHONE ENCOUNTER
Patient called back to check status of medication advised patient messages have been forwarded to the nurse patient states he is tired of being jerked around and wants to speak with .

## 2025-02-27 NOTE — TELEPHONE ENCOUNTER
Spoke with Ludy at [facility] in [place] to confirm that the prescription was written by KEVIN Wing. Per Ludy the prescription will be filled for patient.    Spoke with patient to inform him that we have spoke to the pharmacy and they are going to fill his prescription.

## 2025-03-06 ENCOUNTER — TELEPHONE (OUTPATIENT)
Facility: CLINIC | Age: 40
End: 2025-03-06

## 2025-03-06 NOTE — TELEPHONE ENCOUNTER
Antonella from [employer] called because she received a application from the patient requesting to have a service animal on his truck. She wanted to speak to the nurse regarding the [employer] letter that was sent to them on 02/14/2025. She can be reached at [phone]

## 2025-03-17 DIAGNOSIS — M54.41 LOW BACK PAIN WITH RIGHT-SIDED SCIATICA, UNSPECIFIED BACK PAIN LATERALITY, UNSPECIFIED CHRONICITY: ICD-10-CM

## 2025-03-17 NOTE — TELEPHONE ENCOUNTER
Tried contacting Antonella back in regards to her message but did not get an answer. Left voice message for Antonella to contact our office.

## 2025-03-19 RX ORDER — GABAPENTIN 400 MG/1
400 CAPSULE ORAL 3 TIMES DAILY
Qty: 270 CAPSULE | Refills: 1 | Status: SHIPPED | OUTPATIENT
Start: 2025-03-19 | End: 2025-09-15

## 2025-03-19 RX ORDER — LISINOPRIL 40 MG/1
40 TABLET ORAL DAILY
Qty: 90 TABLET | Refills: 3 | Status: SHIPPED | OUTPATIENT
Start: 2025-03-19

## 2025-03-19 RX ORDER — MULTIVITAMIN
1 TABLET ORAL DAILY
Qty: 90 TABLET | Refills: 4 | Status: SHIPPED | OUTPATIENT
Start: 2025-03-19

## 2025-03-19 NOTE — TELEPHONE ENCOUNTER
[facility] Gabapentin as 11/27/4 for a 30 d/s.      Last Visit: 12/4/24  Next Appointment: None  Previous Refill Encounter(s): Date: 12/27/24 #270  Controlled Substance Agreement: None  Urine Drug Screen: None    Previous Refill Encounter(s) Multivitamin: Date: 3/22/24 #90  Previous Refill Encounter(s) Lisinopril: Date: 10/03/24 #90    Requested Prescriptions     Pending Prescriptions Disp Refills    Multiple Vitamin (MULTIVITAMIN) TABS 90 tablet 4     Sig: Take 1 tablet by mouth daily Please dispense brand covered by insurance    lisinopril (PRINIVIL;ZESTRIL) 40 MG tablet 90 tablet 1     Sig: Take 1 tablet by mouth daily    gabapentin (NEURONTIN) 400 MG capsule 270 capsule 0     Sig: Take 1 capsule by mouth 3 times daily for 90 days. TAKE ONE CAPSULE BY MOUTH THREE TIMES A DAY Max Daily Amount: 1,200 mg

## 2025-04-10 NOTE — TELEPHONE ENCOUNTER
Last Visit: 12/4/24   Next Appointment: none    Requested Prescriptions     Pending Prescriptions Disp Refills    atenolol (TENORMIN) 100 MG tablet 180 tablet 0     Sig: Take 1 tablet by mouth 2 times daily

## 2025-04-14 DIAGNOSIS — F41.0 PANIC DISORDER (EPISODIC PAROXYSMAL ANXIETY): ICD-10-CM

## 2025-04-14 DIAGNOSIS — M54.41 LOW BACK PAIN WITH RIGHT-SIDED SCIATICA, UNSPECIFIED BACK PAIN LATERALITY, UNSPECIFIED CHRONICITY: ICD-10-CM

## 2025-04-16 ENCOUNTER — PATIENT MESSAGE (OUTPATIENT)
Facility: CLINIC | Age: 40
End: 2025-04-16

## 2025-04-16 RX ORDER — GABAPENTIN 400 MG/1
400 CAPSULE ORAL 3 TIMES DAILY
Qty: 270 CAPSULE | Refills: 1 | Status: SHIPPED | OUTPATIENT
Start: 2025-04-16 | End: 2025-10-13

## 2025-04-16 RX ORDER — CLONAZEPAM 1 MG/1
1 TABLET ORAL 3 TIMES DAILY PRN
Qty: 60 TABLET | Refills: 0 | Status: SHIPPED | OUTPATIENT
Start: 2025-04-16 | End: 2025-05-16

## 2025-04-16 RX ORDER — ATENOLOL 100 MG/1
100 TABLET ORAL 2 TIMES DAILY
Qty: 180 TABLET | Refills: 0 | Status: SHIPPED | OUTPATIENT
Start: 2025-04-16

## 2025-04-16 NOTE — TELEPHONE ENCOUNTER
Patient comment: I'm currently in [place] and have to pick these up at this exact location. I'm an over the road  and have spoken to OLEG Wing about my complicated circumstances with having to leave areas suddenly at times so if she could be notified it's Tom Rousseau dealing with the controlled medication situation she has been very kind and worked with me in the past. Thank you!!        To be filled at: None [Patient requested: [address] [place] [address], [address]]         [facility] last fill date for Clonazepam as 12/4/24 for a 30 d/s.   [facility]  reports the last fill date for Gabapentin as 11/27/24 for a 30 d/s.      Last Visit: 12/4/24  Next Appointment: None  Previous Refill Encounter(s) Clonazepam: Date: 2/1/25 #60  Previous Refill Encounter(s) Gabapentin: Date: 3/19/25 #270  Controlled Substance Agreement: 11/27/24  Urine Drug Screen: None    Requested Prescriptions     Pending Prescriptions Disp Refills    clonazePAM (KLONOPIN) 1 MG tablet 60 tablet 0     Sig: Take 1 tablet by mouth 3 times daily as needed for Anxiety for up to 30 days. Max Daily Amount: 3 mg    gabapentin (NEURONTIN) 400 MG capsule 270 capsule 1     Sig: Take 1 capsule by mouth 3 times daily for 180 days. TAKE ONE CAPSULE BY MOUTH THREE TIMES A DAY Max Daily Amount: 1,200 mg

## 2025-04-16 NOTE — TELEPHONE ENCOUNTER
Patient has been informed via [employer] message from 4/14/25 that his request has been forwarded to KEVIN Wing for review.

## 2025-05-01 RX ORDER — ATENOLOL 100 MG/1
100 TABLET ORAL 2 TIMES DAILY
Qty: 180 TABLET | Refills: 0 | OUTPATIENT
Start: 2025-05-01

## 2025-06-03 ENCOUNTER — OFFICE VISIT (OUTPATIENT)
Facility: CLINIC | Age: 40
End: 2025-06-03
Payer: MEDICAID

## 2025-06-03 VITALS
DIASTOLIC BLOOD PRESSURE: 83 MMHG | HEART RATE: 68 BPM | HEIGHT: 74 IN | BODY MASS INDEX: 29.44 KG/M2 | WEIGHT: 229.4 LBS | SYSTOLIC BLOOD PRESSURE: 137 MMHG | OXYGEN SATURATION: 98 % | TEMPERATURE: 97.9 F | RESPIRATION RATE: 16 BRPM

## 2025-06-03 DIAGNOSIS — F41.1 GAD (GENERALIZED ANXIETY DISORDER): ICD-10-CM

## 2025-06-03 DIAGNOSIS — F41.0 PANIC DISORDER (EPISODIC PAROXYSMAL ANXIETY): ICD-10-CM

## 2025-06-03 DIAGNOSIS — E78.00 HYPERCHOLESTEREMIA: ICD-10-CM

## 2025-06-03 DIAGNOSIS — I10 ESSENTIAL HYPERTENSION: Primary | ICD-10-CM

## 2025-06-03 PROCEDURE — 99214 OFFICE O/P EST MOD 30 MIN: CPT | Performed by: NURSE PRACTITIONER

## 2025-06-03 PROCEDURE — 3079F DIAST BP 80-89 MM HG: CPT | Performed by: NURSE PRACTITIONER

## 2025-06-03 PROCEDURE — 3075F SYST BP GE 130 - 139MM HG: CPT | Performed by: NURSE PRACTITIONER

## 2025-06-03 RX ORDER — CLONAZEPAM 1 MG/1
1 TABLET ORAL 3 TIMES DAILY PRN
Qty: 60 TABLET | Refills: 0 | Status: SHIPPED | OUTPATIENT
Start: 2025-06-03 | End: 2025-06-04

## 2025-06-03 RX ORDER — PRAZOSIN HYDROCHLORIDE 1 MG/1
1 CAPSULE ORAL NIGHTLY
Qty: 30 CAPSULE | Refills: 1 | Status: SHIPPED | OUTPATIENT
Start: 2025-06-03

## 2025-06-03 RX ORDER — ESCITALOPRAM OXALATE 20 MG/1
TABLET ORAL
Qty: 90 TABLET | Refills: 3 | Status: SHIPPED | OUTPATIENT
Start: 2025-06-03 | End: 2025-09-09

## 2025-06-03 SDOH — ECONOMIC STABILITY: FOOD INSECURITY: WITHIN THE PAST 12 MONTHS, THE FOOD YOU BOUGHT JUST DIDN'T LAST AND YOU DIDN'T HAVE MONEY TO GET MORE.: SOMETIMES TRUE

## 2025-06-03 SDOH — ECONOMIC STABILITY: FOOD INSECURITY: WITHIN THE PAST 12 MONTHS, YOU WORRIED THAT YOUR FOOD WOULD RUN OUT BEFORE YOU GOT MONEY TO BUY MORE.: SOMETIMES TRUE

## 2025-06-03 ASSESSMENT — PATIENT HEALTH QUESTIONNAIRE - PHQ9
2. FEELING DOWN, DEPRESSED OR HOPELESS: NOT AT ALL
1. LITTLE INTEREST OR PLEASURE IN DOING THINGS: NOT AT ALL
3. TROUBLE FALLING OR STAYING ASLEEP: MORE THAN HALF THE DAYS
SUM OF ALL RESPONSES TO PHQ QUESTIONS 1-9: 6
9. THOUGHTS THAT YOU WOULD BE BETTER OFF DEAD, OR OF HURTING YOURSELF: NOT AT ALL
SUM OF ALL RESPONSES TO PHQ QUESTIONS 1-9: 6
6. FEELING BAD ABOUT YOURSELF - OR THAT YOU ARE A FAILURE OR HAVE LET YOURSELF OR YOUR FAMILY DOWN: NOT AT ALL
8. MOVING OR SPEAKING SO SLOWLY THAT OTHER PEOPLE COULD HAVE NOTICED. OR THE OPPOSITE, BEING SO FIGETY OR RESTLESS THAT YOU HAVE BEEN MOVING AROUND A LOT MORE THAN USUAL: NOT AT ALL
10. IF YOU CHECKED OFF ANY PROBLEMS, HOW DIFFICULT HAVE THESE PROBLEMS MADE IT FOR YOU TO DO YOUR WORK, TAKE CARE OF THINGS AT HOME, OR GET ALONG WITH OTHER PEOPLE: NOT DIFFICULT AT ALL
4. FEELING TIRED OR HAVING LITTLE ENERGY: MORE THAN HALF THE DAYS
7. TROUBLE CONCENTRATING ON THINGS, SUCH AS READING THE NEWSPAPER OR WATCHING TELEVISION: NOT AT ALL
5. POOR APPETITE OR OVEREATING: MORE THAN HALF THE DAYS

## 2025-06-03 NOTE — PROGRESS NOTES
Have you been to the ER, urgent care clinic since your last visit?  Hospitalized since your last visit?   NO    Have you seen or consulted any other health care providers outside our system since your last visit?   NO    
presents for symptoms of anxiety and night terrors.    He has been experiencing increased anxiety and night terrors for the past 2 weeks, which he attributes to the impending loss of his grandmother and the stress associated with starting a new job. He is considering seeking psychological help and is open to resuming Lexapro and obtaining a refill of clonazepam. He also expresses interest in addressing his sleep issues. He reports that his anxiety significantly impacts his daily life, but he does not experience any depressive symptoms. He is currently on gabapentin, which he finds beneficial for his anxiety. He discontinued Lexapro approximately a year ago due to difficulties in obtaining the medication from the pharmacy. Despite this, he managed to cope with his anxiety without medication, resorting to clonazepam only during periods of heightened anxiety. His use of clonazepam has increased recently, necessitating a refill every 2 months.    He is currently on atenolol and lisinopril for blood pressure management and does not wish to discontinue these medications. He does not possess a blood pressure cuff but is willing to purchase one if necessary.    He has been experiencing fatigue, malaise, periorbital dark circles, and a sensation of his right leg not functioning optimally, leading to tripping incidents.    He has been using melatonin for sleep but reports that it is ineffective. He has also tried Benadryl, which induces drowsiness but does not alleviate his night terrors.    He was previously on Eliquis for pulmonary embolism but has since been cleared. He takes Lopid and reports no issues with it. He experiences heartburn and takes acid reducers as needed. He has discontinued hydrochlorothiazide, which was prescribed for heart failure and pulmonary embolism.    Review of Systems   Constitutional:  Negative for appetite change, chills, fatigue and fever.   Respiratory:  Negative for cough and shortness of

## 2025-06-04 RX ORDER — CLONAZEPAM 1 MG/1
1 TABLET ORAL 3 TIMES DAILY PRN
Qty: 180 TABLET | Refills: 0 | Status: SHIPPED | OUTPATIENT
Start: 2025-06-04 | End: 2025-09-02

## (undated) DEVICE — 3M™ STERI-DRAPE™ INSTRUMENT POUCH 1018L: Brand: STERI-DRAPE™

## (undated) DEVICE — DRAPE,EXTREMITY,89X128,STERILE: Brand: MEDLINE

## (undated) DEVICE — SOLUTION LACTATED RINGERS INJECTION USP

## (undated) DEVICE — CORD ES L10FT MPLR LAP

## (undated) DEVICE — LIGHT HANDLE COVER ,2 PER PACK: Brand: DEROYAL

## (undated) DEVICE — PACK PROCEDURE SURG MAJ W/ BASIN LF

## (undated) DEVICE — SUTURE SZ 0 27IN 5/8 CIR UR-6  TAPER PT VIOLET ABSRB VICRYL J603H

## (undated) DEVICE — GARMENT,MEDLINE,DVT,INT,CALF,MED, GEN2: Brand: MEDLINE

## (undated) DEVICE — INTENDED FOR TISSUE SEPARATION, AND OTHER PROCEDURES THAT REQUIRE A SHARP SURGICAL BLADE TO PUNCTURE OR CUT.: Brand: BARD-PARKER SAFETY BLADES SIZE 11, STERILE

## (undated) DEVICE — CULTURETTE SGL EVAC TUBE PALL -- 100/CA

## (undated) DEVICE — KIT CLN UP BON SECOURS MARYV

## (undated) DEVICE — TROCAR: Brand: KII® OPTICAL ACCESS SYSTEM

## (undated) DEVICE — GARMENT,MEDLINE,DVT,INT,CALF,FOAM,MED: Brand: MEDLINE

## (undated) DEVICE — FLEX ADVANTAGE 3000CC: Brand: FLEX ADVANTAGE

## (undated) DEVICE — TISSUE RETRIEVAL SYSTEM: Brand: INZII RETRIEVAL SYSTEM

## (undated) DEVICE — TABLE COVER: Brand: CONVERTORS

## (undated) DEVICE — BLANKET WRM AD W50XL85.8IN PACU FULL BODY FORC AIR

## (undated) DEVICE — TRAY,URINE METER,100% SILICONE,16FR10ML: Brand: MEDLINE

## (undated) DEVICE — REM POLYHESIVE ADULT PATIENT RETURN ELECTRODE: Brand: VALLEYLAB

## (undated) DEVICE — PADDING,UNDERCAST,COTTON, 4"X4YD STERILE: Brand: MEDLINE

## (undated) DEVICE — BLANKET WRM W40.2XL55.9IN IORT LO BODY + MISTRAL AIR

## (undated) DEVICE — DRESSING FOAM DISK DIA1IN H 7MM HYDRPHLC CHG IMPREG IN SL

## (undated) DEVICE — DRAIN KT WND 10FR RND 400ML --

## (undated) DEVICE — STERILE POLYISOPRENE POWDER-FREE SURGICAL GLOVES: Brand: PROTEXIS

## (undated) DEVICE — HEX-LOCKING BLADE ELECTRODE: Brand: EDGE

## (undated) DEVICE — (D)PREP SKN CHLRAPRP APPL 26ML -- CONVERT TO ITEM 371833

## (undated) DEVICE — CUTTER ENDOSCP L340MM LIN ARTC SGL STROKE FIRING ENDOPATH

## (undated) DEVICE — GAUZE,SPONGE,4"X4",16PLY,STRL,LF,10/TRAY: Brand: MEDLINE

## (undated) DEVICE — SWAB CULT LIQ STUART AGR AERB MOD IN BRK SGL RAYON TIP PLAS 220099] BECTON DICKINSON MICRO]

## (undated) DEVICE — ENDOLOOP SUT LIGATURE 18IN -- 12/BX PDS II

## (undated) DEVICE — DRESSING,GAUZE,XEROFORM,CURAD,1"X8",ST: Brand: CURAD

## (undated) DEVICE — THREE-QUARTER SHEET: Brand: CONVERTORS

## (undated) DEVICE — SUTURE PDS II SZ 0 L27IN ABSRB VLT L36MM CT-1 1/2 CIR Z340H

## (undated) DEVICE — TROCAR: Brand: KII® SLEEVE

## (undated) DEVICE — SUTURE MCRYL SZ 4-0 L27IN ABSRB UD L24MM PS-1 3/8 CIR PRIM Y935H

## (undated) DEVICE — Device

## (undated) DEVICE — SUTURE ENDOLOOP SZ 0 L18IN ABSRB VLT LIG SLDE KNOT VCRL

## (undated) DEVICE — STRIP,CLOSURE,WOUND,MEDI-STRIP,1/2X4: Brand: MEDLINE

## (undated) DEVICE — LAPAROSCOPIC SMOKE FILTRATION SYSTEM: Brand: PALL LAPAROSHIELD® PLUS LAPAROSCOPIC SMOKE FILTRATION SYSTEM

## (undated) DEVICE — SOLUTION IV 1000ML 0.9% SOD CHL

## (undated) DEVICE — DRAPE TWL SURG 16X26IN BLU ORB04] ALLCARE INC]

## (undated) DEVICE — LAPAROSCOPIC TROCAR SLEEVE/SINGLE USE: Brand: KII® OPTICAL ACCESS SYSTEM

## (undated) DEVICE — 3M™ TEGADERM™ HP TRANSPARENT FILM DRESSING FRAME STYLE, 9534HP, 2-3/8 X 2-3/4 IN (6 CM X 7 CM), 100/CT 4CT/CASE: Brand: 3M™ TEGADERM™

## (undated) DEVICE — FCPS ENDOSCP 5MMX33CM -- ENDOPATH

## (undated) DEVICE — AIRLIFE™ ADULT CUSHION NASAL CANNULA 14 FOOT (4.3) CRUSH-RESISTANT OXYGEN TUBING, AND U/CONNECT-IT ADAPTER: Brand: AIRLIFE™

## (undated) DEVICE — NEEDLE HYPO 25GA L1.5IN BVL ORIENTED ECLIPSE

## (undated) DEVICE — DRAPE,HAND,STERILE: Brand: MEDLINE

## (undated) DEVICE — HANDPIECE SET WITH HIGH FLOW TIP AND SUCTION TUBE: Brand: INTERPULSE

## (undated) DEVICE — INTENDED FOR TISSUE SEPARATION, AND OTHER PROCEDURES THAT REQUIRE A SHARP SURGICAL BLADE TO PUNCTURE OR CUT.: Brand: BARD-PARKER SAFETY BLADES SIZE 10, STERILE

## (undated) DEVICE — SOLUTION IRRIG 3000ML 0.9% SOD CHL FLX CONT 0797208] ICU MEDICAL INC]